# Patient Record
Sex: MALE | Race: BLACK OR AFRICAN AMERICAN | NOT HISPANIC OR LATINO | Employment: OTHER | URBAN - METROPOLITAN AREA
[De-identification: names, ages, dates, MRNs, and addresses within clinical notes are randomized per-mention and may not be internally consistent; named-entity substitution may affect disease eponyms.]

---

## 2017-09-27 ENCOUNTER — LAB VISIT (OUTPATIENT)
Dept: LAB | Facility: HOSPITAL | Age: 77
End: 2017-09-27
Payer: MEDICARE

## 2017-09-27 ENCOUNTER — OFFICE VISIT (OUTPATIENT)
Dept: FAMILY MEDICINE | Facility: CLINIC | Age: 77
End: 2017-09-27
Payer: MEDICARE

## 2017-09-27 VITALS
WEIGHT: 315 LBS | HEIGHT: 74 IN | BODY MASS INDEX: 40.43 KG/M2 | SYSTOLIC BLOOD PRESSURE: 136 MMHG | DIASTOLIC BLOOD PRESSURE: 70 MMHG | RESPIRATION RATE: 18 BRPM | TEMPERATURE: 97 F | OXYGEN SATURATION: 97 % | HEART RATE: 90 BPM

## 2017-09-27 DIAGNOSIS — J30.9 ALLERGIC RHINITIS, UNSPECIFIED CHRONICITY, UNSPECIFIED SEASONALITY, UNSPECIFIED TRIGGER: ICD-10-CM

## 2017-09-27 DIAGNOSIS — Z79.4 TYPE 2 DIABETES MELLITUS WITH HYPERGLYCEMIA, WITH LONG-TERM CURRENT USE OF INSULIN: ICD-10-CM

## 2017-09-27 DIAGNOSIS — R42 DIZZINESS: ICD-10-CM

## 2017-09-27 DIAGNOSIS — J44.9 CHRONIC OBSTRUCTIVE PULMONARY DISEASE, UNSPECIFIED COPD TYPE: ICD-10-CM

## 2017-09-27 DIAGNOSIS — I10 ESSENTIAL HYPERTENSION: ICD-10-CM

## 2017-09-27 DIAGNOSIS — I25.10 CORONARY ARTERY DISEASE, ANGINA PRESENCE UNSPECIFIED, UNSPECIFIED VESSEL OR LESION TYPE, UNSPECIFIED WHETHER NATIVE OR TRANSPLANTED HEART: ICD-10-CM

## 2017-09-27 DIAGNOSIS — M17.0 OSTEOARTHRITIS OF BOTH KNEES, UNSPECIFIED OSTEOARTHRITIS TYPE: ICD-10-CM

## 2017-09-27 DIAGNOSIS — E78.00 HYPERCHOLESTEROLEMIA: ICD-10-CM

## 2017-09-27 DIAGNOSIS — E11.65 TYPE 2 DIABETES MELLITUS WITH HYPERGLYCEMIA, WITH LONG-TERM CURRENT USE OF INSULIN: ICD-10-CM

## 2017-09-27 DIAGNOSIS — G47.30 SLEEP APNEA, UNSPECIFIED TYPE: ICD-10-CM

## 2017-09-27 DIAGNOSIS — M54.50 CHRONIC LOW BACK PAIN WITHOUT SCIATICA, UNSPECIFIED BACK PAIN LATERALITY: ICD-10-CM

## 2017-09-27 DIAGNOSIS — G89.29 CHRONIC LOW BACK PAIN WITHOUT SCIATICA, UNSPECIFIED BACK PAIN LATERALITY: ICD-10-CM

## 2017-09-27 DIAGNOSIS — N40.0 BENIGN PROSTATIC HYPERPLASIA, PRESENCE OF LOWER URINARY TRACT SYMPTOMS UNSPECIFIED: ICD-10-CM

## 2017-09-27 PROBLEM — M54.9 CHRONIC BACK PAIN: Status: ACTIVE | Noted: 2017-09-27

## 2017-09-27 LAB
ALBUMIN SERPL BCP-MCNC: 3.5 G/DL
ALP SERPL-CCNC: 107 U/L
ALT SERPL W/O P-5'-P-CCNC: 19 U/L
ANION GAP SERPL CALC-SCNC: 12 MMOL/L
AST SERPL-CCNC: 21 U/L
BASOPHILS # BLD AUTO: 0.01 K/UL
BASOPHILS NFR BLD: 0.1 %
BILIRUB SERPL-MCNC: 0.4 MG/DL
BUN SERPL-MCNC: 21 MG/DL
CALCIUM SERPL-MCNC: 9.1 MG/DL
CHLORIDE SERPL-SCNC: 101 MMOL/L
CHOLEST SERPL-MCNC: 130 MG/DL
CHOLEST/HDLC SERPL: 2.5 {RATIO}
CO2 SERPL-SCNC: 26 MMOL/L
CREAT SERPL-MCNC: 1.2 MG/DL
DIFFERENTIAL METHOD: ABNORMAL
EOSINOPHIL # BLD AUTO: 0.2 K/UL
EOSINOPHIL NFR BLD: 1.8 %
ERYTHROCYTE [DISTWIDTH] IN BLOOD BY AUTOMATED COUNT: 14.6 %
EST. GFR  (AFRICAN AMERICAN): >60 ML/MIN/1.73 M^2
EST. GFR  (NON AFRICAN AMERICAN): 58 ML/MIN/1.73 M^2
ESTIMATED AVG GLUCOSE: 209 MG/DL
GLUCOSE SERPL-MCNC: 264 MG/DL
HBA1C MFR BLD HPLC: 8.9 %
HCT VFR BLD AUTO: 40.3 %
HDLC SERPL-MCNC: 51 MG/DL
HDLC SERPL: 39.2 %
HGB BLD-MCNC: 13.1 G/DL
LDLC SERPL CALC-MCNC: 65.8 MG/DL
LYMPHOCYTES # BLD AUTO: 2.5 K/UL
LYMPHOCYTES NFR BLD: 26.2 %
MCH RBC QN AUTO: 31 PG
MCHC RBC AUTO-ENTMCNC: 32.5 G/DL
MCV RBC AUTO: 95 FL
MONOCYTES # BLD AUTO: 0.9 K/UL
MONOCYTES NFR BLD: 9.3 %
NEUTROPHILS # BLD AUTO: 6 K/UL
NEUTROPHILS NFR BLD: 62.5 %
NONHDLC SERPL-MCNC: 79 MG/DL
PLATELET # BLD AUTO: 207 K/UL
PMV BLD AUTO: 10.9 FL
POTASSIUM SERPL-SCNC: 4.1 MMOL/L
PROT SERPL-MCNC: 7.1 G/DL
RBC # BLD AUTO: 4.23 M/UL
SODIUM SERPL-SCNC: 139 MMOL/L
TRIGL SERPL-MCNC: 66 MG/DL
TSH SERPL DL<=0.005 MIU/L-ACNC: 3.67 UIU/ML
WBC # BLD AUTO: 9.57 K/UL

## 2017-09-27 PROCEDURE — 80053 COMPREHEN METABOLIC PANEL: CPT

## 2017-09-27 PROCEDURE — 1125F AMNT PAIN NOTED PAIN PRSNT: CPT | Mod: S$GLB,,, | Performed by: INTERNAL MEDICINE

## 2017-09-27 PROCEDURE — 3008F BODY MASS INDEX DOCD: CPT | Mod: S$GLB,,, | Performed by: INTERNAL MEDICINE

## 2017-09-27 PROCEDURE — 80061 LIPID PANEL: CPT

## 2017-09-27 PROCEDURE — G0008 ADMIN INFLUENZA VIRUS VAC: HCPCS | Mod: S$GLB,,, | Performed by: INTERNAL MEDICINE

## 2017-09-27 PROCEDURE — 36415 COLL VENOUS BLD VENIPUNCTURE: CPT | Mod: PO

## 2017-09-27 PROCEDURE — 85025 COMPLETE CBC W/AUTO DIFF WBC: CPT

## 2017-09-27 PROCEDURE — 99999 PR PBB SHADOW E&M-NEW PATIENT-LVL V: CPT | Mod: PBBFAC,,, | Performed by: INTERNAL MEDICINE

## 2017-09-27 PROCEDURE — 84443 ASSAY THYROID STIM HORMONE: CPT

## 2017-09-27 PROCEDURE — 99205 OFFICE O/P NEW HI 60 MIN: CPT | Mod: S$GLB,,, | Performed by: INTERNAL MEDICINE

## 2017-09-27 PROCEDURE — 83036 HEMOGLOBIN GLYCOSYLATED A1C: CPT

## 2017-09-27 PROCEDURE — 1159F MED LIST DOCD IN RCRD: CPT | Mod: S$GLB,,, | Performed by: INTERNAL MEDICINE

## 2017-09-27 PROCEDURE — 90662 IIV NO PRSV INCREASED AG IM: CPT | Mod: S$GLB,,, | Performed by: INTERNAL MEDICINE

## 2017-09-27 RX ORDER — HYDROCODONE BITARTRATE AND ACETAMINOPHEN 10; 325 MG/1; MG/1
TABLET ORAL
Refills: 0 | COMMUNITY
Start: 2017-07-03

## 2017-09-27 RX ORDER — VALSARTAN 80 MG/1
80 TABLET ORAL DAILY
COMMUNITY
End: 2017-10-12

## 2017-09-27 RX ORDER — MECLIZINE HCL 12.5 MG 12.5 MG/1
12.5 TABLET ORAL 3 TIMES DAILY PRN
Qty: 60 TABLET | Refills: 1 | Status: SHIPPED | OUTPATIENT
Start: 2017-09-27 | End: 2017-10-12

## 2017-09-27 RX ORDER — BUMETANIDE 2 MG/1
TABLET ORAL
Refills: 5 | COMMUNITY
Start: 2017-07-11 | End: 2018-10-26 | Stop reason: SDUPTHER

## 2017-09-27 RX ORDER — FLUTICASONE PROPIONATE 50 MCG
2 SPRAY, SUSPENSION (ML) NASAL DAILY
Qty: 16 BOTTLE | Refills: 6 | Status: SHIPPED | OUTPATIENT
Start: 2017-09-27 | End: 2017-11-03

## 2017-09-27 RX ORDER — POTASSIUM CHLORIDE 750 MG/1
TABLET, EXTENDED RELEASE ORAL
Refills: 3 | COMMUNITY
Start: 2017-07-03 | End: 2018-01-29 | Stop reason: SDUPTHER

## 2017-09-27 RX ORDER — ISOSORBIDE DINITRATE 30 MG/1
TABLET ORAL
Refills: 5 | COMMUNITY
Start: 2017-07-25 | End: 2017-10-12

## 2017-09-27 RX ORDER — TAMSULOSIN HYDROCHLORIDE 0.4 MG/1
CAPSULE ORAL
Refills: 11 | COMMUNITY
Start: 2017-07-24

## 2017-09-27 RX ORDER — ALBUTEROL 2 MG/1
2 TABLET ORAL 4 TIMES DAILY
COMMUNITY
End: 2021-07-23

## 2017-09-27 RX ORDER — ATORVASTATIN CALCIUM 40 MG/1
40 TABLET, FILM COATED ORAL DAILY
COMMUNITY
End: 2018-01-29 | Stop reason: SDUPTHER

## 2017-09-27 NOTE — PROGRESS NOTES
Subjective:       Patient ID: Jose Luis Martinez is a 77 y.o. male.    Chief Complaint: Establish Care; Sinus Problem; Medication Refill; Hypertension; Hyperlipidemia; Diabetes; COPD; Coronary Artery Disease; and Benign Prostatic Hypertrophy  -est care--------  Sinus Problem   This is a recurrent problem. Associated symptoms include congestion and shortness of breath. Pertinent negatives include no chills, coughing, diaphoresis, ear pain, headaches, neck pain, sinus pressure, sneezing or sore throat.   Medication Refill   Associated symptoms include congestion. Pertinent negatives include no abdominal pain, arthralgias, chest pain, chills, coughing, diaphoresis, fatigue, fever, headaches, joint swelling, myalgias, nausea, neck pain, numbness, rash, sore throat, vomiting or weakness.   Hypertension   This is a chronic problem. The problem is controlled. Associated symptoms include shortness of breath. Pertinent negatives include no chest pain, headaches, neck pain or palpitations.   Hyperlipidemia   This is a chronic problem. Associated symptoms include shortness of breath. Pertinent negatives include no chest pain or myalgias.   Diabetes   He presents for his follow-up diabetic visit. He has type 2 diabetes mellitus. Pertinent negatives for hypoglycemia include no confusion, dizziness, headaches, nervousness/anxiousness, pallor, seizures, speech difficulty or tremors. Pertinent negatives for diabetes include no chest pain, no fatigue, no polydipsia, no polyphagia, no polyuria and no weakness.   COPD   This is a chronic problem. Associated symptoms include congestion. Pertinent negatives include no abdominal pain, arthralgias, chest pain, chills, coughing, diaphoresis, fatigue, fever, headaches, joint swelling, myalgias, nausea, neck pain, numbness, rash, sore throat, vomiting or weakness.   Coronary Artery Disease   Presents for follow-up visit. Symptoms include shortness of breath. Pertinent negatives include no chest  pain, chest tightness, dizziness, leg swelling or palpitations. Risk factors include hyperlipidemia. The symptoms have been stable.   Benign Prostatic Hypertrophy   This is a chronic problem. Irritative symptoms do not include frequency or urgency. Pertinent negatives include no chills, dysuria, hematuria, nausea or vomiting.     Review of Systems   Constitutional: Negative for activity change, appetite change, chills, diaphoresis, fatigue, fever and unexpected weight change.   HENT: Positive for congestion. Negative for drooling, ear discharge, ear pain, facial swelling, hearing loss, mouth sores, nosebleeds, postnasal drip, rhinorrhea, sinus pressure, sneezing, sore throat, tinnitus, trouble swallowing and voice change.    Eyes: Negative for photophobia.   Respiratory: Positive for shortness of breath. Negative for apnea, cough, choking, chest tightness and wheezing.    Cardiovascular: Negative for chest pain, palpitations and leg swelling.   Gastrointestinal: Negative for abdominal distention, abdominal pain, anal bleeding, blood in stool, constipation, diarrhea, nausea and vomiting.   Endocrine: Negative for cold intolerance, heat intolerance, polydipsia, polyphagia and polyuria.   Genitourinary: Negative for difficulty urinating, dysuria, enuresis, flank pain, frequency, genital sores, hematuria and urgency.   Musculoskeletal: Positive for gait problem. Negative for arthralgias, back pain, joint swelling, myalgias, neck pain and neck stiffness.   Skin: Negative for color change, pallor, rash and wound.   Allergic/Immunologic: Negative for food allergies and immunocompromised state.   Neurological: Negative for dizziness, tremors, seizures, syncope, facial asymmetry, speech difficulty, weakness, light-headedness, numbness and headaches.   Hematological: Negative for adenopathy. Does not bruise/bleed easily.   Psychiatric/Behavioral: Negative for agitation, behavioral problems, confusion, decreased concentration,  dysphoric mood, hallucinations, self-injury, sleep disturbance and suicidal ideas. The patient is not nervous/anxious and is not hyperactive.        Objective:      Physical Exam   Constitutional: He is oriented to person, place, and time. He appears well-developed and well-nourished. No distress.   HENT:   Head: Normocephalic and atraumatic.   Eyes: Pupils are equal, round, and reactive to light.   Neck: Normal range of motion. Neck supple. Carotid bruit is not present.   Cardiovascular: Normal rate, regular rhythm, normal heart sounds and intact distal pulses.    Pulmonary/Chest: Effort normal and breath sounds normal. No respiratory distress. He has no wheezes. He has no rales. He exhibits no tenderness.   Abdominal: Soft. Bowel sounds are normal. He exhibits no distension. There is no tenderness. There is no rebound and no guarding.   Musculoskeletal: Normal range of motion. He exhibits no edema or tenderness.   Neurological: He is alert and oriented to person, place, and time.   Skin: Skin is warm and dry. No rash noted. He is not diaphoretic. No erythema. No pallor.   Psychiatric: He has a normal mood and affect. His behavior is normal. Judgment and thought content normal.   Nursing note and vitals reviewed.      Assessment:       1. Sleep apnea, unspecified type    2. Chronic obstructive pulmonary disease, unspecified COPD type    3. Essential hypertension    4. Hypercholesterolemia    5. Type 2 diabetes mellitus with hyperglycemia, with long-term current use of insulin    6. Benign prostatic hyperplasia, presence of lower urinary tract symptoms unspecified    7. Coronary artery disease, angina presence unspecified, unspecified vessel or lesion type, unspecified whether native or transplanted heart    8. Chronic low back pain without sciatica, unspecified back pain laterality    9. Osteoarthritis of both knees, unspecified osteoarthritis type    10. Allergic rhinitis, unspecified chronicity, unspecified  seasonality, unspecified trigger    11. Dizziness        Plan:         stable------------continue meds.              Notes/labs reviewed.             Sees cards dr mccormick, urology dr heredia.                 Check cmp,cbc,hga1c,lipids,tsh.                ----pulm consult for copd---------wants to wait on this-               Colon at [facility].                  F/u prn or 3 months.

## 2017-09-28 ENCOUNTER — TELEPHONE (OUTPATIENT)
Dept: FAMILY MEDICINE | Facility: CLINIC | Age: 77
End: 2017-09-28

## 2017-10-12 ENCOUNTER — OFFICE VISIT (OUTPATIENT)
Dept: FAMILY MEDICINE | Facility: CLINIC | Age: 77
End: 2017-10-12
Payer: MEDICARE

## 2017-10-12 VITALS
TEMPERATURE: 97 F | HEART RATE: 78 BPM | SYSTOLIC BLOOD PRESSURE: 130 MMHG | HEIGHT: 74 IN | WEIGHT: 315 LBS | RESPIRATION RATE: 18 BRPM | DIASTOLIC BLOOD PRESSURE: 76 MMHG | BODY MASS INDEX: 40.43 KG/M2 | OXYGEN SATURATION: 99 %

## 2017-10-12 DIAGNOSIS — I73.9 PAD (PERIPHERAL ARTERY DISEASE): ICD-10-CM

## 2017-10-12 DIAGNOSIS — R26.81 UNSTEADY GAIT: ICD-10-CM

## 2017-10-12 DIAGNOSIS — J30.9 ALLERGIC RHINITIS, UNSPECIFIED CHRONICITY, UNSPECIFIED SEASONALITY, UNSPECIFIED TRIGGER: Primary | ICD-10-CM

## 2017-10-12 DIAGNOSIS — Z79.4 TYPE 2 DIABETES MELLITUS WITH HYPERGLYCEMIA, WITH LONG-TERM CURRENT USE OF INSULIN: ICD-10-CM

## 2017-10-12 DIAGNOSIS — E11.65 TYPE 2 DIABETES MELLITUS WITH HYPERGLYCEMIA, WITH LONG-TERM CURRENT USE OF INSULIN: ICD-10-CM

## 2017-10-12 DIAGNOSIS — J44.9 CHRONIC OBSTRUCTIVE PULMONARY DISEASE, UNSPECIFIED COPD TYPE: ICD-10-CM

## 2017-10-12 PROCEDURE — 99999 PR PBB SHADOW E&M-EST. PATIENT-LVL V: CPT | Mod: PBBFAC,,, | Performed by: INTERNAL MEDICINE

## 2017-10-12 PROCEDURE — 99213 OFFICE O/P EST LOW 20 MIN: CPT | Mod: S$GLB,,, | Performed by: INTERNAL MEDICINE

## 2017-10-12 RX ORDER — ISOSORBIDE MONONITRATE 30 MG/1
TABLET, EXTENDED RELEASE ORAL
COMMUNITY
Start: 2017-09-28

## 2017-10-12 RX ORDER — BLOOD SUGAR DIAGNOSTIC
STRIP MISCELLANEOUS
COMMUNITY
Start: 2017-09-28 | End: 2017-12-28 | Stop reason: SDUPTHER

## 2017-10-12 RX ORDER — PEN NEEDLE, DIABETIC 31 GX5/16"
NEEDLE, DISPOSABLE MISCELLANEOUS
COMMUNITY
Start: 2017-08-18 | End: 2023-05-19 | Stop reason: SDUPTHER

## 2017-10-12 RX ORDER — VALSARTAN 160 MG/1
TABLET ORAL
COMMUNITY
Start: 2017-09-21 | End: 2018-09-26 | Stop reason: SDUPTHER

## 2017-10-12 RX ORDER — SYRING-NEEDL,DISP,INSUL,0.3 ML 30 GX5/16"
SYRINGE, EMPTY DISPOSABLE MISCELLANEOUS
COMMUNITY
Start: 2017-09-19 | End: 2019-06-07 | Stop reason: SDUPTHER

## 2017-10-12 RX ORDER — ALBUTEROL SULFATE 90 UG/1
AEROSOL, METERED RESPIRATORY (INHALATION)
COMMUNITY
Start: 2017-10-02 | End: 2018-06-13 | Stop reason: SDUPTHER

## 2017-10-12 RX ORDER — MECLIZINE HYDROCHLORIDE 25 MG/1
TABLET ORAL
COMMUNITY
Start: 2017-09-27 | End: 2018-08-16

## 2017-10-12 NOTE — PROGRESS NOTES
Subjective:       Patient ID: Jose Luis Martinez is a 77 y.o. male.    Chief Complaint: Follow-up and Shortness of Breath  -[facility] er f/u from past Saturday--------------for sob--------------dx'd---copd exacerbation-treated with nebs/steroids---------breathing good today-----has sinus congestion--------HPI  Review of Systems   Constitutional: Negative for chills and fever.   HENT: Positive for congestion, postnasal drip, rhinorrhea and sinus pressure. Negative for sore throat.    Respiratory: Negative for apnea, cough, choking, chest tightness, shortness of breath, wheezing and stridor.    Cardiovascular: Negative for chest pain and palpitations.   Gastrointestinal: Negative for abdominal pain, nausea and vomiting.   Genitourinary: Negative.    Neurological: Negative for tremors, seizures, syncope and speech difficulty.   Psychiatric/Behavioral: Negative for agitation, behavioral problems and confusion.       Objective:      Physical Exam   Constitutional: He is oriented to person, place, and time. He appears well-developed and well-nourished. No distress.   HENT:   Head: Normocephalic and atraumatic.   Eyes: Pupils are equal, round, and reactive to light.   Neck: Normal range of motion. Neck supple. Carotid bruit is not present.   Cardiovascular: Normal rate, regular rhythm, normal heart sounds and intact distal pulses.    Pulmonary/Chest: Effort normal and breath sounds normal. No respiratory distress. He has no wheezes. He has no rales. He exhibits no tenderness.   Abdominal: Soft. Bowel sounds are normal.   Musculoskeletal: Normal range of motion. He exhibits no edema or tenderness.   Neurological: He is alert and oriented to person, place, and time.   Skin: Skin is warm and dry. No rash noted. He is not diaphoretic. No erythema. No pallor.   Psychiatric: He has a normal mood and affect. His behavior is normal. Judgment and thought content normal.   Nursing note and vitals reviewed.      Assessment:       1. Allergic  rhinitis, unspecified chronicity, unspecified seasonality, unspecified trigger    2. PAD (peripheral artery disease)    3. Unsteady gait    4. Chronic obstructive pulmonary disease, unspecified COPD type    5. Type 2 diabetes mellitus with hyperglycemia, with long-term current use of insulin        Plan:        stable--------continue meds, watch diet.              Has f/u with pulm dr. Lai.         Notes/labs reviewed.               F/u as scheduled.

## 2017-10-20 ENCOUNTER — TELEPHONE (OUTPATIENT)
Dept: FAMILY MEDICINE | Facility: CLINIC | Age: 77
End: 2017-10-20

## 2017-10-20 ENCOUNTER — NURSE TRIAGE (OUTPATIENT)
Dept: ADMINISTRATIVE | Facility: CLINIC | Age: 77
End: 2017-10-20

## 2017-10-20 NOTE — TELEPHONE ENCOUNTER
----- Message from Anna Jones sent at 10/20/2017  3:04 PM CDT -----  Contact: mrs dunham -spouse  Would like to consult with nurse regarding patient's breathing. Please call back at [phone].        Thanks,  Anna Jones

## 2017-10-20 NOTE — TELEPHONE ENCOUNTER
Reason for Disposition   MODERATE difficulty breathing (e.g., speaks in phrases, SOB even at rest, pulse 100-120) of new onset or worse than normal   BP > 160 / 100 and any cardiac or neurologic symptoms (e.g., chest pain, difficulty breathing, unsteady gait, blurred vision)    Protocols used: ST BREATHING DIFFICULTY-A-OH, ST HIGH BLOOD PRESSURE-A-OH    Mr. Martinez states he is experiencing difficulty breathing and his chest feels tight. Patient states his blood pressure is currently 150/114. His blood sugar is 202 two hours after taking 30 units of insulin. Patient states he has not eaten.

## 2017-10-30 ENCOUNTER — OFFICE VISIT (OUTPATIENT)
Dept: FAMILY MEDICINE | Facility: CLINIC | Age: 77
End: 2017-10-30
Payer: MEDICARE

## 2017-10-30 ENCOUNTER — LAB VISIT (OUTPATIENT)
Dept: LAB | Facility: HOSPITAL | Age: 77
End: 2017-10-30
Payer: MEDICARE

## 2017-10-30 VITALS
HEART RATE: 89 BPM | BODY MASS INDEX: 40.43 KG/M2 | HEIGHT: 74 IN | WEIGHT: 315 LBS | RESPIRATION RATE: 16 BRPM | DIASTOLIC BLOOD PRESSURE: 82 MMHG | TEMPERATURE: 96 F | OXYGEN SATURATION: 96 % | SYSTOLIC BLOOD PRESSURE: 122 MMHG

## 2017-10-30 DIAGNOSIS — M17.0 OSTEOARTHRITIS OF BOTH KNEES, UNSPECIFIED OSTEOARTHRITIS TYPE: ICD-10-CM

## 2017-10-30 DIAGNOSIS — G47.30 SLEEP APNEA, UNSPECIFIED TYPE: ICD-10-CM

## 2017-10-30 DIAGNOSIS — I73.9 PAD (PERIPHERAL ARTERY DISEASE): ICD-10-CM

## 2017-10-30 DIAGNOSIS — N40.0 BENIGN PROSTATIC HYPERPLASIA, UNSPECIFIED WHETHER LOWER URINARY TRACT SYMPTOMS PRESENT: ICD-10-CM

## 2017-10-30 DIAGNOSIS — I10 ESSENTIAL HYPERTENSION: ICD-10-CM

## 2017-10-30 DIAGNOSIS — Z79.4 TYPE 2 DIABETES MELLITUS WITH HYPERGLYCEMIA, WITH LONG-TERM CURRENT USE OF INSULIN: ICD-10-CM

## 2017-10-30 DIAGNOSIS — E11.65 TYPE 2 DIABETES MELLITUS WITH HYPERGLYCEMIA, WITH LONG-TERM CURRENT USE OF INSULIN: ICD-10-CM

## 2017-10-30 DIAGNOSIS — M54.50 CHRONIC LOW BACK PAIN WITHOUT SCIATICA, UNSPECIFIED BACK PAIN LATERALITY: ICD-10-CM

## 2017-10-30 DIAGNOSIS — J44.9 CHRONIC OBSTRUCTIVE PULMONARY DISEASE, UNSPECIFIED COPD TYPE: ICD-10-CM

## 2017-10-30 DIAGNOSIS — R42 DIZZINESS: ICD-10-CM

## 2017-10-30 DIAGNOSIS — J30.9 ALLERGIC RHINITIS, UNSPECIFIED CHRONICITY, UNSPECIFIED SEASONALITY, UNSPECIFIED TRIGGER: Primary | ICD-10-CM

## 2017-10-30 DIAGNOSIS — E78.00 HYPERCHOLESTEROLEMIA: ICD-10-CM

## 2017-10-30 DIAGNOSIS — I25.10 CORONARY ARTERY DISEASE, ANGINA PRESENCE UNSPECIFIED, UNSPECIFIED VESSEL OR LESION TYPE, UNSPECIFIED WHETHER NATIVE OR TRANSPLANTED HEART: ICD-10-CM

## 2017-10-30 DIAGNOSIS — G89.29 CHRONIC LOW BACK PAIN WITHOUT SCIATICA, UNSPECIFIED BACK PAIN LATERALITY: ICD-10-CM

## 2017-10-30 LAB
BASOPHILS # BLD AUTO: 0.03 K/UL
BASOPHILS NFR BLD: 0.3 %
DIFFERENTIAL METHOD: ABNORMAL
EOSINOPHIL # BLD AUTO: 0.1 K/UL
EOSINOPHIL NFR BLD: 1 %
ERYTHROCYTE [DISTWIDTH] IN BLOOD BY AUTOMATED COUNT: 14.2 %
HCT VFR BLD AUTO: 41.2 %
HGB BLD-MCNC: 12.8 G/DL
IMM GRANULOCYTES # BLD AUTO: 0.02 K/UL
IMM GRANULOCYTES NFR BLD AUTO: 0.2 %
LYMPHOCYTES # BLD AUTO: 2.4 K/UL
LYMPHOCYTES NFR BLD: 26 %
MCH RBC QN AUTO: 29.6 PG
MCHC RBC AUTO-ENTMCNC: 31.1 G/DL
MCV RBC AUTO: 95 FL
MONOCYTES # BLD AUTO: 0.8 K/UL
MONOCYTES NFR BLD: 8.1 %
NEUTROPHILS # BLD AUTO: 6 K/UL
NEUTROPHILS NFR BLD: 64.4 %
NRBC BLD-RTO: 0 /100 WBC
PLATELET # BLD AUTO: 215 K/UL
PMV BLD AUTO: 10.9 FL
RBC # BLD AUTO: 4.32 M/UL
WBC # BLD AUTO: 9.37 K/UL

## 2017-10-30 PROCEDURE — 99999 PR PBB SHADOW E&M-EST. PATIENT-LVL IV: CPT | Mod: PBBFAC,,, | Performed by: INTERNAL MEDICINE

## 2017-10-30 PROCEDURE — 99215 OFFICE O/P EST HI 40 MIN: CPT | Mod: S$GLB,,, | Performed by: INTERNAL MEDICINE

## 2017-10-30 PROCEDURE — 36415 COLL VENOUS BLD VENIPUNCTURE: CPT | Mod: PO

## 2017-10-30 PROCEDURE — 85025 COMPLETE CBC W/AUTO DIFF WBC: CPT

## 2017-10-30 RX ORDER — FLUTICASONE PROPIONATE AND SALMETEROL 100; 50 UG/1; UG/1
1 POWDER RESPIRATORY (INHALATION) 2 TIMES DAILY
Qty: 60 EACH | Refills: 1 | Status: SHIPPED | OUTPATIENT
Start: 2017-10-30 | End: 2018-10-15

## 2017-10-30 NOTE — PROGRESS NOTES
Subjective:       Patient ID: Jose Luis Martinez is a 77 y.o. male.    Chief Complaint: Follow-up; Shortness of Breath; COPD; Hypertension; Hyperlipidemia; and Diabetes  --[facility] er f/u for SOB/copd--------missed his pulm f/u from previous er visit---------  Shortness of Breath   This is a recurrent problem. Pertinent negatives include no abdominal pain, chest pain, ear pain, fever, headaches, leg swelling, neck pain, rash, rhinorrhea, sore throat, vomiting or wheezing. His past medical history is significant for COPD.   COPD   This is a chronic problem. Associated symptoms include congestion and coughing. Pertinent negatives include no abdominal pain, arthralgias, chest pain, chills, diaphoresis, fatigue, fever, headaches, joint swelling, myalgias, nausea, neck pain, numbness, rash, sore throat, vomiting or weakness.   Hypertension   The problem is controlled. Associated symptoms include shortness of breath. Pertinent negatives include no chest pain, headaches, neck pain or palpitations.   Hyperlipidemia   Associated symptoms include shortness of breath. Pertinent negatives include no chest pain or myalgias. Current antihyperlipidemic treatment includes statins.   Diabetes   He presents for his follow-up diabetic visit. He has type 2 diabetes mellitus. Pertinent negatives for hypoglycemia include no confusion, dizziness, headaches, nervousness/anxiousness, pallor, seizures, speech difficulty or tremors. Pertinent negatives for diabetes include no chest pain, no fatigue, no polydipsia, no polyphagia, no polyuria and no weakness.     Review of Systems   Constitutional: Negative for activity change, appetite change, chills, diaphoresis, fatigue, fever and unexpected weight change.   HENT: Positive for congestion. Negative for drooling, ear discharge, ear pain, facial swelling, hearing loss, mouth sores, nosebleeds, postnasal drip, rhinorrhea, sinus pressure, sneezing, sore throat, tinnitus, trouble swallowing and voice  change.    Eyes: Negative for photophobia, redness and visual disturbance.   Respiratory: Positive for cough and shortness of breath. Negative for apnea, choking, chest tightness and wheezing.    Cardiovascular: Negative for chest pain, palpitations and leg swelling.   Gastrointestinal: Negative for abdominal distention, abdominal pain, anal bleeding, blood in stool, constipation, diarrhea, nausea and vomiting.   Endocrine: Negative for cold intolerance, heat intolerance, polydipsia, polyphagia and polyuria.   Genitourinary: Negative for difficulty urinating, dysuria, enuresis, flank pain, frequency, genital sores, hematuria and urgency.   Musculoskeletal: Negative for arthralgias, back pain, gait problem, joint swelling, myalgias, neck pain and neck stiffness.   Skin: Negative for color change, pallor, rash and wound.   Allergic/Immunologic: Negative for food allergies and immunocompromised state.   Neurological: Negative for dizziness, tremors, seizures, syncope, facial asymmetry, speech difficulty, weakness, light-headedness, numbness and headaches.   Hematological: Negative for adenopathy. Does not bruise/bleed easily.   Psychiatric/Behavioral: Negative for agitation, behavioral problems, confusion, decreased concentration, dysphoric mood, hallucinations, self-injury, sleep disturbance and suicidal ideas. The patient is not nervous/anxious and is not hyperactive.        Objective:      Physical Exam   Constitutional: He is oriented to person, place, and time. He appears well-developed and well-nourished. No distress.   HENT:   Head: Normocephalic and atraumatic.   Eyes: Pupils are equal, round, and reactive to light.   Neck: Normal range of motion. Neck supple. Carotid bruit is not present.   Cardiovascular: Normal rate, regular rhythm, normal heart sounds and intact distal pulses.    Pulmonary/Chest: Effort normal. No respiratory distress. He has wheezes. He has no rales. He exhibits no tenderness.   Abdominal:  Soft. Bowel sounds are normal. He exhibits no distension. There is no tenderness. There is no rebound and no guarding.   Musculoskeletal: Normal range of motion. He exhibits no edema or tenderness.   Neurological: He is alert and oriented to person, place, and time. Coordination normal.   Skin: Skin is warm and dry. No rash noted. He is not diaphoretic. No erythema. No pallor.   Psychiatric: He has a normal mood and affect. His behavior is normal. Judgment and thought content normal.   Nursing note and vitals reviewed.      Assessment:       1. Allergic rhinitis, unspecified chronicity, unspecified seasonality, unspecified trigger    2. Benign prostatic hyperplasia, unspecified whether lower urinary tract symptoms present    3. Coronary artery disease, angina presence unspecified, unspecified vessel or lesion type, unspecified whether native or transplanted heart    4. Chronic low back pain without sciatica, unspecified back pain laterality    5. Chronic obstructive pulmonary disease, unspecified COPD type    6. Essential hypertension    7. Hypercholesterolemia    8. Osteoarthritis of both knees, unspecified osteoarthritis type    9. PAD (peripheral artery disease)    10. Sleep apnea, unspecified type    11. Type 2 diabetes mellitus with hyperglycemia, with long-term current use of insulin    12. Dizziness        Plan:        continue meds.           Notes/labs reviewed.           Check cbc,            Sees cards dr mccormick, urology dr heredia.         F/u with pulm dr galeano.           Restart advair 100/50 bid and albuterol inh prn.             -----ent consult for sinus congestion-----             Colon at [facility].       F/u 6 weeks.

## 2017-11-02 ENCOUNTER — HOSPITAL ENCOUNTER (OUTPATIENT)
Dept: RADIOLOGY | Facility: HOSPITAL | Age: 77
Discharge: HOME | End: 2017-11-02
Attending: PHYSICIAN ASSISTANT
Payer: MEDICARE

## 2017-11-02 ENCOUNTER — OFFICE VISIT (OUTPATIENT)
Dept: OTOLARYNGOLOGY | Facility: CLINIC | Age: 77
End: 2017-11-02
Payer: MEDICARE

## 2017-11-02 ENCOUNTER — TELEPHONE (OUTPATIENT)
Dept: OTOLARYNGOLOGY | Facility: CLINIC | Age: 77
End: 2017-11-02

## 2017-11-02 VITALS — DIASTOLIC BLOOD PRESSURE: 67 MMHG | SYSTOLIC BLOOD PRESSURE: 131 MMHG | TEMPERATURE: 98 F | HEART RATE: 80 BPM

## 2017-11-02 DIAGNOSIS — R09.81 CHRONIC NASAL CONGESTION: Primary | ICD-10-CM

## 2017-11-02 DIAGNOSIS — J34.3 HYPERTROPHY OF BOTH INFERIOR NASAL TURBINATES: ICD-10-CM

## 2017-11-02 DIAGNOSIS — R09.81 CHRONIC NASAL CONGESTION: ICD-10-CM

## 2017-11-02 PROCEDURE — 99204 OFFICE O/P NEW MOD 45 MIN: CPT | Mod: S$GLB,,, | Performed by: PHYSICIAN ASSISTANT

## 2017-11-02 PROCEDURE — 70220 X-RAY EXAM OF SINUSES: CPT | Mod: 26,,, | Performed by: RADIOLOGY

## 2017-11-02 PROCEDURE — 99999 PR PBB SHADOW E&M-EST. PATIENT-LVL IV: CPT | Mod: PBBFAC,,, | Performed by: PHYSICIAN ASSISTANT

## 2017-11-02 PROCEDURE — 70220 X-RAY EXAM OF SINUSES: CPT | Mod: TC,PO

## 2017-11-02 NOTE — LETTER
November 2, 2017      Matti Mejia MD  [address] [place] [address]           [facility] - ENT  [address] [place] [address]  Phone: [phone]  Fax: [phone]          Patient: Jose Luis Martinez   MR Number: [id number]   YOB: 1940   Date of Visit: 11/2/2017       Dear Dr. Matti Mejia:    Thank you for referring Jose Luis Martinez to me for evaluation. Attached you will find relevant portions of my assessment and plan of care.    If you have questions, please do not hesitate to call me. I look forward to following Jose Luis Martinez along with you.    Sincerely,    MOSES Díazosure  CC:  No Recipients    If you would like to receive this communication electronically, please contact [email or web address] or [phone] to request more information on [employer] access.    For providers and/or their staff who would like to refer a patient to [facility], please contact us through our one-stop-shop provider referral line, [facility], at [phone].    If you feel you have received this communication in error or would no longer like to receive these types of communications, please e-mail [email or web address]

## 2017-11-02 NOTE — PROGRESS NOTES
Referring Provider:    Matti Mejia Md  [address] [place] [address]  Subjective:   Patient: Jose Luis Martinez [id number], :1940   Visit date:2017 11:08 AM    Chief Complaint:  Other (runny nose, allergies)    HPI:  Jose Luis is a 77 y.o. male who I was asked to see in consultation for evaluation of the following issue(s): chronic nasal congestion for years; alternating sides; worse over past 6 months and worse when lying down.  He has pain and pressure in his face and forehead.  He's been on Flonase for about 6 weeks with some improvement.  He's unable to tolerate his CPAP mask and wakes during the night and must take it off because of his nasal congestion.  He has occasional clear nasal drainage; denies sneezing.  He always has watery eyes.  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  Denies previous sinus surgery.  He quit smoking about 11 months ago; smoked 1 pack per day for 29 years.  He's unsure if he's on antihistamine currently.  He's had several recent ED visits for COPD exacerbation but says he's feeling better currently.        Review of Systems:  Negative unless checked off.  Gen:  []fever   []fatigue  HENT:  []nosebleeds  []dental problem   Eyes:  []photophobia  []visual disturbance  Resp:  []chest tightness [x]wheezing  Card:  []chest pain  []leg swelling  GI:  []abdominal pain []blood in stool  :  []dysuria  []hematuria  Musc:  []joint swelling  [x]gait problem  Skin:  []color change  []pallor  Neuro:  []seizures  []numbness  Hem:  []bruise/bleed easily  Psych:  []hallucinations  []behavioral problems  Allergy/Imm: has No Known Allergies.    His meds, allergies, medical, surgical, social & family histories were reviewed & updated:  -     He has a current medication list which includes the following prescription(s): accu-chek anjana plus test strp, albuterol, atorvastatin, bd ultra-fine erik pen needles, bumetanide, fluticasone, fluticasone-salmeterol 100-50 mcg/dose,  hydrocodone-acetaminophen 10-325mg, insulin nph-insulin regular (70/30), isosorbide mononitrate, meclizine, potassium chloride, ranitidine, tamsulosin, trueplus insulin, valsartan, and ventolin hfa.  -     He  has no past medical history on file.   -     He  does not have any pertinent problems on file.   -     He  has no past surgical history on file.  -     He  reports that he has quit smoking. He has a 29.00 pack-year smoking history. He has quit using smokeless tobacco. He reports that he does not drink alcohol or use drugs.  -     His family history is not on file.  -     He has No Known Allergies.    Objective:     Physical Exam:  Vitals:  /67   Pulse 80   Temp 97.6 °F (36.4 °C) (Tympanic)   General appearance:  Well developed, well nourished.  In wheelchair    Eyes:  Extraocular motions intact, PERRL    Communication:  no hoarseness, no dysphonia    Ears:  Otoscopy of external auditory canals and tympanic membranes was normal, clinical speech reception thresholds grossly intact, no mass/lesion of auricle.  Nose:  No masses/lesions of external nose, nasal mucosa, septum with bony spur on left.  Moderate hypertrophy of inferior turbinates bilaterally; subjective improvement of airflow thru his nose after intranasal application of topical Mukesh-Synephrine with reduction in turbinate size (L>R).  Significant sinus tenderness frontal and maxillary bilaterally  Mouth:  No mass/lesion of lips, teeth, gums, hard/soft palate, tongue, tonsils, or oropharynx.    Cardiovascular:   Radial Pulses +2     Neck & Lymphatics:  No cervical lymphadenopathy, no neck mass/crepitus/ asymmetry, trachea is midline, no thyroid enlargement/tenderness/mass.    Psych: Oriented x3,  Alert with normal mood and affect.     Respiration/Chest:  Symmetric expansion during respiration, normal respiratory effort.    Skin:  Warm and intact. No ulcerations of face, scalp, neck.    Assessment & Plan:   Jose Luis was seen today for  other.    Diagnoses and all orders for this visit:    Chronic nasal congestion  -     X-Ray Sinuses Min 3 Views; Future    Hypertrophy of both inferior nasal turbinates      His sinus tenderness seems out of proportion to his symptoms.  Will get sinus xrays and call him with results.  Recommend he continue Flonase BID and he can add Zyrtec or Claritin if not already on an antihistamine.  He's to check his meds at home first.  Recommend he RTC with Dr. Jordan for nasal endoscopy and to discuss if he's a candidate for SMRT.        We discussed his medical conditions, treatments and plan.  Jose Luis should return to clinic if any issues arise (symptoms worsen or persist), otherwise we will see him back in the clinic as above.    Thank you for allowing me to participate in the care of Jose Luis.

## 2017-11-03 ENCOUNTER — TELEPHONE (OUTPATIENT)
Dept: FAMILY MEDICINE | Facility: CLINIC | Age: 77
End: 2017-11-03

## 2017-11-03 RX ORDER — TRIAMCINOLONE ACETONIDE 55 UG/1
2 SPRAY, METERED NASAL DAILY
Qty: 16.9 G | Refills: 1 | Status: SHIPPED | OUTPATIENT
Start: 2017-11-03 | End: 2018-10-15

## 2017-11-03 NOTE — TELEPHONE ENCOUNTER
----- Message from Anabelle Barros sent at 11/3/2017  1:05 PM CDT -----  Contact: Pt  Pt called and stated he was returning a call to the nurse. He can be reached at [phone].    Thanks,  TF

## 2017-11-03 NOTE — TELEPHONE ENCOUNTER
----- Message from Anna Jones sent at 11/3/2017 10:23 AM CDT -----  Contact: self   Patient would like to consult with nurse regarding. Please call back at [phone].        Thanks,  Anna Jones

## 2017-11-03 NOTE — TELEPHONE ENCOUNTER
Sinus/nasal congestion/ has not tried anything OTC  Has tried Flonase/no fever/no cough/ times a couple of days, Flonase not helping.  Pt has C Pap. Would like possible nasal spray either OTC or RX

## 2017-11-20 ENCOUNTER — TELEPHONE (OUTPATIENT)
Dept: FAMILY MEDICINE | Facility: CLINIC | Age: 77
End: 2017-11-20

## 2017-11-20 DIAGNOSIS — E11.65 TYPE 2 DIABETES MELLITUS WITH HYPERGLYCEMIA, WITH LONG-TERM CURRENT USE OF INSULIN: Primary | ICD-10-CM

## 2017-11-20 DIAGNOSIS — Z79.4 TYPE 2 DIABETES MELLITUS WITH HYPERGLYCEMIA, WITH LONG-TERM CURRENT USE OF INSULIN: Primary | ICD-10-CM

## 2017-11-20 NOTE — TELEPHONE ENCOUNTER
----- Message from Pankaj Tapia sent at 11/20/2017 11:56 AM CST -----  Contact: pt  Pt is calling nurse staff regarding pt took insulin and pt sugar is still going higher instead of lower.  Pt don't feel that pt shaked the insulin very well. Pt call back [phone] thanks

## 2017-11-20 NOTE — TELEPHONE ENCOUNTER
----- Message from Hillary Jones sent at 11/20/2017  2:13 PM CST -----  Contact: self [phone]  Would like to consult with nurse regarding insulin medication,readings have elevated since taking last dosage.  Would like call back to advise.  Please call back at [phone].  Md Hernan

## 2017-11-20 NOTE — TELEPHONE ENCOUNTER
205 - 2:40  178 - 3:00   Pt hadn't ate anything and felt dizzy. Advised him that he needed to eat something and recheck sugar levels at that time.   205 - 3:50    Pt finances won't allow diabetic teaching right now. Scheduled for the beginning of the yr.

## 2017-12-28 ENCOUNTER — LAB VISIT (OUTPATIENT)
Dept: LAB | Facility: HOSPITAL | Age: 77
End: 2017-12-28
Payer: MEDICARE

## 2017-12-28 ENCOUNTER — OFFICE VISIT (OUTPATIENT)
Dept: FAMILY MEDICINE | Facility: CLINIC | Age: 77
End: 2017-12-28
Payer: MEDICARE

## 2017-12-28 ENCOUNTER — TELEPHONE (OUTPATIENT)
Dept: FAMILY MEDICINE | Facility: CLINIC | Age: 77
End: 2017-12-28

## 2017-12-28 VITALS
TEMPERATURE: 97 F | DIASTOLIC BLOOD PRESSURE: 60 MMHG | WEIGHT: 315 LBS | HEART RATE: 60 BPM | RESPIRATION RATE: 16 BRPM | HEIGHT: 74 IN | BODY MASS INDEX: 40.43 KG/M2 | OXYGEN SATURATION: 98 % | SYSTOLIC BLOOD PRESSURE: 130 MMHG

## 2017-12-28 DIAGNOSIS — N40.0 BENIGN PROSTATIC HYPERPLASIA, UNSPECIFIED WHETHER LOWER URINARY TRACT SYMPTOMS PRESENT: ICD-10-CM

## 2017-12-28 DIAGNOSIS — E11.65 TYPE 2 DIABETES MELLITUS WITH HYPERGLYCEMIA, WITH LONG-TERM CURRENT USE OF INSULIN: ICD-10-CM

## 2017-12-28 DIAGNOSIS — E78.00 HYPERCHOLESTEROLEMIA: ICD-10-CM

## 2017-12-28 DIAGNOSIS — Z79.4 TYPE 2 DIABETES MELLITUS WITH HYPERGLYCEMIA, WITH LONG-TERM CURRENT USE OF INSULIN: ICD-10-CM

## 2017-12-28 DIAGNOSIS — I25.10 CORONARY ARTERY DISEASE, ANGINA PRESENCE UNSPECIFIED, UNSPECIFIED VESSEL OR LESION TYPE, UNSPECIFIED WHETHER NATIVE OR TRANSPLANTED HEART: Primary | ICD-10-CM

## 2017-12-28 DIAGNOSIS — J44.9 CHRONIC OBSTRUCTIVE PULMONARY DISEASE, UNSPECIFIED COPD TYPE: ICD-10-CM

## 2017-12-28 DIAGNOSIS — I10 ESSENTIAL HYPERTENSION: ICD-10-CM

## 2017-12-28 DIAGNOSIS — G47.30 SLEEP APNEA, UNSPECIFIED TYPE: ICD-10-CM

## 2017-12-28 DIAGNOSIS — I73.9 PAD (PERIPHERAL ARTERY DISEASE): ICD-10-CM

## 2017-12-28 LAB
BASOPHILS # BLD AUTO: 0.03 K/UL
BASOPHILS NFR BLD: 0.3 %
DIFFERENTIAL METHOD: ABNORMAL
EOSINOPHIL # BLD AUTO: 0.1 K/UL
EOSINOPHIL NFR BLD: 1.4 %
ERYTHROCYTE [DISTWIDTH] IN BLOOD BY AUTOMATED COUNT: 14.1 %
ESTIMATED AVG GLUCOSE: 220 MG/DL
HBA1C MFR BLD HPLC: 9.3 %
HCT VFR BLD AUTO: 43.2 %
HGB BLD-MCNC: 13.2 G/DL
IMM GRANULOCYTES # BLD AUTO: 0.02 K/UL
IMM GRANULOCYTES NFR BLD AUTO: 0.2 %
LYMPHOCYTES # BLD AUTO: 3 K/UL
LYMPHOCYTES NFR BLD: 31.5 %
MCH RBC QN AUTO: 30.1 PG
MCHC RBC AUTO-ENTMCNC: 30.6 G/DL
MCV RBC AUTO: 99 FL
MONOCYTES # BLD AUTO: 0.9 K/UL
MONOCYTES NFR BLD: 9.1 %
NEUTROPHILS # BLD AUTO: 5.5 K/UL
NEUTROPHILS NFR BLD: 57.5 %
NRBC BLD-RTO: 0 /100 WBC
PLATELET # BLD AUTO: 225 K/UL
PMV BLD AUTO: 10.8 FL
RBC # BLD AUTO: 4.38 M/UL
WBC # BLD AUTO: 9.6 K/UL

## 2017-12-28 PROCEDURE — 36415 COLL VENOUS BLD VENIPUNCTURE: CPT | Mod: PO

## 2017-12-28 PROCEDURE — 99215 OFFICE O/P EST HI 40 MIN: CPT | Mod: S$GLB,,, | Performed by: INTERNAL MEDICINE

## 2017-12-28 PROCEDURE — 83036 HEMOGLOBIN GLYCOSYLATED A1C: CPT

## 2017-12-28 PROCEDURE — 85025 COMPLETE CBC W/AUTO DIFF WBC: CPT

## 2017-12-28 PROCEDURE — 99999 PR PBB SHADOW E&M-EST. PATIENT-LVL V: CPT | Mod: PBBFAC,,, | Performed by: INTERNAL MEDICINE

## 2017-12-28 RX ORDER — BLOOD SUGAR DIAGNOSTIC
1 STRIP MISCELLANEOUS 2 TIMES DAILY PRN
Qty: 200 EACH | Refills: 12 | Status: SHIPPED | OUTPATIENT
Start: 2017-12-28 | End: 2018-01-08 | Stop reason: SDUPTHER

## 2017-12-28 NOTE — PROGRESS NOTES
Subjective:       Patient ID: Jose Luis Martinez is a 77 y.o. male.    Chief Complaint: Follow-up; Hypertension; Hyperlipidemia; Diabetes; COPD; Peripheral Vascular Disease; Sleep Apnea; Benign Prostatic Hypertrophy; and Coronary Artery Disease    Hypertension   This is a chronic problem. The problem is controlled. Pertinent negatives include no chest pain, headaches, neck pain, palpitations or shortness of breath.   Hyperlipidemia   Pertinent negatives include no chest pain, myalgias or shortness of breath. Current antihyperlipidemic treatment includes statins. The current treatment provides significant improvement of lipids.   Diabetes   He presents for his follow-up diabetic visit. He has type 2 diabetes mellitus. His disease course has been stable. Pertinent negatives for hypoglycemia include no confusion, dizziness, headaches, nervousness/anxiousness, pallor, seizures, speech difficulty or tremors. Pertinent negatives for diabetes include no chest pain, no fatigue, no polydipsia, no polyphagia, no polyuria and no weakness.   COPD   Associated symptoms include congestion. Pertinent negatives include no abdominal pain, arthralgias, chest pain, chills, coughing, diaphoresis, fatigue, fever, headaches, joint swelling, myalgias, nausea, neck pain, numbness, rash, sore throat, vomiting or weakness.   Benign Prostatic Hypertrophy   Irritative symptoms do not include frequency or urgency. Pertinent negatives include no chills, dysuria, hematuria, nausea or vomiting.   Coronary Artery Disease   Presents for follow-up visit. Pertinent negatives include no chest pain, chest tightness, dizziness, leg swelling, palpitations or shortness of breath. Risk factors include hyperlipidemia. The symptoms have been stable.     Review of Systems   Constitutional: Negative for activity change, appetite change, chills, diaphoresis, fatigue, fever and unexpected weight change.   HENT: Positive for congestion. Negative for drooling, ear  discharge, ear pain, facial swelling, hearing loss, mouth sores, nosebleeds, postnasal drip, rhinorrhea, sinus pressure, sneezing, sore throat, tinnitus, trouble swallowing and voice change.    Eyes: Negative for photophobia, redness and visual disturbance.   Respiratory: Negative for apnea, cough, choking, chest tightness, shortness of breath and wheezing.    Cardiovascular: Negative for chest pain, palpitations and leg swelling.   Gastrointestinal: Negative for abdominal distention, abdominal pain, anal bleeding, blood in stool, constipation, diarrhea, nausea and vomiting.   Endocrine: Negative for cold intolerance, heat intolerance, polydipsia, polyphagia and polyuria.   Genitourinary: Negative for difficulty urinating, dysuria, enuresis, flank pain, frequency, genital sores, hematuria and urgency.   Musculoskeletal: Negative for arthralgias, back pain, gait problem, joint swelling, myalgias, neck pain and neck stiffness.   Skin: Negative for color change, pallor, rash and wound.   Allergic/Immunologic: Negative for food allergies and immunocompromised state.   Neurological: Negative for dizziness, tremors, seizures, syncope, facial asymmetry, speech difficulty, weakness, light-headedness, numbness and headaches.   Hematological: Negative for adenopathy. Does not bruise/bleed easily.   Psychiatric/Behavioral: Negative for agitation, behavioral problems, confusion, decreased concentration, dysphoric mood, hallucinations, self-injury, sleep disturbance and suicidal ideas. The patient is not nervous/anxious and is not hyperactive.        Objective:      Physical Exam   Constitutional: He is oriented to person, place, and time. He appears well-developed and well-nourished. No distress.   HENT:   Head: Normocephalic and atraumatic.   Eyes: Pupils are equal, round, and reactive to light.   Neck: Normal range of motion. Neck supple. No JVD present. Carotid bruit is not present. No tracheal deviation present. No  thyromegaly present.   Cardiovascular: Normal rate, regular rhythm, normal heart sounds and intact distal pulses.    Pulmonary/Chest: Effort normal and breath sounds normal. No respiratory distress. He has no wheezes. He has no rales. He exhibits no tenderness.   Abdominal: Soft. Bowel sounds are normal. He exhibits no distension. There is no tenderness. There is no rebound and no guarding.   Musculoskeletal: Normal range of motion. He exhibits no edema or tenderness.   Lymphadenopathy:     He has no cervical adenopathy.   Neurological: He is alert and oriented to person, place, and time.   Skin: Skin is warm and dry. No rash noted. He is not diaphoretic. No erythema. No pallor.   Psychiatric: He has a normal mood and affect. His behavior is normal. Judgment and thought content normal.   Nursing note and vitals reviewed.      Assessment:       1. Coronary artery disease, angina presence unspecified, unspecified vessel or lesion type, unspecified whether native or transplanted heart    2. Chronic obstructive pulmonary disease, unspecified COPD type    3. Essential hypertension    4. Hypercholesterolemia    5. Type 2 diabetes mellitus with hyperglycemia, with long-term current use of insulin    6. PAD (peripheral artery disease)    7. Sleep apnea, unspecified type    8. Benign prostatic hyperplasia, unspecified whether lower urinary tract symptoms present        Plan:        stable-continue meds.          Notes/labs reviewed.                    Check cbc,hga1c.                     Sees cards dr mccormick, urology dr heredia,pulm doc. [facility].                 F/u 4 months.

## 2017-12-28 NOTE — TELEPHONE ENCOUNTER
----- Message from Ernie Gardiner sent at 12/28/2017  2:25 PM CST -----  Contact: [phone]  Would like to consult with nurse about test strips order; waiting on strips to be sent to pharmacy; please call bk at [phone] thx nate [facility] - [facility] - [address] [facility].  [address]  [address]  [place] [address]  Phone: [phone] Fax: [phone]

## 2017-12-29 ENCOUNTER — TELEPHONE (OUTPATIENT)
Dept: FAMILY MEDICINE | Facility: CLINIC | Age: 77
End: 2017-12-29

## 2018-01-03 ENCOUNTER — TELEPHONE (OUTPATIENT)
Dept: DIABETES | Facility: CLINIC | Age: 78
End: 2018-01-03

## 2018-01-03 NOTE — TELEPHONE ENCOUNTER
Left message for pt to return call to reschedule appt that was missed today with Laurence Blackwood PA-C.

## 2018-01-08 ENCOUNTER — TELEPHONE (OUTPATIENT)
Dept: FAMILY MEDICINE | Facility: CLINIC | Age: 78
End: 2018-01-08

## 2018-01-08 RX ORDER — BLOOD SUGAR DIAGNOSTIC
1 STRIP MISCELLANEOUS 2 TIMES DAILY PRN
Qty: 200 EACH | Refills: 12 | Status: SHIPPED | OUTPATIENT
Start: 2018-01-08 | End: 2018-02-26 | Stop reason: SDUPTHER

## 2018-01-08 RX ORDER — INSULIN PUMP SYRINGE, 3 ML
EACH MISCELLANEOUS
Qty: 1 EACH | Refills: 12 | Status: SHIPPED | OUTPATIENT
Start: 2018-01-08 | End: 2018-09-10 | Stop reason: SDUPTHER

## 2018-01-08 NOTE — TELEPHONE ENCOUNTER
----- Message from Constantino Chun sent at 1/8/2018 10:29 AM CST -----  Contact: Carine YUEN  pt's wife  The pt is request a call back regarding his accucheck machine.  The pt can be reached [phone]

## 2018-01-08 NOTE — TELEPHONE ENCOUNTER
----- Message from Meg Gallagher sent at 1/8/2018 11:34 AM CST -----  Patient returning the nurse call. Please adv/call [phone].//thanks. cw

## 2018-01-29 RX ORDER — POTASSIUM CHLORIDE 750 MG/1
TABLET, EXTENDED RELEASE ORAL
Refills: 3 | Status: CANCELLED | OUTPATIENT
Start: 2018-01-29

## 2018-01-29 RX ORDER — POTASSIUM CHLORIDE 750 MG/1
10 TABLET, EXTENDED RELEASE ORAL DAILY
Qty: 90 TABLET | Refills: 3 | Status: SHIPPED | OUTPATIENT
Start: 2018-01-29 | End: 2019-01-25 | Stop reason: SDUPTHER

## 2018-01-29 RX ORDER — ATORVASTATIN CALCIUM 40 MG/1
40 TABLET, FILM COATED ORAL DAILY
Status: CANCELLED | OUTPATIENT
Start: 2018-01-29

## 2018-01-29 RX ORDER — ATORVASTATIN CALCIUM 40 MG/1
40 TABLET, FILM COATED ORAL DAILY
Qty: 90 TABLET | Refills: 3 | Status: SHIPPED | OUTPATIENT
Start: 2018-01-29 | End: 2019-02-01 | Stop reason: SDUPTHER

## 2018-01-29 NOTE — TELEPHONE ENCOUNTER
----- Message from Jessica Roblesony sent at 1/29/2018 11:14 AM CST -----  Contact: pt  1. What is the name of the medication you are requesting? Potassium   2. What is the dose? unknown  3. How do you take the medication? Orally, topically, etc? orally  4. How often do you take this medication? Once a day   5. Do you need a 30 day or 90 day supply? 90  6. How many refills are you requesting? 1  7. What is your preferred pharmacy and location of the pharmacy? [place] [phone]  8. Who can we contact with further questions? [phone]    1. What is the name of the medication you are requesting? Atoreastatin  2. What is the dose? 40 mg  3. How do you take the medication? Orally, topically, etc? orally  4. How often do you take this medication? Once a day  5. Do you need a 30 day or 90 day supply? 90  6. How many refills are you requesting? 1  7. What is your preferred pharmacy and location of the pharmacy? [place]   8. Who can we contact with further questions? [phone]

## 2018-02-26 RX ORDER — BLOOD SUGAR DIAGNOSTIC
1 STRIP MISCELLANEOUS 2 TIMES DAILY PRN
Qty: 200 EACH | Refills: 12 | Status: SHIPPED | OUTPATIENT
Start: 2018-02-26 | End: 2018-03-20 | Stop reason: SDUPTHER

## 2018-02-26 NOTE — TELEPHONE ENCOUNTER
LOV: 12/28/2017    Pt states he checks his blood sugar 3 time per day, so he doesn't have enough strips to cover.

## 2018-02-26 NOTE — TELEPHONE ENCOUNTER
----- Message from Radha Reid sent at 2/26/2018 11:37 AM CST -----  Pt at [phone]//states he needs to get a med refilled//med is Anitibine????? HCL 150mg//(generic for Zantac)//would also like to have refills//uses//[facility] in [place], [place]//please call when sent in//elisha/jan

## 2018-03-12 ENCOUNTER — TELEPHONE (OUTPATIENT)
Dept: FAMILY MEDICINE | Facility: CLINIC | Age: 78
End: 2018-03-12

## 2018-03-12 DIAGNOSIS — E11.65 TYPE 2 DIABETES MELLITUS WITH HYPERGLYCEMIA, WITH LONG-TERM CURRENT USE OF INSULIN: Primary | ICD-10-CM

## 2018-03-12 DIAGNOSIS — Z79.4 TYPE 2 DIABETES MELLITUS WITH HYPERGLYCEMIA, WITH LONG-TERM CURRENT USE OF INSULIN: Primary | ICD-10-CM

## 2018-03-12 DIAGNOSIS — J44.9 CHRONIC OBSTRUCTIVE PULMONARY DISEASE, UNSPECIFIED COPD TYPE: ICD-10-CM

## 2018-03-12 DIAGNOSIS — R26.81 UNSTEADY GAIT: ICD-10-CM

## 2018-03-12 DIAGNOSIS — I25.10 CORONARY ARTERY DISEASE, ANGINA PRESENCE UNSPECIFIED, UNSPECIFIED VESSEL OR LESION TYPE, UNSPECIFIED WHETHER NATIVE OR TRANSPLANTED HEART: ICD-10-CM

## 2018-03-12 NOTE — TELEPHONE ENCOUNTER
----- Message from Anabelle Barros sent at 3/12/2018  1:28 PM CDT -----  Contact: Carine Hawk called and stated she needed to speak to the nurse. She stated that the pt needs a order for home health to help the pt keep up with his blood sugar and breathing treatments.  She also stated that the pt needs PT in the home as well. She can be reached at [phone].    Thanks,  TF

## 2018-03-19 RX ORDER — FLUTICASONE PROPIONATE 50 MCG
SPRAY, SUSPENSION (ML) NASAL
Qty: 16 G | Refills: 6 | Status: SHIPPED | OUTPATIENT
Start: 2018-03-19 | End: 2018-11-01 | Stop reason: SDUPTHER

## 2018-03-20 RX ORDER — BLOOD SUGAR DIAGNOSTIC
1 STRIP MISCELLANEOUS 2 TIMES DAILY PRN
Qty: 200 EACH | Refills: 12 | Status: SHIPPED | OUTPATIENT
Start: 2018-03-20 | End: 2018-04-19 | Stop reason: SDUPTHER

## 2018-03-20 NOTE — TELEPHONE ENCOUNTER
----- Message from Ira Shafer sent at 3/20/2018  1:16 PM CDT -----  [facility]  [facility] strips needed..[phone]      [facility] [place] - [address] [facility].  [address] [place], [place] [address].  [address]  [place] [address]  Phone: [phone] Fax: [phone]

## 2018-04-16 ENCOUNTER — PATIENT OUTREACH (OUTPATIENT)
Dept: ADMINISTRATIVE | Facility: HOSPITAL | Age: 78
End: 2018-04-16

## 2018-04-16 NOTE — LETTER
April 16, 2018    Jose Luis Martinez  [address]  [facility] [address]             [facility] [address]  [facility] [address]  Phone: [phone] April 16, 2018        Dear Jose Luis Martinez    You have an upcoming appointment with Matti Mejia MD .    Your chart is indicating you may be overdue for the following:   Health Maintenance Due   Topic    Eye Exam     Zoster Vaccine      Were any of these test, procedures, and/or vaccines performed outside of [facility]?  If so, please let me know when and where so I may request those records and update your chart.    If you would like me to coordinate scheduling any of the recommended test or procedures around the time of your appointment, please feel free to let me know.     Thank you for choosing [facility] for your healthcare needs,    Charleen DE SANTIAGO LPN  Care Coordination Department  [facility]  [phone]

## 2018-04-19 ENCOUNTER — OFFICE VISIT (OUTPATIENT)
Dept: FAMILY MEDICINE | Facility: CLINIC | Age: 78
End: 2018-04-19
Payer: MEDICARE

## 2018-04-19 ENCOUNTER — LAB VISIT (OUTPATIENT)
Dept: LAB | Facility: HOSPITAL | Age: 78
End: 2018-04-19
Attending: INTERNAL MEDICINE
Payer: MEDICARE

## 2018-04-19 VITALS
TEMPERATURE: 96 F | DIASTOLIC BLOOD PRESSURE: 70 MMHG | BODY MASS INDEX: 40.43 KG/M2 | HEART RATE: 92 BPM | RESPIRATION RATE: 18 BRPM | HEIGHT: 74 IN | SYSTOLIC BLOOD PRESSURE: 114 MMHG | WEIGHT: 315 LBS | OXYGEN SATURATION: 97 %

## 2018-04-19 DIAGNOSIS — I10 ESSENTIAL HYPERTENSION: ICD-10-CM

## 2018-04-19 DIAGNOSIS — J44.9 CHRONIC OBSTRUCTIVE PULMONARY DISEASE, UNSPECIFIED COPD TYPE: ICD-10-CM

## 2018-04-19 DIAGNOSIS — I25.10 CORONARY ARTERY DISEASE, ANGINA PRESENCE UNSPECIFIED, UNSPECIFIED VESSEL OR LESION TYPE, UNSPECIFIED WHETHER NATIVE OR TRANSPLANTED HEART: Primary | ICD-10-CM

## 2018-04-19 DIAGNOSIS — G47.30 SLEEP APNEA, UNSPECIFIED TYPE: ICD-10-CM

## 2018-04-19 DIAGNOSIS — E11.65 TYPE 2 DIABETES MELLITUS WITH HYPERGLYCEMIA, WITH LONG-TERM CURRENT USE OF INSULIN: ICD-10-CM

## 2018-04-19 DIAGNOSIS — N40.0 BENIGN PROSTATIC HYPERPLASIA, UNSPECIFIED WHETHER LOWER URINARY TRACT SYMPTOMS PRESENT: ICD-10-CM

## 2018-04-19 DIAGNOSIS — Z79.4 TYPE 2 DIABETES MELLITUS WITH HYPERGLYCEMIA, WITH LONG-TERM CURRENT USE OF INSULIN: ICD-10-CM

## 2018-04-19 DIAGNOSIS — R26.81 UNSTEADY GAIT: ICD-10-CM

## 2018-04-19 DIAGNOSIS — E78.00 HYPERCHOLESTEROLEMIA: ICD-10-CM

## 2018-04-19 DIAGNOSIS — I73.9 PAD (PERIPHERAL ARTERY DISEASE): ICD-10-CM

## 2018-04-19 LAB
ALBUMIN SERPL BCP-MCNC: 3.3 G/DL
ALP SERPL-CCNC: 90 U/L
ALT SERPL W/O P-5'-P-CCNC: 21 U/L
ANION GAP SERPL CALC-SCNC: 10 MMOL/L
AST SERPL-CCNC: 18 U/L
BILIRUB SERPL-MCNC: 0.5 MG/DL
BUN SERPL-MCNC: 19 MG/DL
CALCIUM SERPL-MCNC: 9.2 MG/DL
CHLORIDE SERPL-SCNC: 100 MMOL/L
CO2 SERPL-SCNC: 29 MMOL/L
CREAT SERPL-MCNC: 1.1 MG/DL
EST. GFR  (AFRICAN AMERICAN): >60 ML/MIN/1.73 M^2
EST. GFR  (NON AFRICAN AMERICAN): >60 ML/MIN/1.73 M^2
ESTIMATED AVG GLUCOSE: 235 MG/DL
GLUCOSE SERPL-MCNC: 264 MG/DL
HBA1C MFR BLD HPLC: 9.8 %
POTASSIUM SERPL-SCNC: 4.1 MMOL/L
PROT SERPL-MCNC: 6.3 G/DL
SODIUM SERPL-SCNC: 139 MMOL/L

## 2018-04-19 PROCEDURE — 36415 COLL VENOUS BLD VENIPUNCTURE: CPT | Mod: PO

## 2018-04-19 PROCEDURE — 3074F SYST BP LT 130 MM HG: CPT | Mod: CPTII,S$GLB,, | Performed by: INTERNAL MEDICINE

## 2018-04-19 PROCEDURE — 3078F DIAST BP <80 MM HG: CPT | Mod: CPTII,S$GLB,, | Performed by: INTERNAL MEDICINE

## 2018-04-19 PROCEDURE — 80053 COMPREHEN METABOLIC PANEL: CPT

## 2018-04-19 PROCEDURE — 99999 PR PBB SHADOW E&M-EST. PATIENT-LVL V: CPT | Mod: PBBFAC,,, | Performed by: INTERNAL MEDICINE

## 2018-04-19 PROCEDURE — 83036 HEMOGLOBIN GLYCOSYLATED A1C: CPT

## 2018-04-19 PROCEDURE — 99215 OFFICE O/P EST HI 40 MIN: CPT | Mod: S$GLB,,, | Performed by: INTERNAL MEDICINE

## 2018-04-19 RX ORDER — BLOOD SUGAR DIAGNOSTIC
1 STRIP MISCELLANEOUS 3 TIMES DAILY PRN
Qty: 100 EACH | Refills: 12 | Status: SHIPPED | OUTPATIENT
Start: 2018-04-19 | End: 2019-04-22 | Stop reason: SDUPTHER

## 2018-04-19 RX ORDER — DOCUSATE SODIUM 100 MG/1
100 CAPSULE, LIQUID FILLED ORAL
COMMUNITY
Start: 2018-04-10 | End: 2018-05-10

## 2018-04-19 RX ORDER — FLUTICASONE FUROATE AND VILANTEROL 100; 25 UG/1; UG/1
POWDER RESPIRATORY (INHALATION)
COMMUNITY
Start: 2018-04-10 | End: 2018-10-15

## 2018-04-19 NOTE — PROGRESS NOTES
Subjective:       Patient ID: Jose Luis Martinez is a 77 y.o. male.    Chief Complaint: Hospital Follow Up; Hypertension; Hyperlipidemia; Diabetes; Coronary Artery Disease; COPD; and Benign Prostatic Hypertrophy    Hypertension   This is a chronic problem. The problem is controlled. Pertinent negatives include no chest pain, headaches, neck pain, palpitations or shortness of breath.   Hyperlipidemia   Associated symptoms include myalgias. Pertinent negatives include no chest pain or shortness of breath. Current antihyperlipidemic treatment includes statins. The current treatment provides significant improvement of lipids.   Diabetes   He presents for his follow-up diabetic visit. He has type 2 diabetes mellitus. His disease course has been stable. Pertinent negatives for hypoglycemia include no confusion, dizziness, headaches, nervousness/anxiousness, pallor, seizures, speech difficulty or tremors. Pertinent negatives for diabetes include no chest pain, no fatigue, no polydipsia, no polyphagia, no polyuria and no weakness.   Coronary Artery Disease   Presents for follow-up visit. Pertinent negatives include no chest pain, chest tightness, dizziness, leg swelling, palpitations or shortness of breath. Risk factors include hyperlipidemia. The symptoms have been stable.   COPD   This is a chronic problem. Associated symptoms include arthralgias and myalgias. Pertinent negatives include no abdominal pain, chest pain, chills, coughing, diaphoresis, fatigue, fever, headaches, joint swelling, nausea, neck pain, numbness, rash, sore throat, vomiting or weakness.   Benign Prostatic Hypertrophy   Irritative symptoms do not include frequency or urgency. Pertinent negatives include no chills, dysuria, hematuria, nausea or vomiting.     History reviewed. No pertinent past medical history.  History reviewed. No pertinent surgical history.  History reviewed. No pertinent family history.  Social History     Social History    Marital  status:      Spouse name: N/A    Number of children: N/A    Years of education: N/A     Occupational History    Not on file.     Social History Main Topics    Smoking status: Former Smoker     Packs/day: 1.00     Years: 29.00    Smokeless tobacco: Former User    Alcohol use No    Drug use: No    Sexual activity: Not on file     Other Topics Concern    Not on file     Social History Narrative    No narrative on file     Review of Systems   Constitutional: Negative for activity change, appetite change, chills, diaphoresis, fatigue, fever and unexpected weight change.   HENT: Negative for drooling, ear discharge, ear pain, facial swelling, hearing loss, mouth sores, nosebleeds, postnasal drip, rhinorrhea, sinus pressure, sneezing, sore throat, tinnitus, trouble swallowing and voice change.    Eyes: Negative for photophobia, redness and visual disturbance.   Respiratory: Negative for apnea, cough, choking, chest tightness, shortness of breath and wheezing.    Cardiovascular: Negative for chest pain, palpitations and leg swelling.   Gastrointestinal: Negative for abdominal distention, abdominal pain, anal bleeding, blood in stool, constipation, diarrhea, nausea and vomiting.   Endocrine: Negative for cold intolerance, heat intolerance, polydipsia, polyphagia and polyuria.   Genitourinary: Negative for difficulty urinating, dysuria, enuresis, flank pain, frequency, genital sores, hematuria and urgency.   Musculoskeletal: Positive for arthralgias, gait problem and myalgias. Negative for back pain, joint swelling, neck pain and neck stiffness.   Skin: Negative for color change, pallor, rash and wound.   Allergic/Immunologic: Negative for food allergies and immunocompromised state.   Neurological: Negative for dizziness, tremors, seizures, syncope, facial asymmetry, speech difficulty, weakness, light-headedness, numbness and headaches.   Hematological: Negative for adenopathy. Does not bruise/bleed easily.    Psychiatric/Behavioral: Negative for agitation, behavioral problems, confusion, decreased concentration, dysphoric mood, hallucinations, self-injury, sleep disturbance and suicidal ideas. The patient is not nervous/anxious and is not hyperactive.        Objective:      Physical Exam   Constitutional: He is oriented to person, place, and time. He appears well-developed and well-nourished. No distress.   HENT:   Head: Normocephalic and atraumatic.   Eyes: Pupils are equal, round, and reactive to light.   Neck: Normal range of motion. Neck supple. No JVD present. Carotid bruit is not present. No tracheal deviation present. No thyromegaly present.   Cardiovascular: Normal rate, regular rhythm, normal heart sounds and intact distal pulses.    Pulmonary/Chest: Effort normal and breath sounds normal. No respiratory distress. He has no wheezes. He has no rales. He exhibits no tenderness.   Abdominal: Soft. Bowel sounds are normal. He exhibits no distension. There is no tenderness. There is no rebound and no guarding.   Musculoskeletal: Normal range of motion. He exhibits no edema or tenderness.   Lymphadenopathy:     He has no cervical adenopathy.   Neurological: He is alert and oriented to person, place, and time.   Skin: Skin is warm and dry. No rash noted. He is not diaphoretic. No erythema. No pallor.   Psychiatric: He has a normal mood and affect. His behavior is normal. Judgment and thought content normal.   Nursing note and vitals reviewed.      CMP  Sodium   Date Value Ref Range Status   09/27/2017 139 136 - 145 mmol/L Final     Potassium   Date Value Ref Range Status   09/27/2017 4.1 3.5 - 5.1 mmol/L Final     Chloride   Date Value Ref Range Status   09/27/2017 101 95 - 110 mmol/L Final     CO2   Date Value Ref Range Status   09/27/2017 26 23 - 29 mmol/L Final     Glucose   Date Value Ref Range Status   09/27/2017 264 (H) 70 - 110 mg/dL Final     BUN, Bld   Date Value Ref Range Status   09/27/2017 21 8 - 23 mg/dL  Final     Creatinine   Date Value Ref Range Status   09/27/2017 1.2 0.5 - 1.4 mg/dL Final     Calcium   Date Value Ref Range Status   09/27/2017 9.1 8.7 - 10.5 mg/dL Final     Total Protein   Date Value Ref Range Status   09/27/2017 7.1 6.0 - 8.4 g/dL Final     Albumin   Date Value Ref Range Status   09/27/2017 3.5 3.5 - 5.2 g/dL Final     Total Bilirubin   Date Value Ref Range Status   09/27/2017 0.4 0.1 - 1.0 mg/dL Final     Comment:     For infants and newborns, interpretation of results should be based  on gestational age, weight and in agreement with clinical  observations.  Premature Infant recommended reference ranges:  Up to 24 hours.............<8.0 mg/dL  Up to 48 hours............<12.0 mg/dL  3-5 days..................<15.0 mg/dL  6-29 days.................<15.0 mg/dL       Alkaline Phosphatase   Date Value Ref Range Status   09/27/2017 107 55 - 135 U/L Final     AST   Date Value Ref Range Status   09/27/2017 21 10 - 40 U/L Final     ALT   Date Value Ref Range Status   09/27/2017 19 10 - 44 U/L Final     Anion Gap   Date Value Ref Range Status   09/27/2017 12 8 - 16 mmol/L Final     eGFR if    Date Value Ref Range Status   09/27/2017 >60.0 >60 mL/min/1.73 m^2 Final     eGFR if non    Date Value Ref Range Status   09/27/2017 58.0 (A) >60 mL/min/1.73 m^2 Final     Comment:     Calculation used to obtain the estimated glomerular filtration  rate (eGFR) is the CKD-EPI equation. Since race is unknown   in our information system, the eGFR values for   -American and Non--American patients are given   for each creatinine result.       Lab Results   Component Value Date    WBC 9.60 12/28/2017    HGB 13.2 (L) 12/28/2017    HCT 43.2 12/28/2017    MCV 99 (H) 12/28/2017     12/28/2017     Lab Results   Component Value Date    CHOL 130 09/27/2017     Lab Results   Component Value Date    HDL 51 09/27/2017     Lab Results   Component Value Date    LDLCALC 65.8  09/27/2017     Lab Results   Component Value Date    TRIG 66 09/27/2017     Lab Results   Component Value Date    CHOLHDL 39.2 09/27/2017     Lab Results   Component Value Date    TSH 3.667 09/27/2017     Lab Results   Component Value Date    HGBA1C 9.3 (H) 12/28/2017     Assessment:       1. Coronary artery disease, angina presence unspecified, unspecified vessel or lesion type, unspecified whether native or transplanted heart    2. Chronic obstructive pulmonary disease, unspecified COPD type    3. Essential hypertension    4. Hypercholesterolemia    5. PAD (peripheral artery disease)    6. Sleep apnea, unspecified type    7. Type 2 diabetes mellitus with hyperglycemia, with long-term current use of insulin    8. Benign prostatic hyperplasia, unspecified whether lower urinary tract symptoms present    9. Unsteady gait        Plan:   Coronary artery disease, angina presence unspecified, unspecified vessel or lesion type, unspecified whether native or transplanted heart------------sees cards dr mccormick.    Chronic obstructive pulmonary disease, unspecified COPD type-------------sees pulm doc.    Essential hypertension-------------controlled.    Hypercholesterolemia--------------controlled. Statin.    PAD (peripheral artery disease)    Sleep apnea, unspecified type    Type 2 diabetes mellitus with hyperglycemia, with long-term current use of insulin-------------watch diet, exercise.,  -     Comprehensive metabolic panel; Future; Expected date: 04/19/2018  -     Hemoglobin A1c; Future; Expected date: 04/19/2018    Benign prostatic hyperplasia, unspecified whether lower urinary tract symptoms present---------sees urology dr heredia.    Unsteady gait    Other orders  -     ACCU-CHEK DAVID PLUS TEST STRP Strp; Apply 1 strip topically 3 (three) times daily as needed.  Dispense: 100 each; Refill: 12           Colon at [facility].                F/u 3 months.

## 2018-04-20 ENCOUNTER — TELEPHONE (OUTPATIENT)
Dept: FAMILY MEDICINE | Facility: CLINIC | Age: 78
End: 2018-04-20

## 2018-04-20 DIAGNOSIS — Z79.4 TYPE 2 DIABETES MELLITUS WITH HYPERGLYCEMIA, WITH LONG-TERM CURRENT USE OF INSULIN: Primary | ICD-10-CM

## 2018-04-20 DIAGNOSIS — E11.65 TYPE 2 DIABETES MELLITUS WITH HYPERGLYCEMIA, WITH LONG-TERM CURRENT USE OF INSULIN: Primary | ICD-10-CM

## 2018-04-20 NOTE — TELEPHONE ENCOUNTER
Pt states he has no way of getting to appts wants to know if we can send someone to home to do diabetic teaching? Can you put in home health, so they can do the teaching?

## 2018-05-10 ENCOUNTER — TELEPHONE (OUTPATIENT)
Dept: FAMILY MEDICINE | Facility: CLINIC | Age: 78
End: 2018-05-10

## 2018-05-10 NOTE — TELEPHONE ENCOUNTER
----- Message from Deven Sofia sent at 5/10/2018  1:38 PM CDT -----  Contact: pt/Wife  Please give pt wife a call at [phone] regarding pain in the pt legs.

## 2018-06-13 ENCOUNTER — TELEPHONE (OUTPATIENT)
Dept: FAMILY MEDICINE | Facility: CLINIC | Age: 78
End: 2018-06-13

## 2018-06-13 ENCOUNTER — OFFICE VISIT (OUTPATIENT)
Dept: FAMILY MEDICINE | Facility: CLINIC | Age: 78
End: 2018-06-13
Payer: MEDICARE

## 2018-06-13 VITALS
BODY MASS INDEX: 40.43 KG/M2 | HEART RATE: 88 BPM | RESPIRATION RATE: 18 BRPM | OXYGEN SATURATION: 95 % | HEIGHT: 74 IN | WEIGHT: 315 LBS | SYSTOLIC BLOOD PRESSURE: 138 MMHG | DIASTOLIC BLOOD PRESSURE: 70 MMHG | TEMPERATURE: 98 F

## 2018-06-13 DIAGNOSIS — I10 ESSENTIAL HYPERTENSION: ICD-10-CM

## 2018-06-13 DIAGNOSIS — E78.00 HYPERCHOLESTEROLEMIA: Primary | ICD-10-CM

## 2018-06-13 DIAGNOSIS — I73.9 PAD (PERIPHERAL ARTERY DISEASE): ICD-10-CM

## 2018-06-13 DIAGNOSIS — L97.929 ULCERS OF BOTH LOWER LEGS: ICD-10-CM

## 2018-06-13 DIAGNOSIS — L97.919 ULCERS OF BOTH LOWER LEGS: ICD-10-CM

## 2018-06-13 DIAGNOSIS — Z79.4 TYPE 2 DIABETES MELLITUS WITH HYPERGLYCEMIA, WITH LONG-TERM CURRENT USE OF INSULIN: ICD-10-CM

## 2018-06-13 DIAGNOSIS — R60.9 EDEMA, UNSPECIFIED TYPE: ICD-10-CM

## 2018-06-13 DIAGNOSIS — E11.65 TYPE 2 DIABETES MELLITUS WITH HYPERGLYCEMIA, WITH LONG-TERM CURRENT USE OF INSULIN: ICD-10-CM

## 2018-06-13 PROCEDURE — 99214 OFFICE O/P EST MOD 30 MIN: CPT | Mod: S$GLB,,, | Performed by: INTERNAL MEDICINE

## 2018-06-13 PROCEDURE — 3075F SYST BP GE 130 - 139MM HG: CPT | Mod: CPTII,S$GLB,, | Performed by: INTERNAL MEDICINE

## 2018-06-13 PROCEDURE — 3078F DIAST BP <80 MM HG: CPT | Mod: CPTII,S$GLB,, | Performed by: INTERNAL MEDICINE

## 2018-06-13 PROCEDURE — 99999 PR PBB SHADOW E&M-EST. PATIENT-LVL V: CPT | Mod: PBBFAC,,, | Performed by: INTERNAL MEDICINE

## 2018-06-13 RX ORDER — SULFAMETHOXAZOLE AND TRIMETHOPRIM 800; 160 MG/1; MG/1
1 TABLET ORAL 2 TIMES DAILY
Qty: 20 TABLET | Refills: 0 | Status: SHIPPED | OUTPATIENT
Start: 2018-06-13 | End: 2018-06-23

## 2018-06-13 RX ORDER — SILVER SULFADIAZINE 10 G/1000G
CREAM TOPICAL DAILY
Qty: 400 G | Refills: 3 | Status: SHIPPED | OUTPATIENT
Start: 2018-06-13

## 2018-06-13 RX ORDER — ALBUTEROL SULFATE 90 UG/1
2 AEROSOL, METERED RESPIRATORY (INHALATION) EVERY 6 HOURS PRN
Qty: 18 G | Refills: 12 | Status: SHIPPED | OUTPATIENT
Start: 2018-06-13 | End: 2019-06-24 | Stop reason: SDUPTHER

## 2018-06-13 NOTE — TELEPHONE ENCOUNTER
----- Message from Amrik Luu sent at 6/13/2018 12:25 PM CDT -----  Contact: Bee/Daughter  Call Bee at [phone] regarding pharmacy not having pt prescription insulin NPH-insulin regular, 70/30, (NOVOLIN 70/30) 100 unit/mL (70-30) injection      [facility] - [place] [address].  [address] [place] [address] Ave.  [address]  [place] [address]  Phone: [phone] Fax: [phone]

## 2018-06-13 NOTE — PROGRESS NOTES
Subjective:       Patient ID: Jose Luis Martinez is a 77 y.o. male.    Chief Complaint: Medication discussion; sores on legs.; Hypertension; Hyperlipidemia; and Diabetes    Hypertension   This is a chronic problem. The problem is controlled. Pertinent negatives include no chest pain, headaches, neck pain, palpitations or shortness of breath.   Hyperlipidemia   Associated symptoms include myalgias. Pertinent negatives include no chest pain or shortness of breath. Current antihyperlipidemic treatment includes statins. The current treatment provides significant improvement of lipids.   Diabetes   He presents for his follow-up diabetic visit. He has type 2 diabetes mellitus. His disease course has been stable. Pertinent negatives for hypoglycemia include no confusion, dizziness, headaches, nervousness/anxiousness, pallor, seizures, speech difficulty or tremors. Pertinent negatives for diabetes include no chest pain, no fatigue, no polydipsia, no polyphagia, no polyuria and no weakness.     No past medical history on file.  No past surgical history on file.  No family history on file.  Social History     Social History    Marital status:      Spouse name: N/A    Number of children: N/A    Years of education: N/A     Occupational History    Not on file.     Social History Main Topics    Smoking status: Former Smoker     Packs/day: 1.00     Years: 29.00    Smokeless tobacco: Former User    Alcohol use No    Drug use: No    Sexual activity: Not on file     Other Topics Concern    Not on file     Social History Narrative    No narrative on file     Review of Systems   Constitutional: Negative for activity change, appetite change, chills, diaphoresis, fatigue, fever and unexpected weight change.   HENT: Negative for drooling, ear discharge, ear pain, facial swelling, hearing loss, mouth sores, nosebleeds, postnasal drip, rhinorrhea, sinus pressure, sneezing, sore throat, tinnitus, trouble swallowing and voice  change.    Eyes: Negative for photophobia and redness.   Respiratory: Negative for apnea, cough, choking, chest tightness, shortness of breath and wheezing.    Cardiovascular: Positive for leg swelling. Negative for chest pain and palpitations.   Gastrointestinal: Negative for abdominal distention, abdominal pain, anal bleeding, blood in stool, constipation, diarrhea, nausea and vomiting.   Endocrine: Negative for cold intolerance, heat intolerance, polydipsia, polyphagia and polyuria.   Genitourinary: Negative for difficulty urinating, dysuria, enuresis, flank pain, frequency, genital sores, hematuria and urgency.   Musculoskeletal: Positive for arthralgias, gait problem and myalgias. Negative for back pain, joint swelling, neck pain and neck stiffness.   Skin: Negative for color change, pallor, rash and wound.        Ulcers left lower leg   Allergic/Immunologic: Negative for food allergies and immunocompromised state.   Neurological: Negative for dizziness, tremors, seizures, syncope, facial asymmetry, speech difficulty, weakness, light-headedness, numbness and headaches.   Hematological: Negative for adenopathy. Does not bruise/bleed easily.   Psychiatric/Behavioral: Negative for agitation, behavioral problems, confusion, decreased concentration, dysphoric mood, hallucinations, self-injury, sleep disturbance and suicidal ideas. The patient is not nervous/anxious and is not hyperactive.        Objective:      Physical Exam   Constitutional: He is oriented to person, place, and time. He appears well-developed and well-nourished. No distress.   HENT:   Head: Normocephalic and atraumatic.   Eyes: Pupils are equal, round, and reactive to light.   Neck: Normal range of motion. Neck supple. No JVD present. Carotid bruit is not present. No tracheal deviation present. No thyromegaly present.   Cardiovascular: Normal rate, regular rhythm, normal heart sounds and intact distal pulses.    Pulmonary/Chest: Effort normal and  breath sounds normal. No respiratory distress. He has no wheezes. He has no rales. He exhibits no tenderness.   Abdominal: Soft. Bowel sounds are normal. He exhibits no distension. There is no tenderness. There is no rebound and no guarding.   Musculoskeletal: Normal range of motion. He exhibits edema. He exhibits no tenderness.   Lymphadenopathy:     He has no cervical adenopathy.   Neurological: He is alert and oriented to person, place, and time. No cranial nerve deficit. Coordination normal.   Skin: Skin is warm and dry. No rash noted. He is not diaphoretic. No erythema. No pallor.   Edema bilat lower extre with venous stasis changes and several superficial skin ulcers.,   Psychiatric: He has a normal mood and affect. His behavior is normal. Judgment and thought content normal.   Nursing note and vitals reviewed.      CMP  Sodium   Date Value Ref Range Status   04/19/2018 139 136 - 145 mmol/L Final     Potassium   Date Value Ref Range Status   04/19/2018 4.1 3.5 - 5.1 mmol/L Final     Chloride   Date Value Ref Range Status   04/19/2018 100 95 - 110 mmol/L Final     CO2   Date Value Ref Range Status   04/19/2018 29 23 - 29 mmol/L Final     Glucose   Date Value Ref Range Status   04/19/2018 264 (H) 70 - 110 mg/dL Final     BUN, Bld   Date Value Ref Range Status   04/19/2018 19 8 - 23 mg/dL Final     Creatinine   Date Value Ref Range Status   04/19/2018 1.1 0.5 - 1.4 mg/dL Final     Calcium   Date Value Ref Range Status   04/19/2018 9.2 8.7 - 10.5 mg/dL Final     Total Protein   Date Value Ref Range Status   04/19/2018 6.3 6.0 - 8.4 g/dL Final     Albumin   Date Value Ref Range Status   04/19/2018 3.3 (L) 3.5 - 5.2 g/dL Final     Total Bilirubin   Date Value Ref Range Status   04/19/2018 0.5 0.1 - 1.0 mg/dL Final     Comment:     For infants and newborns, interpretation of results should be based  on gestational age, weight and in agreement with clinical  observations.  Premature Infant recommended reference  ranges:  Up to 24 hours.............<8.0 mg/dL  Up to 48 hours............<12.0 mg/dL  3-5 days..................<15.0 mg/dL  6-29 days.................<15.0 mg/dL       Alkaline Phosphatase   Date Value Ref Range Status   04/19/2018 90 55 - 135 U/L Final     AST   Date Value Ref Range Status   04/19/2018 18 10 - 40 U/L Final     ALT   Date Value Ref Range Status   04/19/2018 21 10 - 44 U/L Final     Anion Gap   Date Value Ref Range Status   04/19/2018 10 8 - 16 mmol/L Final     eGFR if    Date Value Ref Range Status   04/19/2018 >60.0 >60 mL/min/1.73 m^2 Final     eGFR if non    Date Value Ref Range Status   04/19/2018 >60.0 >60 mL/min/1.73 m^2 Final     Comment:     Calculation used to obtain the estimated glomerular filtration  rate (eGFR) is the CKD-EPI equation.        Lab Results   Component Value Date    WBC 9.60 12/28/2017    HGB 13.2 (L) 12/28/2017    HCT 43.2 12/28/2017    MCV 99 (H) 12/28/2017     12/28/2017     Lab Results   Component Value Date    CHOL 130 09/27/2017     Lab Results   Component Value Date    HDL 51 09/27/2017     Lab Results   Component Value Date    LDLCALC 65.8 09/27/2017     Lab Results   Component Value Date    TRIG 66 09/27/2017     Lab Results   Component Value Date    CHOLHDL 39.2 09/27/2017     Lab Results   Component Value Date    TSH 3.667 09/27/2017     Lab Results   Component Value Date    HGBA1C 9.8 (H) 04/19/2018     Assessment:       1. Hypercholesterolemia    2. Type 2 diabetes mellitus with hyperglycemia, with long-term current use of insulin    3. PAD (peripheral artery disease)    4. Essential hypertension    5. Edema, unspecified type    6. Ulcers of both lower legs        Plan:   Hypercholesterolemia    Type 2 diabetes mellitus with hyperglycemia, with long-term current use of insulin  -     sulfamethoxazole-trimethoprim 800-160mg (BACTRIM DS) 800-160 mg Tab; Take 1 tablet by mouth 2 (two) times daily.  Dispense: 20 tablet;  Refill: 0  -     Cardiology Lab GERARDO Resting, Lower Extremities; Future  -     Ambulatory referral to Wound Clinic    PAD (peripheral artery disease)    Essential hypertension    Edema, unspecified type  -     sulfamethoxazole-trimethoprim 800-160mg (BACTRIM DS) 800-160 mg Tab; Take 1 tablet by mouth 2 (two) times daily.  Dispense: 20 tablet; Refill: 0  -     Cardiology Lab G[facility]O Resting, Lower Extremities; Future  -     Ambulatory referral to Wound Clinic    Ulcers of both lower legs  -     sulfamethoxazole-trimethoprim 800-160mg (BACTRIM DS) 800-160 mg Tab; Take 1 tablet by mouth 2 (two) times daily.  Dispense: 20 tablet; Refill: 0  -     Cardiology Lab GERARDO Resting, Lower Extremities; Future  -     Ambulatory referral to Wound Clinic    Other orders  -     VENTOLIN HFA 90 mcg/actuation inhaler; Inhale 2 puffs into the lungs every 6 (six) hours as needed for Wheezing.  Dispense: 18 g; Refill: 12  -     insulin NPH-insulin regular, 70/30, (NOVOLIN 70/30) 100 unit/mL (70-30) injection; Inject 30 Units into the skin Daily.  Dispense: 10 mL; Refill: 12                F/u 1 month.

## 2018-06-20 ENCOUNTER — TELEPHONE (OUTPATIENT)
Dept: CARDIOLOGY | Facility: CLINIC | Age: 78
End: 2018-06-20

## 2018-06-22 ENCOUNTER — TELEPHONE (OUTPATIENT)
Dept: FAMILY MEDICINE | Facility: CLINIC | Age: 78
End: 2018-06-22

## 2018-06-22 NOTE — TELEPHONE ENCOUNTER
----- Message from Zaid Chapa sent at 6/22/2018  8:22 AM CDT -----  Contact: Bee, pt's daughter  She's calling in regard to the pt having some constipation, pls advise OTC medication to use or send a new RX [facility] in [place], pls advise, [phone] (cell)

## 2018-06-27 ENCOUNTER — TELEPHONE (OUTPATIENT)
Dept: FAMILY MEDICINE | Facility: CLINIC | Age: 78
End: 2018-06-27

## 2018-06-27 NOTE — TELEPHONE ENCOUNTER
----- Message from Missy Hardy sent at 6/27/2018 12:18 PM CDT -----  Contact: pt wife  Calling in regards to upcoming appointment and health concerns as well  And please advise [phone]

## 2018-06-27 NOTE — TELEPHONE ENCOUNTER
Spoke with pt's wife she is requesting financial assistance for pt to be able to go to [facility] wound care clinic advised to contact JONN directly.

## 2018-07-18 PROBLEM — E66.01 MORBID OBESITY: Status: ACTIVE | Noted: 2018-07-18

## 2018-07-19 ENCOUNTER — PATIENT OUTREACH (OUTPATIENT)
Dept: ADMINISTRATIVE | Facility: HOSPITAL | Age: 78
End: 2018-07-19

## 2018-07-19 NOTE — PROGRESS NOTES
Spoke with patient in regards to Eye exam and he states he is scheuled for cateract surgery on 08/06/2018     Charleen DE SANTIAGO LPN Care Coordinator  Care Coordination Department  [facility]  [phone]

## 2018-08-06 ENCOUNTER — TELEPHONE (OUTPATIENT)
Dept: ADMINISTRATIVE | Facility: CLINIC | Age: 78
End: 2018-08-06

## 2018-08-16 ENCOUNTER — LAB VISIT (OUTPATIENT)
Dept: LAB | Facility: HOSPITAL | Age: 78
End: 2018-08-16
Attending: INTERNAL MEDICINE
Payer: MEDICARE

## 2018-08-16 ENCOUNTER — OFFICE VISIT (OUTPATIENT)
Dept: FAMILY MEDICINE | Facility: CLINIC | Age: 78
End: 2018-08-16
Payer: MEDICARE

## 2018-08-16 VITALS
HEART RATE: 75 BPM | SYSTOLIC BLOOD PRESSURE: 130 MMHG | OXYGEN SATURATION: 97 % | TEMPERATURE: 98 F | DIASTOLIC BLOOD PRESSURE: 70 MMHG | HEIGHT: 74 IN | BODY MASS INDEX: 38.45 KG/M2 | WEIGHT: 299.63 LBS

## 2018-08-16 DIAGNOSIS — E78.00 HYPERCHOLESTEROLEMIA: ICD-10-CM

## 2018-08-16 DIAGNOSIS — L97.929 ULCERS OF BOTH LOWER LEGS: Primary | ICD-10-CM

## 2018-08-16 DIAGNOSIS — Z79.4 TYPE 2 DIABETES MELLITUS WITH HYPERGLYCEMIA, WITH LONG-TERM CURRENT USE OF INSULIN: ICD-10-CM

## 2018-08-16 DIAGNOSIS — E66.01 MORBID OBESITY: ICD-10-CM

## 2018-08-16 DIAGNOSIS — E11.65 TYPE 2 DIABETES MELLITUS WITH HYPERGLYCEMIA, WITH LONG-TERM CURRENT USE OF INSULIN: ICD-10-CM

## 2018-08-16 DIAGNOSIS — I10 ESSENTIAL HYPERTENSION: ICD-10-CM

## 2018-08-16 DIAGNOSIS — M54.50 CHRONIC LOW BACK PAIN WITHOUT SCIATICA, UNSPECIFIED BACK PAIN LATERALITY: ICD-10-CM

## 2018-08-16 DIAGNOSIS — I73.9 PAD (PERIPHERAL ARTERY DISEASE): ICD-10-CM

## 2018-08-16 DIAGNOSIS — M17.0 OSTEOARTHRITIS OF BOTH KNEES, UNSPECIFIED OSTEOARTHRITIS TYPE: ICD-10-CM

## 2018-08-16 DIAGNOSIS — J44.9 CHRONIC OBSTRUCTIVE PULMONARY DISEASE, UNSPECIFIED COPD TYPE: ICD-10-CM

## 2018-08-16 DIAGNOSIS — L97.919 ULCERS OF BOTH LOWER LEGS: Primary | ICD-10-CM

## 2018-08-16 DIAGNOSIS — I25.10 CORONARY ARTERY DISEASE, ANGINA PRESENCE UNSPECIFIED, UNSPECIFIED VESSEL OR LESION TYPE, UNSPECIFIED WHETHER NATIVE OR TRANSPLANTED HEART: ICD-10-CM

## 2018-08-16 DIAGNOSIS — G89.29 CHRONIC LOW BACK PAIN WITHOUT SCIATICA, UNSPECIFIED BACK PAIN LATERALITY: ICD-10-CM

## 2018-08-16 DIAGNOSIS — G47.30 SLEEP APNEA, UNSPECIFIED TYPE: ICD-10-CM

## 2018-08-16 LAB
BASOPHILS # BLD AUTO: 0.03 K/UL
BASOPHILS NFR BLD: 0.3 %
CHOLEST SERPL-MCNC: 112 MG/DL
CHOLEST/HDLC SERPL: 2.4 {RATIO}
DIFFERENTIAL METHOD: ABNORMAL
EOSINOPHIL # BLD AUTO: 0.1 K/UL
EOSINOPHIL NFR BLD: 1.2 %
ERYTHROCYTE [DISTWIDTH] IN BLOOD BY AUTOMATED COUNT: 13.7 %
HCT VFR BLD AUTO: 39.4 %
HDLC SERPL-MCNC: 46 MG/DL
HDLC SERPL: 41.1 %
HGB BLD-MCNC: 12 G/DL
IMM GRANULOCYTES # BLD AUTO: 0.03 K/UL
IMM GRANULOCYTES NFR BLD AUTO: 0.3 %
LDLC SERPL CALC-MCNC: 53 MG/DL
LYMPHOCYTES # BLD AUTO: 2.9 K/UL
LYMPHOCYTES NFR BLD: 29.7 %
MCH RBC QN AUTO: 30.7 PG
MCHC RBC AUTO-ENTMCNC: 30.5 G/DL
MCV RBC AUTO: 101 FL
MONOCYTES # BLD AUTO: 0.9 K/UL
MONOCYTES NFR BLD: 8.9 %
NEUTROPHILS # BLD AUTO: 5.8 K/UL
NEUTROPHILS NFR BLD: 59.6 %
NONHDLC SERPL-MCNC: 66 MG/DL
NRBC BLD-RTO: 0 /100 WBC
PLATELET # BLD AUTO: 232 K/UL
PMV BLD AUTO: 10.6 FL
RBC # BLD AUTO: 3.91 M/UL
TRIGL SERPL-MCNC: 65 MG/DL
WBC # BLD AUTO: 9.71 K/UL

## 2018-08-16 PROCEDURE — 83036 HEMOGLOBIN GLYCOSYLATED A1C: CPT

## 2018-08-16 PROCEDURE — 36415 COLL VENOUS BLD VENIPUNCTURE: CPT | Mod: PO

## 2018-08-16 PROCEDURE — 3078F DIAST BP <80 MM HG: CPT | Mod: CPTII,S$GLB,, | Performed by: INTERNAL MEDICINE

## 2018-08-16 PROCEDURE — 85025 COMPLETE CBC W/AUTO DIFF WBC: CPT

## 2018-08-16 PROCEDURE — 80061 LIPID PANEL: CPT

## 2018-08-16 PROCEDURE — 3075F SYST BP GE 130 - 139MM HG: CPT | Mod: CPTII,S$GLB,, | Performed by: INTERNAL MEDICINE

## 2018-08-16 PROCEDURE — 99215 OFFICE O/P EST HI 40 MIN: CPT | Mod: S$GLB,,, | Performed by: INTERNAL MEDICINE

## 2018-08-16 PROCEDURE — 99999 PR PBB SHADOW E&M-EST. PATIENT-LVL IV: CPT | Mod: PBBFAC,,, | Performed by: INTERNAL MEDICINE

## 2018-08-16 NOTE — PROGRESS NOTES
Subjective:       Patient ID: Joes Luis Martinez is a 77 y.o. male.    Chief Complaint: Follow-up; Hypertension; Hyperlipidemia; Diabetes; Coronary Artery Disease; COPD; and Sleep Apnea    Hypertension   This is a chronic problem. The problem is controlled. Pertinent negatives include no chest pain, headaches, neck pain, palpitations or shortness of breath.   Hyperlipidemia   Associated symptoms include myalgias. Pertinent negatives include no chest pain or shortness of breath.   Diabetes   He presents for his follow-up diabetic visit. He has type 2 diabetes mellitus. Pertinent negatives for hypoglycemia include no confusion, dizziness, headaches, nervousness/anxiousness, pallor, seizures, speech difficulty or tremors. Pertinent negatives for diabetes include no chest pain, no fatigue, no polydipsia, no polyphagia, no polyuria and no weakness.   Coronary Artery Disease   Presents for follow-up visit. Symptoms include leg swelling. Pertinent negatives include no chest pain, chest tightness, dizziness, palpitations or shortness of breath. Risk factors include hyperlipidemia. The symptoms have been stable.   COPD   This is a chronic problem. Associated symptoms include arthralgias and myalgias. Pertinent negatives include no abdominal pain, chest pain, chills, coughing, diaphoresis, fatigue, fever, headaches, joint swelling, nausea, neck pain, numbness, rash, sore throat, vomiting or weakness.     No past medical history on file.  No past surgical history on file.  No family history on file.  Social History     Socioeconomic History    Marital status:      Spouse name: Not on file    Number of children: Not on file    Years of education: Not on file    Highest education level: Not on file   Social Needs    Financial resource strain: Not on file    Food insecurity - worry: Not on file    Food insecurity - inability: Not on file    Transportation needs - medical: Not on file    Transportation needs -  non-medical: Not on file   Occupational History    Not on file   Tobacco Use    Smoking status: Former Smoker     Packs/day: 1.00     Years: 29.00     Pack years: 29.00    Smokeless tobacco: Former User   Substance and Sexual Activity    Alcohol use: No    Drug use: No    Sexual activity: Not on file   Other Topics Concern    Not on file   Social History Narrative    Not on file     Review of Systems   Constitutional: Positive for activity change. Negative for appetite change, chills, diaphoresis, fatigue, fever and unexpected weight change.   HENT: Negative for drooling, ear discharge, ear pain, facial swelling, hearing loss, mouth sores, nosebleeds, postnasal drip, rhinorrhea, sinus pressure, sneezing, sore throat, tinnitus, trouble swallowing and voice change.    Eyes: Negative for photophobia, redness and visual disturbance.   Respiratory: Negative for apnea, cough, choking, chest tightness, shortness of breath and wheezing.    Cardiovascular: Positive for leg swelling. Negative for chest pain and palpitations.   Gastrointestinal: Negative for abdominal distention, abdominal pain, anal bleeding, blood in stool, constipation, diarrhea, nausea and vomiting.   Endocrine: Negative for cold intolerance, heat intolerance, polydipsia, polyphagia and polyuria.   Genitourinary: Negative for difficulty urinating, dysuria, enuresis, flank pain, frequency, genital sores, hematuria and urgency.   Musculoskeletal: Positive for arthralgias, gait problem and myalgias. Negative for back pain, joint swelling, neck pain and neck stiffness.   Skin: Negative for color change, pallor, rash and wound.   Allergic/Immunologic: Negative for food allergies and immunocompromised state.   Neurological: Negative for dizziness, tremors, seizures, syncope, facial asymmetry, speech difficulty, weakness, light-headedness, numbness and headaches.   Hematological: Negative for adenopathy. Does not bruise/bleed easily.    Psychiatric/Behavioral: Negative for agitation, behavioral problems, confusion, decreased concentration, dysphoric mood, hallucinations, self-injury, sleep disturbance and suicidal ideas. The patient is not nervous/anxious and is not hyperactive.        Objective:      Physical Exam   Constitutional: He is oriented to person, place, and time. He appears well-developed and well-nourished. No distress.   HENT:   Head: Normocephalic and atraumatic.   Eyes: Pupils are equal, round, and reactive to light.   Neck: Normal range of motion. Neck supple. No JVD present. Carotid bruit is not present. No tracheal deviation present. No thyromegaly present.   Cardiovascular: Normal rate, regular rhythm, normal heart sounds and intact distal pulses.   Pulmonary/Chest: Effort normal and breath sounds normal. No respiratory distress. He has no wheezes. He has no rales. He exhibits no tenderness.   Abdominal: Soft. Bowel sounds are normal. He exhibits no distension. There is no tenderness. There is no rebound and no guarding.   Musculoskeletal: Normal range of motion. He exhibits edema. He exhibits no tenderness.   Lymphadenopathy:     He has no cervical adenopathy.   Neurological: He is alert and oriented to person, place, and time.   Skin: Skin is warm and dry. No rash noted. He is not diaphoretic. No erythema. No pallor.   Psychiatric: He has a normal mood and affect. His behavior is normal. Judgment and thought content normal.   Nursing note and vitals reviewed.      CMP  Sodium   Date Value Ref Range Status   04/19/2018 139 136 - 145 mmol/L Final     Potassium   Date Value Ref Range Status   04/19/2018 4.1 3.5 - 5.1 mmol/L Final     Chloride   Date Value Ref Range Status   04/19/2018 100 95 - 110 mmol/L Final     CO2   Date Value Ref Range Status   04/19/2018 29 23 - 29 mmol/L Final     Glucose   Date Value Ref Range Status   04/19/2018 264 (H) 70 - 110 mg/dL Final     BUN, Bld   Date Value Ref Range Status   04/19/2018 19 8 -  23 mg/dL Final     Creatinine   Date Value Ref Range Status   04/19/2018 1.1 0.5 - 1.4 mg/dL Final     Calcium   Date Value Ref Range Status   04/19/2018 9.2 8.7 - 10.5 mg/dL Final     Total Protein   Date Value Ref Range Status   04/19/2018 6.3 6.0 - 8.4 g/dL Final     Albumin   Date Value Ref Range Status   04/19/2018 3.3 (L) 3.5 - 5.2 g/dL Final     Total Bilirubin   Date Value Ref Range Status   04/19/2018 0.5 0.1 - 1.0 mg/dL Final     Comment:     For infants and newborns, interpretation of results should be based  on gestational age, weight and in agreement with clinical  observations.  Premature Infant recommended reference ranges:  Up to 24 hours.............<8.0 mg/dL  Up to 48 hours............<12.0 mg/dL  3-5 days..................<15.0 mg/dL  6-29 days.................<15.0 mg/dL       Alkaline Phosphatase   Date Value Ref Range Status   04/19/2018 90 55 - 135 U/L Final     AST   Date Value Ref Range Status   04/19/2018 18 10 - 40 U/L Final     ALT   Date Value Ref Range Status   04/19/2018 21 10 - 44 U/L Final     Anion Gap   Date Value Ref Range Status   04/19/2018 10 8 - 16 mmol/L Final     eGFR if    Date Value Ref Range Status   04/19/2018 >60.0 >60 mL/min/1.73 m^2 Final     eGFR if non    Date Value Ref Range Status   04/19/2018 >60.0 >60 mL/min/1.73 m^2 Final     Comment:     Calculation used to obtain the estimated glomerular filtration  rate (eGFR) is the CKD-EPI equation.        Lab Results   Component Value Date    WBC 9.60 12/28/2017    HGB 13.2 (L) 12/28/2017    HCT 43.2 12/28/2017    MCV 99 (H) 12/28/2017     12/28/2017     Lab Results   Component Value Date    CHOL 130 09/27/2017     Lab Results   Component Value Date    HDL 51 09/27/2017     Lab Results   Component Value Date    LDLCALC 65.8 09/27/2017     Lab Results   Component Value Date    TRIG 66 09/27/2017     Lab Results   Component Value Date    CHOLHDL 39.2 09/27/2017     Lab Results    Component Value Date    TSH 3.667 09/27/2017     Lab Results   Component Value Date    HGBA1C 9.8 (H) 04/19/2018     Assessment:       1. Ulcers of both lower legs    2. Type 2 diabetes mellitus with hyperglycemia, with long-term current use of insulin    3. Sleep apnea, unspecified type    4. PAD (peripheral artery disease)    5. Osteoarthritis of both knees, unspecified osteoarthritis type    6. Hypercholesterolemia    7. Essential hypertension    8. Chronic obstructive pulmonary disease, unspecified COPD type    9. Chronic low back pain without sciatica, unspecified back pain laterality    10. Coronary artery disease, angina presence unspecified, unspecified vessel or lesion type, unspecified whether native or transplanted heart    11. Morbid obesity        Plan:   Ulcers of both lower legs----------------------improved-------------sees wound care clinic.    Type 2 diabetes mellitus with hyperglycemia, with long-term current use of insulin---watch diet.  -     CBC auto differential; Future; Expected date: 08/16/2018  -     Hemoglobin A1c; Future; Expected date: 08/16/2018    Sleep apnea, unspecified type    PAD (peripheral artery disease)    Osteoarthritis of both knees, unspecified osteoarthritis type    Hypercholesterolemia---stable.  -     Lipid panel; Future; Expected date: 08/16/2018    Essential hypertension---------------stable.    Chronic obstructive pulmonary disease, unspecified COPD type-------------------sees pulm doc.    Chronic low back pain without sciatica, unspecified back pain laterality    Coronary artery disease, angina presence unspecified, unspecified vessel or lesion type, unspecified whether native or transplanted heart--------stable.    Morbid obesity                  F/u 4 months.

## 2018-08-17 ENCOUNTER — TELEPHONE (OUTPATIENT)
Dept: FAMILY MEDICINE | Facility: CLINIC | Age: 78
End: 2018-08-17

## 2018-08-17 DIAGNOSIS — D64.9 ANEMIA, UNSPECIFIED TYPE: Primary | ICD-10-CM

## 2018-08-17 LAB
ESTIMATED AVG GLUCOSE: 235 MG/DL
HBA1C MFR BLD HPLC: 9.8 %

## 2018-08-17 NOTE — TELEPHONE ENCOUNTER
Spoke with pt voiced understanding of results.   appt scheduled with Dr De Souza on 9/26/18 at 10am

## 2018-08-17 NOTE — TELEPHONE ENCOUNTER
----- Message from Norma Leach sent at 8/17/2018  8:12 AM CDT -----  Contact: pt   States he's rtn nurses call and can be reached at [phone]//thanks/dbw

## 2018-08-17 NOTE — TELEPHONE ENCOUNTER
----- Message from Minerva Powell sent at 8/17/2018 11:44 AM CDT -----  Contact: pt  States he's returning a call. Please call pt at [phone]. Thank you

## 2018-09-10 RX ORDER — INSULIN PUMP SYRINGE, 3 ML
EACH MISCELLANEOUS
Qty: 1 EACH | Refills: 0 | Status: SHIPPED | OUTPATIENT
Start: 2018-09-10 | End: 2023-01-10

## 2018-09-10 NOTE — TELEPHONE ENCOUNTER
----- Message from Christelle Chen sent at 9/10/2018  6:59 AM CDT -----  Contact: wife/Carine  Please call pt wife @ [phone] regarding pt glucose machine, states pt need a new machine and supplies.

## 2018-09-12 ENCOUNTER — TELEPHONE (OUTPATIENT)
Dept: ADMINISTRATIVE | Facility: CLINIC | Age: 78
End: 2018-09-12

## 2018-09-12 NOTE — TELEPHONE ENCOUNTER
[facility] with [facility]-Dr. Matti Mejia. Pt received  services.

## 2018-09-19 ENCOUNTER — TELEPHONE (OUTPATIENT)
Dept: FAMILY MEDICINE | Facility: CLINIC | Age: 78
End: 2018-09-19

## 2018-09-19 NOTE — TELEPHONE ENCOUNTER
----- Message from Crystal Gallagher sent at 9/19/2018  9:08 AM CDT -----  Type: Needs Medical Advice    Who Called:  Wife-Carine Symptoms (please be specific):  NA   How long has patient had these symptoms:  [facility]   Pharmacy name and phone #:  [facility]   Best Call Back Number: [phone]  Additional Information: Patient's wife called asking for an order for a chair, to elevate the patient's leg to prevent swelling.

## 2018-09-19 NOTE — TELEPHONE ENCOUNTER
Spoke with pts wife states pt has problems with his legs and wants order for oversized chair (lift chair)  that can elevate his legs?

## 2018-09-25 ENCOUNTER — TELEPHONE (OUTPATIENT)
Dept: FAMILY MEDICINE | Facility: CLINIC | Age: 78
End: 2018-09-25

## 2018-09-25 DIAGNOSIS — R26.81 UNSTEADY GAIT: Primary | ICD-10-CM

## 2018-09-25 NOTE — TELEPHONE ENCOUNTER
----- Message from Arlene Schneider sent at 9/25/2018  4:33 PM CDT -----  Pt is calling in regards to pt's wheel chair and requesting rx refill. Pt does not know name of blood pressure medication. Please call back at [phone].    1. What is the name of the medication you are requesting? Blood pressure medication  2. What is the dose? n/a  3. How do you take the medication? Orally, topically, etc? n/a  4. How often do you take this medication? n/a  5. Do you need a 30 day or 90 day supply? n/a  6. How many refills are you requesting? n/a  7. What is your preferred pharmacy and location of the pharmacy?     Pt uses:    [facility] - [place] [facility] - [address] [place], [place] [address].  [address] [place], [place] [address].  [address]  [place] [address]  Phone: [phone] Fax: [phone]    8. Who can we contact with further questions? N/a    Thanks,   Arlene Schneider

## 2018-09-26 ENCOUNTER — INITIAL CONSULT (OUTPATIENT)
Dept: HEMATOLOGY/ONCOLOGY | Facility: CLINIC | Age: 78
End: 2018-09-26
Payer: MEDICARE

## 2018-09-26 ENCOUNTER — LAB VISIT (OUTPATIENT)
Dept: LAB | Facility: HOSPITAL | Age: 78
End: 2018-09-26
Attending: INTERNAL MEDICINE
Payer: MEDICARE

## 2018-09-26 VITALS
BODY MASS INDEX: 40.43 KG/M2 | WEIGHT: 315 LBS | OXYGEN SATURATION: 95 % | SYSTOLIC BLOOD PRESSURE: 120 MMHG | RESPIRATION RATE: 18 BRPM | TEMPERATURE: 99 F | DIASTOLIC BLOOD PRESSURE: 76 MMHG | HEIGHT: 74 IN | HEART RATE: 74 BPM

## 2018-09-26 DIAGNOSIS — D53.9 MACROCYTIC ANEMIA: ICD-10-CM

## 2018-09-26 DIAGNOSIS — D53.9 MACROCYTIC ANEMIA: Primary | ICD-10-CM

## 2018-09-26 DIAGNOSIS — R94.5 ABNORMAL RESULTS OF LIVER FUNCTION STUDIES: ICD-10-CM

## 2018-09-26 LAB
ALBUMIN SERPL BCP-MCNC: 3.3 G/DL
ALP SERPL-CCNC: 93 U/L
ALT SERPL W/O P-5'-P-CCNC: 18 U/L
ANION GAP SERPL CALC-SCNC: 7 MMOL/L
AST SERPL-CCNC: 17 U/L
B2 MICROGLOB SERPL-MCNC: 2.2 UG/ML
BASOPHILS # BLD AUTO: 0.01 K/UL
BASOPHILS NFR BLD: 0.1 %
BILIRUB SERPL-MCNC: 0.6 MG/DL
BUN SERPL-MCNC: 26 MG/DL
CALCIUM SERPL-MCNC: 8.9 MG/DL
CHLORIDE SERPL-SCNC: 101 MMOL/L
CO2 SERPL-SCNC: 32 MMOL/L
CREAT SERPL-MCNC: 1.1 MG/DL
DIFFERENTIAL METHOD: ABNORMAL
EOSINOPHIL # BLD AUTO: 0.1 K/UL
EOSINOPHIL NFR BLD: 1.4 %
ERYTHROCYTE [DISTWIDTH] IN BLOOD BY AUTOMATED COUNT: 13.8 %
EST. GFR  (AFRICAN AMERICAN): >60 ML/MIN/1.73 M^2
EST. GFR  (NON AFRICAN AMERICAN): >60 ML/MIN/1.73 M^2
FERRITIN SERPL-MCNC: 153 NG/ML
GLUCOSE SERPL-MCNC: 244 MG/DL
HAPTOGLOB SERPL-MCNC: 246 MG/DL
HCT VFR BLD AUTO: 39.1 %
HGB BLD-MCNC: 12.4 G/DL
IRON SERPL-MCNC: 58 UG/DL
LDH SERPL L TO P-CCNC: 159 U/L
LYMPHOCYTES # BLD AUTO: 2.5 K/UL
LYMPHOCYTES NFR BLD: 27.1 %
MCH RBC QN AUTO: 30.3 PG
MCHC RBC AUTO-ENTMCNC: 31.7 G/DL
MCV RBC AUTO: 96 FL
MONOCYTES # BLD AUTO: 0.7 K/UL
MONOCYTES NFR BLD: 7 %
NEUTROPHILS # BLD AUTO: 6 K/UL
NEUTROPHILS NFR BLD: 64.4 %
PLATELET # BLD AUTO: 185 K/UL
PMV BLD AUTO: 9.9 FL
POTASSIUM SERPL-SCNC: 3.6 MMOL/L
PROT SERPL-MCNC: 6.2 G/DL
RBC # BLD AUTO: 4.09 M/UL
RETICS/RBC NFR AUTO: 1.5 %
SATURATED IRON: 21 %
SODIUM SERPL-SCNC: 140 MMOL/L
TOTAL IRON BINDING CAPACITY: 277 UG/DL
TRANSFERRIN SERPL-MCNC: 187 MG/DL
TSH SERPL DL<=0.005 MIU/L-ACNC: 2.86 UIU/ML
VIT B12 SERPL-MCNC: 500 PG/ML
WBC # BLD AUTO: 9.25 K/UL

## 2018-09-26 PROCEDURE — 84165 PROTEIN E-PHORESIS SERUM: CPT

## 2018-09-26 PROCEDURE — 3078F DIAST BP <80 MM HG: CPT | Mod: CPTII,,, | Performed by: INTERNAL MEDICINE

## 2018-09-26 PROCEDURE — 85025 COMPLETE CBC W/AUTO DIFF WBC: CPT | Mod: PO

## 2018-09-26 PROCEDURE — 99213 OFFICE O/P EST LOW 20 MIN: CPT | Mod: PBBFAC,PO | Performed by: INTERNAL MEDICINE

## 2018-09-26 PROCEDURE — 83010 ASSAY OF HAPTOGLOBIN QUANT: CPT

## 2018-09-26 PROCEDURE — 80074 ACUTE HEPATITIS PANEL: CPT

## 2018-09-26 PROCEDURE — 83615 LACTATE (LD) (LDH) ENZYME: CPT | Mod: PO

## 2018-09-26 PROCEDURE — 83540 ASSAY OF IRON: CPT

## 2018-09-26 PROCEDURE — 99205 OFFICE O/P NEW HI 60 MIN: CPT | Mod: S$PBB,,, | Performed by: INTERNAL MEDICINE

## 2018-09-26 PROCEDURE — 1101F PT FALLS ASSESS-DOCD LE1/YR: CPT | Mod: CPTII,,, | Performed by: INTERNAL MEDICINE

## 2018-09-26 PROCEDURE — 82607 VITAMIN B-12: CPT

## 2018-09-26 PROCEDURE — 36415 COLL VENOUS BLD VENIPUNCTURE: CPT | Mod: PO

## 2018-09-26 PROCEDURE — 3074F SYST BP LT 130 MM HG: CPT | Mod: CPTII,,, | Performed by: INTERNAL MEDICINE

## 2018-09-26 PROCEDURE — 85045 AUTOMATED RETICULOCYTE COUNT: CPT

## 2018-09-26 PROCEDURE — 84443 ASSAY THYROID STIM HORMONE: CPT

## 2018-09-26 PROCEDURE — 83520 IMMUNOASSAY QUANT NOS NONAB: CPT

## 2018-09-26 PROCEDURE — 82728 ASSAY OF FERRITIN: CPT

## 2018-09-26 PROCEDURE — 84165 PROTEIN E-PHORESIS SERUM: CPT | Mod: 26,,, | Performed by: PATHOLOGY

## 2018-09-26 PROCEDURE — 80053 COMPREHEN METABOLIC PANEL: CPT | Mod: PO

## 2018-09-26 PROCEDURE — 99999 PR PBB SHADOW E&M-EST. PATIENT-LVL III: CPT | Mod: PBBFAC,,, | Performed by: INTERNAL MEDICINE

## 2018-09-26 PROCEDURE — 82232 ASSAY OF BETA-2 PROTEIN: CPT

## 2018-09-26 RX ORDER — VALSARTAN 160 MG/1
160 TABLET ORAL DAILY
Qty: 90 TABLET | Refills: 3 | Status: SHIPPED | OUTPATIENT
Start: 2018-09-26 | End: 2019-10-22 | Stop reason: SDUPTHER

## 2018-09-26 NOTE — PROGRESS NOTES
Subjective:       Patient ID: Jose Luis Martinez is a 78 y.o. male.    Chief Complaint: Consult; Results; and Anemia    HPI 78-year-old male referred by primary physician for evaluation of progressive macrocytic anemia was SC the patient for further evaluation    Past Medical History:   Diagnosis Date    Hypertension      History reviewed. No pertinent family history.  Social History     Socioeconomic History    Marital status:      Spouse name: Not on file    Number of children: Not on file    Years of education: Not on file    Highest education level: Not on file   Social Needs    Financial resource strain: Not on file    Food insecurity - worry: Not on file    Food insecurity - inability: Not on file    Transportation needs - medical: Not on file    Transportation needs - non-medical: Not on file   Occupational History    Not on file   Tobacco Use    Smoking status: Former Smoker     Packs/day: 1.00     Years: 29.00     Pack years: 29.00    Smokeless tobacco: Former User   Substance and Sexual Activity    Alcohol use: No    Drug use: No    Sexual activity: Not on file   Other Topics Concern    Not on file   Social History Narrative    Not on file     History reviewed. No pertinent surgical history.    Labs:  Lab Results   Component Value Date    WBC 9.71 08/16/2018    HGB 12.0 (L) 08/16/2018    HCT 39.4 (L) 08/16/2018     (H) 08/16/2018     08/16/2018     BMP  Lab Results   Component Value Date     04/19/2018    K 4.1 04/19/2018     04/19/2018    CO2 29 04/19/2018    BUN 19 04/19/2018    CREATININE 1.1 04/19/2018    CALCIUM 9.2 04/19/2018    ANIONGAP 10 04/19/2018    ESTGFRAFRICA >60.0 04/19/2018    EGFRNONAA >60.0 04/19/2018     Lab Results   Component Value Date    ALT 21 04/19/2018    AST 18 04/19/2018    ALKPHOS 90 04/19/2018    BILITOT 0.5 04/19/2018       No results found for: IRON, TIBC, FERRITIN, SATURATEDIRO  No results found for: KZVTBLMC25  No results  found for: FOLATE  Lab Results   Component Value Date    TSH 3.667 09/27/2017         Review of Systems   Constitutional: Positive for activity change, appetite change and fatigue. Negative for chills, diaphoresis, fever and unexpected weight change.   HENT: Negative for congestion, dental problem, drooling, ear discharge, ear pain, facial swelling, hearing loss, mouth sores, nosebleeds, postnasal drip, rhinorrhea, sinus pressure, sneezing, sore throat, tinnitus, trouble swallowing and voice change.    Eyes: Negative for photophobia, pain, discharge, redness, itching and visual disturbance.   Respiratory: Negative for apnea, cough, choking, chest tightness, shortness of breath, wheezing and stridor.    Cardiovascular: Negative for chest pain, palpitations and leg swelling.   Gastrointestinal: Negative for abdominal distention, abdominal pain, anal bleeding, blood in stool, constipation, diarrhea, nausea, rectal pain and vomiting.   Endocrine: Negative for cold intolerance, heat intolerance, polydipsia, polyphagia and polyuria.   Genitourinary: Negative for decreased urine volume, difficulty urinating, discharge, dysuria, enuresis, flank pain, frequency, genital sores, hematuria, penile pain, penile swelling, scrotal swelling, testicular pain and urgency.   Musculoskeletal: Positive for arthralgias and gait problem. Negative for back pain, joint swelling, myalgias, neck pain and neck stiffness.   Skin: Negative for color change, pallor, rash and wound.   Allergic/Immunologic: Negative for environmental allergies, food allergies and immunocompromised state.   Neurological: Positive for weakness. Negative for dizziness, tremors, seizures, syncope, facial asymmetry, speech difficulty, light-headedness, numbness and headaches.   Hematological: Negative for adenopathy. Does not bruise/bleed easily.   Psychiatric/Behavioral: Positive for dysphoric mood. Negative for agitation, behavioral problems, confusion, decreased  concentration, hallucinations, self-injury, sleep disturbance and suicidal ideas. The patient is nervous/anxious. The patient is not hyperactive.        Objective:      Physical Exam   Constitutional: He is oriented to person, place, and time. He appears well-developed and well-nourished. He appears distressed.   HENT:   Head: Normocephalic.   Right Ear: Tympanic membrane and external ear normal.   Left Ear: Tympanic membrane and external ear normal.   Nose: Nose normal. Right sinus exhibits no maxillary sinus tenderness and no frontal sinus tenderness. Left sinus exhibits no maxillary sinus tenderness and no frontal sinus tenderness.   Mouth/Throat: Oropharynx is clear and moist. No oropharyngeal exudate.   Eyes: EOM and lids are normal. Pupils are equal, round, and reactive to light. Right eye exhibits no discharge. Left eye exhibits no discharge. Right conjunctiva is not injected. Right conjunctiva has no hemorrhage. Left conjunctiva is not injected. Left conjunctiva has no hemorrhage. No scleral icterus. Right eye exhibits normal extraocular motion. Left eye exhibits normal extraocular motion.   Neck: Normal range of motion. Neck supple. No JVD present. No tracheal deviation present. No thyromegaly present.   Cardiovascular: Normal rate, regular rhythm and normal heart sounds.   Pulmonary/Chest: Effort normal and breath sounds normal. No stridor. No respiratory distress.   Abdominal: Soft. Bowel sounds are normal. He exhibits no mass. There is no hepatosplenomegaly, splenomegaly or hepatomegaly. There is no tenderness.   Musculoskeletal: Normal range of motion. He exhibits no edema or tenderness.   Lymphadenopathy:        Head (right side): No posterior auricular and no occipital adenopathy present.        Head (left side): No posterior auricular and no occipital adenopathy present.     He has no cervical adenopathy.        Right cervical: No superficial cervical, no deep cervical and no posterior cervical  adenopathy present.       Left cervical: No superficial cervical, no deep cervical and no posterior cervical adenopathy present.     He has no axillary adenopathy.        Right: No supraclavicular adenopathy present.        Left: No supraclavicular adenopathy present.   Neurological: He is alert and oriented to person, place, and time. He has normal strength. No cranial nerve deficit. Coordination abnormal.   Skin: Skin is dry. No rash noted. He is not diaphoretic. No cyanosis or erythema. Nails show no clubbing.   Psychiatric: He has a normal mood and affect. His behavior is normal. Judgment and thought content normal. Cognition and memory are normal.   Vitals reviewed.          Assessment:      1. Macrocytic anemia    2. Abnormal results of liver function studies            Plan:   Review of laboratory studies demonstrates progressive macrocytic anemia at this point will proceed with laboratory evaluation and see back in 2 weeks of stab wish patient on portal article on macrocytic anemia sent to patient unable to be sooner because of financial concerns for pain and co-pay          Aleks De Souza Jr, MD FACP

## 2018-09-26 NOTE — LETTER
September 26, 2018      Matti Mejia MD  [address] [place] [address]           [facility]  [address] [facility] [address]  [place] [address]  Phone: [phone]  Fax: [phone]          Patient: Jose Luis Martinez   MR Number: [id number]   YOB: 1940   Date of Visit: 9/26/2018       Dear Dr. Matti Mejia:    Thank you for referring Jose Luis Martinez to me for evaluation. Attached you will find relevant portions of my assessment and plan of care.    If you have questions, please do not hesitate to call me. I look forward to following Jose Luis Martinez along with you.    Sincerely,    Aleks De Souza MD    Enclosure  CC:  No Recipients    If you would like to receive this communication electronically, please contact [email or web address] or [phone] to request more information on [facility] access.    For providers and/or their staff who would like to refer a patient to [facility], please contact us through our one-stop-shop provider referral line, [facility], at [phone].    If you feel you have received this communication in error or would no longer like to receive these types of communications, please e-mail [email or web address]

## 2018-09-27 LAB
ALBUMIN SERPL ELPH-MCNC: 3.2 G/DL
ALPHA1 GLOB SERPL ELPH-MCNC: 0.27 G/DL
ALPHA2 GLOB SERPL ELPH-MCNC: 0.8 G/DL
B-GLOBULIN SERPL ELPH-MCNC: 0.68 G/DL
GAMMA GLOB SERPL ELPH-MCNC: 0.95 G/DL
HAV IGM SERPL QL IA: NEGATIVE
HBV CORE IGM SERPL QL IA: NEGATIVE
HBV SURFACE AG SERPL QL IA: NEGATIVE
HCV AB SERPL QL IA: NEGATIVE
KAPPA LC SER QL IA: 2.7 MG/DL
KAPPA LC/LAMBDA SER IA: 1.8
LAMBDA LC SER QL IA: 1.5 MG/DL
PATHOLOGIST INTERPRETATION SPE: NORMAL
PROT SERPL-MCNC: 5.9 G/DL

## 2018-10-15 ENCOUNTER — OFFICE VISIT (OUTPATIENT)
Dept: HEMATOLOGY/ONCOLOGY | Facility: CLINIC | Age: 78
End: 2018-10-15
Payer: MEDICARE

## 2018-10-15 VITALS
TEMPERATURE: 99 F | HEIGHT: 74 IN | SYSTOLIC BLOOD PRESSURE: 119 MMHG | WEIGHT: 315 LBS | HEART RATE: 85 BPM | BODY MASS INDEX: 40.43 KG/M2 | DIASTOLIC BLOOD PRESSURE: 69 MMHG | RESPIRATION RATE: 18 BRPM | OXYGEN SATURATION: 98 %

## 2018-10-15 DIAGNOSIS — D53.9 MACROCYTIC ANEMIA: Primary | ICD-10-CM

## 2018-10-15 DIAGNOSIS — D47.2 MGUS (MONOCLONAL GAMMOPATHY OF UNKNOWN SIGNIFICANCE): ICD-10-CM

## 2018-10-15 PROCEDURE — 1101F PT FALLS ASSESS-DOCD LE1/YR: CPT | Mod: CPTII,,, | Performed by: INTERNAL MEDICINE

## 2018-10-15 PROCEDURE — 3078F DIAST BP <80 MM HG: CPT | Mod: CPTII,,, | Performed by: INTERNAL MEDICINE

## 2018-10-15 PROCEDURE — 99213 OFFICE O/P EST LOW 20 MIN: CPT | Mod: PBBFAC,PO | Performed by: INTERNAL MEDICINE

## 2018-10-15 PROCEDURE — 99999 PR PBB SHADOW E&M-EST. PATIENT-LVL III: CPT | Mod: PBBFAC,,, | Performed by: INTERNAL MEDICINE

## 2018-10-15 PROCEDURE — 3074F SYST BP LT 130 MM HG: CPT | Mod: CPTII,,, | Performed by: INTERNAL MEDICINE

## 2018-10-15 PROCEDURE — 99214 OFFICE O/P EST MOD 30 MIN: CPT | Mod: S$PBB,,, | Performed by: INTERNAL MEDICINE

## 2018-10-15 NOTE — PROGRESS NOTES
Subjective:       Patient ID: Jose Luis Martinez is a 78 y.o. male.    Chief Complaint: Results    HPI 78-year-old male history of low-grade anemia returns for review    Past Medical History:   Diagnosis Date    Hypertension      History reviewed. No pertinent family history.  Social History     Socioeconomic History    Marital status:      Spouse name: Not on file    Number of children: Not on file    Years of education: Not on file    Highest education level: Not on file   Social Needs    Financial resource strain: Not on file    Food insecurity - worry: Not on file    Food insecurity - inability: Not on file    Transportation needs - medical: Not on file    Transportation needs - non-medical: Not on file   Occupational History    Not on file   Tobacco Use    Smoking status: Former Smoker     Packs/day: 1.00     Years: 29.00     Pack years: 29.00    Smokeless tobacco: Former User   Substance and Sexual Activity    Alcohol use: No    Drug use: No    Sexual activity: Not on file   Other Topics Concern    Not on file   Social History Narrative    Not on file     History reviewed. No pertinent surgical history.    Labs:  Lab Results   Component Value Date    WBC 9.25 09/26/2018    HGB 12.4 (L) 09/26/2018    HCT 39.1 (L) 09/26/2018    MCV 96 09/26/2018     09/26/2018     BMP  Lab Results   Component Value Date     09/26/2018    K 3.6 09/26/2018     09/26/2018    CO2 32 (H) 09/26/2018    BUN 26 (H) 09/26/2018    CREATININE 1.1 09/26/2018    CALCIUM 8.9 09/26/2018    ANIONGAP 7 (L) 09/26/2018    ESTGFRAFRICA >60 09/26/2018    EGFRNONAA >60 09/26/2018     Lab Results   Component Value Date    ALT 18 09/26/2018    AST 17 09/26/2018    ALKPHOS 93 09/26/2018    BILITOT 0.6 09/26/2018       Lab Results   Component Value Date    IRON 58 09/26/2018    TIBC 277 09/26/2018    FERRITIN 153 09/26/2018     Lab Results   Component Value Date    IZZFOWMO30 500 09/26/2018     No results found  for: FOLATE  Lab Results   Component Value Date    TSH 2.858 09/26/2018         Review of Systems   Constitutional: Negative for activity change, appetite change, chills, diaphoresis, fatigue, fever and unexpected weight change.   HENT: Negative for congestion, dental problem, drooling, ear discharge, ear pain, facial swelling, hearing loss, mouth sores, nosebleeds, postnasal drip, rhinorrhea, sinus pressure, sneezing, sore throat, tinnitus, trouble swallowing and voice change.    Eyes: Negative for photophobia, pain, discharge, redness, itching and visual disturbance.   Respiratory: Negative for apnea, cough, choking, chest tightness, shortness of breath, wheezing and stridor.    Cardiovascular: Negative for chest pain, palpitations and leg swelling.   Gastrointestinal: Negative for abdominal distention, abdominal pain, anal bleeding, blood in stool, constipation, diarrhea, nausea, rectal pain and vomiting.   Endocrine: Negative for cold intolerance, heat intolerance, polydipsia, polyphagia and polyuria.   Genitourinary: Negative for decreased urine volume, difficulty urinating, discharge, dysuria, enuresis, flank pain, frequency, genital sores, hematuria, penile pain, penile swelling, scrotal swelling, testicular pain and urgency.   Musculoskeletal: Positive for gait problem. Negative for arthralgias, back pain, joint swelling, myalgias, neck pain and neck stiffness.   Skin: Negative for color change, pallor, rash and wound.   Allergic/Immunologic: Negative for environmental allergies, food allergies and immunocompromised state.   Neurological: Negative for dizziness, tremors, seizures, syncope, facial asymmetry, speech difficulty, weakness, light-headedness, numbness and headaches.   Hematological: Negative for adenopathy. Does not bruise/bleed easily.   Psychiatric/Behavioral: Negative for agitation, behavioral problems, confusion, decreased concentration, dysphoric mood, hallucinations, self-injury, sleep  disturbance and suicidal ideas. The patient is not nervous/anxious and is not hyperactive.        Objective:      Physical Exam   Constitutional: He is oriented to person, place, and time. He appears well-developed and well-nourished. No distress.   HENT:   Head: Normocephalic.   Right Ear: External ear normal.   Left Ear: External ear normal.   Nose: Nose normal. Right sinus exhibits no maxillary sinus tenderness and no frontal sinus tenderness. Left sinus exhibits no maxillary sinus tenderness and no frontal sinus tenderness.   Mouth/Throat: Oropharynx is clear and moist. No oropharyngeal exudate.   Eyes: EOM and lids are normal. Pupils are equal, round, and reactive to light. Right eye exhibits no discharge. Left eye exhibits no discharge. Right conjunctiva is not injected. Right conjunctiva has no hemorrhage. Left conjunctiva is not injected. Left conjunctiva has no hemorrhage. No scleral icterus. Right eye exhibits normal extraocular motion. Left eye exhibits normal extraocular motion.   Neck: Normal range of motion. Neck supple. No JVD present. No tracheal deviation present. No thyromegaly present.   Cardiovascular: Normal rate and regular rhythm.   Pulmonary/Chest: Effort normal. No stridor. No respiratory distress.   Abdominal: Soft. He exhibits no mass. There is no hepatosplenomegaly, splenomegaly or hepatomegaly. There is no tenderness.   Musculoskeletal: Normal range of motion. He exhibits no edema or tenderness.   Lymphadenopathy:        Head (right side): No posterior auricular and no occipital adenopathy present.        Head (left side): No posterior auricular and no occipital adenopathy present.     He has no cervical adenopathy.        Right cervical: No superficial cervical, no deep cervical and no posterior cervical adenopathy present.       Left cervical: No superficial cervical, no deep cervical and no posterior cervical adenopathy present.     He has no axillary adenopathy.        Right: No  supraclavicular adenopathy present.        Left: No supraclavicular adenopathy present.   Neurological: He is alert and oriented to person, place, and time. He has normal strength. No cranial nerve deficit. Coordination abnormal.   Skin: Skin is dry. No rash noted. He is not diaphoretic. No cyanosis or erythema. Nails show no clubbing.   Psychiatric: He has a normal mood and affect. His behavior is normal. Judgment and thought content normal. Cognition and memory are normal.   Vitals reviewed.          Assessment:      1. Macrocytic anemia    2. MGUS (monoclonal gammopathy of unknown significance)           Plan:   Macrocytic anemia.  At this point stable findings will return 4-6 months low-grade MGUS information given no specific findings other than low-grade MGUS.  Discussed implications          Aleks De Souza Jr, MD FACP

## 2018-10-26 RX ORDER — BUMETANIDE 2 MG/1
2 TABLET ORAL 2 TIMES DAILY
Qty: 60 TABLET | Refills: 12 | Status: SHIPPED | OUTPATIENT
Start: 2018-10-26 | End: 2019-10-31 | Stop reason: SDUPTHER

## 2018-10-26 NOTE — TELEPHONE ENCOUNTER
----- Message from Rachna Tejada sent at 10/26/2018  9:42 AM CDT -----  [facility]ak ( [facility] ) is requesting a call from nurse to f/u on refill request         Please call Deepak ( [facility] ) back at [phone]

## 2018-10-30 ENCOUNTER — TELEPHONE (OUTPATIENT)
Dept: FAMILY MEDICINE | Facility: CLINIC | Age: 78
End: 2018-10-30

## 2018-10-30 NOTE — TELEPHONE ENCOUNTER
----- Message from Missy Hardy sent at 10/30/2018  4:28 PM CDT -----  Contact: pt wife  Calling in regards to a oversized reclining chair and insurance company does not have the information needed and to call [employer] at [phone] and ref # [id number] and please advise  [phone]

## 2018-10-30 NOTE — TELEPHONE ENCOUNTER
Pt wife instructed to call insurance company to see which chair would be cover under her insurance.

## 2018-11-01 RX ORDER — FLUTICASONE PROPIONATE 50 MCG
SPRAY, SUSPENSION (ML) NASAL
Qty: 16 G | Refills: 6 | Status: SHIPPED | OUTPATIENT
Start: 2018-11-01 | End: 2019-05-28 | Stop reason: SDUPTHER

## 2018-12-17 ENCOUNTER — OFFICE VISIT (OUTPATIENT)
Dept: FAMILY MEDICINE | Facility: CLINIC | Age: 78
End: 2018-12-17
Payer: MEDICARE

## 2018-12-17 ENCOUNTER — LAB VISIT (OUTPATIENT)
Dept: LAB | Facility: HOSPITAL | Age: 78
End: 2018-12-17
Payer: MEDICARE

## 2018-12-17 VITALS
SYSTOLIC BLOOD PRESSURE: 126 MMHG | TEMPERATURE: 98 F | DIASTOLIC BLOOD PRESSURE: 64 MMHG | BODY MASS INDEX: 40.43 KG/M2 | HEIGHT: 74 IN | WEIGHT: 315 LBS | RESPIRATION RATE: 16 BRPM | HEART RATE: 73 BPM | OXYGEN SATURATION: 96 %

## 2018-12-17 DIAGNOSIS — Z79.4 TYPE 2 DIABETES MELLITUS WITH HYPERGLYCEMIA, WITH LONG-TERM CURRENT USE OF INSULIN: ICD-10-CM

## 2018-12-17 DIAGNOSIS — D53.9 MACROCYTIC ANEMIA: ICD-10-CM

## 2018-12-17 DIAGNOSIS — E11.65 TYPE 2 DIABETES MELLITUS WITH HYPERGLYCEMIA, WITH LONG-TERM CURRENT USE OF INSULIN: ICD-10-CM

## 2018-12-17 DIAGNOSIS — E78.00 HYPERCHOLESTEROLEMIA: ICD-10-CM

## 2018-12-17 DIAGNOSIS — I10 ESSENTIAL HYPERTENSION: Primary | ICD-10-CM

## 2018-12-17 LAB
ESTIMATED AVG GLUCOSE: 246 MG/DL
HBA1C MFR BLD HPLC: 10.2 %

## 2018-12-17 PROCEDURE — 99999 PR PBB SHADOW E&M-EST. PATIENT-LVL V: CPT | Mod: PBBFAC,HCNC,, | Performed by: INTERNAL MEDICINE

## 2018-12-17 PROCEDURE — 83036 HEMOGLOBIN GLYCOSYLATED A1C: CPT | Mod: HCNC

## 2018-12-17 PROCEDURE — 3078F DIAST BP <80 MM HG: CPT | Mod: CPTII,HCNC,S$GLB, | Performed by: INTERNAL MEDICINE

## 2018-12-17 PROCEDURE — G0009 ADMIN PNEUMOCOCCAL VACCINE: HCPCS | Mod: HCNC,S$GLB,, | Performed by: INTERNAL MEDICINE

## 2018-12-17 PROCEDURE — 90670 PCV13 VACCINE IM: CPT | Mod: HCNC,S$GLB,, | Performed by: INTERNAL MEDICINE

## 2018-12-17 PROCEDURE — 36415 COLL VENOUS BLD VENIPUNCTURE: CPT | Mod: HCNC,PO

## 2018-12-17 PROCEDURE — 90662 IIV NO PRSV INCREASED AG IM: CPT | Mod: HCNC,S$GLB,, | Performed by: INTERNAL MEDICINE

## 2018-12-17 PROCEDURE — 1100F PTFALLS ASSESS-DOCD GE2>/YR: CPT | Mod: CPTII,HCNC,S$GLB, | Performed by: INTERNAL MEDICINE

## 2018-12-17 PROCEDURE — 3288F FALL RISK ASSESSMENT DOCD: CPT | Mod: CPTII,HCNC,S$GLB, | Performed by: INTERNAL MEDICINE

## 2018-12-17 PROCEDURE — 99215 OFFICE O/P EST HI 40 MIN: CPT | Mod: 25,HCNC,S$GLB, | Performed by: INTERNAL MEDICINE

## 2018-12-17 PROCEDURE — G0008 ADMIN INFLUENZA VIRUS VAC: HCPCS | Mod: HCNC,S$GLB,, | Performed by: INTERNAL MEDICINE

## 2018-12-17 PROCEDURE — 3074F SYST BP LT 130 MM HG: CPT | Mod: CPTII,HCNC,S$GLB, | Performed by: INTERNAL MEDICINE

## 2018-12-17 NOTE — PROGRESS NOTES
Subjective:       Patient ID: Jose Luis Martinez is a 78 y.o. male.    Chief Complaint: Follow-up (4 month); Hyperlipidemia; Hypertension; and Diabetes    Hyperlipidemia   Associated symptoms include myalgias. Pertinent negatives include no chest pain or shortness of breath. Current antihyperlipidemic treatment includes statins. The current treatment provides significant improvement of lipids.   Hypertension   This is a chronic problem. The problem is controlled. Pertinent negatives include no chest pain, headaches, neck pain, palpitations or shortness of breath.   Diabetes   He presents for his follow-up diabetic visit. He has type 2 diabetes mellitus. His disease course has been stable. Pertinent negatives for hypoglycemia include no confusion, dizziness, headaches, nervousness/anxiousness, pallor, seizures, speech difficulty or tremors. Pertinent negatives for diabetes include no chest pain, no fatigue, no polydipsia, no polyphagia, no polyuria and no weakness.     Past Medical History:   Diagnosis Date    Hypertension      No past surgical history on file.  No family history on file.  Social History     Socioeconomic History    Marital status:      Spouse name: Not on file    Number of children: Not on file    Years of education: Not on file    Highest education level: Not on file   Social Needs    Financial resource strain: Not on file    Food insecurity - worry: Not on file    Food insecurity - inability: Not on file    Transportation needs - medical: Not on file    Transportation needs - non-medical: Not on file   Occupational History    Not on file   Tobacco Use    Smoking status: Former Smoker     Packs/day: 1.00     Years: 29.00     Pack years: 29.00    Smokeless tobacco: Former User   Substance and Sexual Activity    Alcohol use: No    Drug use: No    Sexual activity: Not on file   Other Topics Concern    Not on file   Social History Narrative    Not on file     Review of Systems    Constitutional: Negative for activity change, appetite change, chills, diaphoresis, fatigue, fever and unexpected weight change.   HENT: Negative for drooling, ear discharge, ear pain, facial swelling, hearing loss, mouth sores, nosebleeds, postnasal drip, rhinorrhea, sinus pressure, sneezing, sore throat, tinnitus, trouble swallowing and voice change.    Eyes: Negative for photophobia, redness and visual disturbance.   Respiratory: Negative for apnea, cough, choking, chest tightness, shortness of breath and wheezing.    Cardiovascular: Positive for leg swelling. Negative for chest pain and palpitations.   Gastrointestinal: Negative for abdominal distention, abdominal pain, anal bleeding, blood in stool, constipation, diarrhea, nausea and vomiting.   Endocrine: Negative for cold intolerance, heat intolerance, polydipsia, polyphagia and polyuria.   Genitourinary: Negative for difficulty urinating, dysuria, enuresis, flank pain, frequency, genital sores, hematuria and urgency.   Musculoskeletal: Positive for arthralgias, gait problem and myalgias. Negative for back pain, joint swelling, neck pain and neck stiffness.   Skin: Negative for color change, pallor, rash and wound.   Allergic/Immunologic: Negative for food allergies and immunocompromised state.   Neurological: Negative for dizziness, tremors, seizures, syncope, facial asymmetry, speech difficulty, weakness, light-headedness, numbness and headaches.   Hematological: Negative for adenopathy. Does not bruise/bleed easily.   Psychiatric/Behavioral: Negative for agitation, behavioral problems, confusion, decreased concentration, dysphoric mood, hallucinations, self-injury, sleep disturbance and suicidal ideas. The patient is not nervous/anxious and is not hyperactive.        Objective:      Physical Exam   Constitutional: He is oriented to person, place, and time. He appears well-developed and well-nourished. No distress.   HENT:   Head: Normocephalic and  atraumatic.   Eyes: Pupils are equal, round, and reactive to light.   Neck: Normal range of motion. Neck supple. No JVD present. Carotid bruit is not present. No tracheal deviation present. No thyromegaly present.   Cardiovascular: Normal rate, regular rhythm, normal heart sounds and intact distal pulses.   Pulmonary/Chest: Effort normal and breath sounds normal. No respiratory distress. He has no wheezes. He has no rales. He exhibits no tenderness.   Abdominal: Soft. Bowel sounds are normal. He exhibits no distension. There is no tenderness. There is no rebound and no guarding.   Musculoskeletal: Normal range of motion. He exhibits edema. He exhibits no tenderness.   Lymphadenopathy:     He has no cervical adenopathy.   Neurological: He is alert and oriented to person, place, and time.   Skin: Skin is warm and dry. No rash noted. He is not diaphoretic. No erythema. No pallor.   Psychiatric: He has a normal mood and affect. His behavior is normal. Judgment and thought content normal.   Nursing note and vitals reviewed.      CMP  Sodium   Date Value Ref Range Status   09/26/2018 140 136 - 145 mmol/L Final     Potassium   Date Value Ref Range Status   09/26/2018 3.6 3.5 - 5.1 mmol/L Final     Chloride   Date Value Ref Range Status   09/26/2018 101 95 - 110 mmol/L Final     CO2   Date Value Ref Range Status   09/26/2018 32 (H) 23 - 29 mmol/L Final     Glucose   Date Value Ref Range Status   09/26/2018 244 (H) 70 - 110 mg/dL Final     BUN, Bld   Date Value Ref Range Status   09/26/2018 26 (H) 8 - 23 mg/dL Final     Creatinine   Date Value Ref Range Status   09/26/2018 1.1 0.5 - 1.4 mg/dL Final     Calcium   Date Value Ref Range Status   09/26/2018 8.9 8.7 - 10.5 mg/dL Final     Total Protein   Date Value Ref Range Status   09/26/2018 6.2 6.0 - 8.4 g/dL Final     Albumin   Date Value Ref Range Status   09/26/2018 3.3 (L) 3.5 - 5.2 g/dL Final     Total Bilirubin   Date Value Ref Range Status   09/26/2018 0.6 0.1 - 1.0  mg/dL Final     Comment:     For infants and newborns, interpretation of results should be based  on gestational age, weight and in agreement with clinical  observations.  Premature Infant recommended reference ranges:  Up to 24 hours.............<8.0 mg/dL  Up to 48 hours............<12.0 mg/dL  3-5 days..................<15.0 mg/dL  6-29 days.................<15.0 mg/dL       Alkaline Phosphatase   Date Value Ref Range Status   09/26/2018 93 55 - 135 U/L Final     AST   Date Value Ref Range Status   09/26/2018 17 10 - 40 U/L Final     ALT   Date Value Ref Range Status   09/26/2018 18 10 - 44 U/L Final     Anion Gap   Date Value Ref Range Status   09/26/2018 7 (L) 8 - 16 mmol/L Final     eGFR if    Date Value Ref Range Status   09/26/2018 >60 >60 mL/min/1.73 m^2 Final     eGFR if non    Date Value Ref Range Status   09/26/2018 >60 >60 mL/min/1.73 m^2 Final     Comment:     Calculation used to obtain the estimated glomerular filtration  rate (eGFR) is the CKD-EPI equation.        Lab Results   Component Value Date    WBC 9.25 09/26/2018    HGB 12.4 (L) 09/26/2018    HCT 39.1 (L) 09/26/2018    MCV 96 09/26/2018     09/26/2018     Lab Results   Component Value Date    CHOL 112 (L) 08/16/2018     Lab Results   Component Value Date    HDL 46 08/16/2018     Lab Results   Component Value Date    LDLCALC 53.0 (L) 08/16/2018     Lab Results   Component Value Date    TRIG 65 08/16/2018     Lab Results   Component Value Date    CHOLHDL 41.1 08/16/2018     Lab Results   Component Value Date    TSH 2.858 09/26/2018     Lab Results   Component Value Date    HGBA1C 9.8 (H) 08/16/2018     Assessment:       1. Essential hypertension    2. Hypercholesterolemia    3. Macrocytic anemia    4. Type 2 diabetes mellitus with hyperglycemia, with long-term current use of insulin        Plan:   Essential hypertension--stable.    Hypercholesterolemia-stable.    Macrocytic anemia-stable.    Type 2  diabetes mellitus with hyperglycemia, with long-term current use of insulin----------watch diet.  -     Hemoglobin A1c; Future; Expected date: 12/17/2018    Other orders  -     (In Office Administered) Pneumococcal Conjugate Vaccine (13 Valent) (IM)    F/u 4 months/

## 2018-12-18 ENCOUNTER — TELEPHONE (OUTPATIENT)
Dept: FAMILY MEDICINE | Facility: CLINIC | Age: 78
End: 2018-12-18

## 2019-01-25 RX ORDER — POTASSIUM CHLORIDE 750 MG/1
TABLET, EXTENDED RELEASE ORAL
Qty: 90 TABLET | Refills: 3 | Status: SHIPPED | OUTPATIENT
Start: 2019-01-25 | End: 2019-11-21 | Stop reason: SDUPTHER

## 2019-01-29 ENCOUNTER — TELEPHONE (OUTPATIENT)
Dept: FAMILY MEDICINE | Facility: CLINIC | Age: 79
End: 2019-01-29

## 2019-01-29 DIAGNOSIS — R26.81 UNSTEADY GAIT: ICD-10-CM
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
## 2019-01-29 NOTE — TELEPHONE ENCOUNTER
----- Message from Anand Mcdaniel sent at 1/29/2019 10:23 AM CST -----  Contact: pt wife  Caller is requesting a call back from the nurse in regards to the pt needing orders for a hospital bed because he is falling out of the bed and chair   [phone]

## 2019-01-29 NOTE — TELEPHONE ENCOUNTER
----- Message from Ira Shafer sent at 1/29/2019  3:29 PM CST -----  returned call regarding pt issue...[phone] (home)

## 2019-02-01 ENCOUNTER — TELEPHONE (OUTPATIENT)
Dept: FAMILY MEDICINE | Facility: CLINIC | Age: 79
End: 2019-02-01

## 2019-02-01 NOTE — TELEPHONE ENCOUNTER
----- Message from Katalina Gallagher sent at 2/1/2019  1:03 PM CST -----  Contact: Wife  Request a call concerning a hospital bed for this pt, no additional info given, can be reached at [phone]///[id number]

## 2019-02-02 RX ORDER — ATORVASTATIN CALCIUM 40 MG/1
TABLET, FILM COATED ORAL
Qty: 90 TABLET | Refills: 3 | Status: SHIPPED | OUTPATIENT
Start: 2019-02-02 | End: 2019-11-21 | Stop reason: SDUPTHER

## 2019-02-07 DIAGNOSIS — D53.9 MACROCYTIC ANEMIA: ICD-10-CM

## 2019-02-07 DIAGNOSIS — D47.2 MGUS (MONOCLONAL GAMMOPATHY OF UNKNOWN SIGNIFICANCE): Primary | ICD-10-CM

## 2019-02-13 ENCOUNTER — TELEPHONE (OUTPATIENT)
Dept: HEMATOLOGY/ONCOLOGY | Facility: CLINIC | Age: 79
End: 2019-02-13

## 2019-02-13 NOTE — TELEPHONE ENCOUNTER
Spoke with patient who states he is having problems getting his car to run and he will call back in a month or so to r/s. I acknowledged.  Call ended well.

## 2019-02-17 ENCOUNTER — TELEPHONE (OUTPATIENT)
Dept: FAMILY MEDICINE | Facility: CLINIC | Age: 79
End: 2019-02-17

## 2019-02-17 DIAGNOSIS — R53.1 WEAKNESS: Primary | ICD-10-CM

## 2019-02-17 NOTE — TELEPHONE ENCOUNTER
----- Message from Shelley Silva LPN sent at 2/15/2019  3:06 PM CST -----  Contact: Carine- spouse  Please advise.  ----- Message -----  From: Arlene Schneider  Sent: 2/15/2019  10:02 AM  To: Jackie HERNANDEZ Staff    Pt received hospital bed, but bed is too small for pt. Carine is calling to speak with nurse in regards to ordering larger bed for pt. A new rx is needed for larger bed. Please call Carine back at [phone].        Thanks,   Arlene Schneider

## 2019-03-11 ENCOUNTER — PATIENT OUTREACH (OUTPATIENT)
Dept: ADMINISTRATIVE | Facility: HOSPITAL | Age: 79
End: 2019-03-11

## 2019-04-22 RX ORDER — BLOOD SUGAR DIAGNOSTIC
STRIP MISCELLANEOUS
Qty: 100 STRIP | Refills: 12 | Status: SHIPPED | OUTPATIENT
Start: 2019-04-22 | End: 2020-05-11

## 2019-05-24 ENCOUNTER — OFFICE VISIT (OUTPATIENT)
Dept: FAMILY MEDICINE | Facility: CLINIC | Age: 79
End: 2019-05-24
Payer: MEDICARE

## 2019-05-24 ENCOUNTER — LAB VISIT (OUTPATIENT)
Dept: LAB | Facility: HOSPITAL | Age: 79
End: 2019-05-24
Payer: MEDICARE

## 2019-05-24 VITALS
SYSTOLIC BLOOD PRESSURE: 116 MMHG | HEIGHT: 74 IN | BODY MASS INDEX: 42.11 KG/M2 | OXYGEN SATURATION: 96 % | DIASTOLIC BLOOD PRESSURE: 52 MMHG | TEMPERATURE: 98 F | HEART RATE: 66 BPM

## 2019-05-24 DIAGNOSIS — I25.10 CORONARY ARTERY DISEASE, ANGINA PRESENCE UNSPECIFIED, UNSPECIFIED VESSEL OR LESION TYPE, UNSPECIFIED WHETHER NATIVE OR TRANSPLANTED HEART: Primary | ICD-10-CM

## 2019-05-24 DIAGNOSIS — J44.9 CHRONIC OBSTRUCTIVE PULMONARY DISEASE, UNSPECIFIED COPD TYPE: ICD-10-CM

## 2019-05-24 DIAGNOSIS — E11.65 TYPE 2 DIABETES MELLITUS WITH HYPERGLYCEMIA, WITH LONG-TERM CURRENT USE OF INSULIN: ICD-10-CM

## 2019-05-24 DIAGNOSIS — Z79.4 TYPE 2 DIABETES MELLITUS WITH HYPERGLYCEMIA, WITH LONG-TERM CURRENT USE OF INSULIN: ICD-10-CM

## 2019-05-24 DIAGNOSIS — I10 ESSENTIAL HYPERTENSION: ICD-10-CM

## 2019-05-24 DIAGNOSIS — E78.00 HYPERCHOLESTEROLEMIA: ICD-10-CM

## 2019-05-24 DIAGNOSIS — M17.0 OSTEOARTHRITIS OF BOTH KNEES, UNSPECIFIED OSTEOARTHRITIS TYPE: ICD-10-CM

## 2019-05-24 LAB
ALBUMIN SERPL BCP-MCNC: 3.3 G/DL (ref 3.5–5.2)
ALP SERPL-CCNC: 103 U/L (ref 55–135)
ALT SERPL W/O P-5'-P-CCNC: 15 U/L (ref 10–44)
ANION GAP SERPL CALC-SCNC: 9 MMOL/L (ref 8–16)
AST SERPL-CCNC: 18 U/L (ref 10–40)
BASOPHILS # BLD AUTO: 0.03 K/UL (ref 0–0.2)
BASOPHILS NFR BLD: 0.3 % (ref 0–1.9)
BILIRUB SERPL-MCNC: 0.4 MG/DL (ref 0.1–1)
BUN SERPL-MCNC: 26 MG/DL (ref 8–23)
CALCIUM SERPL-MCNC: 9.3 MG/DL (ref 8.7–10.5)
CHLORIDE SERPL-SCNC: 102 MMOL/L (ref 95–110)
CO2 SERPL-SCNC: 28 MMOL/L (ref 23–29)
CREAT SERPL-MCNC: 1.2 MG/DL (ref 0.5–1.4)
DIFFERENTIAL METHOD: ABNORMAL
EOSINOPHIL # BLD AUTO: 0.1 K/UL (ref 0–0.5)
EOSINOPHIL NFR BLD: 1.4 % (ref 0–8)
ERYTHROCYTE [DISTWIDTH] IN BLOOD BY AUTOMATED COUNT: 14 % (ref 11.5–14.5)
EST. GFR  (AFRICAN AMERICAN): >60 ML/MIN/1.73 M^2
EST. GFR  (NON AFRICAN AMERICAN): 57.6 ML/MIN/1.73 M^2
ESTIMATED AVG GLUCOSE: 252 MG/DL (ref 68–131)
GLUCOSE SERPL-MCNC: 238 MG/DL (ref 70–110)
HBA1C MFR BLD HPLC: 10.4 % (ref 4–5.6)
HCT VFR BLD AUTO: 38.7 % (ref 40–54)
HGB BLD-MCNC: 12.1 G/DL (ref 14–18)
IMM GRANULOCYTES # BLD AUTO: 0.02 K/UL (ref 0–0.04)
IMM GRANULOCYTES NFR BLD AUTO: 0.2 % (ref 0–0.5)
LYMPHOCYTES # BLD AUTO: 2.3 K/UL (ref 1–4.8)
LYMPHOCYTES NFR BLD: 24.4 % (ref 18–48)
MCH RBC QN AUTO: 30.5 PG (ref 27–31)
MCHC RBC AUTO-ENTMCNC: 31.3 G/DL (ref 32–36)
MCV RBC AUTO: 98 FL (ref 82–98)
MONOCYTES # BLD AUTO: 0.8 K/UL (ref 0.3–1)
MONOCYTES NFR BLD: 9 % (ref 4–15)
NEUTROPHILS # BLD AUTO: 6 K/UL (ref 1.8–7.7)
NEUTROPHILS NFR BLD: 64.7 % (ref 38–73)
NRBC BLD-RTO: 0 /100 WBC
PLATELET # BLD AUTO: 223 K/UL (ref 150–350)
PMV BLD AUTO: 11.1 FL (ref 9.2–12.9)
POTASSIUM SERPL-SCNC: 4.2 MMOL/L (ref 3.5–5.1)
PROT SERPL-MCNC: 6.4 G/DL (ref 6–8.4)
RBC # BLD AUTO: 3.97 M/UL (ref 4.6–6.2)
SODIUM SERPL-SCNC: 139 MMOL/L (ref 136–145)
WBC # BLD AUTO: 9.21 K/UL (ref 3.9–12.7)

## 2019-05-24 PROCEDURE — 3074F SYST BP LT 130 MM HG: CPT | Mod: HCNC,CPTII,S$GLB, | Performed by: INTERNAL MEDICINE

## 2019-05-24 PROCEDURE — 83036 HEMOGLOBIN GLYCOSYLATED A1C: CPT | Mod: HCNC

## 2019-05-24 PROCEDURE — 3078F DIAST BP <80 MM HG: CPT | Mod: HCNC,CPTII,S$GLB, | Performed by: INTERNAL MEDICINE

## 2019-05-24 PROCEDURE — 3078F PR MOST RECENT DIASTOLIC BLOOD PRESSURE < 80 MM HG: ICD-10-PCS | Mod: HCNC,CPTII,S$GLB, | Performed by: INTERNAL MEDICINE

## 2019-05-24 PROCEDURE — 3074F PR MOST RECENT SYSTOLIC BLOOD PRESSURE < 130 MM HG: ICD-10-PCS | Mod: HCNC,CPTII,S$GLB, | Performed by: INTERNAL MEDICINE

## 2019-05-24 PROCEDURE — 99999 PR PBB SHADOW E&M-EST. PATIENT-LVL IV: ICD-10-PCS | Mod: PBBFAC,HCNC,, | Performed by: INTERNAL MEDICINE

## 2019-05-24 PROCEDURE — 99215 PR OFFICE/OUTPT VISIT, EST, LEVL V, 40-54 MIN: ICD-10-PCS | Mod: HCNC,S$GLB,, | Performed by: INTERNAL MEDICINE

## 2019-05-24 PROCEDURE — 36415 COLL VENOUS BLD VENIPUNCTURE: CPT | Mod: HCNC,PO

## 2019-05-24 PROCEDURE — 1101F PR PT FALLS ASSESS DOC 0-1 FALLS W/OUT INJ PAST YR: ICD-10-PCS | Mod: HCNC,CPTII,S$GLB, | Performed by: INTERNAL MEDICINE

## 2019-05-24 PROCEDURE — 99999 PR PBB SHADOW E&M-EST. PATIENT-LVL IV: CPT | Mod: PBBFAC,HCNC,, | Performed by: INTERNAL MEDICINE

## 2019-05-24 PROCEDURE — 80053 COMPREHEN METABOLIC PANEL: CPT | Mod: HCNC

## 2019-05-24 PROCEDURE — 99215 OFFICE O/P EST HI 40 MIN: CPT | Mod: HCNC,S$GLB,, | Performed by: INTERNAL MEDICINE

## 2019-05-24 PROCEDURE — 1101F PT FALLS ASSESS-DOCD LE1/YR: CPT | Mod: HCNC,CPTII,S$GLB, | Performed by: INTERNAL MEDICINE

## 2019-05-24 PROCEDURE — 85025 COMPLETE CBC W/AUTO DIFF WBC: CPT | Mod: HCNC

## 2019-05-26 ENCOUNTER — TELEPHONE (OUTPATIENT)
Dept: FAMILY MEDICINE | Facility: CLINIC | Age: 79
End: 2019-05-26

## 2019-05-27 NOTE — TELEPHONE ENCOUNTER
----- Message from Siena Cotton sent at 5/27/2019  3:18 PM CDT -----  Contact: PT   Type:  Patient Returning Call    Who Called:pt   Who Left Message for Patient: Raul   Does the patient know what this is regarding?:no  Would the patient rather a call back or a response via [facility]? Call back   Best Call Back Number: [phone] (home)   Additional Information:

## 2019-05-28 RX ORDER — FLUTICASONE PROPIONATE 50 MCG
SPRAY, SUSPENSION (ML) NASAL
Qty: 16 G | Refills: 6 | Status: SHIPPED | OUTPATIENT
Start: 2019-05-28 | End: 2020-02-21

## 2019-06-06 RX ORDER — PEN NEEDLE, DIABETIC 31 GX5/16"
NEEDLE, DISPOSABLE MISCELLANEOUS
OUTPATIENT
Start: 2019-06-06

## 2019-06-06 NOTE — TELEPHONE ENCOUNTER
----- Message from Court Baker sent at 6/6/2019 11:06 AM CDT -----  Contact: pt  Can the clinic reply in [facility]: No    Please refill the medication(s) listed below. The patient can be reached at this phone number ([phone]_) once it is called into the pharmacy.    Medication #1insulin needles    Preferred Pharmacy: [facility] [facility] [address]

## 2019-06-07 NOTE — TELEPHONE ENCOUNTER
----- Message from Sheela Paulson sent at 6/7/2019  2:11 PM CDT -----  Contact: wendy/daughter [phone]  Type:  RX Refill Request    Who Called: Wendy/daughter  Refill or New Rx:refill  RX Name and Strength: insulin needles  How is the patient currently taking it? (ex. 1XDay): 2-3x day  Is this a 30 day or 90 day RX:90 day  Preferred Pharmacy with phone number:    [facility] [place] [address]  [address]  [facility] [address]  Phone: [phone] Fax: [phone]    Local or Mail Order:local  Ordering Provider:Jackie  Would the patient rather a call back or a response via [facility]? Call back   Best Call Back Number:[phone]  Additional Information:

## 2019-06-09 RX ORDER — SYRING-NEEDL,DISP,INSUL,0.3 ML 30 GX5/16"
0.5 SYRINGE, EMPTY DISPOSABLE MISCELLANEOUS 2 TIMES DAILY
Qty: 100 EACH | Refills: 6 | Status: SHIPPED | OUTPATIENT
Start: 2019-06-09 | End: 2020-09-08

## 2019-06-24 RX ORDER — ALBUTEROL SULFATE 90 UG/1
2 AEROSOL, METERED RESPIRATORY (INHALATION) EVERY 6 HOURS PRN
Qty: 18 G | Refills: 12 | Status: SHIPPED | OUTPATIENT
Start: 2019-06-24 | End: 2020-06-30

## 2019-07-01 RX ORDER — HUMAN INSULIN 100 [USP'U]/ML
INJECTION, SUSPENSION SUBCUTANEOUS
Qty: 10 ML | Refills: 12 | Status: SHIPPED | OUTPATIENT
Start: 2019-07-01 | End: 2019-12-21 | Stop reason: SDUPTHER

## 2019-10-22 RX ORDER — VALSARTAN 160 MG/1
TABLET ORAL
Qty: 90 TABLET | Refills: 3 | Status: SHIPPED | OUTPATIENT
Start: 2019-10-22 | End: 2020-12-17

## 2019-10-31 RX ORDER — BUMETANIDE 2 MG/1
TABLET ORAL
Qty: 60 TABLET | Refills: 12 | Status: SHIPPED | OUTPATIENT
Start: 2019-10-31 | End: 2020-11-03

## 2019-11-21 RX ORDER — ATORVASTATIN CALCIUM 40 MG/1
TABLET, FILM COATED ORAL
Qty: 90 TABLET | Refills: 3 | Status: SHIPPED | OUTPATIENT
Start: 2019-11-21 | End: 2020-12-17

## 2019-11-21 RX ORDER — POTASSIUM CHLORIDE 750 MG/1
TABLET, EXTENDED RELEASE ORAL
Qty: 90 TABLET | Refills: 3 | Status: SHIPPED | OUTPATIENT
Start: 2019-11-21 | End: 2021-01-06

## 2019-11-22 ENCOUNTER — TELEPHONE (OUTPATIENT)
Dept: FAMILY MEDICINE | Facility: CLINIC | Age: 79
End: 2019-11-22

## 2019-11-22 DIAGNOSIS — S81.802S WOUND OF LEFT LOWER EXTREMITY, SEQUELA: Primary | ICD-10-CM

## 2019-11-22 NOTE — TELEPHONE ENCOUNTER
----- Message from Christelle Chen sent at 11/22/2019  8:18 AM CST -----  Contact: Teola/wife  Type:  Same Day Appointment Request    Caller is requesting a same day appointment.  Caller declined first available appointment listed below.    Name of Caller:Patient  When is the first available appointment?11/25  Symptoms:Fluid build up/leg swollen/skin coming over,blisters  Best Call Back Number:[phone] or [phone]  Additional Information: wife call this morning, still waiting on someone to call to get him early this morning, pt only want to see Dr Mejia

## 2019-11-22 NOTE — TELEPHONE ENCOUNTER
----- Message from Norma Leach sent at 11/22/2019 10:08 AM CST -----  Contact: cindy from [place] after [facility]   Is calling  pt having the wound care for today and has had this before and has had wound care before and is wanting to have orders placed for wound care for tomorrow and can be reached at [phone]//thanks/dbw     Cindy states to pls call the home health co to set up wound care orders

## 2019-11-22 NOTE — TELEPHONE ENCOUNTER
----- Message from Anna Jones sent at 11/22/2019  6:55 AM CST -----  Contact: Spouse   Type:  Same Day Appointment Request    Caller is requesting a same day appointment.  Caller declined first available appointment listed below.    Name of Caller:Jose Luissarah Martinez   When is the first available appointment?12/6/19  Symptoms fluis in L leg -skin around leg peeling -leakage-swelling  Best Call Back Number: [phone]  Additional Information: n/a

## 2019-11-22 NOTE — TELEPHONE ENCOUNTER
Called madeleine   States pt was put on keflex and wound care was completed today  She will contact [facility] wound [facility] for Sunday       Order faxed to [facility]  Wound care [phone]

## 2019-11-25 ENCOUNTER — TELEPHONE (OUTPATIENT)
Dept: FAMILY MEDICINE | Facility: CLINIC | Age: 79
End: 2019-11-25

## 2019-11-25 DIAGNOSIS — S81.802S WOUND OF LEFT LOWER EXTREMITY, SEQUELA: ICD-10-CM

## 2019-11-25 DIAGNOSIS — R53.81 DEBILITY: Primary | ICD-10-CM

## 2019-11-25 NOTE — TELEPHONE ENCOUNTER
----- Message from Janee Nobles sent at 11/25/2019 10:10 AM CST -----  Contact: Carine wife  Calling concerning if patient HH is in motion. Wife is waiting to hear from provider for answer. Please call wife ASAP TODAY URGENT!! @ [phone]. Thanks, carlos

## 2019-11-25 NOTE — TELEPHONE ENCOUNTER
Called pt   Wife states pt had wound care 11/24/19  Orders sent to [facility] wound care 11/22  Wife given phone number to set up appt with [facility] wound clinic   Will be waiting for  to set up eval

## 2019-11-25 NOTE — TELEPHONE ENCOUNTER
----- Message from Katalina Gallagher sent at 11/25/2019 12:05 PM CST -----  Contact: wife  Request a concerning home health orders for this pt, no additional info given and can be reached at [phone]

## 2019-11-25 NOTE — TELEPHONE ENCOUNTER
Called pt   No answer    HH was sent referral Friday 11/22/19  Also sent referral to wound care at [facility]

## 2019-11-25 NOTE — TELEPHONE ENCOUNTER
----- Message from Christelle Chen sent at 11/25/2019  6:41 AM CST -----  Contact: deandre/Carine  Please call pt wife @ [phone] regarding status for pt Home Health.

## 2019-11-25 NOTE — TELEPHONE ENCOUNTER
Called pt wife  States she called HH and no referral was in    Internal referral placed 11/25  Monik confirmed receipt of refferal     Called pt and lvm

## 2019-11-26 PROCEDURE — G0180 PR HOME HEALTH MD CERTIFICATION: ICD-10-PCS | Mod: ,,, | Performed by: INTERNAL MEDICINE

## 2019-11-26 PROCEDURE — G0180 MD CERTIFICATION HHA PATIENT: HCPCS | Mod: ,,, | Performed by: INTERNAL MEDICINE

## 2019-12-06 ENCOUNTER — EXTERNAL HOME HEALTH (OUTPATIENT)
Dept: HOME HEALTH SERVICES | Facility: HOSPITAL | Age: 79
End: 2019-12-06
Payer: MEDICARE

## 2019-12-09 ENCOUNTER — OFFICE VISIT (OUTPATIENT)
Dept: FAMILY MEDICINE | Facility: CLINIC | Age: 79
End: 2019-12-09
Payer: MEDICARE

## 2019-12-09 ENCOUNTER — LAB VISIT (OUTPATIENT)
Dept: LAB | Facility: HOSPITAL | Age: 79
End: 2019-12-09
Attending: INTERNAL MEDICINE
Payer: MEDICARE

## 2019-12-09 VITALS
DIASTOLIC BLOOD PRESSURE: 63 MMHG | WEIGHT: 300.69 LBS | BODY MASS INDEX: 38.59 KG/M2 | OXYGEN SATURATION: 95 % | HEIGHT: 74 IN | TEMPERATURE: 99 F | SYSTOLIC BLOOD PRESSURE: 131 MMHG | HEART RATE: 95 BPM

## 2019-12-09 DIAGNOSIS — E11.65 TYPE 2 DIABETES MELLITUS WITH HYPERGLYCEMIA, WITH LONG-TERM CURRENT USE OF INSULIN: ICD-10-CM

## 2019-12-09 DIAGNOSIS — I10 ESSENTIAL HYPERTENSION: ICD-10-CM

## 2019-12-09 DIAGNOSIS — L97.929 ULCERS OF BOTH LOWER LEGS: ICD-10-CM

## 2019-12-09 DIAGNOSIS — I25.10 CORONARY ARTERY DISEASE, ANGINA PRESENCE UNSPECIFIED, UNSPECIFIED VESSEL OR LESION TYPE, UNSPECIFIED WHETHER NATIVE OR TRANSPLANTED HEART: Primary | ICD-10-CM

## 2019-12-09 DIAGNOSIS — H93.232 HEARING ABNORMALLY ACUTE, LEFT: ICD-10-CM

## 2019-12-09 DIAGNOSIS — Z79.4 TYPE 2 DIABETES MELLITUS WITH HYPERGLYCEMIA, WITH LONG-TERM CURRENT USE OF INSULIN: ICD-10-CM

## 2019-12-09 DIAGNOSIS — D53.9 MACROCYTIC ANEMIA: ICD-10-CM

## 2019-12-09 DIAGNOSIS — I73.9 PAD (PERIPHERAL ARTERY DISEASE): ICD-10-CM

## 2019-12-09 DIAGNOSIS — J44.9 CHRONIC OBSTRUCTIVE PULMONARY DISEASE, UNSPECIFIED COPD TYPE: ICD-10-CM

## 2019-12-09 DIAGNOSIS — E78.00 HYPERCHOLESTEROLEMIA: ICD-10-CM

## 2019-12-09 DIAGNOSIS — L97.919 ULCERS OF BOTH LOWER LEGS: ICD-10-CM

## 2019-12-09 PROCEDURE — 99499 UNLISTED E&M SERVICE: CPT | Mod: S$GLB,,, | Performed by: INTERNAL MEDICINE

## 2019-12-09 PROCEDURE — 99999 PR PBB SHADOW E&M-EST. PATIENT-LVL V: CPT | Mod: PBBFAC,HCNC,, | Performed by: INTERNAL MEDICINE

## 2019-12-09 PROCEDURE — 1159F PR MEDICATION LIST DOCUMENTED IN MEDICAL RECORD: ICD-10-PCS | Mod: HCNC,S$GLB,, | Performed by: INTERNAL MEDICINE

## 2019-12-09 PROCEDURE — 1159F MED LIST DOCD IN RCRD: CPT | Mod: HCNC,S$GLB,, | Performed by: INTERNAL MEDICINE

## 2019-12-09 PROCEDURE — 83036 HEMOGLOBIN GLYCOSYLATED A1C: CPT | Mod: HCNC

## 2019-12-09 PROCEDURE — 36415 COLL VENOUS BLD VENIPUNCTURE: CPT | Mod: HCNC,PO

## 2019-12-09 PROCEDURE — 99999 PR PBB SHADOW E&M-EST. PATIENT-LVL V: ICD-10-PCS | Mod: PBBFAC,HCNC,, | Performed by: INTERNAL MEDICINE

## 2019-12-09 PROCEDURE — G0008 FLU VACCINE - HIGH DOSE (65+) PRESERVATIVE FREE IM: ICD-10-PCS | Mod: HCNC,S$GLB,, | Performed by: INTERNAL MEDICINE

## 2019-12-09 PROCEDURE — 80061 LIPID PANEL: CPT | Mod: HCNC

## 2019-12-09 PROCEDURE — 3078F PR MOST RECENT DIASTOLIC BLOOD PRESSURE < 80 MM HG: ICD-10-PCS | Mod: HCNC,CPTII,S$GLB, | Performed by: INTERNAL MEDICINE

## 2019-12-09 PROCEDURE — 90662 FLU VACCINE - HIGH DOSE (65+) PRESERVATIVE FREE IM: ICD-10-PCS | Mod: HCNC,S$GLB,, | Performed by: INTERNAL MEDICINE

## 2019-12-09 PROCEDURE — 3075F PR MOST RECENT SYSTOLIC BLOOD PRESS GE 130-139MM HG: ICD-10-PCS | Mod: HCNC,CPTII,S$GLB, | Performed by: INTERNAL MEDICINE

## 2019-12-09 PROCEDURE — 3078F DIAST BP <80 MM HG: CPT | Mod: HCNC,CPTII,S$GLB, | Performed by: INTERNAL MEDICINE

## 2019-12-09 PROCEDURE — 1126F AMNT PAIN NOTED NONE PRSNT: CPT | Mod: HCNC,S$GLB,, | Performed by: INTERNAL MEDICINE

## 2019-12-09 PROCEDURE — 99215 PR OFFICE/OUTPT VISIT, EST, LEVL V, 40-54 MIN: ICD-10-PCS | Mod: 25,HCNC,S$GLB, | Performed by: INTERNAL MEDICINE

## 2019-12-09 PROCEDURE — 3075F SYST BP GE 130 - 139MM HG: CPT | Mod: HCNC,CPTII,S$GLB, | Performed by: INTERNAL MEDICINE

## 2019-12-09 PROCEDURE — 90662 IIV NO PRSV INCREASED AG IM: CPT | Mod: HCNC,S$GLB,, | Performed by: INTERNAL MEDICINE

## 2019-12-09 PROCEDURE — 99499 RISK ADDL DX/OHS AUDIT: ICD-10-PCS | Mod: S$GLB,,, | Performed by: INTERNAL MEDICINE

## 2019-12-09 PROCEDURE — 80053 COMPREHEN METABOLIC PANEL: CPT | Mod: HCNC

## 2019-12-09 PROCEDURE — 1101F PR PT FALLS ASSESS DOC 0-1 FALLS W/OUT INJ PAST YR: ICD-10-PCS | Mod: HCNC,CPTII,S$GLB, | Performed by: INTERNAL MEDICINE

## 2019-12-09 PROCEDURE — G0008 ADMIN INFLUENZA VIRUS VAC: HCPCS | Mod: HCNC,S$GLB,, | Performed by: INTERNAL MEDICINE

## 2019-12-09 PROCEDURE — 1126F PR PAIN SEVERITY QUANTIFIED, NO PAIN PRESENT: ICD-10-PCS | Mod: HCNC,S$GLB,, | Performed by: INTERNAL MEDICINE

## 2019-12-09 PROCEDURE — 1101F PT FALLS ASSESS-DOCD LE1/YR: CPT | Mod: HCNC,CPTII,S$GLB, | Performed by: INTERNAL MEDICINE

## 2019-12-09 PROCEDURE — 99215 OFFICE O/P EST HI 40 MIN: CPT | Mod: 25,HCNC,S$GLB, | Performed by: INTERNAL MEDICINE

## 2019-12-09 NOTE — PROGRESS NOTES
Subjective:       Patient ID: Jose Luis Martinez is a 79 y.o. male.    Chief Complaint: Follow-up; Hypertension; Hyperlipidemia; Diabetes; Anemia; and Coronary Artery Disease    Follow-up   Associated symptoms include arthralgias, fatigue and myalgias. Pertinent negatives include no abdominal pain, chest pain, chills, coughing, diaphoresis, fever, headaches, joint swelling, nausea, neck pain, numbness, rash, sore throat, vomiting or weakness.   Hypertension   Pertinent negatives include no chest pain, headaches, neck pain, palpitations or shortness of breath.   Hyperlipidemia   Associated symptoms include myalgias. Pertinent negatives include no chest pain or shortness of breath.   Diabetes   Pertinent negatives for hypoglycemia include no confusion, dizziness, headaches, nervousness/anxiousness, pallor, seizures, speech difficulty or tremors. Associated symptoms include fatigue. Pertinent negatives for diabetes include no chest pain, no polydipsia, no polyphagia, no polyuria and no weakness.   Anemia   There has been no abdominal pain, bruising/bleeding easily, confusion, fever, light-headedness, pallor or palpitations.   Coronary Artery Disease   Pertinent negatives include no chest pain, chest tightness, dizziness, palpitations or shortness of breath. Risk factors include hyperlipidemia.     Past Medical History:   Diagnosis Date    Hypertension      No past surgical history on file.  No family history on file.  Social History     Socioeconomic History    Marital status:      Spouse name: Not on file    Number of children: Not on file    Years of education: Not on file    Highest education level: Not on file   Occupational History    Not on file   Social Needs    Financial resource strain: Not on file    Food insecurity:     Worry: Not on file     Inability: Not on file    Transportation needs:     Medical: Not on file     Non-medical: Not on file   Tobacco Use    Smoking status: Former Smoker      Packs/day: 1.00     Years: 29.00     Pack years: 29.00    Smokeless tobacco: Former User   Substance and Sexual Activity    Alcohol use: No    Drug use: No    Sexual activity: Not on file   Lifestyle    Physical activity:     Days per week: Not on file     Minutes per session: Not on file    Stress: Not on file   Relationships    Social connections:     Talks on phone: Not on file     Gets together: Not on file     Attends Confucianist service: Not on file     Active member of club or organization: Not on file     Attends meetings of clubs or organizations: Not on file     Relationship status: Not on file   Other Topics Concern    Not on file   Social History Narrative    Not on file     Review of Systems   Constitutional: Positive for fatigue. Negative for activity change, appetite change, chills, diaphoresis, fever and unexpected weight change.   HENT: Positive for hearing loss. Negative for drooling, ear discharge, ear pain, facial swelling, mouth sores, nosebleeds, postnasal drip, rhinorrhea, sinus pressure, sneezing, sore throat, tinnitus, trouble swallowing and voice change.    Eyes: Negative for photophobia, redness and visual disturbance.   Respiratory: Negative for apnea, cough, choking, chest tightness, shortness of breath and wheezing.    Cardiovascular: Negative for chest pain and palpitations.   Gastrointestinal: Negative for abdominal distention, abdominal pain, anal bleeding, blood in stool, constipation, diarrhea, nausea and vomiting.   Endocrine: Negative for cold intolerance, heat intolerance, polydipsia, polyphagia and polyuria.   Genitourinary: Negative for difficulty urinating, dysuria, enuresis, flank pain, frequency, genital sores, hematuria and urgency.   Musculoskeletal: Positive for arthralgias, gait problem and myalgias. Negative for back pain, joint swelling, neck pain and neck stiffness.   Skin: Negative for color change, pallor, rash and wound.   Allergic/Immunologic: Negative for  food allergies and immunocompromised state.   Neurological: Negative for dizziness, tremors, seizures, syncope, facial asymmetry, speech difficulty, weakness, light-headedness, numbness and headaches.   Hematological: Negative for adenopathy. Does not bruise/bleed easily.   Psychiatric/Behavioral: Negative for agitation, behavioral problems, confusion, decreased concentration, dysphoric mood, hallucinations, self-injury, sleep disturbance and suicidal ideas. The patient is not nervous/anxious and is not hyperactive.        Objective:      Physical Exam   Constitutional: He is oriented to person, place, and time. He appears well-developed and well-nourished. No distress.   HENT:   Head: Normocephalic and atraumatic.   Right Ear: External ear normal.   Left Ear: External ear normal.   Mouth/Throat: No oropharyngeal exudate.   Eyes: Pupils are equal, round, and reactive to light. No scleral icterus.   Neck: Normal range of motion. Neck supple. No JVD present. Carotid bruit is not present. No tracheal deviation present. No thyromegaly present.   Cardiovascular: Normal rate, regular rhythm, normal heart sounds and intact distal pulses.   Pulmonary/Chest: Effort normal and breath sounds normal. No respiratory distress. He has no wheezes. He has no rales. He exhibits no tenderness.   Abdominal: Soft. Bowel sounds are normal. He exhibits no distension. There is no tenderness. There is no rebound and no guarding.   Musculoskeletal: Normal range of motion. He exhibits no edema or tenderness.   Lymphadenopathy:     He has no cervical adenopathy.   Neurological: He is alert and oriented to person, place, and time.   Skin: Skin is warm and dry. No rash noted. He is not diaphoretic. No erythema. No pallor.   Psychiatric: He has a normal mood and affect. His behavior is normal. Judgment and thought content normal.   Nursing note and vitals reviewed.      CMP  Sodium   Date Value Ref Range Status   05/24/2019 139 136 - 145 mmol/L  Final     Potassium   Date Value Ref Range Status   05/24/2019 4.2 3.5 - 5.1 mmol/L Final     Chloride   Date Value Ref Range Status   05/24/2019 102 95 - 110 mmol/L Final     CO2   Date Value Ref Range Status   05/24/2019 28 23 - 29 mmol/L Final     Glucose   Date Value Ref Range Status   05/24/2019 238 (H) 70 - 110 mg/dL Final     BUN, Bld   Date Value Ref Range Status   05/24/2019 26 (H) 8 - 23 mg/dL Final     Creatinine   Date Value Ref Range Status   05/24/2019 1.2 0.5 - 1.4 mg/dL Final     Calcium   Date Value Ref Range Status   05/24/2019 9.3 8.7 - 10.5 mg/dL Final     Total Protein   Date Value Ref Range Status   05/24/2019 6.4 6.0 - 8.4 g/dL Final     Albumin   Date Value Ref Range Status   05/24/2019 3.3 (L) 3.5 - 5.2 g/dL Final     Total Bilirubin   Date Value Ref Range Status   05/24/2019 0.4 0.1 - 1.0 mg/dL Final     Comment:     For infants and newborns, interpretation of results should be based  on gestational age, weight and in agreement with clinical  observations.  Premature Infant recommended reference ranges:  Up to 24 hours.............<8.0 mg/dL  Up to 48 hours............<12.0 mg/dL  3-5 days..................<15.0 mg/dL  6-29 days.................<15.0 mg/dL       Alkaline Phosphatase   Date Value Ref Range Status   05/24/2019 103 55 - 135 U/L Final     AST   Date Value Ref Range Status   05/24/2019 18 10 - 40 U/L Final     ALT   Date Value Ref Range Status   05/24/2019 15 10 - 44 U/L Final     Anion Gap   Date Value Ref Range Status   05/24/2019 9 8 - 16 mmol/L Final     eGFR if    Date Value Ref Range Status   05/24/2019 >60.0 >60 mL/min/1.73 m^2 Final     eGFR if non    Date Value Ref Range Status   05/24/2019 57.6 (A) >60 mL/min/1.73 m^2 Final     Comment:     Calculation used to obtain the estimated glomerular filtration  rate (eGFR) is the CKD-EPI equation.        Lab Results   Component Value Date    WBC 9.21 05/24/2019    HGB 12.1 (L) 05/24/2019    HCT  38.7 (L) 05/24/2019    MCV 98 05/24/2019     05/24/2019     Lab Results   Component Value Date    CHOL 112 (L) 08/16/2018     Lab Results   Component Value Date    HDL 46 08/16/2018     Lab Results   Component Value Date    LDLCALC 53.0 (L) 08/16/2018     Lab Results   Component Value Date    TRIG 65 08/16/2018     Lab Results   Component Value Date    CHOLHDL 41.1 08/16/2018     Lab Results   Component Value Date    TSH 2.858 09/26/2018     Lab Results   Component Value Date    HGBA1C 10.4 (H) 05/24/2019     Assessment:       1. Coronary artery disease, angina presence unspecified, unspecified vessel or lesion type, unspecified whether native or transplanted heart    2. PAD (peripheral artery disease)    3. Ulcers of both lower legs    4. Chronic obstructive pulmonary disease, unspecified COPD type    5. Essential hypertension    6. Hypercholesterolemia    7. Macrocytic anemia    8. Type 2 diabetes mellitus with hyperglycemia, with long-term current use of insulin    9. Hearing abnormally acute, left        Plan:   Coronary artery disease, angina presence unspecified, unspecified vessel or lesion type, unspecified whether native or transplanted heart--stable-    PAD (peripheral artery disease)    Ulcers of both lower legs---------sees wound care clinic------------    Chronic obstructive pulmonary disease, unspecified COPD type    Essential hypertension-------stable----------  -     Comprehensive metabolic panel; Future; Expected date: 12/09/2019    Hypercholesterolemia  -     Lipid panel; Future; Expected date: 12/09/2019    Macrocytic anemia    Type 2 diabetes mellitus with hyperglycemia, with long-term current use of insulin-----------continue meds, watch diet---------  -     Hemoglobin A1c; Future; Expected date: 12/09/2019    Hearing abnormally acute, left  -     Ambulatory referral to ENT    F/u 4 months-----------

## 2019-12-10 LAB
ALBUMIN SERPL BCP-MCNC: 3.2 G/DL (ref 3.5–5.2)
ALP SERPL-CCNC: 89 U/L (ref 55–135)
ALT SERPL W/O P-5'-P-CCNC: 27 U/L (ref 10–44)
ANION GAP SERPL CALC-SCNC: 10 MMOL/L (ref 8–16)
AST SERPL-CCNC: 25 U/L (ref 10–40)
BILIRUB SERPL-MCNC: 0.4 MG/DL (ref 0.1–1)
BUN SERPL-MCNC: 26 MG/DL (ref 8–23)
CALCIUM SERPL-MCNC: 8.6 MG/DL (ref 8.7–10.5)
CHLORIDE SERPL-SCNC: 103 MMOL/L (ref 95–110)
CHOLEST SERPL-MCNC: 99 MG/DL (ref 120–199)
CHOLEST/HDLC SERPL: 2.2 {RATIO} (ref 2–5)
CO2 SERPL-SCNC: 29 MMOL/L (ref 23–29)
CREAT SERPL-MCNC: 1.2 MG/DL (ref 0.5–1.4)
EST. GFR  (AFRICAN AMERICAN): >60 ML/MIN/1.73 M^2
EST. GFR  (NON AFRICAN AMERICAN): 57.2 ML/MIN/1.73 M^2
ESTIMATED AVG GLUCOSE: 237 MG/DL (ref 68–131)
GLUCOSE SERPL-MCNC: 215 MG/DL (ref 70–110)
HBA1C MFR BLD HPLC: 9.9 % (ref 4–5.6)
HDLC SERPL-MCNC: 46 MG/DL (ref 40–75)
HDLC SERPL: 46.5 % (ref 20–50)
LDLC SERPL CALC-MCNC: 42.8 MG/DL (ref 63–159)
NONHDLC SERPL-MCNC: 53 MG/DL
POTASSIUM SERPL-SCNC: 4 MMOL/L (ref 3.5–5.1)
PROT SERPL-MCNC: 6.5 G/DL (ref 6–8.4)
SODIUM SERPL-SCNC: 142 MMOL/L (ref 136–145)
TRIGL SERPL-MCNC: 51 MG/DL (ref 30–150)

## 2019-12-21 ENCOUNTER — NURSE TRIAGE (OUTPATIENT)
Dept: ADMINISTRATIVE | Facility: CLINIC | Age: 79
End: 2019-12-21

## 2019-12-21 NOTE — TELEPHONE ENCOUNTER
Reason for Disposition   Caller has already spoken with another triager and has no further questions.    Additional Information   Negative: Caller is angry or rude (e.g., hangs up, verbally abusive, yelling)   Negative: Caller hangs up   Negative: Caller has already spoken with the PCP and has no further questions.    Protocols used: NO CONTACT OR DUPLICATE CONTACT CALL-A-AH
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
## 2020-01-24 ENCOUNTER — EXTERNAL HOME HEALTH (OUTPATIENT)
Dept: HOME HEALTH SERVICES | Facility: HOSPITAL | Age: 80
End: 2020-01-24
Payer: MEDICARE

## 2020-01-25 PROCEDURE — G0179 PR HOME HEALTH MD RECERTIFICATION: ICD-10-PCS | Mod: ,,, | Performed by: INTERNAL MEDICINE

## 2020-01-25 PROCEDURE — G0179 MD RECERTIFICATION HHA PT: HCPCS | Mod: ,,, | Performed by: INTERNAL MEDICINE

## 2020-02-21 RX ORDER — FLUTICASONE PROPIONATE 50 MCG
SPRAY, SUSPENSION (ML) NASAL
Qty: 16 G | Refills: 6 | Status: SHIPPED | OUTPATIENT
Start: 2020-02-21 | End: 2020-09-16

## 2020-03-25 PROCEDURE — G0179 PR HOME HEALTH MD RECERTIFICATION: ICD-10-PCS | Mod: ,,, | Performed by: INTERNAL MEDICINE

## 2020-03-25 PROCEDURE — G0179 MD RECERTIFICATION HHA PT: HCPCS | Mod: ,,, | Performed by: INTERNAL MEDICINE

## 2020-03-30 ENCOUNTER — EXTERNAL HOME HEALTH (OUTPATIENT)
Dept: HOME HEALTH SERVICES | Facility: HOSPITAL | Age: 80
End: 2020-03-30
Payer: MEDICARE

## 2020-04-01 ENCOUNTER — TELEPHONE (OUTPATIENT)
Dept: FAMILY MEDICINE | Facility: CLINIC | Age: 80
End: 2020-04-01

## 2020-04-01 DIAGNOSIS — H93.19 TINNITUS, UNSPECIFIED LATERALITY: Primary | ICD-10-CM

## 2020-04-01 NOTE — TELEPHONE ENCOUNTER
----- Message from Martha Ortega sent at 4/1/2020  1:05 PM CDT -----  Contact: TRACEY MARROQUIN [[id number]]  Name of Who is Calling:   TRACEY MARROQUIN [[id number]]    What is the request in detail:   Patient called requesting a referral to see an ENT doctor.  Please give the above patient a call back at your earliest convenience and further advise.     Thanks!    Reply by [facility]:no      Call Back: TRACEY MARROQUIN // ph# [phone]

## 2020-04-01 NOTE — TELEPHONE ENCOUNTER
Pt states he is hearing ringing in his ear  No pain   Would like something called in   And     Would like to see ENT

## 2020-04-02 NOTE — TELEPHONE ENCOUNTER
----- Message from Katy Christopher sent at 4/2/2020 10:35 AM CDT -----  Contact: Patient  Patient is returning a call, please call them back at [phone]. Thank you

## 2020-04-02 NOTE — TELEPHONE ENCOUNTER
----- Message from Clariebl Rodriguez sent at 4/1/2020  4:15 PM CDT -----  Contact: Yaw  Type:  Patient Returning Call    Who Called:Carine-wife  Who Left Message for Patient:  Does the patient know what this is regarding?:  Would the patient rather a call back or a response via [facility]? call  Best Call Back Number:[phone]    Additional Information:

## 2020-04-15 ENCOUNTER — DOCUMENT SCAN (OUTPATIENT)
Dept: HOME HEALTH SERVICES | Facility: HOSPITAL | Age: 80
End: 2020-04-15
Payer: MEDICARE

## 2020-05-11 ENCOUNTER — CLINICAL SUPPORT (OUTPATIENT)
Dept: AUDIOLOGY | Facility: CLINIC | Age: 80
End: 2020-05-11
Payer: MEDICARE

## 2020-05-11 DIAGNOSIS — H93.19 TINNITUS, UNSPECIFIED LATERALITY: ICD-10-CM

## 2020-05-11 RX ORDER — BLOOD SUGAR DIAGNOSTIC
STRIP MISCELLANEOUS
Qty: 100 STRIP | Refills: 12 | Status: SHIPPED | OUTPATIENT
Start: 2020-05-11 | End: 2021-07-17

## 2020-05-13 ENCOUNTER — PATIENT OUTREACH (OUTPATIENT)
Dept: ADMINISTRATIVE | Facility: OTHER | Age: 80
End: 2020-05-13

## 2020-05-15 ENCOUNTER — CLINICAL SUPPORT (OUTPATIENT)
Dept: AUDIOLOGY | Facility: CLINIC | Age: 80
End: 2020-05-15
Payer: MEDICARE

## 2020-05-15 DIAGNOSIS — H90.3 SENSORINEURAL HEARING LOSS, BILATERAL: Primary | ICD-10-CM

## 2020-05-15 DIAGNOSIS — H93.12 TINNITUS, LEFT EAR: ICD-10-CM

## 2020-05-15 PROCEDURE — 92557 COMPREHENSIVE HEARING TEST: CPT | Mod: HCNC,S$GLB,, | Performed by: AUDIOLOGIST-HEARING AID FITTER

## 2020-05-15 PROCEDURE — 92567 TYMPANOMETRY: CPT | Mod: HCNC,S$GLB,, | Performed by: AUDIOLOGIST-HEARING AID FITTER

## 2020-05-15 PROCEDURE — 92567 PR TYMPA2METRY: ICD-10-PCS | Mod: HCNC,S$GLB,, | Performed by: AUDIOLOGIST-HEARING AID FITTER

## 2020-05-15 PROCEDURE — 92557 PR COMPREHENSIVE HEARING TEST: ICD-10-PCS | Mod: HCNC,S$GLB,, | Performed by: AUDIOLOGIST-HEARING AID FITTER

## 2020-05-15 NOTE — PROGRESS NOTES
Referring Provider:Dr. Jackie Martinez was seen 05/15/2020 for an audiological evaluation.  Patient complains of tinnitus of the left ear that has been present for the last few months. He reports that it is constant and high pitched. He denies hearing loss, otalgia, aural fullness, and vertigo today. 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 He has DM Type 2.    Results reveal a mild-to-moderate sensorineural hearing loss 250-8000 Hz for the right ear, and a moderate sensorineural hearing loss 250-8000 Hz for the left ear.   Speech Reception Thresholds were  40 dBHL for the right ear and 50 dBHL for the left ear.   Word recognition scores were excellent for the right ear and good for the left ear.   Tympanograms were Type A, normal for the right ear and Type A, normal for the left ear.    We discussed that tinnitus is most often caused by a hearing loss, and that as the hair cells are damaged, either genetic or as a result of loud noise exposure, they then cause tinnitus. Some patients find that restricting the salt or caffeine in their diet helps, and there is also an OTC supplement, lipflavinoids, that some people find to be effective though their benefit is not fully proven. Tinnitus tends to be louder in times of stress and fatigue, and may decrease with time. Sound machines may also be an effective masking technique if needed at night.     Patient was counseled on the above findings.    Recommendations:  1. Binaural hearing aids with tinnitus masking technology. Patient's wife has hearing aids through insurance so he will contact [facility] regarding benefits. Provided shaun  Copy of his audiogram.  2. Annual Audiograms.  3. Hearing protection in noise.

## 2020-05-24 PROCEDURE — G0179 PR HOME HEALTH MD RECERTIFICATION: ICD-10-PCS | Mod: ,,, | Performed by: INTERNAL MEDICINE

## 2020-05-24 PROCEDURE — G0179 MD RECERTIFICATION HHA PT: HCPCS | Mod: ,,, | Performed by: INTERNAL MEDICINE

## 2020-06-04 ENCOUNTER — EXTERNAL HOME HEALTH (OUTPATIENT)
Dept: HOME HEALTH SERVICES | Facility: HOSPITAL | Age: 80
End: 2020-06-04
Payer: MEDICARE

## 2020-06-09 ENCOUNTER — LAB VISIT (OUTPATIENT)
Dept: LAB | Facility: HOSPITAL | Age: 80
End: 2020-06-09
Payer: MEDICARE

## 2020-06-09 ENCOUNTER — OFFICE VISIT (OUTPATIENT)
Dept: FAMILY MEDICINE | Facility: CLINIC | Age: 80
End: 2020-06-09
Payer: MEDICARE

## 2020-06-09 VITALS
RESPIRATION RATE: 16 BRPM | HEART RATE: 71 BPM | DIASTOLIC BLOOD PRESSURE: 52 MMHG | SYSTOLIC BLOOD PRESSURE: 112 MMHG | OXYGEN SATURATION: 97 % | TEMPERATURE: 99 F

## 2020-06-09 DIAGNOSIS — Z79.4 TYPE 2 DIABETES MELLITUS WITH HYPERGLYCEMIA, WITH LONG-TERM CURRENT USE OF INSULIN: ICD-10-CM

## 2020-06-09 DIAGNOSIS — E11.65 TYPE 2 DIABETES MELLITUS WITH HYPERGLYCEMIA, WITH LONG-TERM CURRENT USE OF INSULIN: ICD-10-CM

## 2020-06-09 DIAGNOSIS — I10 ESSENTIAL HYPERTENSION: ICD-10-CM

## 2020-06-09 DIAGNOSIS — I25.10 CORONARY ARTERY DISEASE, ANGINA PRESENCE UNSPECIFIED, UNSPECIFIED VESSEL OR LESION TYPE, UNSPECIFIED WHETHER NATIVE OR TRANSPLANTED HEART: ICD-10-CM

## 2020-06-09 DIAGNOSIS — E78.00 HYPERCHOLESTEROLEMIA: ICD-10-CM

## 2020-06-09 DIAGNOSIS — I10 ESSENTIAL HYPERTENSION: Primary | ICD-10-CM

## 2020-06-09 LAB
ALBUMIN SERPL BCP-MCNC: 3.5 G/DL (ref 3.5–5.2)
ALP SERPL-CCNC: 113 U/L (ref 55–135)
ALT SERPL W/O P-5'-P-CCNC: 31 U/L (ref 10–44)
ANION GAP SERPL CALC-SCNC: 10 MMOL/L (ref 8–16)
AST SERPL-CCNC: 30 U/L (ref 10–40)
BASOPHILS # BLD AUTO: 0.03 K/UL (ref 0–0.2)
BASOPHILS NFR BLD: 0.3 % (ref 0–1.9)
BILIRUB SERPL-MCNC: 0.4 MG/DL (ref 0.1–1)
BUN SERPL-MCNC: 33 MG/DL (ref 8–23)
CALCIUM SERPL-MCNC: 9.2 MG/DL (ref 8.7–10.5)
CHLORIDE SERPL-SCNC: 102 MMOL/L (ref 95–110)
CO2 SERPL-SCNC: 28 MMOL/L (ref 23–29)
CREAT SERPL-MCNC: 1.3 MG/DL (ref 0.5–1.4)
DIFFERENTIAL METHOD: ABNORMAL
EOSINOPHIL # BLD AUTO: 0.1 K/UL (ref 0–0.5)
EOSINOPHIL NFR BLD: 1.4 % (ref 0–8)
ERYTHROCYTE [DISTWIDTH] IN BLOOD BY AUTOMATED COUNT: 14.7 % (ref 11.5–14.5)
EST. GFR  (AFRICAN AMERICAN): 60 ML/MIN/1.73 M^2
EST. GFR  (NON AFRICAN AMERICAN): 51.9 ML/MIN/1.73 M^2
GLUCOSE SERPL-MCNC: 259 MG/DL (ref 70–110)
HCT VFR BLD AUTO: 42.8 % (ref 40–54)
HGB BLD-MCNC: 12.5 G/DL (ref 14–18)
IMM GRANULOCYTES # BLD AUTO: 0.02 K/UL (ref 0–0.04)
IMM GRANULOCYTES NFR BLD AUTO: 0.2 % (ref 0–0.5)
LYMPHOCYTES # BLD AUTO: 3.2 K/UL (ref 1–4.8)
LYMPHOCYTES NFR BLD: 32.6 % (ref 18–48)
MCH RBC QN AUTO: 29.4 PG (ref 27–31)
MCHC RBC AUTO-ENTMCNC: 29.2 G/DL (ref 32–36)
MCV RBC AUTO: 101 FL (ref 82–98)
MONOCYTES # BLD AUTO: 0.8 K/UL (ref 0.3–1)
MONOCYTES NFR BLD: 8.3 % (ref 4–15)
NEUTROPHILS # BLD AUTO: 5.6 K/UL (ref 1.8–7.7)
NEUTROPHILS NFR BLD: 57.2 % (ref 38–73)
NRBC BLD-RTO: 0 /100 WBC
PLATELET # BLD AUTO: 226 K/UL (ref 150–350)
PMV BLD AUTO: 10.7 FL (ref 9.2–12.9)
POTASSIUM SERPL-SCNC: 4.4 MMOL/L (ref 3.5–5.1)
PROT SERPL-MCNC: 6.7 G/DL (ref 6–8.4)
RBC # BLD AUTO: 4.25 M/UL (ref 4.6–6.2)
SODIUM SERPL-SCNC: 140 MMOL/L (ref 136–145)
WBC # BLD AUTO: 9.87 K/UL (ref 3.9–12.7)

## 2020-06-09 PROCEDURE — 1159F MED LIST DOCD IN RCRD: CPT | Mod: HCNC,S$GLB,, | Performed by: INTERNAL MEDICINE

## 2020-06-09 PROCEDURE — 1101F PT FALLS ASSESS-DOCD LE1/YR: CPT | Mod: HCNC,CPTII,S$GLB, | Performed by: INTERNAL MEDICINE

## 2020-06-09 PROCEDURE — 1125F AMNT PAIN NOTED PAIN PRSNT: CPT | Mod: HCNC,S$GLB,, | Performed by: INTERNAL MEDICINE

## 2020-06-09 PROCEDURE — 80053 COMPREHEN METABOLIC PANEL: CPT | Mod: HCNC

## 2020-06-09 PROCEDURE — 1159F PR MEDICATION LIST DOCUMENTED IN MEDICAL RECORD: ICD-10-PCS | Mod: HCNC,S$GLB,, | Performed by: INTERNAL MEDICINE

## 2020-06-09 PROCEDURE — 1125F PR PAIN SEVERITY QUANTIFIED, PAIN PRESENT: ICD-10-PCS | Mod: HCNC,S$GLB,, | Performed by: INTERNAL MEDICINE

## 2020-06-09 PROCEDURE — 36415 COLL VENOUS BLD VENIPUNCTURE: CPT | Mod: HCNC,PO

## 2020-06-09 PROCEDURE — 3074F PR MOST RECENT SYSTOLIC BLOOD PRESSURE < 130 MM HG: ICD-10-PCS | Mod: HCNC,CPTII,S$GLB, | Performed by: INTERNAL MEDICINE

## 2020-06-09 PROCEDURE — 99999 PR PBB SHADOW E&M-EST. PATIENT-LVL IV: ICD-10-PCS | Mod: PBBFAC,HCNC,, | Performed by: INTERNAL MEDICINE

## 2020-06-09 PROCEDURE — 83036 HEMOGLOBIN GLYCOSYLATED A1C: CPT | Mod: HCNC

## 2020-06-09 PROCEDURE — 99499 RISK ADDL DX/OHS AUDIT: ICD-10-PCS | Mod: S$GLB,,, | Performed by: INTERNAL MEDICINE

## 2020-06-09 PROCEDURE — 99214 PR OFFICE/OUTPT VISIT, EST, LEVL IV, 30-39 MIN: ICD-10-PCS | Mod: HCNC,S$GLB,, | Performed by: INTERNAL MEDICINE

## 2020-06-09 PROCEDURE — 3078F PR MOST RECENT DIASTOLIC BLOOD PRESSURE < 80 MM HG: ICD-10-PCS | Mod: HCNC,CPTII,S$GLB, | Performed by: INTERNAL MEDICINE

## 2020-06-09 PROCEDURE — 3078F DIAST BP <80 MM HG: CPT | Mod: HCNC,CPTII,S$GLB, | Performed by: INTERNAL MEDICINE

## 2020-06-09 PROCEDURE — 99214 OFFICE O/P EST MOD 30 MIN: CPT | Mod: HCNC,S$GLB,, | Performed by: INTERNAL MEDICINE

## 2020-06-09 PROCEDURE — 1101F PR PT FALLS ASSESS DOC 0-1 FALLS W/OUT INJ PAST YR: ICD-10-PCS | Mod: HCNC,CPTII,S$GLB, | Performed by: INTERNAL MEDICINE

## 2020-06-09 PROCEDURE — 99499 UNLISTED E&M SERVICE: CPT | Mod: S$GLB,,, | Performed by: INTERNAL MEDICINE

## 2020-06-09 PROCEDURE — 3074F SYST BP LT 130 MM HG: CPT | Mod: HCNC,CPTII,S$GLB, | Performed by: INTERNAL MEDICINE

## 2020-06-09 PROCEDURE — 85025 COMPLETE CBC W/AUTO DIFF WBC: CPT | Mod: HCNC

## 2020-06-09 PROCEDURE — 99999 PR PBB SHADOW E&M-EST. PATIENT-LVL IV: CPT | Mod: PBBFAC,HCNC,, | Performed by: INTERNAL MEDICINE

## 2020-06-09 NOTE — PROGRESS NOTES
Subjective:       Patient ID: Jose Luis Martinez is a 79 y.o. male.    Chief Complaint: Follow-up; Hypertension; Hyperlipidemia; Diabetes; and Coronary Artery Disease    Follow-up   Associated symptoms include arthralgias, fatigue and myalgias. Pertinent negatives include no abdominal pain, chest pain, chills, coughing, diaphoresis, fever, headaches, joint swelling, nausea, neck pain, numbness, rash, sore throat, vomiting or weakness.   Hypertension   Pertinent negatives include no chest pain, headaches, neck pain, palpitations or shortness of breath.   Hyperlipidemia   Associated symptoms include myalgias. Pertinent negatives include no chest pain or shortness of breath.   Diabetes   Pertinent negatives for hypoglycemia include no confusion, dizziness, headaches, nervousness/anxiousness, pallor, seizures, speech difficulty or tremors. Associated symptoms include fatigue. Pertinent negatives for diabetes include no chest pain, no polydipsia, no polyphagia, no polyuria and no weakness.   Coronary Artery Disease   Pertinent negatives include no chest pain, chest tightness, dizziness, palpitations or shortness of breath. Risk factors include hyperlipidemia.     Past Medical History:   Diagnosis Date    Hypertension      No past surgical history on file.  No family history on file.  Social History     Socioeconomic History    Marital status:      Spouse name: Not on file    Number of children: Not on file    Years of education: Not on file    Highest education level: Not on file   Occupational History    Not on file   Social Needs    Financial resource strain: Not on file    Food insecurity:     Worry: Not on file     Inability: Not on file    Transportation needs:     Medical: Not on file     Non-medical: Not on file   Tobacco Use    Smoking status: Former Smoker     Packs/day: 1.00     Years: 29.00     Pack years: 29.00    Smokeless tobacco: Former User   Substance and Sexual Activity    Alcohol use:  No    Drug use: No    Sexual activity: Not on file   Lifestyle    Physical activity:     Days per week: Not on file     Minutes per session: Not on file    Stress: Not on file   Relationships    Social connections:     Talks on phone: Not on file     Gets together: Not on file     Attends Taoism service: Not on file     Active member of club or organization: Not on file     Attends meetings of clubs or organizations: Not on file     Relationship status: Not on file   Other Topics Concern    Not on file   Social History Narrative    Not on file     Review of Systems   Constitutional: Positive for fatigue. Negative for activity change, appetite change, chills, diaphoresis, fever and unexpected weight change.   HENT: Negative for drooling, ear discharge, ear pain, facial swelling, hearing loss, mouth sores, nosebleeds, postnasal drip, rhinorrhea, sinus pressure, sneezing, sore throat, tinnitus, trouble swallowing and voice change.    Eyes: Negative for photophobia, redness and visual disturbance.   Respiratory: Negative for apnea, cough, choking, chest tightness, shortness of breath and wheezing.    Cardiovascular: Negative for chest pain and palpitations.   Gastrointestinal: Negative for abdominal distention, abdominal pain, anal bleeding, blood in stool, constipation, diarrhea, nausea and vomiting.   Endocrine: Negative for cold intolerance, heat intolerance, polydipsia, polyphagia and polyuria.   Genitourinary: Negative for difficulty urinating, dysuria, enuresis, flank pain, frequency, genital sores, hematuria and urgency.   Musculoskeletal: Positive for arthralgias, gait problem and myalgias. Negative for back pain, joint swelling, neck pain and neck stiffness.   Skin: Negative for color change, pallor, rash and wound.   Allergic/Immunologic: Negative for food allergies and immunocompromised state.   Neurological: Negative for dizziness, tremors, seizures, syncope, facial asymmetry, speech difficulty,  weakness, light-headedness, numbness and headaches.   Hematological: Negative for adenopathy. Does not bruise/bleed easily.   Psychiatric/Behavioral: Negative for agitation, behavioral problems, confusion, decreased concentration, dysphoric mood, hallucinations, self-injury, sleep disturbance and suicidal ideas. The patient is not nervous/anxious and is not hyperactive.        Objective:      Physical Exam   Constitutional: He is oriented to person, place, and time. He appears well-developed and well-nourished. No distress.   HENT:   Head: Normocephalic and atraumatic.   Eyes: Pupils are equal, round, and reactive to light.   Neck: Normal range of motion. Neck supple. No JVD present. Carotid bruit is not present. No tracheal deviation present. No thyromegaly present.   Cardiovascular: Normal rate, regular rhythm, normal heart sounds and intact distal pulses.   Pulmonary/Chest: Effort normal and breath sounds normal. No respiratory distress. He has no wheezes. He has no rales. He exhibits no tenderness.   Abdominal: Soft. Bowel sounds are normal. He exhibits no distension. There is no tenderness. There is no rebound and no guarding.   Musculoskeletal: Normal range of motion. He exhibits no edema or tenderness.   Lymphadenopathy:     He has no cervical adenopathy.   Neurological: He is alert and oriented to person, place, and time.   Skin: Skin is warm and dry. No rash noted. He is not diaphoretic. No erythema. No pallor.   Psychiatric: He has a normal mood and affect. His behavior is normal. Judgment and thought content normal.   Nursing note and vitals reviewed.      CMP  Sodium   Date Value Ref Range Status   12/09/2019 142 136 - 145 mmol/L Final     Potassium   Date Value Ref Range Status   12/09/2019 4.0 3.5 - 5.1 mmol/L Final     Chloride   Date Value Ref Range Status   12/09/2019 103 95 - 110 mmol/L Final     CO2   Date Value Ref Range Status   12/09/2019 29 23 - 29 mmol/L Final     Glucose   Date Value Ref  Range Status   12/09/2019 215 (H) 70 - 110 mg/dL Final     BUN, Bld   Date Value Ref Range Status   12/09/2019 26 (H) 8 - 23 mg/dL Final     Creatinine   Date Value Ref Range Status   12/09/2019 1.2 0.5 - 1.4 mg/dL Final     Calcium   Date Value Ref Range Status   12/09/2019 8.6 (L) 8.7 - 10.5 mg/dL Final     Total Protein   Date Value Ref Range Status   12/09/2019 6.5 6.0 - 8.4 g/dL Final     Albumin   Date Value Ref Range Status   12/09/2019 3.2 (L) 3.5 - 5.2 g/dL Final     Total Bilirubin   Date Value Ref Range Status   12/09/2019 0.4 0.1 - 1.0 mg/dL Final     Comment:     For infants and newborns, interpretation of results should be based  on gestational age, weight and in agreement with clinical  observations.  Premature Infant recommended reference ranges:  Up to 24 hours.............<8.0 mg/dL  Up to 48 hours............<12.0 mg/dL  3-5 days..................<15.0 mg/dL  6-29 days.................<15.0 mg/dL       Alkaline Phosphatase   Date Value Ref Range Status   12/09/2019 89 55 - 135 U/L Final     AST   Date Value Ref Range Status   12/09/2019 25 10 - 40 U/L Final     ALT   Date Value Ref Range Status   12/09/2019 27 10 - 44 U/L Final     Anion Gap   Date Value Ref Range Status   12/09/2019 10 8 - 16 mmol/L Final     eGFR if    Date Value Ref Range Status   12/09/2019 >60.0 >60 mL/min/1.73 m^2 Final     eGFR if non    Date Value Ref Range Status   12/09/2019 57.2 (A) >60 mL/min/1.73 m^2 Final     Comment:     Calculation used to obtain the estimated glomerular filtration  rate (eGFR) is the CKD-EPI equation.        Lab Results   Component Value Date    WBC 9.21 05/24/2019    HGB 12.1 (L) 05/24/2019    HCT 38.7 (L) 05/24/2019    MCV 98 05/24/2019     05/24/2019     Lab Results   Component Value Date    CHOL 99 (L) 12/09/2019     Lab Results   Component Value Date    HDL 46 12/09/2019     Lab Results   Component Value Date    LDLCALC 42.8 (L) 12/09/2019     Lab  Results   Component Value Date    TRIG 51 12/09/2019     Lab Results   Component Value Date    CHOLHDL 46.5 12/09/2019     Lab Results   Component Value Date    TSH 2.858 09/26/2018     Lab Results   Component Value Date    HGBA1C 9.9 (H) 12/09/2019     Assessment:       1. Essential hypertension    2. Hypercholesterolemia    3. Type 2 diabetes mellitus with hyperglycemia, with long-term current use of insulin    4. Coronary artery disease, angina presence unspecified, unspecified vessel or lesion type, unspecified whether native or transplanted heart        Plan:   Essential hypertension  -     Comprehensive metabolic panel; Future; Expected date: 06/09/2020  -     CBC auto differential; Future; Expected date: 06/09/2020    Hypercholesterolemia    Type 2 diabetes mellitus with hyperglycemia, with long-term current use of insulin  -     Hemoglobin A1C; Future; Expected date: 06/09/2020    Coronary artery disease, angina presence unspecified, unspecified vessel or lesion type, unspecified whether native or transplanted heart    Stable--------------------continue current meds-------------sees wound care for legs ulcers.                F/u 4 months----------;

## 2020-06-10 ENCOUNTER — TELEPHONE (OUTPATIENT)
Dept: FAMILY MEDICINE | Facility: CLINIC | Age: 80
End: 2020-06-10

## 2020-06-10 DIAGNOSIS — E11.65 TYPE 2 DIABETES MELLITUS WITH HYPERGLYCEMIA, WITHOUT LONG-TERM CURRENT USE OF INSULIN: Primary | ICD-10-CM

## 2020-06-10 LAB
ESTIMATED AVG GLUCOSE: 249 MG/DL (ref 68–131)
HBA1C MFR BLD HPLC: 10.3 % (ref 4–5.6)

## 2020-06-18 ENCOUNTER — PATIENT OUTREACH (OUTPATIENT)
Dept: ADMINISTRATIVE | Facility: OTHER | Age: 80
End: 2020-06-18

## 2020-06-19 ENCOUNTER — CLINICAL SUPPORT (OUTPATIENT)
Dept: DIABETES | Facility: CLINIC | Age: 80
End: 2020-06-19
Payer: MEDICARE

## 2020-06-19 DIAGNOSIS — E11.65 TYPE 2 DIABETES MELLITUS WITH HYPERGLYCEMIA, WITHOUT LONG-TERM CURRENT USE OF INSULIN: Primary | ICD-10-CM

## 2020-06-19 PROCEDURE — G0108 DIAB MANAGE TRN  PER INDIV: HCPCS | Mod: HCNC,S$GLB,, | Performed by: DIETITIAN, REGISTERED

## 2020-06-19 PROCEDURE — G0108 PR DIAB MANAGE TRN  PER INDIV: ICD-10-PCS | Mod: HCNC,S$GLB,, | Performed by: DIETITIAN, REGISTERED

## 2020-06-19 NOTE — PROGRESS NOTES
Diabetes Education  Author: Crista Tovar RD, CDE  Date: 6/19/2020      Diabetes Care Specialist Telehealth Visit Note     It was in the patient's best interest to receive diabetes self-management education and support services in this format to prevent the exposure to COVID-19.        Established Patient - Audio Only Telehealth Visit  The patient location is: home   The chief complaint leading to consultation is: Diabetes    Visit type: Virtual visit with audio only (telephone)  Total time spent with patient: 60 min      The reason for the audio only service rather than synchronous audio and video virtual visit was related to technical difficulties or patient preference/necessity.     Each patient to whom I provide medical services by telemedicine is:  (1) informed of the relationship between the physician and patient and the respective role of any other health care provider with respect to management of the patient; and (2) notified that they may decline to receive medical services by telemedicine and may withdraw from such care at any time. Patient verbally consented to receive this service via voice-only telephone call.       Diabetes Care Management Summary  Diabetes Education Record Assessment/Progress: Initial  Current Diabetes Risk Level: High     Referring Provider: Matti Mejia P*  79 y.o. male on phone today for diabetes education. Patient has not taken diabetes education courses in the past.      Patient has never taken oral diabetes medication. He likes taking NPH insulin because he can adjust the dose based on his BG. Explained that his A1c is elevated and could benefit from adding an oral medication. He was adamant that he does not want to change his medication regimen. He states he has a bad memory and is not able to retain information. He thinks he will forget to take a new medication. He is also afraid of hypoglycemia and states he feels bad if his BG is under 100 mg/dl. Suggested  patient see Diabetes Provider, Elio Quinones for medication adjustment and he declined.   Daughter assists patient in caring for his health. He gave permission to call daughter, Bee Best at [phone] to schedule follow-up visit and discuss recommendation to see Diabetes Provider.  Called daughter and left recorded message with call back information to schedule follow-up visit.            There is no height or weight on file to calculate BMI.  Self Reported weight: 298 pounds      Last A1c:   Lab Results   Component Value Date    HGBA1C 10.3 (H) 06/09/2020     Last Visit with Diabetes Educator: Last Education Visit: Not Found  Last OPCM Referral: : Not Found   Enrolled in OPCM: No      Diabetes Type  Diabetes Type : Type II    Diabetes History  Diabetes Diagnosis: >10 years  Current Treatment: Insulin, Diet  Reviewed Problem List with Patient: Yes   Takes 7-10u NPH with lunch. If BG is over 250 mg/dl, patient will take 2u to correct.     Health Maintenance was reviewed today with patient. Discussed with patient importance of routine eye exams, foot exams/foot care, blood work (i.e.: A1c, microalbumin, and lipid), dental visits, yearly flu vaccine, and pneumonia vaccine as indicated by PCP. Patient verbalized understanding.     Health Maintenance Topics with due status: Not Due       Topic Last Completion Date    Lipid Panel 12/09/2019    Hemoglobin A1c 06/09/2020     Health Maintenance Due   Topic Date Due    TETANUS VACCINE  09/02/1958    Aspirin/Antiplatelet Therapy  09/02/1958    Shingles Vaccine (1 of 2) 09/02/1990    Foot Exam  01/03/2019    Eye Exam  07/12/2019       Nutrition  Meal Planning: water, 3 meals per day  What type of sweetener do you use?: none  What type of beverages do you drink?: water, juice  Meal Plan 24 Hour Recall - Breakfast: egg, grits, water  Meal Plan 24 Hour Recall - Lunch: diet cranberry juice, lima beans, stew meat, spoonful of rice  Meal Plan 24 Hour Recall - Dinner:  Turkey salami sandwich, crackers with jelly, water  Meal Plan 24 Hour Recall - Snack: none.    Monitoring   Self Monitoring : Checks BG three times per day  Blood Glucose Logs: No  Do you use a personal continuous glucose monitor?: No  In the last month, how often have you had a low blood sugar reaction?: once  What are your symptoms of low blood sugar?: dizzy, unstable, drunk feeling  How do you treat low blood sugar?: eat something  Can you tell when your blood sugar is too high?: no  How do you treat high blood sugar?: doesn't do anything, lets medicine work.    Exercise   Exercise Type: none    Current Diabetes Treatment   Current Treatment: Insulin, Diet    Social History  Preferred Learning Method: Face to Face  Primary Support: Self, Daughter  Occupation: Retired  Smoking Status: Ex Smoker  Alcohol Use: Never                                Barriers to Change  Barriers to Change: Financial(Only gets $566 per month in [facility])  Learning Challenges : Literacy(Memory Loss)    Readiness to Learn   Readiness to Learn : Acceptance    Cultural Influences  Cultural Influences: No    Diabetes Education Assessment/Progress  Diabetes Disease Process (diabetes disease process and treatment options): Discussion, Individual Session  Nutrition (Incorporating nutritional management into one's lifestyle): Discussion, Individual Session, Requires Assistance Family/SO  Physical Activity (incorporating physical activity into one's lifestyle): Demonstration, Individual Session  Medications (states correct name, dose, onset, peak, duration, side effects & timing of meds): Discussion, Individual Session  Monitoring (monitoring blood glucose/other parameters & using results): Discussion, Individual Session  Acute Complications (preventing, detecting, and treating acute complications): Discussion, Individual Session  Chronic Complications (preventing, detecting, and treating chronic complications): Discussion, Individual  Session  Clinical (diabetes, other pertinent medical history, and relevant comorbidities reviewed during visit): Discussion, Individual Session  Cognitive (knowledge of self-management skills, functional health literacy): Discussion, Individual Session  Psychosocial (emotional response to diabetes): Discussion, Individual Session  Diabetes Distress and Support Systems: Not Covered/Deferred  Behavioral (readiness for change, lifestyle practices, self-care behaviors): Discussion, Individual Session              Mailing plate method for meal planning and Hypoglycemia/Hyperglycemia Signs and Treatment.     Diabetes Care Plan/Intervention  Education Plan/Intervention: Individual Follow-Up DSMT(Follow-up with daughter)    Diabetes Meal Plan  Restrictions: Restricted Carbohydrate  Calories: 1800  Carbohydrate Per Meal: 30-45g  Carbohydrate Per Snack : 7-15g    Today's Self-Management Care Plan was developed with the patient's input and is based on barriers identified during today's assessment.    The long and short-term goals in the care plan were written with the patient/caregiver's input. The patient has agreed to work toward these goals to improve his overall diabetes control.      The patient received a copy of today's self-management plan and verbalized understanding of the care plan, goals, and all of today's instructions.      The patient was encouraged to communicate with his physician and care team regarding his condition(s) and treatment.  I provided the patient with my contact information today and encouraged him to contact me via phone or patient portal as needed.     Education Units of Time   Time Spent: 60 min

## 2020-07-20 ENCOUNTER — TELEPHONE (OUTPATIENT)
Dept: FAMILY MEDICINE | Facility: CLINIC | Age: 80
End: 2020-07-20

## 2020-07-20 NOTE — TELEPHONE ENCOUNTER
----- Message from Claribel Rodriguez sent at 7/20/2020  3:01 PM CDT -----  [facility] line is calling regarding detailed order for  pt. Please call back at [phone] ref [id number]

## 2020-11-12 ENCOUNTER — TELEPHONE (OUTPATIENT)
Dept: FAMILY MEDICINE | Facility: CLINIC | Age: 80
End: 2020-11-12

## 2020-11-12 ENCOUNTER — OFFICE VISIT (OUTPATIENT)
Dept: FAMILY MEDICINE | Facility: CLINIC | Age: 80
End: 2020-11-12
Payer: MEDICARE

## 2020-11-12 ENCOUNTER — LAB VISIT (OUTPATIENT)
Dept: LAB | Facility: HOSPITAL | Age: 80
End: 2020-11-12
Attending: INTERNAL MEDICINE
Payer: MEDICARE

## 2020-11-12 VITALS
DIASTOLIC BLOOD PRESSURE: 60 MMHG | SYSTOLIC BLOOD PRESSURE: 116 MMHG | HEART RATE: 74 BPM | TEMPERATURE: 99 F | OXYGEN SATURATION: 99 % | WEIGHT: 315 LBS | BODY MASS INDEX: 40.43 KG/M2 | HEIGHT: 74 IN

## 2020-11-12 DIAGNOSIS — Z79.4 TYPE 2 DIABETES MELLITUS WITH HYPERGLYCEMIA, WITH LONG-TERM CURRENT USE OF INSULIN: Primary | ICD-10-CM

## 2020-11-12 DIAGNOSIS — Z79.4 TYPE 2 DIABETES MELLITUS WITH HYPERGLYCEMIA, WITH LONG-TERM CURRENT USE OF INSULIN: ICD-10-CM

## 2020-11-12 DIAGNOSIS — E11.65 TYPE 2 DIABETES MELLITUS WITH HYPERGLYCEMIA, WITH LONG-TERM CURRENT USE OF INSULIN: Primary | ICD-10-CM

## 2020-11-12 DIAGNOSIS — E66.01 MORBID OBESITY: ICD-10-CM

## 2020-11-12 DIAGNOSIS — K21.9 GASTROESOPHAGEAL REFLUX DISEASE WITHOUT ESOPHAGITIS: ICD-10-CM

## 2020-11-12 DIAGNOSIS — H93.19 TINNITUS, UNSPECIFIED LATERALITY: ICD-10-CM

## 2020-11-12 DIAGNOSIS — I10 ESSENTIAL HYPERTENSION: ICD-10-CM

## 2020-11-12 DIAGNOSIS — E11.65 TYPE 2 DIABETES MELLITUS WITH HYPERGLYCEMIA, WITH LONG-TERM CURRENT USE OF INSULIN: ICD-10-CM

## 2020-11-12 DIAGNOSIS — I25.10 CORONARY ARTERY DISEASE, ANGINA PRESENCE UNSPECIFIED, UNSPECIFIED VESSEL OR LESION TYPE, UNSPECIFIED WHETHER NATIVE OR TRANSPLANTED HEART: ICD-10-CM

## 2020-11-12 LAB
ESTIMATED AVG GLUCOSE: 263 MG/DL (ref 68–131)
HBA1C MFR BLD HPLC: 10.8 % (ref 4–5.6)

## 2020-11-12 PROCEDURE — 99499 RISK ADDL DX/OHS AUDIT: ICD-10-PCS | Mod: S$GLB,,, | Performed by: INTERNAL MEDICINE

## 2020-11-12 PROCEDURE — 83036 HEMOGLOBIN GLYCOSYLATED A1C: CPT | Mod: HCNC

## 2020-11-12 PROCEDURE — 1125F AMNT PAIN NOTED PAIN PRSNT: CPT | Mod: HCNC,S$GLB,, | Performed by: INTERNAL MEDICINE

## 2020-11-12 PROCEDURE — 99213 OFFICE O/P EST LOW 20 MIN: CPT | Mod: HCNC,S$GLB,, | Performed by: INTERNAL MEDICINE

## 2020-11-12 PROCEDURE — 1125F PR PAIN SEVERITY QUANTIFIED, PAIN PRESENT: ICD-10-PCS | Mod: HCNC,S$GLB,, | Performed by: INTERNAL MEDICINE

## 2020-11-12 PROCEDURE — 3288F PR FALLS RISK ASSESSMENT DOCUMENTED: ICD-10-PCS | Mod: HCNC,CPTII,S$GLB, | Performed by: INTERNAL MEDICINE

## 2020-11-12 PROCEDURE — 3074F SYST BP LT 130 MM HG: CPT | Mod: HCNC,CPTII,S$GLB, | Performed by: INTERNAL MEDICINE

## 2020-11-12 PROCEDURE — 1159F MED LIST DOCD IN RCRD: CPT | Mod: HCNC,S$GLB,, | Performed by: INTERNAL MEDICINE

## 2020-11-12 PROCEDURE — 3078F PR MOST RECENT DIASTOLIC BLOOD PRESSURE < 80 MM HG: ICD-10-PCS | Mod: HCNC,CPTII,S$GLB, | Performed by: INTERNAL MEDICINE

## 2020-11-12 PROCEDURE — 3078F DIAST BP <80 MM HG: CPT | Mod: HCNC,CPTII,S$GLB, | Performed by: INTERNAL MEDICINE

## 2020-11-12 PROCEDURE — 3288F FALL RISK ASSESSMENT DOCD: CPT | Mod: HCNC,CPTII,S$GLB, | Performed by: INTERNAL MEDICINE

## 2020-11-12 PROCEDURE — 99999 PR PBB SHADOW E&M-EST. PATIENT-LVL V: CPT | Mod: PBBFAC,HCNC,, | Performed by: INTERNAL MEDICINE

## 2020-11-12 PROCEDURE — 36415 COLL VENOUS BLD VENIPUNCTURE: CPT | Mod: HCNC,PO

## 2020-11-12 PROCEDURE — 99999 PR PBB SHADOW E&M-EST. PATIENT-LVL V: ICD-10-PCS | Mod: PBBFAC,HCNC,, | Performed by: INTERNAL MEDICINE

## 2020-11-12 PROCEDURE — 3074F PR MOST RECENT SYSTOLIC BLOOD PRESSURE < 130 MM HG: ICD-10-PCS | Mod: HCNC,CPTII,S$GLB, | Performed by: INTERNAL MEDICINE

## 2020-11-12 PROCEDURE — 99213 PR OFFICE/OUTPT VISIT, EST, LEVL III, 20-29 MIN: ICD-10-PCS | Mod: HCNC,S$GLB,, | Performed by: INTERNAL MEDICINE

## 2020-11-12 PROCEDURE — 1100F PTFALLS ASSESS-DOCD GE2>/YR: CPT | Mod: HCNC,CPTII,S$GLB, | Performed by: INTERNAL MEDICINE

## 2020-11-12 PROCEDURE — 1100F PR PT FALLS ASSESS DOC 2+ FALLS/FALL W/INJURY/YR: ICD-10-PCS | Mod: HCNC,CPTII,S$GLB, | Performed by: INTERNAL MEDICINE

## 2020-11-12 PROCEDURE — 1159F PR MEDICATION LIST DOCUMENTED IN MEDICAL RECORD: ICD-10-PCS | Mod: HCNC,S$GLB,, | Performed by: INTERNAL MEDICINE

## 2020-11-12 PROCEDURE — 99499 UNLISTED E&M SERVICE: CPT | Mod: S$GLB,,, | Performed by: INTERNAL MEDICINE

## 2020-11-12 RX ORDER — DUTASTERIDE 0.5 MG/1
0.5 CAPSULE, LIQUID FILLED ORAL DAILY
COMMUNITY
Start: 2020-10-22

## 2020-11-12 RX ORDER — CETIRIZINE HYDROCHLORIDE 10 MG/1
10 TABLET ORAL
COMMUNITY
End: 2023-01-10

## 2020-11-12 RX ORDER — PANTOPRAZOLE SODIUM 40 MG/1
40 TABLET, DELAYED RELEASE ORAL DAILY
Qty: 90 TABLET | Refills: 3 | Status: SHIPPED | OUTPATIENT
Start: 2020-11-12 | End: 2021-10-21

## 2020-11-12 RX ORDER — ALFUZOSIN HYDROCHLORIDE 10 MG/1
TABLET, EXTENDED RELEASE ORAL
COMMUNITY
Start: 2020-11-03

## 2020-11-12 RX ORDER — INSULIN ASPART 100 [IU]/ML
INJECTION, SUSPENSION SUBCUTANEOUS
COMMUNITY
End: 2021-03-02

## 2020-11-12 NOTE — PROGRESS NOTES
Subjective:       Patient ID: Jose Luis Martinez is a 80 y.o. male.    Chief Complaint: Hypertension, Diabetes, Coronary Artery Disease, and Gastroesophageal Reflux    Hypertension  This is a chronic problem. Pertinent negatives include no chest pain, palpitations or shortness of breath.   Diabetes  He presents for his follow-up diabetic visit. He has type 2 diabetes mellitus. Associated symptoms include weakness. Pertinent negatives for diabetes include no chest pain.   Coronary Artery Disease  Pertinent negatives include no chest pain, chest tightness, palpitations or shortness of breath.   Gastroesophageal Reflux  He reports no abdominal pain, no chest pain, no choking, no coughing, no nausea or no wheezing.     Past Medical History:   Diagnosis Date    Hypertension      History reviewed. No pertinent surgical history.  History reviewed. No pertinent family history.  Social History     Socioeconomic History    Marital status:      Spouse name: Not on file    Number of children: Not on file    Years of education: Not on file    Highest education level: Not on file   Occupational History    Not on file   Social Needs    Financial resource strain: Not on file    Food insecurity     Worry: Not on file     Inability: Not on file    Transportation needs     Medical: Not on file     Non-medical: Not on file   Tobacco Use    Smoking status: Former Smoker     Packs/day: 1.00     Years: 29.00     Pack years: 29.00    Smokeless tobacco: Former User   Substance and Sexual Activity    Alcohol use: No    Drug use: No    Sexual activity: Not on file   Lifestyle    Physical activity     Days per week: Not on file     Minutes per session: Not on file    Stress: Not on file   Relationships    Social connections     Talks on phone: Not on file     Gets together: Not on file     Attends Oriental orthodox service: Not on file     Active member of club or organization: Not on file     Attends meetings of clubs or  organizations: Not on file     Relationship status: Not on file   Other Topics Concern    Not on file   Social History Narrative    Not on file     Review of Systems   Constitutional: Negative for chills and fever.   HENT: Negative.    Respiratory: Negative for apnea, cough, choking, chest tightness, shortness of breath, wheezing and stridor.    Cardiovascular: Negative for chest pain and palpitations.   Gastrointestinal: Negative for abdominal pain, nausea and vomiting.   Musculoskeletal: Positive for arthralgias, gait problem and myalgias.   Neurological: Positive for weakness.       Objective:      Physical Exam  Vitals signs and nursing note reviewed.   Constitutional:       Appearance: Normal appearance.   Cardiovascular:      Rate and Rhythm: Normal rate and regular rhythm.      Pulses: Normal pulses.      Heart sounds: Normal heart sounds.   Pulmonary:      Effort: Pulmonary effort is normal.      Breath sounds: Normal breath sounds.   Abdominal:      General: Abdomen is flat.      Palpations: Abdomen is soft.   Neurological:      Mental Status: He is alert and oriented to person, place, and time.         CMP  Sodium   Date Value Ref Range Status   06/09/2020 140 136 - 145 mmol/L Final     Potassium   Date Value Ref Range Status   06/09/2020 4.4 3.5 - 5.1 mmol/L Final     Chloride   Date Value Ref Range Status   06/09/2020 102 95 - 110 mmol/L Final     CO2   Date Value Ref Range Status   06/09/2020 28 23 - 29 mmol/L Final     Glucose   Date Value Ref Range Status   06/09/2020 259 (H) 70 - 110 mg/dL Final     BUN   Date Value Ref Range Status   06/09/2020 33 (H) 8 - 23 mg/dL Final     Creatinine   Date Value Ref Range Status   06/09/2020 1.3 0.5 - 1.4 mg/dL Final     Calcium   Date Value Ref Range Status   06/09/2020 9.2 8.7 - 10.5 mg/dL Final     Total Protein   Date Value Ref Range Status   06/09/2020 6.7 6.0 - 8.4 g/dL Final     Albumin   Date Value Ref Range Status   06/09/2020 3.5 3.5 - 5.2 g/dL Final      Total Bilirubin   Date Value Ref Range Status   06/09/2020 0.4 0.1 - 1.0 mg/dL Final     Comment:     For infants and newborns, interpretation of results should be based  on gestational age, weight and in agreement with clinical  observations.  Premature Infant recommended reference ranges:  Up to 24 hours.............<8.0 mg/dL  Up to 48 hours............<12.0 mg/dL  3-5 days..................<15.0 mg/dL  6-29 days.................<15.0 mg/dL       Alkaline Phosphatase   Date Value Ref Range Status   06/09/2020 113 55 - 135 U/L Final     AST   Date Value Ref Range Status   06/09/2020 30 10 - 40 U/L Final     ALT   Date Value Ref Range Status   06/09/2020 31 10 - 44 U/L Final     Anion Gap   Date Value Ref Range Status   06/09/2020 10 8 - 16 mmol/L Final     eGFR if    Date Value Ref Range Status   06/09/2020 60.0 >60 mL/min/1.73 m^2 Final     eGFR if non    Date Value Ref Range Status   06/09/2020 51.9 (A) >60 mL/min/1.73 m^2 Final     Comment:     Calculation used to obtain the estimated glomerular filtration  rate (eGFR) is the CKD-EPI equation.        Lab Results   Component Value Date    WBC 9.87 06/09/2020    HGB 12.5 (L) 06/09/2020    HCT 42.8 06/09/2020     (H) 06/09/2020     06/09/2020     Lab Results   Component Value Date    CHOL 99 (L) 12/09/2019     Lab Results   Component Value Date    HDL 46 12/09/2019     Lab Results   Component Value Date    LDLCALC 42.8 (L) 12/09/2019     Lab Results   Component Value Date    TRIG 51 12/09/2019     Lab Results   Component Value Date    CHOLHDL 46.5 12/09/2019     Lab Results   Component Value Date    TSH 2.858 09/26/2018     Lab Results   Component Value Date    HGBA1C 10.3 (H) 06/09/2020     Assessment:       1. Type 2 diabetes mellitus with hyperglycemia, with long-term current use of insulin    2. Morbid obesity    3. Essential hypertension    4. Coronary artery disease, angina presence unspecified, unspecified  vessel or lesion type, unspecified whether native or transplanted heart    5. Gastroesophageal reflux disease without esophagitis    6. Tinnitus, unspecified laterality        Plan:   Type 2 diabetes mellitus with hyperglycemia, with long-term current use of insulin  -     Hemoglobin A1C; Future; Expected date: 11/12/2020  -     Ambulatory referral/consult to Home Health; Future; Expected date: 11/19/2020----------to help with diabetes--------    Morbid obesity    Essential hypertension    Coronary artery disease, angina presence unspecified, unspecified vessel or lesion type, unspecified whether native or transplanted heart    Gastroesophageal reflux disease without esophagitis  -     pantoprazole (PROTONIX) 40 MG tablet; Take 1 tablet (40 mg total) by mouth once daily.  Dispense: 90 tablet; Refill: 3    Tinnitus, unspecified laterality  -     Ambulatory referral/consult to ENT; Future; Expected date: 11/19/2020    Continue meds.            F/u 3 months-

## 2020-11-12 NOTE — TELEPHONE ENCOUNTER
Pt daughter states he has been taking OTC gas X  It has not helped   He has no appetite and a stomach ache     Asking for something prescribed

## 2020-11-13 ENCOUNTER — TELEPHONE (OUTPATIENT)
Dept: FAMILY MEDICINE | Facility: CLINIC | Age: 80
End: 2020-11-13

## 2020-11-13 DIAGNOSIS — E11.65 TYPE 2 DIABETES MELLITUS WITH HYPERGLYCEMIA, WITH LONG-TERM CURRENT USE OF INSULIN: Primary | ICD-10-CM

## 2020-11-13 DIAGNOSIS — Z79.4 TYPE 2 DIABETES MELLITUS WITH HYPERGLYCEMIA, WITH LONG-TERM CURRENT USE OF INSULIN: Primary | ICD-10-CM

## 2020-11-19 ENCOUNTER — TELEPHONE (OUTPATIENT)
Dept: DIABETES | Facility: CLINIC | Age: 80
End: 2020-11-19

## 2020-11-20 ENCOUNTER — TELEPHONE (OUTPATIENT)
Dept: DIABETES | Facility: CLINIC | Age: 80
End: 2020-11-20

## 2020-11-20 NOTE — TELEPHONE ENCOUNTER
S/w pt's wife in regards to dm management referral. Wife stated they do not have transportation at this time and declined to schedule an appt.

## 2020-12-10 ENCOUNTER — PES CALL (OUTPATIENT)
Dept: ADMINISTRATIVE | Facility: CLINIC | Age: 80
End: 2020-12-10

## 2021-01-13 ENCOUNTER — IMMUNIZATION (OUTPATIENT)
Dept: INTERNAL MEDICINE | Facility: CLINIC | Age: 81
End: 2021-01-13
Payer: MEDICARE

## 2021-01-13 DIAGNOSIS — Z23 NEED FOR VACCINATION: ICD-10-CM

## 2021-01-13 PROCEDURE — 91300 COVID-19, MRNA, LNP-S, PF, 30 MCG/0.3 ML DOSE VACCINE: CPT | Mod: PBBFAC | Performed by: FAMILY MEDICINE

## 2021-02-01 ENCOUNTER — OFFICE VISIT (OUTPATIENT)
Dept: FAMILY MEDICINE | Facility: CLINIC | Age: 81
End: 2021-02-01
Payer: MEDICARE

## 2021-02-01 VITALS
SYSTOLIC BLOOD PRESSURE: 120 MMHG | TEMPERATURE: 98 F | WEIGHT: 302.69 LBS | BODY MASS INDEX: 38.85 KG/M2 | HEIGHT: 74 IN | HEART RATE: 69 BPM | DIASTOLIC BLOOD PRESSURE: 52 MMHG | OXYGEN SATURATION: 95 %

## 2021-02-01 DIAGNOSIS — S09.302A INJURY OF TYMPANIC MEMBRANE OF LEFT EAR, INITIAL ENCOUNTER: Primary | ICD-10-CM

## 2021-02-01 PROCEDURE — 3078F PR MOST RECENT DIASTOLIC BLOOD PRESSURE < 80 MM HG: ICD-10-PCS | Mod: CPTII,S$GLB,, | Performed by: FAMILY MEDICINE

## 2021-02-01 PROCEDURE — 3288F PR FALLS RISK ASSESSMENT DOCUMENTED: ICD-10-PCS | Mod: CPTII,S$GLB,, | Performed by: FAMILY MEDICINE

## 2021-02-01 PROCEDURE — 1159F PR MEDICATION LIST DOCUMENTED IN MEDICAL RECORD: ICD-10-PCS | Mod: S$GLB,,, | Performed by: FAMILY MEDICINE

## 2021-02-01 PROCEDURE — 3288F FALL RISK ASSESSMENT DOCD: CPT | Mod: CPTII,S$GLB,, | Performed by: FAMILY MEDICINE

## 2021-02-01 PROCEDURE — 99213 PR OFFICE/OUTPT VISIT, EST, LEVL III, 20-29 MIN: ICD-10-PCS | Mod: S$GLB,,, | Performed by: FAMILY MEDICINE

## 2021-02-01 PROCEDURE — 1100F PR PT FALLS ASSESS DOC 2+ FALLS/FALL W/INJURY/YR: ICD-10-PCS | Mod: CPTII,S$GLB,, | Performed by: FAMILY MEDICINE

## 2021-02-01 PROCEDURE — 1100F PTFALLS ASSESS-DOCD GE2>/YR: CPT | Mod: CPTII,S$GLB,, | Performed by: FAMILY MEDICINE

## 2021-02-01 PROCEDURE — 1159F MED LIST DOCD IN RCRD: CPT | Mod: S$GLB,,, | Performed by: FAMILY MEDICINE

## 2021-02-01 PROCEDURE — 3074F PR MOST RECENT SYSTOLIC BLOOD PRESSURE < 130 MM HG: ICD-10-PCS | Mod: CPTII,S$GLB,, | Performed by: FAMILY MEDICINE

## 2021-02-01 PROCEDURE — 1126F PR PAIN SEVERITY QUANTIFIED, NO PAIN PRESENT: ICD-10-PCS | Mod: S$GLB,,, | Performed by: FAMILY MEDICINE

## 2021-02-01 PROCEDURE — 99999 PR PBB SHADOW E&M-EST. PATIENT-LVL V: CPT | Mod: PBBFAC,,, | Performed by: FAMILY MEDICINE

## 2021-02-01 PROCEDURE — 1126F AMNT PAIN NOTED NONE PRSNT: CPT | Mod: S$GLB,,, | Performed by: FAMILY MEDICINE

## 2021-02-01 PROCEDURE — 3074F SYST BP LT 130 MM HG: CPT | Mod: CPTII,S$GLB,, | Performed by: FAMILY MEDICINE

## 2021-02-01 PROCEDURE — 99999 PR PBB SHADOW E&M-EST. PATIENT-LVL V: ICD-10-PCS | Mod: PBBFAC,,, | Performed by: FAMILY MEDICINE

## 2021-02-01 PROCEDURE — 3078F DIAST BP <80 MM HG: CPT | Mod: CPTII,S$GLB,, | Performed by: FAMILY MEDICINE

## 2021-02-01 PROCEDURE — 99213 OFFICE O/P EST LOW 20 MIN: CPT | Mod: S$GLB,,, | Performed by: FAMILY MEDICINE

## 2021-02-01 RX ORDER — OFLOXACIN 3 MG/ML
5 SOLUTION AURICULAR (OTIC) 2 TIMES DAILY
Qty: 5 ML | Refills: 0 | Status: SHIPPED | OUTPATIENT
Start: 2021-02-01

## 2021-02-03 ENCOUNTER — IMMUNIZATION (OUTPATIENT)
Dept: INTERNAL MEDICINE | Facility: CLINIC | Age: 81
End: 2021-02-03
Payer: MEDICARE

## 2021-02-03 DIAGNOSIS — Z23 NEED FOR VACCINATION: Primary | ICD-10-CM

## 2021-02-03 PROCEDURE — 91300 COVID-19, MRNA, LNP-S, PF, 30 MCG/0.3 ML DOSE VACCINE: CPT | Mod: PBBFAC | Performed by: FAMILY MEDICINE

## 2021-02-03 PROCEDURE — 0002A COVID-19, MRNA, LNP-S, PF, 30 MCG/0.3 ML DOSE VACCINE: CPT | Mod: PBBFAC | Performed by: FAMILY MEDICINE

## 2021-02-15 ENCOUNTER — TELEPHONE (OUTPATIENT)
Dept: INTERNAL MEDICINE | Facility: CLINIC | Age: 81
End: 2021-02-15

## 2021-02-26 RX ORDER — ALBUTEROL SULFATE 90 UG/1
AEROSOL, METERED RESPIRATORY (INHALATION)
Qty: 18 G | Refills: 12 | Status: SHIPPED | OUTPATIENT
Start: 2021-02-26 | End: 2021-11-03

## 2021-03-01 ENCOUNTER — TELEPHONE (OUTPATIENT)
Dept: FAMILY MEDICINE | Facility: CLINIC | Age: 81
End: 2021-03-01

## 2021-03-02 ENCOUNTER — OFFICE VISIT (OUTPATIENT)
Dept: FAMILY MEDICINE | Facility: CLINIC | Age: 81
End: 2021-03-02
Payer: MEDICARE

## 2021-03-02 ENCOUNTER — LAB VISIT (OUTPATIENT)
Dept: LAB | Facility: HOSPITAL | Age: 81
End: 2021-03-02
Payer: MEDICARE

## 2021-03-02 ENCOUNTER — TELEPHONE (OUTPATIENT)
Dept: FAMILY MEDICINE | Facility: CLINIC | Age: 81
End: 2021-03-02

## 2021-03-02 VITALS
TEMPERATURE: 98 F | OXYGEN SATURATION: 96 % | RESPIRATION RATE: 18 BRPM | DIASTOLIC BLOOD PRESSURE: 74 MMHG | SYSTOLIC BLOOD PRESSURE: 138 MMHG | HEIGHT: 74 IN | HEART RATE: 81 BPM | BODY MASS INDEX: 38.86 KG/M2

## 2021-03-02 DIAGNOSIS — E11.65 TYPE 2 DIABETES MELLITUS WITH HYPERGLYCEMIA, WITH LONG-TERM CURRENT USE OF INSULIN: ICD-10-CM

## 2021-03-02 DIAGNOSIS — E11.65 TYPE 2 DIABETES MELLITUS WITH HYPERGLYCEMIA, WITH LONG-TERM CURRENT USE OF INSULIN: Primary | ICD-10-CM

## 2021-03-02 DIAGNOSIS — Z79.4 TYPE 2 DIABETES MELLITUS WITH HYPERGLYCEMIA, WITH LONG-TERM CURRENT USE OF INSULIN: Primary | ICD-10-CM

## 2021-03-02 DIAGNOSIS — Z79.4 TYPE 2 DIABETES MELLITUS WITH HYPERGLYCEMIA, WITH LONG-TERM CURRENT USE OF INSULIN: ICD-10-CM

## 2021-03-02 LAB
ALBUMIN SERPL BCP-MCNC: 3.3 G/DL (ref 3.5–5.2)
ALP SERPL-CCNC: 90 U/L (ref 55–135)
ALT SERPL W/O P-5'-P-CCNC: 22 U/L (ref 10–44)
ANION GAP SERPL CALC-SCNC: 11 MMOL/L (ref 8–16)
AST SERPL-CCNC: 19 U/L (ref 10–40)
BILIRUB SERPL-MCNC: 0.5 MG/DL (ref 0.1–1)
BUN SERPL-MCNC: 25 MG/DL (ref 8–23)
CALCIUM SERPL-MCNC: 9.2 MG/DL (ref 8.7–10.5)
CHLORIDE SERPL-SCNC: 100 MMOL/L (ref 95–110)
CO2 SERPL-SCNC: 29 MMOL/L (ref 23–29)
CREAT SERPL-MCNC: 1.4 MG/DL (ref 0.5–1.4)
EST. GFR  (AFRICAN AMERICAN): 54.5 ML/MIN/1.73 M^2
EST. GFR  (NON AFRICAN AMERICAN): 47.1 ML/MIN/1.73 M^2
ESTIMATED AVG GLUCOSE: 283 MG/DL (ref 68–131)
GLUCOSE SERPL-MCNC: 304 MG/DL (ref 70–110)
HBA1C MFR BLD: 11.5 % (ref 4–5.6)
POTASSIUM SERPL-SCNC: 4.5 MMOL/L (ref 3.5–5.1)
PROT SERPL-MCNC: 6.6 G/DL (ref 6–8.4)
SODIUM SERPL-SCNC: 140 MMOL/L (ref 136–145)

## 2021-03-02 PROCEDURE — 1125F PR PAIN SEVERITY QUANTIFIED, PAIN PRESENT: ICD-10-PCS | Mod: S$GLB,,, | Performed by: REGISTERED NURSE

## 2021-03-02 PROCEDURE — 99214 OFFICE O/P EST MOD 30 MIN: CPT | Mod: S$GLB,,, | Performed by: REGISTERED NURSE

## 2021-03-02 PROCEDURE — 36415 COLL VENOUS BLD VENIPUNCTURE: CPT | Mod: PO

## 2021-03-02 PROCEDURE — 1125F AMNT PAIN NOTED PAIN PRSNT: CPT | Mod: S$GLB,,, | Performed by: REGISTERED NURSE

## 2021-03-02 PROCEDURE — 3288F FALL RISK ASSESSMENT DOCD: CPT | Mod: CPTII,S$GLB,, | Performed by: REGISTERED NURSE

## 2021-03-02 PROCEDURE — 99214 PR OFFICE/OUTPT VISIT, EST, LEVL IV, 30-39 MIN: ICD-10-PCS | Mod: S$GLB,,, | Performed by: REGISTERED NURSE

## 2021-03-02 PROCEDURE — 1100F PTFALLS ASSESS-DOCD GE2>/YR: CPT | Mod: CPTII,S$GLB,, | Performed by: REGISTERED NURSE

## 2021-03-02 PROCEDURE — 83036 HEMOGLOBIN GLYCOSYLATED A1C: CPT

## 2021-03-02 PROCEDURE — 3075F SYST BP GE 130 - 139MM HG: CPT | Mod: CPTII,S$GLB,, | Performed by: REGISTERED NURSE

## 2021-03-02 PROCEDURE — 3078F PR MOST RECENT DIASTOLIC BLOOD PRESSURE < 80 MM HG: ICD-10-PCS | Mod: CPTII,S$GLB,, | Performed by: REGISTERED NURSE

## 2021-03-02 PROCEDURE — 1100F PR PT FALLS ASSESS DOC 2+ FALLS/FALL W/INJURY/YR: ICD-10-PCS | Mod: CPTII,S$GLB,, | Performed by: REGISTERED NURSE

## 2021-03-02 PROCEDURE — 3078F DIAST BP <80 MM HG: CPT | Mod: CPTII,S$GLB,, | Performed by: REGISTERED NURSE

## 2021-03-02 PROCEDURE — 99499 UNLISTED E&M SERVICE: CPT | Mod: S$GLB,,, | Performed by: REGISTERED NURSE

## 2021-03-02 PROCEDURE — 99499 RISK ADDL DX/OHS AUDIT: ICD-10-PCS | Mod: S$GLB,,, | Performed by: REGISTERED NURSE

## 2021-03-02 PROCEDURE — 1159F PR MEDICATION LIST DOCUMENTED IN MEDICAL RECORD: ICD-10-PCS | Mod: S$GLB,,, | Performed by: REGISTERED NURSE

## 2021-03-02 PROCEDURE — 1159F MED LIST DOCD IN RCRD: CPT | Mod: S$GLB,,, | Performed by: REGISTERED NURSE

## 2021-03-02 PROCEDURE — 99999 PR PBB SHADOW E&M-EST. PATIENT-LVL V: ICD-10-PCS | Mod: PBBFAC,,, | Performed by: REGISTERED NURSE

## 2021-03-02 PROCEDURE — 3288F PR FALLS RISK ASSESSMENT DOCUMENTED: ICD-10-PCS | Mod: CPTII,S$GLB,, | Performed by: REGISTERED NURSE

## 2021-03-02 PROCEDURE — 80053 COMPREHEN METABOLIC PANEL: CPT

## 2021-03-02 PROCEDURE — 99999 PR PBB SHADOW E&M-EST. PATIENT-LVL V: CPT | Mod: PBBFAC,,, | Performed by: REGISTERED NURSE

## 2021-03-02 PROCEDURE — 3075F PR MOST RECENT SYSTOLIC BLOOD PRESS GE 130-139MM HG: ICD-10-PCS | Mod: CPTII,S$GLB,, | Performed by: REGISTERED NURSE

## 2021-03-02 RX ORDER — INSULIN DETEMIR 100 [IU]/ML
15 INJECTION, SOLUTION SUBCUTANEOUS NIGHTLY
Qty: 3 BOX | Refills: 1 | Status: SHIPPED | OUTPATIENT
Start: 2021-03-02 | End: 2021-06-07 | Stop reason: SDUPTHER

## 2021-03-02 RX ORDER — LINAGLIPTIN 5 MG/1
5 TABLET, FILM COATED ORAL DAILY
Qty: 90 TABLET | Refills: 1 | Status: SHIPPED | OUTPATIENT
Start: 2021-03-02 | End: 2021-04-02

## 2021-03-02 RX ORDER — PEN NEEDLE, DIABETIC 30 GX3/16"
1 NEEDLE, DISPOSABLE MISCELLANEOUS DAILY
Qty: 100 EACH | Refills: 6 | Status: SHIPPED | OUTPATIENT
Start: 2021-03-02

## 2021-03-22 ENCOUNTER — TELEPHONE (OUTPATIENT)
Dept: FAMILY MEDICINE | Facility: CLINIC | Age: 81
End: 2021-03-22

## 2021-03-22 DIAGNOSIS — R29.898 LEG WEAKNESS, BILATERAL: Primary | ICD-10-CM

## 2021-03-22 DIAGNOSIS — R26.81 UNSTEADY GAIT: ICD-10-CM

## 2021-03-23 ENCOUNTER — TELEPHONE (OUTPATIENT)
Dept: FAMILY MEDICINE | Facility: CLINIC | Age: 81
End: 2021-03-23

## 2021-03-24 ENCOUNTER — TELEPHONE (OUTPATIENT)
Dept: FAMILY MEDICINE | Facility: CLINIC | Age: 81
End: 2021-03-24

## 2021-03-24 DIAGNOSIS — R53.1 WEAKNESS: Primary | ICD-10-CM

## 2021-03-26 ENCOUNTER — PES CALL (OUTPATIENT)
Dept: ADMINISTRATIVE | Facility: CLINIC | Age: 81
End: 2021-03-26

## 2021-04-02 ENCOUNTER — NURSE TRIAGE (OUTPATIENT)
Dept: ADMINISTRATIVE | Facility: CLINIC | Age: 81
End: 2021-04-02

## 2021-04-02 DIAGNOSIS — E11.65 TYPE 2 DIABETES MELLITUS WITH HYPERGLYCEMIA, WITH LONG-TERM CURRENT USE OF INSULIN: ICD-10-CM

## 2021-04-02 DIAGNOSIS — Z79.4 TYPE 2 DIABETES MELLITUS WITH HYPERGLYCEMIA, WITH LONG-TERM CURRENT USE OF INSULIN: ICD-10-CM

## 2021-04-02 RX ORDER — LINAGLIPTIN 5 MG/1
5 TABLET, FILM COATED ORAL DAILY
Qty: 90 TABLET | Refills: 1 | Status: SHIPPED | OUTPATIENT
Start: 2021-04-02 | End: 2021-06-07 | Stop reason: SDUPTHER

## 2021-04-21 ENCOUNTER — TELEPHONE (OUTPATIENT)
Dept: FAMILY MEDICINE | Facility: CLINIC | Age: 81
End: 2021-04-21

## 2021-04-22 ENCOUNTER — TELEPHONE (OUTPATIENT)
Dept: FAMILY MEDICINE | Facility: CLINIC | Age: 81
End: 2021-04-22

## 2021-04-22 DIAGNOSIS — R26.81 UNSTEADY GAIT: Primary | ICD-10-CM

## 2021-05-03 ENCOUNTER — PES CALL (OUTPATIENT)
Dept: ADMINISTRATIVE | Facility: CLINIC | Age: 81
End: 2021-05-03

## 2021-06-07 ENCOUNTER — OFFICE VISIT (OUTPATIENT)
Dept: FAMILY MEDICINE | Facility: CLINIC | Age: 81
End: 2021-06-07
Payer: MEDICARE

## 2021-06-07 VITALS
RESPIRATION RATE: 18 BRPM | OXYGEN SATURATION: 97 % | BODY MASS INDEX: 39.63 KG/M2 | SYSTOLIC BLOOD PRESSURE: 124 MMHG | HEART RATE: 69 BPM | DIASTOLIC BLOOD PRESSURE: 60 MMHG | TEMPERATURE: 97 F | WEIGHT: 308.63 LBS

## 2021-06-07 DIAGNOSIS — M79.642 PAIN IN BOTH HANDS: Primary | ICD-10-CM

## 2021-06-07 DIAGNOSIS — M79.89 LEG SWELLING: ICD-10-CM

## 2021-06-07 DIAGNOSIS — Z79.4 TYPE 2 DIABETES MELLITUS WITH HYPERGLYCEMIA, WITH LONG-TERM CURRENT USE OF INSULIN: ICD-10-CM

## 2021-06-07 DIAGNOSIS — E11.65 TYPE 2 DIABETES MELLITUS WITH HYPERGLYCEMIA, WITH LONG-TERM CURRENT USE OF INSULIN: ICD-10-CM

## 2021-06-07 DIAGNOSIS — M79.641 PAIN IN BOTH HANDS: Primary | ICD-10-CM

## 2021-06-07 PROCEDURE — 99499 UNLISTED E&M SERVICE: CPT | Mod: HCNC,S$GLB,, | Performed by: INTERNAL MEDICINE

## 2021-06-07 PROCEDURE — 3288F FALL RISK ASSESSMENT DOCD: CPT | Mod: CPTII,S$GLB,, | Performed by: INTERNAL MEDICINE

## 2021-06-07 PROCEDURE — 99214 PR OFFICE/OUTPT VISIT, EST, LEVL IV, 30-39 MIN: ICD-10-PCS | Mod: S$GLB,,, | Performed by: INTERNAL MEDICINE

## 2021-06-07 PROCEDURE — 1159F PR MEDICATION LIST DOCUMENTED IN MEDICAL RECORD: ICD-10-PCS | Mod: S$GLB,,, | Performed by: INTERNAL MEDICINE

## 2021-06-07 PROCEDURE — 99499 RISK ADDL DX/OHS AUDIT: ICD-10-PCS | Mod: HCNC,S$GLB,, | Performed by: INTERNAL MEDICINE

## 2021-06-07 PROCEDURE — 99999 PR PBB SHADOW E&M-EST. PATIENT-LVL V: CPT | Mod: PBBFAC,,, | Performed by: INTERNAL MEDICINE

## 2021-06-07 PROCEDURE — 1125F PR PAIN SEVERITY QUANTIFIED, PAIN PRESENT: ICD-10-PCS | Mod: S$GLB,,, | Performed by: INTERNAL MEDICINE

## 2021-06-07 PROCEDURE — 1159F MED LIST DOCD IN RCRD: CPT | Mod: S$GLB,,, | Performed by: INTERNAL MEDICINE

## 2021-06-07 PROCEDURE — 1101F PT FALLS ASSESS-DOCD LE1/YR: CPT | Mod: CPTII,S$GLB,, | Performed by: INTERNAL MEDICINE

## 2021-06-07 PROCEDURE — 3288F PR FALLS RISK ASSESSMENT DOCUMENTED: ICD-10-PCS | Mod: CPTII,S$GLB,, | Performed by: INTERNAL MEDICINE

## 2021-06-07 PROCEDURE — 1125F AMNT PAIN NOTED PAIN PRSNT: CPT | Mod: S$GLB,,, | Performed by: INTERNAL MEDICINE

## 2021-06-07 PROCEDURE — 99999 PR PBB SHADOW E&M-EST. PATIENT-LVL V: ICD-10-PCS | Mod: PBBFAC,,, | Performed by: INTERNAL MEDICINE

## 2021-06-07 PROCEDURE — 1101F PR PT FALLS ASSESS DOC 0-1 FALLS W/OUT INJ PAST YR: ICD-10-PCS | Mod: CPTII,S$GLB,, | Performed by: INTERNAL MEDICINE

## 2021-06-07 PROCEDURE — 99214 OFFICE O/P EST MOD 30 MIN: CPT | Mod: S$GLB,,, | Performed by: INTERNAL MEDICINE

## 2021-06-07 RX ORDER — LINAGLIPTIN 5 MG/1
5 TABLET, FILM COATED ORAL DAILY
Qty: 90 TABLET | Refills: 1 | Status: SHIPPED | OUTPATIENT
Start: 2021-06-07 | End: 2021-09-13 | Stop reason: SDUPTHER

## 2021-06-07 RX ORDER — INFLUENZA A VIRUS A/MICHIGAN/45/2015 X-275 (H1N1) ANTIGEN (FORMALDEHYDE INACTIVATED), INFLUENZA A VIRUS A/SINGAPORE/INFIMH-16-0019/2016 IVR-186 (H3N2) ANTIGEN (FORMALDEHYDE INACTIVATED), INFLUENZA B VIRUS B/PHUKET/3073/2013 ANTIGEN (FORMALDEHYDE INACTIVATED), AND INFLUENZA B VIRUS B/MARYLAND/15/2016 BX-69A ANTIGEN (FORMALDEHYDE INACTIVATED) 60; 60; 60; 60 UG/.7ML; UG/.7ML; UG/.7ML; UG/.7ML
INJECTION, SUSPENSION INTRAMUSCULAR
COMMUNITY
Start: 2020-12-17 | End: 2023-01-10

## 2021-06-07 RX ORDER — INSULIN DETEMIR 100 [IU]/ML
15 INJECTION, SOLUTION SUBCUTANEOUS NIGHTLY
Qty: 3 BOX | Refills: 1 | Status: SHIPPED | OUTPATIENT
Start: 2021-06-07 | End: 2021-07-08

## 2021-06-09 ENCOUNTER — TELEPHONE (OUTPATIENT)
Dept: FAMILY MEDICINE | Facility: CLINIC | Age: 81
End: 2021-06-09

## 2021-06-09 DIAGNOSIS — M79.642 PAIN IN BOTH HANDS: Primary | ICD-10-CM

## 2021-06-09 DIAGNOSIS — M79.641 PAIN IN BOTH HANDS: Primary | ICD-10-CM

## 2021-06-09 PROCEDURE — G0180 PR HOME HEALTH MD CERTIFICATION: ICD-10-PCS | Mod: ,,, | Performed by: INTERNAL MEDICINE

## 2021-06-09 PROCEDURE — G0180 MD CERTIFICATION HHA PATIENT: HCPCS | Mod: ,,, | Performed by: INTERNAL MEDICINE

## 2021-06-22 ENCOUNTER — PATIENT OUTREACH (OUTPATIENT)
Dept: ADMINISTRATIVE | Facility: HOSPITAL | Age: 81
End: 2021-06-22

## 2021-06-22 DIAGNOSIS — E11.65 TYPE 2 DIABETES MELLITUS WITH HYPERGLYCEMIA, WITH LONG-TERM CURRENT USE OF INSULIN: Primary | ICD-10-CM

## 2021-06-22 DIAGNOSIS — Z79.4 TYPE 2 DIABETES MELLITUS WITH HYPERGLYCEMIA, WITH LONG-TERM CURRENT USE OF INSULIN: Primary | ICD-10-CM

## 2021-07-06 ENCOUNTER — EXTERNAL HOME HEALTH (OUTPATIENT)
Dept: HOME HEALTH SERVICES | Facility: HOSPITAL | Age: 81
End: 2021-07-06
Payer: MEDICARE

## 2021-07-07 ENCOUNTER — TELEPHONE (OUTPATIENT)
Dept: FAMILY MEDICINE | Facility: CLINIC | Age: 81
End: 2021-07-07

## 2021-07-12 ENCOUNTER — TELEPHONE (OUTPATIENT)
Dept: FAMILY MEDICINE | Facility: CLINIC | Age: 81
End: 2021-07-12

## 2021-07-12 DIAGNOSIS — G47.30 SLEEP APNEA, UNSPECIFIED TYPE: Primary | ICD-10-CM

## 2021-07-17 RX ORDER — BLOOD SUGAR DIAGNOSTIC
STRIP MISCELLANEOUS
Qty: 100 STRIP | Refills: 12 | Status: SHIPPED | OUTPATIENT
Start: 2021-07-17 | End: 2021-08-25 | Stop reason: SDUPTHER

## 2021-07-23 ENCOUNTER — OFFICE VISIT (OUTPATIENT)
Dept: PULMONOLOGY | Facility: CLINIC | Age: 81
End: 2021-07-23
Payer: MEDICARE

## 2021-07-23 VITALS
HEART RATE: 62 BPM | SYSTOLIC BLOOD PRESSURE: 109 MMHG | BODY MASS INDEX: 39.01 KG/M2 | OXYGEN SATURATION: 97 % | HEIGHT: 74 IN | DIASTOLIC BLOOD PRESSURE: 70 MMHG | RESPIRATION RATE: 20 BRPM | WEIGHT: 304 LBS

## 2021-07-23 DIAGNOSIS — Z87.891 FORMER SMOKER: ICD-10-CM

## 2021-07-23 DIAGNOSIS — G47.33 OSA AND COPD OVERLAP SYNDROME: Primary | ICD-10-CM

## 2021-07-23 DIAGNOSIS — G47.30 SLEEP APNEA, UNSPECIFIED TYPE: ICD-10-CM

## 2021-07-23 DIAGNOSIS — J44.9 OSA AND COPD OVERLAP SYNDROME: Primary | ICD-10-CM

## 2021-07-23 PROBLEM — E66.812 CLASS 2 SEVERE OBESITY DUE TO EXCESS CALORIES WITH SERIOUS COMORBIDITY AND BODY MASS INDEX (BMI) OF 39.0 TO 39.9 IN ADULT: Status: ACTIVE | Noted: 2018-07-18

## 2021-07-23 PROCEDURE — 99205 PR OFFICE/OUTPT VISIT, NEW, LEVL V, 60-74 MIN: ICD-10-PCS | Mod: S$GLB,,, | Performed by: INTERNAL MEDICINE

## 2021-07-23 PROCEDURE — 3074F PR MOST RECENT SYSTOLIC BLOOD PRESSURE < 130 MM HG: ICD-10-PCS | Mod: CPTII,S$GLB,, | Performed by: INTERNAL MEDICINE

## 2021-07-23 PROCEDURE — 1101F PT FALLS ASSESS-DOCD LE1/YR: CPT | Mod: CPTII,S$GLB,, | Performed by: INTERNAL MEDICINE

## 2021-07-23 PROCEDURE — 99999 PR PBB SHADOW E&M-EST. PATIENT-LVL IV: ICD-10-PCS | Mod: PBBFAC,,, | Performed by: INTERNAL MEDICINE

## 2021-07-23 PROCEDURE — 1101F PR PT FALLS ASSESS DOC 0-1 FALLS W/OUT INJ PAST YR: ICD-10-PCS | Mod: CPTII,S$GLB,, | Performed by: INTERNAL MEDICINE

## 2021-07-23 PROCEDURE — 3288F FALL RISK ASSESSMENT DOCD: CPT | Mod: CPTII,S$GLB,, | Performed by: INTERNAL MEDICINE

## 2021-07-23 PROCEDURE — 3288F PR FALLS RISK ASSESSMENT DOCUMENTED: ICD-10-PCS | Mod: CPTII,S$GLB,, | Performed by: INTERNAL MEDICINE

## 2021-07-23 PROCEDURE — 99205 OFFICE O/P NEW HI 60 MIN: CPT | Mod: S$GLB,,, | Performed by: INTERNAL MEDICINE

## 2021-07-23 PROCEDURE — 3078F PR MOST RECENT DIASTOLIC BLOOD PRESSURE < 80 MM HG: ICD-10-PCS | Mod: CPTII,S$GLB,, | Performed by: INTERNAL MEDICINE

## 2021-07-23 PROCEDURE — 99999 PR PBB SHADOW E&M-EST. PATIENT-LVL IV: CPT | Mod: PBBFAC,,, | Performed by: INTERNAL MEDICINE

## 2021-07-23 PROCEDURE — 1159F MED LIST DOCD IN RCRD: CPT | Mod: CPTII,S$GLB,, | Performed by: INTERNAL MEDICINE

## 2021-07-23 PROCEDURE — 3074F SYST BP LT 130 MM HG: CPT | Mod: CPTII,S$GLB,, | Performed by: INTERNAL MEDICINE

## 2021-07-23 PROCEDURE — 1159F PR MEDICATION LIST DOCUMENTED IN MEDICAL RECORD: ICD-10-PCS | Mod: CPTII,S$GLB,, | Performed by: INTERNAL MEDICINE

## 2021-07-23 PROCEDURE — 3078F DIAST BP <80 MM HG: CPT | Mod: CPTII,S$GLB,, | Performed by: INTERNAL MEDICINE

## 2021-07-23 RX ORDER — ALBUTEROL SULFATE 0.83 MG/ML
2.5 SOLUTION RESPIRATORY (INHALATION) EVERY 6 HOURS PRN
Qty: 1 BOX | Refills: 3 | Status: SHIPPED | OUTPATIENT
Start: 2021-07-23 | End: 2022-07-23

## 2021-07-26 ENCOUNTER — PES CALL (OUTPATIENT)
Dept: ADMINISTRATIVE | Facility: CLINIC | Age: 81
End: 2021-07-26

## 2021-07-28 ENCOUNTER — TELEPHONE (OUTPATIENT)
Dept: PULMONOLOGY | Facility: CLINIC | Age: 81
End: 2021-07-28

## 2021-08-04 ENCOUNTER — PATIENT MESSAGE (OUTPATIENT)
Dept: ADMINISTRATIVE | Facility: HOSPITAL | Age: 81
End: 2021-08-04

## 2021-08-08 PROCEDURE — G0179 MD RECERTIFICATION HHA PT: HCPCS | Mod: ,,, | Performed by: INTERNAL MEDICINE

## 2021-08-08 PROCEDURE — G0179 PR HOME HEALTH MD RECERTIFICATION: ICD-10-PCS | Mod: ,,, | Performed by: INTERNAL MEDICINE

## 2021-08-09 ENCOUNTER — TELEPHONE (OUTPATIENT)
Dept: FAMILY MEDICINE | Facility: CLINIC | Age: 81
End: 2021-08-09

## 2021-08-09 ENCOUNTER — TELEPHONE (OUTPATIENT)
Dept: PULMONOLOGY | Facility: CLINIC | Age: 81
End: 2021-08-09

## 2021-08-10 ENCOUNTER — OFFICE VISIT (OUTPATIENT)
Dept: FAMILY MEDICINE | Facility: CLINIC | Age: 81
End: 2021-08-10
Payer: MEDICARE

## 2021-08-10 VITALS
BODY MASS INDEX: 39.03 KG/M2 | HEART RATE: 70 BPM | OXYGEN SATURATION: 94 % | DIASTOLIC BLOOD PRESSURE: 48 MMHG | TEMPERATURE: 97 F | SYSTOLIC BLOOD PRESSURE: 105 MMHG | RESPIRATION RATE: 20 BRPM | HEIGHT: 74 IN

## 2021-08-10 DIAGNOSIS — I10 ESSENTIAL HYPERTENSION: ICD-10-CM

## 2021-08-10 DIAGNOSIS — M54.50 CHRONIC LOW BACK PAIN WITHOUT SCIATICA, UNSPECIFIED BACK PAIN LATERALITY: ICD-10-CM

## 2021-08-10 DIAGNOSIS — I25.10 CORONARY ARTERY DISEASE, ANGINA PRESENCE UNSPECIFIED, UNSPECIFIED VESSEL OR LESION TYPE, UNSPECIFIED WHETHER NATIVE OR TRANSPLANTED HEART: ICD-10-CM

## 2021-08-10 DIAGNOSIS — I73.9 PAD (PERIPHERAL ARTERY DISEASE): ICD-10-CM

## 2021-08-10 DIAGNOSIS — N40.0 BENIGN PROSTATIC HYPERPLASIA, UNSPECIFIED WHETHER LOWER URINARY TRACT SYMPTOMS PRESENT: ICD-10-CM

## 2021-08-10 DIAGNOSIS — Z79.4 TYPE 2 DIABETES MELLITUS WITH HYPERGLYCEMIA, WITH LONG-TERM CURRENT USE OF INSULIN: ICD-10-CM

## 2021-08-10 DIAGNOSIS — E11.65 TYPE 2 DIABETES MELLITUS WITH HYPERGLYCEMIA, WITH LONG-TERM CURRENT USE OF INSULIN: ICD-10-CM

## 2021-08-10 DIAGNOSIS — F11.20 UNCOMPLICATED OPIOID DEPENDENCE: ICD-10-CM

## 2021-08-10 DIAGNOSIS — Z00.00 ENCOUNTER FOR PREVENTIVE HEALTH EXAMINATION: Primary | ICD-10-CM

## 2021-08-10 DIAGNOSIS — L97.929 ULCERS OF BOTH LOWER LEGS: ICD-10-CM

## 2021-08-10 DIAGNOSIS — G89.29 CHRONIC LOW BACK PAIN WITHOUT SCIATICA, UNSPECIFIED BACK PAIN LATERALITY: ICD-10-CM

## 2021-08-10 DIAGNOSIS — L97.919 ULCERS OF BOTH LOWER LEGS: ICD-10-CM

## 2021-08-10 DIAGNOSIS — H53.8 BLURRED VISION, BILATERAL: ICD-10-CM

## 2021-08-10 DIAGNOSIS — R26.81 UNSTEADY GAIT: ICD-10-CM

## 2021-08-10 DIAGNOSIS — E66.01 CLASS 2 SEVERE OBESITY DUE TO EXCESS CALORIES WITH SERIOUS COMORBIDITY AND BODY MASS INDEX (BMI) OF 39.0 TO 39.9 IN ADULT: ICD-10-CM

## 2021-08-10 DIAGNOSIS — Z99.3 DEPENDENCE ON WHEELCHAIR: ICD-10-CM

## 2021-08-10 DIAGNOSIS — J44.9 CHRONIC OBSTRUCTIVE PULMONARY DISEASE, UNSPECIFIED COPD TYPE: ICD-10-CM

## 2021-08-10 DIAGNOSIS — G47.30 SLEEP APNEA, UNSPECIFIED TYPE: ICD-10-CM

## 2021-08-10 DIAGNOSIS — E78.00 HYPERCHOLESTEROLEMIA: ICD-10-CM

## 2021-08-10 PROCEDURE — 1101F PT FALLS ASSESS-DOCD LE1/YR: CPT | Mod: CPTII,S$GLB,, | Performed by: NURSE PRACTITIONER

## 2021-08-10 PROCEDURE — 99999 PR PBB SHADOW E&M-EST. PATIENT-LVL IV: ICD-10-PCS | Mod: PBBFAC,,, | Performed by: NURSE PRACTITIONER

## 2021-08-10 PROCEDURE — 99999 PR PBB SHADOW E&M-EST. PATIENT-LVL IV: CPT | Mod: PBBFAC,,, | Performed by: NURSE PRACTITIONER

## 2021-08-10 PROCEDURE — 1125F AMNT PAIN NOTED PAIN PRSNT: CPT | Mod: CPTII,S$GLB,, | Performed by: NURSE PRACTITIONER

## 2021-08-10 PROCEDURE — 1101F PR PT FALLS ASSESS DOC 0-1 FALLS W/OUT INJ PAST YR: ICD-10-PCS | Mod: CPTII,S$GLB,, | Performed by: NURSE PRACTITIONER

## 2021-08-10 PROCEDURE — G0439 PR MEDICARE ANNUAL WELLNESS SUBSEQUENT VISIT: ICD-10-PCS | Mod: S$GLB,,, | Performed by: NURSE PRACTITIONER

## 2021-08-10 PROCEDURE — 99499 UNLISTED E&M SERVICE: CPT | Mod: HCNC,S$GLB,, | Performed by: NURSE PRACTITIONER

## 2021-08-10 PROCEDURE — 3074F PR MOST RECENT SYSTOLIC BLOOD PRESSURE < 130 MM HG: ICD-10-PCS | Mod: CPTII,S$GLB,, | Performed by: NURSE PRACTITIONER

## 2021-08-10 PROCEDURE — 3074F SYST BP LT 130 MM HG: CPT | Mod: CPTII,S$GLB,, | Performed by: NURSE PRACTITIONER

## 2021-08-10 PROCEDURE — 1125F PR PAIN SEVERITY QUANTIFIED, PAIN PRESENT: ICD-10-PCS | Mod: CPTII,S$GLB,, | Performed by: NURSE PRACTITIONER

## 2021-08-10 PROCEDURE — 99499 RISK ADDL DX/OHS AUDIT: ICD-10-PCS | Mod: HCNC,S$GLB,, | Performed by: NURSE PRACTITIONER

## 2021-08-10 PROCEDURE — 3078F DIAST BP <80 MM HG: CPT | Mod: CPTII,S$GLB,, | Performed by: NURSE PRACTITIONER

## 2021-08-10 PROCEDURE — 3288F FALL RISK ASSESSMENT DOCD: CPT | Mod: CPTII,S$GLB,, | Performed by: NURSE PRACTITIONER

## 2021-08-10 PROCEDURE — G0439 PPPS, SUBSEQ VISIT: HCPCS | Mod: S$GLB,,, | Performed by: NURSE PRACTITIONER

## 2021-08-10 PROCEDURE — 3078F PR MOST RECENT DIASTOLIC BLOOD PRESSURE < 80 MM HG: ICD-10-PCS | Mod: CPTII,S$GLB,, | Performed by: NURSE PRACTITIONER

## 2021-08-10 PROCEDURE — 3288F PR FALLS RISK ASSESSMENT DOCUMENTED: ICD-10-PCS | Mod: CPTII,S$GLB,, | Performed by: NURSE PRACTITIONER

## 2021-08-20 ENCOUNTER — OFFICE VISIT (OUTPATIENT)
Dept: FAMILY MEDICINE | Facility: CLINIC | Age: 81
End: 2021-08-20
Payer: MEDICARE

## 2021-08-20 DIAGNOSIS — E78.00 HYPERCHOLESTEROLEMIA: ICD-10-CM

## 2021-08-20 DIAGNOSIS — R60.9 EDEMA, UNSPECIFIED TYPE: ICD-10-CM

## 2021-08-20 DIAGNOSIS — M79.642 PAIN IN BOTH HANDS: ICD-10-CM

## 2021-08-20 DIAGNOSIS — G47.30 SLEEP APNEA, UNSPECIFIED TYPE: ICD-10-CM

## 2021-08-20 DIAGNOSIS — R26.81 UNSTEADY GAIT: ICD-10-CM

## 2021-08-20 DIAGNOSIS — I10 ESSENTIAL HYPERTENSION: ICD-10-CM

## 2021-08-20 DIAGNOSIS — Z79.4 TYPE 2 DIABETES MELLITUS WITH HYPERGLYCEMIA, WITH LONG-TERM CURRENT USE OF INSULIN: ICD-10-CM

## 2021-08-20 DIAGNOSIS — E11.65 TYPE 2 DIABETES MELLITUS WITH HYPERGLYCEMIA, WITH LONG-TERM CURRENT USE OF INSULIN: ICD-10-CM

## 2021-08-20 DIAGNOSIS — I25.10 CORONARY ARTERY DISEASE, ANGINA PRESENCE UNSPECIFIED, UNSPECIFIED VESSEL OR LESION TYPE, UNSPECIFIED WHETHER NATIVE OR TRANSPLANTED HEART: Primary | ICD-10-CM

## 2021-08-20 DIAGNOSIS — M79.641 PAIN IN BOTH HANDS: ICD-10-CM

## 2021-08-20 DIAGNOSIS — J44.9 CHRONIC OBSTRUCTIVE PULMONARY DISEASE, UNSPECIFIED COPD TYPE: ICD-10-CM

## 2021-08-20 PROCEDURE — 99442 PR PHYSICIAN TELEPHONE EVALUATION 11-20 MIN: ICD-10-PCS | Mod: 95,,, | Performed by: INTERNAL MEDICINE

## 2021-08-20 PROCEDURE — 99442 PR PHYSICIAN TELEPHONE EVALUATION 11-20 MIN: CPT | Mod: 95,,, | Performed by: INTERNAL MEDICINE

## 2021-08-20 RX ORDER — GABAPENTIN 100 MG/1
100 CAPSULE ORAL NIGHTLY
Qty: 30 CAPSULE | Refills: 4 | Status: SHIPPED | OUTPATIENT
Start: 2021-08-20 | End: 2023-01-10

## 2021-08-25 ENCOUNTER — LAB VISIT (OUTPATIENT)
Dept: LAB | Facility: HOSPITAL | Age: 81
End: 2021-08-25
Attending: INTERNAL MEDICINE
Payer: MEDICARE

## 2021-08-25 DIAGNOSIS — I25.10 DISEASE OF CARDIOVASCULAR SYSTEM: Primary | ICD-10-CM

## 2021-08-25 LAB
ALBUMIN SERPL BCP-MCNC: 3.2 G/DL (ref 3.5–5.2)
ALP SERPL-CCNC: 95 U/L (ref 55–135)
ALT SERPL W/O P-5'-P-CCNC: 19 U/L (ref 10–44)
ANION GAP SERPL CALC-SCNC: 13 MMOL/L (ref 8–16)
AST SERPL-CCNC: 23 U/L (ref 10–40)
BASOPHILS # BLD AUTO: 0.02 K/UL (ref 0–0.2)
BASOPHILS NFR BLD: 0.2 % (ref 0–1.9)
BILIRUB SERPL-MCNC: 0.5 MG/DL (ref 0.1–1)
BUN SERPL-MCNC: 30 MG/DL (ref 8–23)
CALCIUM SERPL-MCNC: 9.4 MG/DL (ref 8.7–10.5)
CHLORIDE SERPL-SCNC: 103 MMOL/L (ref 95–110)
CHOLEST SERPL-MCNC: 110 MG/DL (ref 120–199)
CHOLEST/HDLC SERPL: 2.3 {RATIO} (ref 2–5)
CO2 SERPL-SCNC: 23 MMOL/L (ref 23–29)
CREAT SERPL-MCNC: 1.3 MG/DL (ref 0.5–1.4)
DIFFERENTIAL METHOD: ABNORMAL
EOSINOPHIL # BLD AUTO: 0.2 K/UL (ref 0–0.5)
EOSINOPHIL NFR BLD: 2 % (ref 0–8)
ERYTHROCYTE [DISTWIDTH] IN BLOOD BY AUTOMATED COUNT: 15.5 % (ref 11.5–14.5)
EST. GFR  (AFRICAN AMERICAN): 59.6 ML/MIN/1.73 M^2
EST. GFR  (NON AFRICAN AMERICAN): 51.5 ML/MIN/1.73 M^2
ESTIMATED AVG GLUCOSE: 232 MG/DL (ref 68–131)
GLUCOSE SERPL-MCNC: 174 MG/DL (ref 70–110)
HBA1C MFR BLD: 9.7 % (ref 4–5.6)
HCT VFR BLD AUTO: 36.4 % (ref 40–54)
HDLC SERPL-MCNC: 47 MG/DL (ref 40–75)
HDLC SERPL: 42.7 % (ref 20–50)
HGB BLD-MCNC: 11 G/DL (ref 14–18)
IMM GRANULOCYTES # BLD AUTO: 0.06 K/UL (ref 0–0.04)
IMM GRANULOCYTES NFR BLD AUTO: 0.7 % (ref 0–0.5)
LDLC SERPL CALC-MCNC: 53 MG/DL (ref 63–159)
LYMPHOCYTES # BLD AUTO: 1.8 K/UL (ref 1–4.8)
LYMPHOCYTES NFR BLD: 19.3 % (ref 18–48)
MCH RBC QN AUTO: 28.4 PG (ref 27–31)
MCHC RBC AUTO-ENTMCNC: 30.2 G/DL (ref 32–36)
MCV RBC AUTO: 94 FL (ref 82–98)
MONOCYTES # BLD AUTO: 0.8 K/UL (ref 0.3–1)
MONOCYTES NFR BLD: 8.6 % (ref 4–15)
NEUTROPHILS # BLD AUTO: 6.4 K/UL (ref 1.8–7.7)
NEUTROPHILS NFR BLD: 69.2 % (ref 38–73)
NONHDLC SERPL-MCNC: 63 MG/DL
NRBC BLD-RTO: 0 /100 WBC
PLATELET # BLD AUTO: 238 K/UL (ref 150–450)
PMV BLD AUTO: 10.7 FL (ref 9.2–12.9)
POTASSIUM SERPL-SCNC: 4.3 MMOL/L (ref 3.5–5.1)
PROT SERPL-MCNC: 7.1 G/DL (ref 6–8.4)
RBC # BLD AUTO: 3.87 M/UL (ref 4.6–6.2)
SODIUM SERPL-SCNC: 139 MMOL/L (ref 136–145)
TRIGL SERPL-MCNC: 50 MG/DL (ref 30–150)
TSH SERPL DL<=0.005 MIU/L-ACNC: 3.8 UIU/ML (ref 0.4–4)
WBC # BLD AUTO: 9.18 K/UL (ref 3.9–12.7)

## 2021-08-25 PROCEDURE — 84443 ASSAY THYROID STIM HORMONE: CPT | Mod: PO | Performed by: INTERNAL MEDICINE

## 2021-08-25 PROCEDURE — 80053 COMPREHEN METABOLIC PANEL: CPT | Mod: PO | Performed by: INTERNAL MEDICINE

## 2021-08-25 PROCEDURE — 85025 COMPLETE CBC W/AUTO DIFF WBC: CPT | Mod: PO | Performed by: INTERNAL MEDICINE

## 2021-08-25 PROCEDURE — 83036 HEMOGLOBIN GLYCOSYLATED A1C: CPT | Performed by: INTERNAL MEDICINE

## 2021-08-25 PROCEDURE — 80061 LIPID PANEL: CPT | Performed by: INTERNAL MEDICINE

## 2021-08-25 RX ORDER — BLOOD SUGAR DIAGNOSTIC
1 STRIP MISCELLANEOUS 2 TIMES DAILY PRN
Qty: 100 STRIP | Refills: 12 | Status: SHIPPED | OUTPATIENT
Start: 2021-08-25 | End: 2022-08-04

## 2021-08-26 ENCOUNTER — TELEPHONE (OUTPATIENT)
Dept: FAMILY MEDICINE | Facility: CLINIC | Age: 81
End: 2021-08-26

## 2021-08-26 DIAGNOSIS — D64.9 ANEMIA, UNSPECIFIED TYPE: Primary | ICD-10-CM

## 2021-08-27 ENCOUNTER — EXTERNAL HOME HEALTH (OUTPATIENT)
Dept: HOME HEALTH SERVICES | Facility: HOSPITAL | Age: 81
End: 2021-08-27
Payer: MEDICARE

## 2021-09-13 DIAGNOSIS — Z79.4 TYPE 2 DIABETES MELLITUS WITH HYPERGLYCEMIA, WITH LONG-TERM CURRENT USE OF INSULIN: ICD-10-CM

## 2021-09-13 DIAGNOSIS — E11.65 TYPE 2 DIABETES MELLITUS WITH HYPERGLYCEMIA, WITH LONG-TERM CURRENT USE OF INSULIN: ICD-10-CM

## 2021-09-13 RX ORDER — LINAGLIPTIN 5 MG/1
5 TABLET, FILM COATED ORAL DAILY
Qty: 90 TABLET | Refills: 1 | Status: SHIPPED | OUTPATIENT
Start: 2021-09-13 | End: 2021-11-12

## 2021-09-13 RX ORDER — INSULIN DETEMIR 100 [IU]/ML
15 INJECTION, SOLUTION SUBCUTANEOUS NIGHTLY
Qty: 45 ML | Refills: 1 | Status: SHIPPED | OUTPATIENT
Start: 2021-09-13 | End: 2021-09-15 | Stop reason: SDUPTHER

## 2021-09-15 ENCOUNTER — DOCUMENT SCAN (OUTPATIENT)
Dept: HOME HEALTH SERVICES | Facility: HOSPITAL | Age: 81
End: 2021-09-15
Payer: MEDICARE

## 2021-09-15 ENCOUNTER — TELEPHONE (OUTPATIENT)
Dept: FAMILY MEDICINE | Facility: CLINIC | Age: 81
End: 2021-09-15

## 2021-09-15 DIAGNOSIS — Z79.4 TYPE 2 DIABETES MELLITUS WITH HYPERGLYCEMIA, WITH LONG-TERM CURRENT USE OF INSULIN: ICD-10-CM

## 2021-09-15 DIAGNOSIS — E11.65 TYPE 2 DIABETES MELLITUS WITH HYPERGLYCEMIA, WITH LONG-TERM CURRENT USE OF INSULIN: ICD-10-CM

## 2021-09-15 RX ORDER — INSULIN DETEMIR 100 [IU]/ML
15 INJECTION, SOLUTION SUBCUTANEOUS NIGHTLY
Qty: 45 ML | Refills: 2 | Status: SHIPPED | OUTPATIENT
Start: 2021-09-15 | End: 2021-10-14

## 2021-09-15 RX ORDER — INSULIN DETEMIR 100 [IU]/ML
15 INJECTION, SOLUTION SUBCUTANEOUS NIGHTLY
Qty: 45 ML | Refills: 6 | Status: SHIPPED | OUTPATIENT
Start: 2021-09-15 | End: 2021-09-15 | Stop reason: SDUPTHER

## 2021-09-17 ENCOUNTER — DOCUMENT SCAN (OUTPATIENT)
Dept: HOME HEALTH SERVICES | Facility: HOSPITAL | Age: 81
End: 2021-09-17
Payer: MEDICARE

## 2021-09-17 ENCOUNTER — TELEPHONE (OUTPATIENT)
Dept: PULMONOLOGY | Facility: CLINIC | Age: 81
End: 2021-09-17

## 2021-09-20 ENCOUNTER — DOCUMENT SCAN (OUTPATIENT)
Dept: HOME HEALTH SERVICES | Facility: HOSPITAL | Age: 81
End: 2021-09-20
Payer: MEDICARE

## 2021-09-21 ENCOUNTER — LAB VISIT (OUTPATIENT)
Dept: LAB | Facility: HOSPITAL | Age: 81
End: 2021-09-21
Attending: INTERNAL MEDICINE
Payer: MEDICARE

## 2021-09-21 DIAGNOSIS — I25.10 CORONARY ATHEROSCLEROSIS OF NATIVE CORONARY ARTERY: Primary | ICD-10-CM

## 2021-09-21 LAB
BASOPHILS # BLD AUTO: 0.03 K/UL (ref 0–0.2)
BASOPHILS NFR BLD: 0.3 % (ref 0–1.9)
DIFFERENTIAL METHOD: ABNORMAL
EOSINOPHIL # BLD AUTO: 0.2 K/UL (ref 0–0.5)
EOSINOPHIL NFR BLD: 2 % (ref 0–8)
ERYTHROCYTE [DISTWIDTH] IN BLOOD BY AUTOMATED COUNT: 16.3 % (ref 11.5–14.5)
HCT VFR BLD AUTO: 36.2 % (ref 40–54)
HGB BLD-MCNC: 10.9 G/DL (ref 14–18)
IMM GRANULOCYTES # BLD AUTO: 0.04 K/UL (ref 0–0.04)
IMM GRANULOCYTES NFR BLD AUTO: 0.4 % (ref 0–0.5)
LYMPHOCYTES # BLD AUTO: 2.5 K/UL (ref 1–4.8)
LYMPHOCYTES NFR BLD: 27.5 % (ref 18–48)
MCH RBC QN AUTO: 29.2 PG (ref 27–31)
MCHC RBC AUTO-ENTMCNC: 30.1 G/DL (ref 32–36)
MCV RBC AUTO: 97 FL (ref 82–98)
MONOCYTES # BLD AUTO: 0.9 K/UL (ref 0.3–1)
MONOCYTES NFR BLD: 9.7 % (ref 4–15)
NEUTROPHILS # BLD AUTO: 5.4 K/UL (ref 1.8–7.7)
NEUTROPHILS NFR BLD: 60.1 % (ref 38–73)
NRBC BLD-RTO: 0 /100 WBC
PLATELET # BLD AUTO: 205 K/UL (ref 150–450)
PMV BLD AUTO: 10.8 FL (ref 9.2–12.9)
RBC # BLD AUTO: 3.73 M/UL (ref 4.6–6.2)
WBC # BLD AUTO: 9.04 K/UL (ref 3.9–12.7)

## 2021-09-21 PROCEDURE — 36415 COLL VENOUS BLD VENIPUNCTURE: CPT | Mod: PO | Performed by: INTERNAL MEDICINE

## 2021-09-21 PROCEDURE — 83540 ASSAY OF IRON: CPT | Performed by: INTERNAL MEDICINE

## 2021-09-21 PROCEDURE — 85025 COMPLETE CBC W/AUTO DIFF WBC: CPT | Mod: PO | Performed by: INTERNAL MEDICINE

## 2021-09-21 PROCEDURE — 82728 ASSAY OF FERRITIN: CPT | Performed by: INTERNAL MEDICINE

## 2021-09-22 ENCOUNTER — TELEPHONE (OUTPATIENT)
Dept: FAMILY MEDICINE | Facility: CLINIC | Age: 81
End: 2021-09-22

## 2021-09-22 DIAGNOSIS — D64.9 ANEMIA, UNSPECIFIED TYPE: Primary | ICD-10-CM

## 2021-09-22 LAB
FERRITIN SERPL-MCNC: 282 NG/ML (ref 20–300)
IRON SERPL-MCNC: 52 UG/DL (ref 45–160)

## 2021-09-28 ENCOUNTER — DOCUMENT SCAN (OUTPATIENT)
Dept: HOME HEALTH SERVICES | Facility: HOSPITAL | Age: 81
End: 2021-09-28
Payer: MEDICARE

## 2021-10-04 ENCOUNTER — TELEPHONE (OUTPATIENT)
Dept: FAMILY MEDICINE | Facility: CLINIC | Age: 81
End: 2021-10-04

## 2021-10-06 DIAGNOSIS — Z01.812 ENCOUNTER FOR PREPROCEDURE SCREENING LABORATORY TESTING FOR COVID-19: Primary | ICD-10-CM

## 2021-10-06 DIAGNOSIS — Z11.52 ENCOUNTER FOR PREPROCEDURE SCREENING LABORATORY TESTING FOR COVID-19: Primary | ICD-10-CM

## 2021-10-07 PROCEDURE — G0180 MD CERTIFICATION HHA PATIENT: HCPCS | Mod: ,,, | Performed by: INTERNAL MEDICINE

## 2021-10-07 PROCEDURE — G0180 PR HOME HEALTH MD CERTIFICATION: ICD-10-PCS | Mod: ,,, | Performed by: INTERNAL MEDICINE

## 2021-10-10 ENCOUNTER — LAB VISIT (OUTPATIENT)
Dept: PRIMARY CARE CLINIC | Facility: CLINIC | Age: 81
End: 2021-10-10
Payer: MEDICARE

## 2021-10-10 DIAGNOSIS — Z01.812 ENCOUNTER FOR PREPROCEDURE SCREENING LABORATORY TESTING FOR COVID-19: ICD-10-CM

## 2021-10-10 DIAGNOSIS — Z11.52 ENCOUNTER FOR PREPROCEDURE SCREENING LABORATORY TESTING FOR COVID-19: ICD-10-CM

## 2021-10-10 PROCEDURE — U0005 INFEC AGEN DETEC AMPLI PROBE: HCPCS | Performed by: INTERNAL MEDICINE

## 2021-10-10 PROCEDURE — U0003 INFECTIOUS AGENT DETECTION BY NUCLEIC ACID (DNA OR RNA); SEVERE ACUTE RESPIRATORY SYNDROME CORONAVIRUS 2 (SARS-COV-2) (CORONAVIRUS DISEASE [COVID-19]), AMPLIFIED PROBE TECHNIQUE, MAKING USE OF HIGH THROUGHPUT TECHNOLOGIES AS DESCRIBED BY CMS-2020-01-R: HCPCS | Mod: HCNC | Performed by: INTERNAL MEDICINE

## 2021-10-11 LAB
SARS-COV-2 RNA RESP QL NAA+PROBE: NOT DETECTED
SARS-COV-2- CYCLE NUMBER: NORMAL

## 2021-10-13 ENCOUNTER — CLINICAL SUPPORT (OUTPATIENT)
Dept: PULMONOLOGY | Facility: CLINIC | Age: 81
End: 2021-10-13
Payer: MEDICARE

## 2021-10-13 VITALS — BODY MASS INDEX: 39.51 KG/M2 | WEIGHT: 307.88 LBS | HEIGHT: 74 IN

## 2021-10-13 DIAGNOSIS — J44.9 OSA AND COPD OVERLAP SYNDROME: ICD-10-CM

## 2021-10-13 DIAGNOSIS — G47.33 OSA AND COPD OVERLAP SYNDROME: ICD-10-CM

## 2021-10-13 LAB
BRPFT: NORMAL
DLCO ADJ PRE: 16.43 ML/(MIN*MMHG)
DLCO SINGLE BREATH LLN: 21.47
DLCO SINGLE BREATH PRE REF: 50.1 %
DLCO SINGLE BREATH REF: 28.4
DLCOC SBVA LLN: 2.56
DLCOC SBVA PRE REF: 148.6 %
DLCOC SBVA REF: 3.58
DLCOC SINGLE BREATH LLN: 21.47
DLCOC SINGLE BREATH PRE REF: 57.9 %
DLCOC SINGLE BREATH REF: 28.4
DLCOVA LLN: 2.56
DLCOVA PRE REF: 128.6 %
DLCOVA PRE: 4.6 ML/(MIN*MMHG*L)
DLCOVA REF: 3.58
DLVAADJ PRE: 5.31 ML/(MIN*MMHG*L)
FEF 25 75 LLN: 0.56
FEF 25 75 PRE REF: 21.8 %
FEF 25 75 REF: 1.9
FEV1 FVC LLN: 60
FEV1 FVC PRE REF: 75.4 %
FEV1 FVC REF: 74
FEV1 LLN: 1.79
FEV1 PRE REF: 43 %
FEV1 REF: 2.75
FVC LLN: 2.59
FVC PRE REF: 56.4 %
FVC REF: 3.73
IVC PRE: 1.84 L
IVC SINGLE BREATH LLN: 2.59
IVC SINGLE BREATH PRE REF: 49.5 %
IVC SINGLE BREATH REF: 3.73
MVV LLN: 110
MVV PRE REF: 20 %
MVV REF: 130
PEF LLN: 5.61
PEF PRE REF: 36.1 %
PEF REF: 8.58
PRE DLCO: 14.22 ML/(MIN*MMHG)
PRE FEF 25 75: 0.41 L/S
PRE FET 100: 11.4 SEC
PRE FEV1 FVC: 56.13 %
PRE FEV1: 1.18 L
PRE FVC: 2.1 L
PRE MVV: 26 L/MIN
PRE PEF: 3.09 L/S
VA PRE: 3.1 L
VA SINGLE BREATH LLN: 7.79
VA SINGLE BREATH PRE REF: 39.8 %
VA SINGLE BREATH REF: 7.79

## 2021-10-13 PROCEDURE — 94618 PULMONARY STRESS TESTING: ICD-10-PCS | Mod: HCNC,S$GLB,, | Performed by: INTERNAL MEDICINE

## 2021-10-13 PROCEDURE — 94729 PR C02/MEMBANE DIFFUSE CAPACITY: ICD-10-PCS | Mod: HCNC,S$GLB,, | Performed by: INTERNAL MEDICINE

## 2021-10-13 PROCEDURE — 94618 PULMONARY STRESS TESTING: CPT | Mod: HCNC,S$GLB,, | Performed by: INTERNAL MEDICINE

## 2021-10-13 PROCEDURE — 94010 BREATHING CAPACITY TEST: CPT | Mod: 59,HCNC,S$GLB, | Performed by: INTERNAL MEDICINE

## 2021-10-13 PROCEDURE — 94010 BREATHING CAPACITY TEST: ICD-10-PCS | Mod: 59,HCNC,S$GLB, | Performed by: INTERNAL MEDICINE

## 2021-10-13 PROCEDURE — 94729 DIFFUSING CAPACITY: CPT | Mod: HCNC,S$GLB,, | Performed by: INTERNAL MEDICINE

## 2021-10-15 ENCOUNTER — TELEPHONE (OUTPATIENT)
Dept: HEMATOLOGY/ONCOLOGY | Facility: CLINIC | Age: 81
End: 2021-10-15

## 2021-10-18 ENCOUNTER — PATIENT MESSAGE (OUTPATIENT)
Dept: ADMINISTRATIVE | Facility: HOSPITAL | Age: 81
End: 2021-10-18
Payer: MEDICARE

## 2021-10-21 ENCOUNTER — EXTERNAL HOME HEALTH (OUTPATIENT)
Dept: HOME HEALTH SERVICES | Facility: HOSPITAL | Age: 81
End: 2021-10-21
Payer: MEDICARE

## 2021-10-25 ENCOUNTER — OFFICE VISIT (OUTPATIENT)
Dept: PULMONOLOGY | Facility: CLINIC | Age: 81
End: 2021-10-25
Payer: MEDICARE

## 2021-10-25 ENCOUNTER — TELEPHONE (OUTPATIENT)
Dept: PULMONOLOGY | Facility: CLINIC | Age: 81
End: 2021-10-25
Payer: MEDICARE

## 2021-10-25 VITALS
DIASTOLIC BLOOD PRESSURE: 72 MMHG | BODY MASS INDEX: 38.08 KG/M2 | OXYGEN SATURATION: 95 % | HEIGHT: 74 IN | WEIGHT: 296.75 LBS | RESPIRATION RATE: 16 BRPM | HEART RATE: 67 BPM | SYSTOLIC BLOOD PRESSURE: 120 MMHG

## 2021-10-25 DIAGNOSIS — J44.9 OSA AND COPD OVERLAP SYNDROME: ICD-10-CM

## 2021-10-25 DIAGNOSIS — G47.33 OSA AND COPD OVERLAP SYNDROME: ICD-10-CM

## 2021-10-25 DIAGNOSIS — J44.9 CHRONIC OBSTRUCTIVE PULMONARY DISEASE, UNSPECIFIED COPD TYPE: ICD-10-CM

## 2021-10-25 DIAGNOSIS — J44.9 COPD, SEVERE: Primary | ICD-10-CM

## 2021-10-25 PROCEDURE — 1160F RVW MEDS BY RX/DR IN RCRD: CPT | Mod: HCNC,CPTII,S$GLB, | Performed by: NURSE PRACTITIONER

## 2021-10-25 PROCEDURE — 99214 OFFICE O/P EST MOD 30 MIN: CPT | Mod: HCNC,S$GLB,, | Performed by: NURSE PRACTITIONER

## 2021-10-25 PROCEDURE — 3074F SYST BP LT 130 MM HG: CPT | Mod: HCNC,CPTII,S$GLB, | Performed by: NURSE PRACTITIONER

## 2021-10-25 PROCEDURE — 99999 PR PBB SHADOW E&M-EST. PATIENT-LVL III: CPT | Mod: PBBFAC,HCNC,, | Performed by: NURSE PRACTITIONER

## 2021-10-25 PROCEDURE — 1159F PR MEDICATION LIST DOCUMENTED IN MEDICAL RECORD: ICD-10-PCS | Mod: HCNC,CPTII,S$GLB, | Performed by: NURSE PRACTITIONER

## 2021-10-25 PROCEDURE — 99214 PR OFFICE/OUTPT VISIT, EST, LEVL IV, 30-39 MIN: ICD-10-PCS | Mod: HCNC,S$GLB,, | Performed by: NURSE PRACTITIONER

## 2021-10-25 PROCEDURE — 3078F PR MOST RECENT DIASTOLIC BLOOD PRESSURE < 80 MM HG: ICD-10-PCS | Mod: HCNC,CPTII,S$GLB, | Performed by: NURSE PRACTITIONER

## 2021-10-25 PROCEDURE — 1160F PR REVIEW ALL MEDS BY PRESCRIBER/CLIN PHARMACIST DOCUMENTED: ICD-10-PCS | Mod: HCNC,CPTII,S$GLB, | Performed by: NURSE PRACTITIONER

## 2021-10-25 PROCEDURE — 99999 PR PBB SHADOW E&M-EST. PATIENT-LVL III: ICD-10-PCS | Mod: PBBFAC,HCNC,, | Performed by: NURSE PRACTITIONER

## 2021-10-25 PROCEDURE — 1159F MED LIST DOCD IN RCRD: CPT | Mod: HCNC,CPTII,S$GLB, | Performed by: NURSE PRACTITIONER

## 2021-10-25 PROCEDURE — 3074F PR MOST RECENT SYSTOLIC BLOOD PRESSURE < 130 MM HG: ICD-10-PCS | Mod: HCNC,CPTII,S$GLB, | Performed by: NURSE PRACTITIONER

## 2021-10-25 PROCEDURE — 3078F DIAST BP <80 MM HG: CPT | Mod: HCNC,CPTII,S$GLB, | Performed by: NURSE PRACTITIONER

## 2021-11-15 ENCOUNTER — TELEPHONE (OUTPATIENT)
Dept: PULMONOLOGY | Facility: CLINIC | Age: 81
End: 2021-11-15
Payer: MEDICARE

## 2021-11-16 ENCOUNTER — CLINICAL SUPPORT (OUTPATIENT)
Dept: PULMONOLOGY | Facility: CLINIC | Age: 81
End: 2021-11-16
Payer: MEDICARE

## 2021-11-16 ENCOUNTER — OFFICE VISIT (OUTPATIENT)
Dept: HEMATOLOGY/ONCOLOGY | Facility: CLINIC | Age: 81
End: 2021-11-16
Payer: MEDICARE

## 2021-11-16 ENCOUNTER — LAB VISIT (OUTPATIENT)
Dept: LAB | Facility: HOSPITAL | Age: 81
End: 2021-11-16
Attending: INTERNAL MEDICINE
Payer: MEDICARE

## 2021-11-16 VITALS
DIASTOLIC BLOOD PRESSURE: 66 MMHG | TEMPERATURE: 98 F | WEIGHT: 288.69 LBS | OXYGEN SATURATION: 97 % | BODY MASS INDEX: 37.05 KG/M2 | HEART RATE: 65 BPM | HEIGHT: 74 IN | SYSTOLIC BLOOD PRESSURE: 131 MMHG

## 2021-11-16 VITALS — OXYGEN SATURATION: 99 % | BODY MASS INDEX: 37.05 KG/M2 | HEIGHT: 74 IN | HEART RATE: 69 BPM | WEIGHT: 288.69 LBS

## 2021-11-16 DIAGNOSIS — R76.8 ELEVATED SERUM IMMUNOGLOBULIN FREE LIGHT CHAIN LEVEL: Primary | ICD-10-CM

## 2021-11-16 DIAGNOSIS — D64.9 ANEMIA, UNSPECIFIED TYPE: ICD-10-CM

## 2021-11-16 DIAGNOSIS — G47.33 OSA AND COPD OVERLAP SYNDROME: ICD-10-CM

## 2021-11-16 DIAGNOSIS — R76.8 ELEVATED SERUM IMMUNOGLOBULIN FREE LIGHT CHAIN LEVEL: ICD-10-CM

## 2021-11-16 DIAGNOSIS — J44.9 OSA AND COPD OVERLAP SYNDROME: ICD-10-CM

## 2021-11-16 PROCEDURE — 1159F MED LIST DOCD IN RCRD: CPT | Mod: HCNC,CPTII,S$GLB, | Performed by: INTERNAL MEDICINE

## 2021-11-16 PROCEDURE — 3078F PR MOST RECENT DIASTOLIC BLOOD PRESSURE < 80 MM HG: ICD-10-PCS | Mod: HCNC,CPTII,S$GLB, | Performed by: INTERNAL MEDICINE

## 2021-11-16 PROCEDURE — 3288F FALL RISK ASSESSMENT DOCD: CPT | Mod: HCNC,CPTII,S$GLB, | Performed by: INTERNAL MEDICINE

## 2021-11-16 PROCEDURE — 99999 PR PBB SHADOW E&M-EST. PATIENT-LVL III: CPT | Mod: PBBFAC,HCNC,, | Performed by: INTERNAL MEDICINE

## 2021-11-16 PROCEDURE — 3288F PR FALLS RISK ASSESSMENT DOCUMENTED: ICD-10-PCS | Mod: HCNC,CPTII,S$GLB, | Performed by: INTERNAL MEDICINE

## 2021-11-16 PROCEDURE — 1126F PR PAIN SEVERITY QUANTIFIED, NO PAIN PRESENT: ICD-10-PCS | Mod: HCNC,CPTII,S$GLB, | Performed by: INTERNAL MEDICINE

## 2021-11-16 PROCEDURE — 99999 PR PBB SHADOW E&M-EST. PATIENT-LVL II: ICD-10-PCS | Mod: PBBFAC,HCNC,,

## 2021-11-16 PROCEDURE — 84165 PATHOLOGIST INTERPRETATION SPE: ICD-10-PCS | Mod: 26,HCNC,, | Performed by: PATHOLOGY

## 2021-11-16 PROCEDURE — 99214 OFFICE O/P EST MOD 30 MIN: CPT | Mod: HCNC,S$GLB,, | Performed by: INTERNAL MEDICINE

## 2021-11-16 PROCEDURE — 84165 PROTEIN E-PHORESIS SERUM: CPT | Mod: 26,HCNC,, | Performed by: PATHOLOGY

## 2021-11-16 PROCEDURE — 36415 COLL VENOUS BLD VENIPUNCTURE: CPT | Mod: HCNC | Performed by: INTERNAL MEDICINE

## 2021-11-16 PROCEDURE — 1101F PT FALLS ASSESS-DOCD LE1/YR: CPT | Mod: HCNC,CPTII,S$GLB, | Performed by: INTERNAL MEDICINE

## 2021-11-16 PROCEDURE — 1101F PR PT FALLS ASSESS DOC 0-1 FALLS W/OUT INJ PAST YR: ICD-10-PCS | Mod: HCNC,CPTII,S$GLB, | Performed by: INTERNAL MEDICINE

## 2021-11-16 PROCEDURE — 3075F PR MOST RECENT SYSTOLIC BLOOD PRESS GE 130-139MM HG: ICD-10-PCS | Mod: HCNC,CPTII,S$GLB, | Performed by: INTERNAL MEDICINE

## 2021-11-16 PROCEDURE — 1126F AMNT PAIN NOTED NONE PRSNT: CPT | Mod: HCNC,CPTII,S$GLB, | Performed by: INTERNAL MEDICINE

## 2021-11-16 PROCEDURE — 99999 PR PBB SHADOW E&M-EST. PATIENT-LVL II: CPT | Mod: PBBFAC,HCNC,,

## 2021-11-16 PROCEDURE — 83520 IMMUNOASSAY QUANT NOS NONAB: CPT | Mod: HCNC | Performed by: INTERNAL MEDICINE

## 2021-11-16 PROCEDURE — 3078F DIAST BP <80 MM HG: CPT | Mod: HCNC,CPTII,S$GLB, | Performed by: INTERNAL MEDICINE

## 2021-11-16 PROCEDURE — 1159F PR MEDICATION LIST DOCUMENTED IN MEDICAL RECORD: ICD-10-PCS | Mod: HCNC,CPTII,S$GLB, | Performed by: INTERNAL MEDICINE

## 2021-11-16 PROCEDURE — 99214 PR OFFICE/OUTPT VISIT, EST, LEVL IV, 30-39 MIN: ICD-10-PCS | Mod: HCNC,S$GLB,, | Performed by: INTERNAL MEDICINE

## 2021-11-16 PROCEDURE — 99999 PR PBB SHADOW E&M-EST. PATIENT-LVL III: ICD-10-PCS | Mod: PBBFAC,HCNC,, | Performed by: INTERNAL MEDICINE

## 2021-11-16 PROCEDURE — 84165 PROTEIN E-PHORESIS SERUM: CPT | Mod: HCNC | Performed by: INTERNAL MEDICINE

## 2021-11-16 PROCEDURE — 3075F SYST BP GE 130 - 139MM HG: CPT | Mod: HCNC,CPTII,S$GLB, | Performed by: INTERNAL MEDICINE

## 2021-11-17 LAB
ALBUMIN SERPL ELPH-MCNC: 3.16 G/DL (ref 3.35–5.55)
ALPHA1 GLOB SERPL ELPH-MCNC: 0.33 G/DL (ref 0.17–0.41)
ALPHA2 GLOB SERPL ELPH-MCNC: 0.85 G/DL (ref 0.43–0.99)
B-GLOBULIN SERPL ELPH-MCNC: 0.67 G/DL (ref 0.5–1.1)
GAMMA GLOB SERPL ELPH-MCNC: 1.29 G/DL (ref 0.67–1.58)
KAPPA LC SER QL IA: 7.52 MG/DL (ref 0.33–1.94)
KAPPA LC/LAMBDA SER IA: 3.27 (ref 0.26–1.65)
LAMBDA LC SER QL IA: 2.3 MG/DL (ref 0.57–2.63)
PATHOLOGIST INTERPRETATION SPE: NORMAL
PROT SERPL-MCNC: 6.3 G/DL (ref 6–8.4)

## 2021-11-22 ENCOUNTER — CLINICAL SUPPORT (OUTPATIENT)
Dept: PULMONOLOGY | Facility: CLINIC | Age: 81
End: 2021-11-22
Payer: MEDICARE

## 2021-11-22 VITALS — HEIGHT: 74 IN | BODY MASS INDEX: 36.96 KG/M2 | HEART RATE: 70 BPM | WEIGHT: 288 LBS | OXYGEN SATURATION: 98 %

## 2021-11-22 PROCEDURE — 99999 PR PBB SHADOW E&M-EST. PATIENT-LVL II: CPT | Mod: PBBFAC,HCNC,,

## 2021-11-22 PROCEDURE — 99999 PR PBB SHADOW E&M-EST. PATIENT-LVL II: ICD-10-PCS | Mod: PBBFAC,HCNC,,

## 2021-11-26 DIAGNOSIS — R76.8 ELEVATED SERUM IMMUNOGLOBULIN FREE LIGHT CHAIN LEVEL: Primary | ICD-10-CM

## 2021-12-21 ENCOUNTER — HOSPITAL ENCOUNTER (OUTPATIENT)
Dept: SLEEP MEDICINE | Facility: HOSPITAL | Age: 81
Discharge: HOME | End: 2021-12-21
Attending: INTERNAL MEDICINE
Payer: MEDICARE

## 2021-12-21 DIAGNOSIS — G47.33 OSA (OBSTRUCTIVE SLEEP APNEA): ICD-10-CM

## 2021-12-21 DIAGNOSIS — J44.9 OSA AND COPD OVERLAP SYNDROME: ICD-10-CM

## 2021-12-21 DIAGNOSIS — G47.33 OSA AND COPD OVERLAP SYNDROME: ICD-10-CM

## 2021-12-21 PROCEDURE — 95811 POLYSOM 6/>YRS CPAP 4/> PARM: CPT | Mod: HCNC

## 2021-12-21 PROCEDURE — 95811 POLYSOM 6/>YRS CPAP 4/> PARM: CPT | Mod: 26,HCNC,, | Performed by: INTERNAL MEDICINE

## 2021-12-21 PROCEDURE — 95811 PR POLYSOMNOGRAPHY W/CPAP: ICD-10-PCS | Mod: 26,HCNC,, | Performed by: INTERNAL MEDICINE

## 2021-12-22 PROBLEM — J44.9 OSA AND COPD OVERLAP SYNDROME: Status: ACTIVE | Noted: 2017-09-27

## 2021-12-22 PROBLEM — G47.33 OSA AND COPD OVERLAP SYNDROME: Status: ACTIVE | Noted: 2017-09-27

## 2021-12-22 RX ORDER — BUMETANIDE 2 MG/1
2 TABLET ORAL 2 TIMES DAILY
Qty: 60 TABLET | Refills: 12 | Status: SHIPPED | OUTPATIENT
Start: 2021-12-22 | End: 2022-12-28

## 2021-12-29 RX ORDER — BUMETANIDE 2 MG/1
TABLET ORAL
Qty: 60 TABLET | Refills: 12 | OUTPATIENT
Start: 2021-12-29

## 2022-01-05 ENCOUNTER — LAB VISIT (OUTPATIENT)
Dept: LAB | Facility: HOSPITAL | Age: 82
End: 2022-01-05
Attending: INTERNAL MEDICINE
Payer: MEDICARE

## 2022-01-05 ENCOUNTER — OFFICE VISIT (OUTPATIENT)
Dept: INTERNAL MEDICINE | Facility: CLINIC | Age: 82
End: 2022-01-05
Payer: MEDICARE

## 2022-01-05 VITALS
TEMPERATURE: 99 F | WEIGHT: 294.56 LBS | RESPIRATION RATE: 16 BRPM | OXYGEN SATURATION: 95 % | SYSTOLIC BLOOD PRESSURE: 120 MMHG | HEART RATE: 70 BPM | DIASTOLIC BLOOD PRESSURE: 82 MMHG | BODY MASS INDEX: 37.82 KG/M2

## 2022-01-05 DIAGNOSIS — G47.33 OSA (OBSTRUCTIVE SLEEP APNEA): ICD-10-CM

## 2022-01-05 DIAGNOSIS — M79.642 PAIN IN BOTH HANDS: ICD-10-CM

## 2022-01-05 DIAGNOSIS — J44.9 OSA AND COPD OVERLAP SYNDROME: Primary | ICD-10-CM

## 2022-01-05 DIAGNOSIS — Z79.4 TYPE 2 DIABETES MELLITUS WITH HYPERGLYCEMIA, WITH LONG-TERM CURRENT USE OF INSULIN: ICD-10-CM

## 2022-01-05 DIAGNOSIS — M79.641 PAIN IN BOTH HANDS: ICD-10-CM

## 2022-01-05 DIAGNOSIS — E78.00 HYPERCHOLESTEROLEMIA: ICD-10-CM

## 2022-01-05 DIAGNOSIS — I10 ESSENTIAL HYPERTENSION: ICD-10-CM

## 2022-01-05 DIAGNOSIS — I25.10 CORONARY ARTERY DISEASE, UNSPECIFIED VESSEL OR LESION TYPE, UNSPECIFIED WHETHER ANGINA PRESENT, UNSPECIFIED WHETHER NATIVE OR TRANSPLANTED HEART: Primary | ICD-10-CM

## 2022-01-05 DIAGNOSIS — G47.33 OSA AND COPD OVERLAP SYNDROME: Primary | ICD-10-CM

## 2022-01-05 DIAGNOSIS — E11.65 TYPE 2 DIABETES MELLITUS WITH HYPERGLYCEMIA, WITH LONG-TERM CURRENT USE OF INSULIN: ICD-10-CM

## 2022-01-05 LAB
ESTIMATED AVG GLUCOSE: 183 MG/DL (ref 68–131)
HBA1C MFR BLD: 8 % (ref 4–5.6)

## 2022-01-05 PROCEDURE — 3079F PR MOST RECENT DIASTOLIC BLOOD PRESSURE 80-89 MM HG: ICD-10-PCS | Mod: HCNC,CPTII,S$GLB, | Performed by: INTERNAL MEDICINE

## 2022-01-05 PROCEDURE — 99999 PR PBB SHADOW E&M-EST. PATIENT-LVL IV: ICD-10-PCS | Mod: PBBFAC,HCNC,, | Performed by: INTERNAL MEDICINE

## 2022-01-05 PROCEDURE — 3288F FALL RISK ASSESSMENT DOCD: CPT | Mod: HCNC,CPTII,S$GLB, | Performed by: INTERNAL MEDICINE

## 2022-01-05 PROCEDURE — 99499 RISK ADDL DX/OHS AUDIT: ICD-10-PCS | Mod: S$GLB,,, | Performed by: INTERNAL MEDICINE

## 2022-01-05 PROCEDURE — 99499 UNLISTED E&M SERVICE: CPT | Mod: S$GLB,,, | Performed by: INTERNAL MEDICINE

## 2022-01-05 PROCEDURE — 36415 COLL VENOUS BLD VENIPUNCTURE: CPT | Mod: HCNC | Performed by: INTERNAL MEDICINE

## 2022-01-05 PROCEDURE — 3079F DIAST BP 80-89 MM HG: CPT | Mod: HCNC,CPTII,S$GLB, | Performed by: INTERNAL MEDICINE

## 2022-01-05 PROCEDURE — 99214 PR OFFICE/OUTPT VISIT, EST, LEVL IV, 30-39 MIN: ICD-10-PCS | Mod: HCNC,S$GLB,, | Performed by: INTERNAL MEDICINE

## 2022-01-05 PROCEDURE — 1126F AMNT PAIN NOTED NONE PRSNT: CPT | Mod: HCNC,CPTII,S$GLB, | Performed by: INTERNAL MEDICINE

## 2022-01-05 PROCEDURE — 1159F PR MEDICATION LIST DOCUMENTED IN MEDICAL RECORD: ICD-10-PCS | Mod: HCNC,CPTII,S$GLB, | Performed by: INTERNAL MEDICINE

## 2022-01-05 PROCEDURE — 3288F PR FALLS RISK ASSESSMENT DOCUMENTED: ICD-10-PCS | Mod: HCNC,CPTII,S$GLB, | Performed by: INTERNAL MEDICINE

## 2022-01-05 PROCEDURE — 1159F MED LIST DOCD IN RCRD: CPT | Mod: HCNC,CPTII,S$GLB, | Performed by: INTERNAL MEDICINE

## 2022-01-05 PROCEDURE — 1101F PT FALLS ASSESS-DOCD LE1/YR: CPT | Mod: HCNC,CPTII,S$GLB, | Performed by: INTERNAL MEDICINE

## 2022-01-05 PROCEDURE — 99214 OFFICE O/P EST MOD 30 MIN: CPT | Mod: HCNC,S$GLB,, | Performed by: INTERNAL MEDICINE

## 2022-01-05 PROCEDURE — 3074F SYST BP LT 130 MM HG: CPT | Mod: HCNC,CPTII,S$GLB, | Performed by: INTERNAL MEDICINE

## 2022-01-05 PROCEDURE — 3074F PR MOST RECENT SYSTOLIC BLOOD PRESSURE < 130 MM HG: ICD-10-PCS | Mod: HCNC,CPTII,S$GLB, | Performed by: INTERNAL MEDICINE

## 2022-01-05 PROCEDURE — 1126F PR PAIN SEVERITY QUANTIFIED, NO PAIN PRESENT: ICD-10-PCS | Mod: HCNC,CPTII,S$GLB, | Performed by: INTERNAL MEDICINE

## 2022-01-05 PROCEDURE — 99999 PR PBB SHADOW E&M-EST. PATIENT-LVL IV: CPT | Mod: PBBFAC,HCNC,, | Performed by: INTERNAL MEDICINE

## 2022-01-05 PROCEDURE — 1101F PR PT FALLS ASSESS DOC 0-1 FALLS W/OUT INJ PAST YR: ICD-10-PCS | Mod: HCNC,CPTII,S$GLB, | Performed by: INTERNAL MEDICINE

## 2022-01-05 PROCEDURE — 83036 HEMOGLOBIN GLYCOSYLATED A1C: CPT | Mod: HCNC | Performed by: INTERNAL MEDICINE

## 2022-01-05 NOTE — PROGRESS NOTES
Subjective:       Patient ID: Jose Luis Martinez is a 81 y.o. male.    Chief Complaint: Follow-up (3m), Hypertension, Hyperlipidemia, and Diabetes    Follow-up  Associated symptoms include arthralgias. Pertinent negatives include no abdominal pain, chest pain, chills, coughing, diaphoresis, fatigue, fever, headaches, joint swelling, myalgias, nausea, neck pain, numbness, rash, sore throat, vomiting or weakness.   Hypertension  Pertinent negatives include no chest pain, headaches, neck pain, palpitations or shortness of breath.   Hyperlipidemia  Pertinent negatives include no chest pain, myalgias or shortness of breath.   Diabetes  Pertinent negatives for hypoglycemia include no confusion, dizziness, headaches, nervousness/anxiousness, pallor, seizures, speech difficulty or tremors. Pertinent negatives for diabetes include no chest pain, no fatigue, no polydipsia, no polyphagia, no polyuria and no weakness.     Past Medical History:   Diagnosis Date    Hypertension      Past Surgical History:   Procedure Laterality Date    CATARACT EXTRACTION, BILATERAL      LIPOMA RESECTION Bilateral     neck     PROSTATE SURGERY      REPAIR OF EYELID      TOTAL KNEE ARTHROPLASTY Left      No family history on file.  Social History     Socioeconomic History    Marital status:    Tobacco Use    Smoking status: Former Smoker     Packs/day: 1.00     Years: 29.00     Pack years: 29.00     Quit date: 10/25/2019     Years since quittin.2    Smokeless tobacco: Former User   Substance and Sexual Activity    Alcohol use: No    Drug use: No     Review of Systems   Constitutional: Negative for activity change, appetite change, chills, diaphoresis, fatigue, fever and unexpected weight change.   HENT: Negative for drooling, ear discharge, ear pain, facial swelling, hearing loss, mouth sores, nosebleeds, postnasal drip, rhinorrhea, sinus pressure, sneezing, sore throat, tinnitus, trouble swallowing and voice change.    Eyes:  Negative for photophobia, redness and visual disturbance.   Respiratory: Negative for apnea, cough, choking, chest tightness, shortness of breath and wheezing.    Cardiovascular: Negative for chest pain, palpitations and leg swelling.   Gastrointestinal: Negative for abdominal distention, abdominal pain, anal bleeding, blood in stool, constipation, diarrhea, nausea and vomiting.   Endocrine: Negative for cold intolerance, heat intolerance, polydipsia, polyphagia and polyuria.   Genitourinary: Negative for difficulty urinating, dysuria, enuresis, flank pain, frequency, genital sores, hematuria and urgency.   Musculoskeletal: Positive for arthralgias and gait problem. Negative for back pain, joint swelling, myalgias, neck pain and neck stiffness.   Skin: Negative for color change, pallor, rash and wound.   Allergic/Immunologic: Negative for food allergies and immunocompromised state.   Neurological: Negative for dizziness, tremors, seizures, syncope, facial asymmetry, speech difficulty, weakness, light-headedness, numbness and headaches.   Hematological: Negative for adenopathy. Does not bruise/bleed easily.   Psychiatric/Behavioral: Negative for agitation, behavioral problems, confusion, decreased concentration, dysphoric mood, hallucinations, self-injury, sleep disturbance and suicidal ideas. The patient is not nervous/anxious and is not hyperactive.        Objective:      Physical Exam  Vitals and nursing note reviewed.   Constitutional:       General: He is not in acute distress.     Appearance: Normal appearance. He is well-developed and well-nourished. He is not diaphoretic.   HENT:      Head: Normocephalic and atraumatic.      Mouth/Throat:      Pharynx: No oropharyngeal exudate.   Eyes:      General: No scleral icterus.     Pupils: Pupils are equal, round, and reactive to light.   Neck:      Thyroid: No thyromegaly.      Vascular: No carotid bruit or JVD.      Trachea: No tracheal deviation.   Cardiovascular:       Rate and Rhythm: Normal rate and regular rhythm.      Pulses: Intact distal pulses.      Heart sounds: Normal heart sounds.   Pulmonary:      Effort: Pulmonary effort is normal. No respiratory distress.      Breath sounds: Normal breath sounds. No wheezing or rales.   Chest:      Chest wall: No tenderness.   Abdominal:      General: Bowel sounds are normal. There is no distension.      Palpations: Abdomen is soft.      Tenderness: There is no abdominal tenderness. There is no guarding or rebound.   Musculoskeletal:         General: No tenderness or edema. Normal range of motion.      Cervical back: Normal range of motion and neck supple.   Lymphadenopathy:      Cervical: No cervical adenopathy.   Skin:     General: Skin is warm and dry.      Coloration: Skin is not pale.      Findings: No erythema or rash.   Neurological:      Mental Status: He is alert and oriented to person, place, and time.   Psychiatric:         Mood and Affect: Mood and affect normal.         Behavior: Behavior normal.         Thought Content: Thought content normal.         Judgment: Judgment normal.         CMP  Sodium   Date Value Ref Range Status   08/25/2021 139 136 - 145 mmol/L Final     Potassium   Date Value Ref Range Status   08/25/2021 4.3 3.5 - 5.1 mmol/L Final     Chloride   Date Value Ref Range Status   08/25/2021 103 95 - 110 mmol/L Final     CO2   Date Value Ref Range Status   08/25/2021 23 23 - 29 mmol/L Final     Glucose   Date Value Ref Range Status   08/25/2021 174 (H) 70 - 110 mg/dL Final     BUN   Date Value Ref Range Status   08/25/2021 30 (H) 8 - 23 mg/dL Final     Creatinine   Date Value Ref Range Status   08/25/2021 1.3 0.5 - 1.4 mg/dL Final     Calcium   Date Value Ref Range Status   08/25/2021 9.4 8.7 - 10.5 mg/dL Final     Total Protein   Date Value Ref Range Status   08/25/2021 7.1 6.0 - 8.4 g/dL Final     Albumin   Date Value Ref Range Status   08/25/2021 3.2 (L) 3.5 - 5.2 g/dL Final     Total Bilirubin   Date  Value Ref Range Status   08/25/2021 0.5 0.1 - 1.0 mg/dL Final     Comment:     For infants and newborns, interpretation of results should be based  on gestational age, weight and in agreement with clinical  observations.    Premature Infant recommended reference ranges:  Up to 24 hours.............<8.0 mg/dL  Up to 48 hours............<12.0 mg/dL  3-5 days..................<15.0 mg/dL  6-29 days.................<15.0 mg/dL       Alkaline Phosphatase   Date Value Ref Range Status   08/25/2021 95 55 - 135 U/L Final     AST   Date Value Ref Range Status   08/25/2021 23 10 - 40 U/L Final     ALT   Date Value Ref Range Status   08/25/2021 19 10 - 44 U/L Final     Anion Gap   Date Value Ref Range Status   08/25/2021 13 8 - 16 mmol/L Final     eGFR if    Date Value Ref Range Status   08/25/2021 59.6 (A) >60 mL/min/1.73 m^2 Final     eGFR if non    Date Value Ref Range Status   08/25/2021 51.5 (A) >60 mL/min/1.73 m^2 Final     Comment:     Calculation used to obtain the estimated glomerular filtration  rate (eGFR) is the CKD-EPI equation.        Lab Results   Component Value Date    WBC 9.04 09/21/2021    HGB 10.9 (L) 09/21/2021    HCT 36.2 (L) 09/21/2021    MCV 97 09/21/2021     09/21/2021     Lab Results   Component Value Date    CHOL 110 (L) 08/25/2021     Lab Results   Component Value Date    HDL 47 08/25/2021     Lab Results   Component Value Date    LDLCALC 53.0 (L) 08/25/2021     Lab Results   Component Value Date    TRIG 50 08/25/2021     Lab Results   Component Value Date    CHOLHDL 42.7 08/25/2021     Lab Results   Component Value Date    TSH 3.799 08/25/2021     Lab Results   Component Value Date    HGBA1C 9.7 (H) 08/25/2021     Assessment:       1. Coronary artery disease, unspecified vessel or lesion type, unspecified whether angina present, unspecified whether native or transplanted heart    2. Essential hypertension    3. Hypercholesterolemia    4. CHRISTOPHER (obstructive sleep  apnea)    5. Type 2 diabetes mellitus with hyperglycemia, with long-term current use of insulin    6. Pain in both hands        Plan:   Coronary artery disease, unspecified vessel or lesion type, unspecified whether angina present, unspecified whether native or transplanted heart    Essential hypertension    Hypercholesterolemia    CHRISTOPHER (obstructive sleep apnea)    Type 2 diabetes mellitus with hyperglycemia, with long-term current use of insulin  -     Hemoglobin A1C; Future; Expected date: 01/05/2022    Pain in both hands  -     Ambulatory referral/consult to Neurology; Future; Expected date: 01/12/2022  -     Ambulatory referral/consult to Orthopedics; Future; Expected date: 01/12/2022    Continue meds-------------as above----------------f/u 3 months-------------

## 2022-01-07 ENCOUNTER — TELEPHONE (OUTPATIENT)
Dept: INTERNAL MEDICINE | Facility: CLINIC | Age: 82
End: 2022-01-07
Payer: MEDICARE

## 2022-01-07 DIAGNOSIS — M79.641 PAIN IN BOTH HANDS: Primary | ICD-10-CM

## 2022-01-07 DIAGNOSIS — M79.642 PAIN IN BOTH HANDS: Primary | ICD-10-CM

## 2022-01-07 NOTE — TELEPHONE ENCOUNTER
----- Message from Afsaneh Orozco sent at 1/7/2022  2:04 PM CST -----  Contact: [phone]  Patient called, in regards needing to talk with Dr Mejia's office. In regard the referral for neurology and ortho. Please call and advise. Thank you

## 2022-01-10 ENCOUNTER — TELEPHONE (OUTPATIENT)
Dept: PULMONOLOGY | Facility: CLINIC | Age: 82
End: 2022-01-10
Payer: MEDICARE

## 2022-01-10 ENCOUNTER — TELEPHONE (OUTPATIENT)
Dept: INTERNAL MEDICINE | Facility: CLINIC | Age: 82
End: 2022-01-10
Payer: MEDICARE

## 2022-01-10 NOTE — TELEPHONE ENCOUNTER
The  Patient's wife called to find out when he will get CPAP. Spoke with Ibeth at [facility] and she stated that it would take 4-16 weeks to get CPAP machine due to shortage.

## 2022-01-10 NOTE — TELEPHONE ENCOUNTER
Spoke to wife told her we never tried to call them.  She just seen [facility] pop up on the phone and assumed it was us.

## 2022-01-10 NOTE — TELEPHONE ENCOUNTER
----- Message from Emani Watkins sent at 1/10/2022 11:46 AM CST -----  Contact: Wife [phone]  Type:  Patient Returning Call    Who Called: Wife(roly)  Who Left Message for Patient: nurse  Does the patient know what this is regarding?:no  Would the patient rather a call back or a response via [facility]? Call back   Best Call Back Number: [phone]  Additional Information:

## 2022-01-12 ENCOUNTER — TELEPHONE (OUTPATIENT)
Dept: PULMONOLOGY | Facility: CLINIC | Age: 82
End: 2022-01-12
Payer: MEDICARE

## 2022-01-20 DIAGNOSIS — M79.642 BILATERAL HAND PAIN: Primary | ICD-10-CM

## 2022-01-20 DIAGNOSIS — M79.641 BILATERAL HAND PAIN: Primary | ICD-10-CM

## 2022-01-26 NOTE — TELEPHONE ENCOUNTER
No new care gaps identified.  Powered by [employer]. Reference number: [id number].   1/26/2022 1:59:45 PM CST

## 2022-02-01 ENCOUNTER — PATIENT OUTREACH (OUTPATIENT)
Dept: ADMINISTRATIVE | Facility: OTHER | Age: 82
End: 2022-02-01
Payer: MEDICARE

## 2022-02-02 ENCOUNTER — HOSPITAL ENCOUNTER (OUTPATIENT)
Dept: RADIOLOGY | Facility: HOSPITAL | Age: 82
Discharge: HOME | End: 2022-02-02
Attending: ORTHOPAEDIC SURGERY
Payer: MEDICARE

## 2022-02-02 ENCOUNTER — OFFICE VISIT (OUTPATIENT)
Dept: ORTHOPEDICS | Facility: CLINIC | Age: 82
End: 2022-02-02
Payer: MEDICARE

## 2022-02-02 VITALS
SYSTOLIC BLOOD PRESSURE: 114 MMHG | DIASTOLIC BLOOD PRESSURE: 55 MMHG | WEIGHT: 294 LBS | BODY MASS INDEX: 37.73 KG/M2 | HEART RATE: 67 BPM | HEIGHT: 74 IN

## 2022-02-02 DIAGNOSIS — M79.642 PAIN IN BOTH HANDS: ICD-10-CM

## 2022-02-02 DIAGNOSIS — M79.642 BILATERAL HAND PAIN: ICD-10-CM

## 2022-02-02 DIAGNOSIS — M19.049 ARTHRITIS OF HAND: Primary | ICD-10-CM

## 2022-02-02 DIAGNOSIS — M79.641 BILATERAL HAND PAIN: ICD-10-CM

## 2022-02-02 DIAGNOSIS — M79.641 PAIN IN BOTH HANDS: ICD-10-CM

## 2022-02-02 PROCEDURE — 1159F MED LIST DOCD IN RCRD: CPT | Mod: HCNC,CPTII,S$GLB, | Performed by: ORTHOPAEDIC SURGERY

## 2022-02-02 PROCEDURE — 99999 PR PBB SHADOW E&M-EST. PATIENT-LVL III: ICD-10-PCS | Mod: PBBFAC,HCNC,, | Performed by: ORTHOPAEDIC SURGERY

## 2022-02-02 PROCEDURE — 1125F AMNT PAIN NOTED PAIN PRSNT: CPT | Mod: HCNC,CPTII,S$GLB, | Performed by: ORTHOPAEDIC SURGERY

## 2022-02-02 PROCEDURE — 1100F PTFALLS ASSESS-DOCD GE2>/YR: CPT | Mod: HCNC,CPTII,S$GLB, | Performed by: ORTHOPAEDIC SURGERY

## 2022-02-02 PROCEDURE — 73130 X-RAY EXAM OF HAND: CPT | Mod: 26,50,HCNC, | Performed by: RADIOLOGY

## 2022-02-02 PROCEDURE — 3078F DIAST BP <80 MM HG: CPT | Mod: HCNC,CPTII,S$GLB, | Performed by: ORTHOPAEDIC SURGERY

## 2022-02-02 PROCEDURE — 1160F PR REVIEW ALL MEDS BY PRESCRIBER/CLIN PHARMACIST DOCUMENTED: ICD-10-PCS | Mod: HCNC,CPTII,S$GLB, | Performed by: ORTHOPAEDIC SURGERY

## 2022-02-02 PROCEDURE — 3288F PR FALLS RISK ASSESSMENT DOCUMENTED: ICD-10-PCS | Mod: HCNC,CPTII,S$GLB, | Performed by: ORTHOPAEDIC SURGERY

## 2022-02-02 PROCEDURE — 99203 PR OFFICE/OUTPT VISIT, NEW, LEVL III, 30-44 MIN: ICD-10-PCS | Mod: 25,HCNC,S$GLB, | Performed by: ORTHOPAEDIC SURGERY

## 2022-02-02 PROCEDURE — 3074F PR MOST RECENT SYSTOLIC BLOOD PRESSURE < 130 MM HG: ICD-10-PCS | Mod: HCNC,CPTII,S$GLB, | Performed by: ORTHOPAEDIC SURGERY

## 2022-02-02 PROCEDURE — 3288F FALL RISK ASSESSMENT DOCD: CPT | Mod: HCNC,CPTII,S$GLB, | Performed by: ORTHOPAEDIC SURGERY

## 2022-02-02 PROCEDURE — 99999 PR PBB SHADOW E&M-EST. PATIENT-LVL III: CPT | Mod: PBBFAC,HCNC,, | Performed by: ORTHOPAEDIC SURGERY

## 2022-02-02 PROCEDURE — 20600 DRAIN/INJ JOINT/BURSA W/O US: CPT | Mod: 51,50,HCNC,S$GLB | Performed by: ORTHOPAEDIC SURGERY

## 2022-02-02 PROCEDURE — 3074F SYST BP LT 130 MM HG: CPT | Mod: HCNC,CPTII,S$GLB, | Performed by: ORTHOPAEDIC SURGERY

## 2022-02-02 PROCEDURE — 1125F PR PAIN SEVERITY QUANTIFIED, PAIN PRESENT: ICD-10-PCS | Mod: HCNC,CPTII,S$GLB, | Performed by: ORTHOPAEDIC SURGERY

## 2022-02-02 PROCEDURE — 20600 DRAIN/INJ JOINT/BURSA W/O US: CPT | Mod: 50,HCNC,S$GLB, | Performed by: ORTHOPAEDIC SURGERY

## 2022-02-02 PROCEDURE — 73130 X-RAY EXAM OF HAND: CPT | Mod: TC,50,HCNC

## 2022-02-02 PROCEDURE — 3078F PR MOST RECENT DIASTOLIC BLOOD PRESSURE < 80 MM HG: ICD-10-PCS | Mod: HCNC,CPTII,S$GLB, | Performed by: ORTHOPAEDIC SURGERY

## 2022-02-02 PROCEDURE — 1160F RVW MEDS BY RX/DR IN RCRD: CPT | Mod: HCNC,CPTII,S$GLB, | Performed by: ORTHOPAEDIC SURGERY

## 2022-02-02 PROCEDURE — 1100F PR PT FALLS ASSESS DOC 2+ FALLS/FALL W/INJURY/YR: ICD-10-PCS | Mod: HCNC,CPTII,S$GLB, | Performed by: ORTHOPAEDIC SURGERY

## 2022-02-02 PROCEDURE — 1159F PR MEDICATION LIST DOCUMENTED IN MEDICAL RECORD: ICD-10-PCS | Mod: HCNC,CPTII,S$GLB, | Performed by: ORTHOPAEDIC SURGERY

## 2022-02-02 PROCEDURE — 99203 OFFICE O/P NEW LOW 30 MIN: CPT | Mod: 25,HCNC,S$GLB, | Performed by: ORTHOPAEDIC SURGERY

## 2022-02-02 PROCEDURE — 73130 XR HAND COMPLETE 3 VIEWS BILATERAL: ICD-10-PCS | Mod: 26,50,HCNC, | Performed by: RADIOLOGY

## 2022-02-02 PROCEDURE — 20600 SMALL JOINT ASPIRATION/INJECTION: R LONG MCP, L LONG MCP: ICD-10-PCS | Mod: 51,50,HCNC,S$GLB | Performed by: ORTHOPAEDIC SURGERY

## 2022-02-02 RX ORDER — INSULIN GLARGINE 100 [IU]/ML
INJECTION, SOLUTION SUBCUTANEOUS
COMMUNITY
Start: 2022-01-05 | End: 2022-06-03

## 2022-02-02 RX ORDER — TRIAMCINOLONE ACETONIDE 40 MG/ML
40 INJECTION, SUSPENSION INTRA-ARTICULAR; INTRAMUSCULAR
Status: DISCONTINUED | OUTPATIENT
Start: 2022-02-02 | End: 2022-02-02 | Stop reason: HOSPADM

## 2022-02-02 RX ADMIN — TRIAMCINOLONE ACETONIDE 40 MG: 40 INJECTION, SUSPENSION INTRA-ARTICULAR; INTRAMUSCULAR at 10:02

## 2022-02-02 NOTE — PROCEDURES
Small Joint Aspiration/Injection: R index MCP, L index MCP    Date/Time: 2/2/2022 10:00 AM  Performed by: Carl Velazquez MD  Authorized by: Carl Velazquez MD     Consent Done?:  Yes (Verbal)  Indications:  Arthritis  Timeout: prior to procedure the correct patient, procedure, and site was verified    Prep: patient was prepped and draped in usual sterile fashion      Local anesthesia used?: Yes    Local anesthetic:  Lidocaine 2% without epinephrine  Anesthetic total (ml):  1    Location:  Index finger  Site:  R index MCP and L index MCP  Ultrasonic guidance for needle placement?: No    Needle size:  25 G  Medications:  40 mg triamcinolone acetonide 40 mg/mL; 40 mg triamcinolone acetonide 40 mg/mL

## 2022-02-02 NOTE — PROGRESS NOTES
"Subjective:     Patient ID: Jose Luis Martinez is a 81 y.o. male.    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  He has tried anti-inflammatories without improvement he wishes to try injection.    Past Medical History:   Diagnosis Date    Hypertension      Past Surgical History:   Procedure Laterality Date    CATARACT EXTRACTION, BILATERAL      LIPOMA RESECTION Bilateral     neck     PROSTATE SURGERY      REPAIR OF EYELID      TOTAL KNEE ARTHROPLASTY Left      No family history on file.  Social History     Socioeconomic History    Marital status:    Tobacco Use    Smoking status: Former Smoker     Packs/day: 1.00     Years: 29.00     Pack years: 29.00     Quit date: 10/25/2019     Years since quittin.2    Smokeless tobacco: Former User   Substance and Sexual Activity    Alcohol use: No    Drug use: No     Medication List with Changes/Refills   Current Medications    ACCU-CHEK DAVID PLUS TEST STRP STRP    Apply 1 each topically 2 (two) times daily as needed.    ACCU-CHEK SOFTCLIX LANCETS MISC    USE TO TEST TWICE DAILY    ALBUTEROL (PROVENTIL) 2.5 MG /3 ML (0.083 %) NEBULIZER SOLUTION    Take 3 mLs (2.5 mg total) by nebulization every 6 (six) hours as needed for Wheezing. Rescue    ALFUZOSIN (UROXATRAL) 10 MG TB24        ASPIRIN ORAL    1 tab    ATORVASTATIN (LIPITOR) 40 MG TABLET    TAKE ONE TABLET BY MOUTH ONCE A DAY    BD ULTRA-FINE VINNIE PEN NEEDLES 32 GAUGE X 5/32" NDLE        BLOOD-GLUCOSE METER KIT    Use as instructed    BUMETANIDE (BUMEX) 2 MG TABLET    Take 1 tablet (2 mg total) by mouth 2 (two) times daily.    CETIRIZINE (ZYRTEC) 10 MG TABLET    Take 10 mg by mouth.    DUTASTERIDE (AVODART) 0.5 MG CAPSULE        FLUTICASONE PROPIONATE (FLONASE) 50 MCG/ACTUATION NASAL SPRAY    USE 2 SPRAYS IN EACH NOSTRIL EVERY DAY    " "FLUTICASONE-UMECLIDIN-VILANTER (TRELEGY ELLIPTA) 100-62.5-25 MCG DSDV    Inhale 1 puff into the lungs once daily.    FLUZONE HIGHDOSE QUAD 20-21  MCG/0.7 ML SYRG        GABAPENTIN (NEURONTIN) 100 MG CAPSULE    Take 1 capsule (100 mg total) by mouth every evening.    HYDROCODONE-ACETAMINOPHEN 10-325MG (NORCO)  MG TAB        ISOSORBIDE MONONITRATE (IMDUR) 30 MG 24 HR TABLET        LANCETS 32 GAUGE MISC    1 lancet by Misc.(Non-Drug; Combo Route) route 2 (two) times daily.    LANTUS SOLOSTAR U-100 INSULIN GLARGINE 100 UNITS/ML (3ML) SUBQ PEN    INJECT 15 UNITS INTO THE SKIN EVERY EVENING.    LEVEMIR FLEXTOUCH U-100 INSULN 100 UNIT/ML (3 ML) INPN PEN    INJECT 15 UNITS INTO THE SKIN EVERY EVENING    OFLOXACIN (FLOXIN) 0.3 % OTIC SOLUTION    Place 5 drops into the left ear 2 (two) times daily.    PANTOPRAZOLE (PROTONIX) 40 MG TABLET    TAKE 1 TABLET BY MOUTH ONCE DAILY.    PEN NEEDLE, DIABETIC (BD ULTRA-FINE VINNIE PEN NEEDLE) 32 GAUGE X 5/32" NDLE    1 Units by Misc.(Non-Drug; Combo Route) route once daily.    POTASSIUM CHLORIDE (KLOR-CON) 10 MEQ TBSR    Take 1 tablet (10 mEq total) by mouth once daily.    SILVER SULFADIAZINE 1% (SILVADENE) 1 % CREAM    Apply topically once daily.    TAMSULOSIN (FLOMAX) 0.4 MG CP24        TRADJENTA 5 MG TAB TABLET    TAKE 1 TABLET BY MOUTH ONCE DAILY    TRUEPLUS INSULIN 0.5 ML 31 GAUGE X 5/16" SYRG    INJECT TWO TIMES A DAY AS DIRECTED    VALSARTAN (DIOVAN) 160 MG TABLET    TAKE ONE TABLET BY MOUTH ONCE A DAY    VENTOLIN HFA 90 MCG/ACTUATION INHALER    INHALE 2 PUFFS INTO THE LUNGS EVERY 6 HOURS AS NEEDED FOR WHEEZING.     Review of patient's allergies indicates:  No Known Allergies  Review of Systems   Constitutional: Negative for malaise/fatigue.   HENT: Negative for hearing loss.    Eyes: Negative for double vision and visual disturbance.   Cardiovascular: Negative for chest pain.   Respiratory: Positive for shortness of breath and sleep disturbances due to breathing.  "   Endocrine: Negative for cold intolerance.   Hematologic/Lymphatic: Does not bruise/bleed easily.   Skin: Positive for poor wound healing. Negative for suspicious lesions.   Musculoskeletal: Positive for arthritis, back pain, falls, joint pain and joint swelling. Negative for gout.   Gastrointestinal: Positive for heartburn. Negative for nausea and vomiting.   Genitourinary: Positive for frequency, hesitancy, incomplete emptying and nocturia. Negative for dysuria.   Neurological: Negative for numbness, paresthesias and sensory change.   Psychiatric/Behavioral: Negative for depression, memory loss and substance abuse. The patient is not nervous/anxious.    Allergic/Immunologic: Negative for persistent infections.       Objective:   Body mass index is 37.75 kg/m².  Vitals:    02/02/22 0853   BP: (!) 114/55   Pulse: 67                General    Constitutional: He is oriented to person, place, and time. He appears well-developed and well-nourished. No distress.   HENT:   Head: Normocephalic.   Eyes: EOM are normal.   Pulmonary/Chest: Effort normal.   Neurological: He is oriented to person, place, and time.   Psychiatric: He has a normal mood and affect.             Right Hand/Wrist Exam     Inspection   Scars: Wrist - absent Hand -  absent  Effusion: Wrist - absent Hand -  present    Pain   Hand - The patient exhibits pain of the middle MCP and index MCP.    Other     Neuorologic Exam    Median Distribution: normal  Ulnar Distribution: normal  Radial Distribution: normal    Comments:  The patient has swelling right index and long finger metacarpophalangeal joints.  There is pain at the extremes of motion.  There are no motor or sensory deficits.      Left Hand/Wrist Exam     Inspection   Scars: Wrist - absent Hand -  absent  Effusion: Wrist - absent Hand -  present    Pain   Hand - The patient exhibits pain of the middle MCP and index MCP.    Other     Sensory Exam  Median Distribution: normal  Ulnar Distribution:  normal  Radial Distribution: normal    Comments:  The patient has swelling left index and long finger metacarpophalangeal joints.  There is pain at the extremes of motion.  There are no motor or sensory deficits.          Vascular Exam       Capillary Refill  Right Hand: normal capillary refill  Left Hand: normal capillary refill      Relevant imaging results reviewed and interpreted by me, discussed with the patient and / or family today radiographs of both hands reviewed which showed significant osteoarthritic change index and long finger metacarpophalangeal joints bilaterally.  Assessment:     Encounter Diagnoses   Name Primary?    Pain in both hands     Arthritis of hand Yes        Plan:       The patient was counseled regarding the diagnosis.  He was injected with 0.5 cc of Kenalog and 0.5 cc of 2% plain lidocaine in the right index and long finger metacarpophalangeal joints and left index and long finger metacarpophalangeal joints under sterile technique.  He will return in 3 weeks if not improved.  He will wait at least 3 months between injections              Disclaimer: This note was prepared using a voice recognition system and is likely to have sound alike errors within the text.

## 2022-02-02 NOTE — PROCEDURES
Small Joint Aspiration/Injection: R long MCP, L long MCP    Date/Time: 2/2/2022 10:00 AM  Performed by: aCrl Velazquez MD  Authorized by: Carl Velazquez MD     Consent Done?:  Yes (Verbal)  Indications:  Arthritis  Timeout: prior to procedure the correct patient, procedure, and site was verified    Prep: patient was prepped and draped in usual sterile fashion      Local anesthesia used?: Yes    Local anesthetic:  Lidocaine 2% without epinephrine  Anesthetic total (ml):  1    Location:  Long finger  Site:  R long MCP and L long MCP  Ultrasonic guidance for needle placement?: No    Needle size:  25 G  Approach:  Dorsal  Medications:  40 mg triamcinolone acetonide 40 mg/mL; 40 mg triamcinolone acetonide 40 mg/mL

## 2022-02-02 NOTE — PROGRESS NOTES
Health Maintenance Due   Topic Date Due    Diabetes Urine Screening  Never done    TETANUS VACCINE  Never done    Aspirin/Antiplatelet Therapy  Never done    Shingles Vaccine (1 of 2) Never done    Foot Exam  01/03/2019    Eye Exam  07/12/2019     Updates were requested from care everywhere.  Chart was reviewed for overdue Proactive [facility] Encounters ([facility]) topics (CRS, Breast Cancer Screening, Eye exam)  Health Maintenance has been updated.  LINKS immunization registry triggered.  Immunizations were reconciled.

## 2022-02-09 RX ORDER — POTASSIUM CHLORIDE 750 MG/1
TABLET, EXTENDED RELEASE ORAL
Qty: 90 TABLET | Refills: 3 | OUTPATIENT
Start: 2022-02-09

## 2022-02-10 ENCOUNTER — TELEPHONE (OUTPATIENT)
Dept: FAMILY MEDICINE | Facility: CLINIC | Age: 82
End: 2022-02-10
Payer: MEDICARE

## 2022-02-10 NOTE — TELEPHONE ENCOUNTER
----- Message from Ijeoma Gallagher sent at 2/10/2022 11:39 AM CST -----  Contact: self [phone]  Pt requesting a call back in regards to c-pap machine.    Please call and advise

## 2022-02-11 ENCOUNTER — TELEPHONE (OUTPATIENT)
Dept: PULMONOLOGY | Facility: CLINIC | Age: 82
End: 2022-02-11
Payer: MEDICARE

## 2022-02-11 ENCOUNTER — TELEPHONE (OUTPATIENT)
Dept: FAMILY MEDICINE | Facility: CLINIC | Age: 82
End: 2022-02-11
Payer: MEDICARE

## 2022-02-11 NOTE — TELEPHONE ENCOUNTER
----- Message from Yahaira Russell sent at 2/11/2022 12:26 PM CST -----  Contact: Carine/ wife  Shirayanna would like a call back in regards to the patients C-pap machine, please call her at [phone]

## 2022-02-11 NOTE — TELEPHONE ENCOUNTER
----- Message from Zulma Cotton sent at 2/11/2022 12:34 PM CST -----  Contact: Carine Hawk would like for someone to call the patient at [phone], Regards to if an Cpap machine is going to be order.    Thanks  Td

## 2022-02-21 ENCOUNTER — TELEPHONE (OUTPATIENT)
Dept: PULMONOLOGY | Facility: CLINIC | Age: 82
End: 2022-02-21
Payer: MEDICARE

## 2022-02-21 NOTE — TELEPHONE ENCOUNTER
----- Message from Alexis Tejada sent at 2/21/2022  1:53 PM CST -----  Contact: wife(lisa)[phone]  wife is calling regarding patients mask. Please call back at [phone]. Thanks/dj

## 2022-02-22 ENCOUNTER — TELEPHONE (OUTPATIENT)
Dept: PULMONOLOGY | Facility: CLINIC | Age: 82
End: 2022-02-22
Payer: MEDICARE

## 2022-02-22 DIAGNOSIS — G47.33 OSA ON CPAP: Primary | ICD-10-CM

## 2022-02-22 NOTE — ASSESSMENT & PLAN NOTE
12/21/2021 PSG Split night severe obstructive sleep apnea.The apnea/hypopnea index 88.2 was events/hour. CPAP 10 cm optimal.   1/5/2022 orders CPAP 10 cm, per Dr. SUDHAKAR Diaz.   2/22/2022 patient request change to nasal mask. Updated orders

## 2022-02-22 NOTE — TELEPHONE ENCOUNTER
----- Message from Karyn Cevallos MA sent at 2/22/2022  4:22 PM CST -----  Please put in orders in for nasal mask . Per Jose pt wants to change.

## 2022-02-22 NOTE — TELEPHONE ENCOUNTER
Orders Placed This Encounter   Procedures    CPAP/BIPAP SUPPLIES     Benefits and compliant  90 day supply. 4 refills  HME: [facility]     Order Specific Question:   Length of need (1-99 months):     Answer:   99     Order Specific Question:   Choose ONE mask type and its corresponding cushions and/or pillows:     Answer:    Nasal Cushion Mask, 1 per 90 days:  Nasal Cushions, (6 per 90 days)     Order Specific Question:   Choose EITHER Heated or Non-Heated Tubjing     Answer:    Non-Heated Tubing, 1 per 90 days     Order Specific Question:   Number of Days Needed:     Answer:   99     Order Specific Question:   All other supplies as needed as listed below:     Answer:    Headgear, 1 per 180 days     Order Specific Question:   All other supplies as needed as listed below:     Answer:    Chin Strap, 1 per 180 days     Order Specific Question:   All other supplies as needed as listed below:     Answer:    Disposable Filter, 6 per 90 days     Order Specific Question:   All other supplies as needed as listed below:     Answer:    Humidifier Chamber, 1 per 180 days     Order Specific Question:   All other supplies as needed as listed below:     Answer:    Non-Disposable Filter, 1 per 180 days     1. CHRISTOPHER on CPAP  CPAP/BIPAP SUPPLIES

## 2022-02-25 ENCOUNTER — TELEPHONE (OUTPATIENT)
Dept: PULMONOLOGY | Facility: HOSPITAL | Age: 82
End: 2022-02-25
Payer: MEDICARE

## 2022-02-25 NOTE — TELEPHONE ENCOUNTER
Called pt regarding pulmonary rehab. Spoke to pt's wife since pt not available.  Information given. Wife will speak to pt and call us back.

## 2022-02-28 ENCOUNTER — TELEPHONE (OUTPATIENT)
Dept: PULMONOLOGY | Facility: CLINIC | Age: 82
End: 2022-02-28
Payer: MEDICARE

## 2022-02-28 ENCOUNTER — PATIENT OUTREACH (OUTPATIENT)
Dept: PULMONOLOGY | Facility: CLINIC | Age: 82
End: 2022-02-28
Payer: MEDICARE

## 2022-02-28 DIAGNOSIS — G47.33 OSA ON CPAP: Primary | ICD-10-CM

## 2022-02-28 NOTE — TELEPHONE ENCOUNTER
"  2/28/2022 pt request change to cpap 8 cm. not tolerating cpap 10 cm  Consideration for over night oximetry if cpap 8 is optimal at IDL, desat on split night titration seen on CPAP 8 cm.    Orders Placed This Encounter   Procedures    HME - OTHER     Patient request lower CPAP pressure by 2 cm. Please change remotely to CPAP 8 cm. Thank you     Order Specific Question:   Type of Equipment:     Answer:   CPAP     Order Specific Question:   Height:     Answer:   6'2"     Order Specific Question:   Weight:     Answer:   294 lbs     Order Specific Question:   Does patient have medical equipment at home?     Answer:   CPAP     1. CHRISTOPHER on CPAP  HME - OTHER       "

## 2022-02-28 NOTE — TELEPHONE ENCOUNTER
Chronic Disease Management  Called patient to complete Pulmonary Disease Management Questionnaire.    Patient has concerns regarding new CPAP device. Patient complains of breathlessness while using device. Patient is requesting to have pressure setting decreased. Encouraged patient to contact [facility] to assess device functionality. Patient reports completing a visit last week with no resolution. Patient is not interested in completing sleep study to reevaluate setting.     Information forwarded to pulmonary provider.  Advised patient to contact PDM with any further needs.     Time  spent with patient:  Minutes

## 2022-02-28 NOTE — TELEPHONE ENCOUNTER
----- Message from Pedro Nieto, RRT sent at 2/28/2022 10:15 AM CST -----  Regarding: CPAP setting  Good morning,   Just an FYI.. patient complaining of discomfort on new CPAP device at +10 setting. Reporting breathlessness and requesting pressure setting to be decreased by 2cmH2o. Refused idea of another sleep study.  Thank you.

## 2022-04-01 PROBLEM — R26.81 UNSTEADY GAIT: Status: RESOLVED | Noted: 2017-10-12 | Resolved: 2022-04-01

## 2022-04-01 PROBLEM — M79.641 PAIN IN BOTH HANDS: Status: RESOLVED | Noted: 2021-08-20 | Resolved: 2022-04-01

## 2022-04-01 PROBLEM — L97.919 ULCERS OF BOTH LOWER LEGS: Status: RESOLVED | Noted: 2018-06-13 | Resolved: 2022-04-01

## 2022-04-01 PROBLEM — M79.642 PAIN IN BOTH HANDS: Status: RESOLVED | Noted: 2021-08-20 | Resolved: 2022-04-01

## 2022-04-01 PROBLEM — L97.929 ULCERS OF BOTH LOWER LEGS: Status: RESOLVED | Noted: 2018-06-13 | Resolved: 2022-04-01

## 2022-04-01 PROBLEM — R42 DIZZINESS: Status: RESOLVED | Noted: 2017-09-27 | Resolved: 2022-04-01

## 2022-04-06 NOTE — TELEPHONE ENCOUNTER
----- Message from Christelle Chen sent at 6/22/2018  1:59 PM CDT -----  Contact: daughter/Bee  Returning nurse call, please call Bee @  [phone].   patient refused

## 2022-04-07 ENCOUNTER — PATIENT OUTREACH (OUTPATIENT)
Dept: PULMONOLOGY | Facility: CLINIC | Age: 82
End: 2022-04-07
Payer: MEDICARE

## 2022-04-07 PROCEDURE — 99999 PR PBB SHADOW E&M-EST. PATIENT-LVL III: CPT | Mod: PBBFAC,,,

## 2022-04-07 PROCEDURE — 99999 PR PBB SHADOW E&M-EST. PATIENT-LVL III: ICD-10-PCS | Mod: PBBFAC,,,

## 2022-04-14 ENCOUNTER — TELEPHONE (OUTPATIENT)
Dept: PULMONOLOGY | Facility: CLINIC | Age: 82
End: 2022-04-14
Payer: MEDICARE

## 2022-04-14 NOTE — TELEPHONE ENCOUNTER
Spoke to pt.  He forgot about his appt yesterday.  resched to 4/25/22.  Will call pulmo for change in his lung inhaler.

## 2022-04-14 NOTE — TELEPHONE ENCOUNTER
----- Message from Yahaira Russell sent at 4/14/2022 12:18 PM CDT -----  Pt would like a call back in regards to his bp keep elevating due to the medication, please call him at .[phone]

## 2022-04-14 NOTE — TELEPHONE ENCOUNTER
----- Message from Katalina Gallagher sent at 4/14/2022  2:21 PM CDT -----  Contact: pt  Type:  Patient Returning Call    Who Called: pt  Who Left Message for Patient: unknown   Does the patient know what this is regarding? no  Would the patient rather a call back or a response via [facility]? Call back  Best Call Back Number: [phone]  Additional Information: n/a

## 2022-04-14 NOTE — TELEPHONE ENCOUNTER
----- Message from Mai Brown sent at 4/14/2022  1:39 PM CDT -----  Contact: Pt spouse  .Type:  Patient Returning Call    Who Called: Carine      Does the patient know what this is regarding?: requesting nurse to callback   Would the patient rather a call back or a response via [facility]?  Callback   Best Call Back Number: .[phone] or [phone] ([place])     Additional Information:

## 2022-04-14 NOTE — TELEPHONE ENCOUNTER
Taking trelegy and its causing high blood pressure. (150/80), normally 115/60.  Pt is concerned that if he continues to take medication, blood pressure will continue to be high. Wants NP Porfirios advise on if he should continue to take medication.

## 2022-04-18 NOTE — TELEPHONE ENCOUNTER
Care Due:                  Date            Visit Type   Department     Provider  --------------------------------------------------------------------------------                                EP -                              PRIMARY      [facility]  Last Visit: 01-      CARE ([facility])   DEE DEE Mejia                              EP -                              PRIMARY      [facility]  Next Visit: 04-      CARE ([facility])   DEE DEE Mejia                                                            Last  Test          Frequency    Reason                     Performed    Due Date  --------------------------------------------------------------------------------    HBA1C.......  6 months...  ADELINA RASMUSSEN.......  01- 07-    Powered by [employer]. Reference number: [id number].   4/18/2022 2:43:05 PM CDT

## 2022-04-19 RX ORDER — VALSARTAN 160 MG/1
TABLET ORAL
Qty: 90 TABLET | Refills: 1 | Status: SHIPPED | OUTPATIENT
Start: 2022-04-19

## 2022-04-19 NOTE — TELEPHONE ENCOUNTER
Refill Authorization Note   Jose Luis Martinez  is requesting a refill authorization.  Brief Assessment and Rationale for Refill:  Approve     Medication Therapy Plan:       Medication Reconciliation Completed: No   Comments:     No Care Gaps recommended.     Note composed:4:47 PM 04/19/2022

## 2022-04-21 ENCOUNTER — TELEPHONE (OUTPATIENT)
Dept: FAMILY MEDICINE | Facility: CLINIC | Age: 82
End: 2022-04-21
Payer: MEDICARE

## 2022-04-21 NOTE — TELEPHONE ENCOUNTER
----- Message from Matti Mejia MD sent at 4/21/2022 11:02 AM CDT -----  Contact: [phone]  Should go to urgent care clinic----  ----- Message -----  From: Zulma Tavares MA  Sent: 4/21/2022  10:59 AM CDT  To: Matti Mejia MD    Spoke to pts wife. She states that Mr. Amaya has had a bad sinus/chest cold.  No fever, been have a wet productive cough congestion, headache.  ----- Message -----  From: Remedios Hardy  Sent: 4/21/2022  10:54 AM CDT  To: Jackie HERNANDEZ Staff    Patient would like to get medical advice.  Symptoms :  coughing brownish mucus , not feeling well  How long have you had these symptoms: 4 days  Would you like a call back, or a response through your [facility] portal?: call back  Pharmacy name and phone #   [facility] - [place] - [address] [place], [place] [address].   Phone:  [phone]  Fax:  [phone]        Comments:   patient  would like an antibiotic

## 2022-04-25 ENCOUNTER — LAB VISIT (OUTPATIENT)
Dept: LAB | Facility: HOSPITAL | Age: 82
End: 2022-04-25
Attending: INTERNAL MEDICINE
Payer: MEDICARE

## 2022-04-25 ENCOUNTER — OFFICE VISIT (OUTPATIENT)
Dept: FAMILY MEDICINE | Facility: CLINIC | Age: 82
End: 2022-04-25
Payer: MEDICARE

## 2022-04-25 VITALS
DIASTOLIC BLOOD PRESSURE: 60 MMHG | TEMPERATURE: 98 F | WEIGHT: 308 LBS | HEART RATE: 72 BPM | BODY MASS INDEX: 39.54 KG/M2 | SYSTOLIC BLOOD PRESSURE: 120 MMHG | RESPIRATION RATE: 18 BRPM

## 2022-04-25 DIAGNOSIS — E78.00 HYPERCHOLESTEROLEMIA: ICD-10-CM

## 2022-04-25 DIAGNOSIS — J44.9 COPD, SEVERE: ICD-10-CM

## 2022-04-25 DIAGNOSIS — E66.01 CLASS 2 SEVERE OBESITY DUE TO EXCESS CALORIES WITH SERIOUS COMORBIDITY AND BODY MASS INDEX (BMI) OF 39.0 TO 39.9 IN ADULT: ICD-10-CM

## 2022-04-25 DIAGNOSIS — Z79.4 TYPE 2 DIABETES MELLITUS WITH HYPERGLYCEMIA, WITH LONG-TERM CURRENT USE OF INSULIN: ICD-10-CM

## 2022-04-25 DIAGNOSIS — E11.65 TYPE 2 DIABETES MELLITUS WITH HYPERGLYCEMIA, WITH LONG-TERM CURRENT USE OF INSULIN: Primary | ICD-10-CM

## 2022-04-25 DIAGNOSIS — Z79.4 TYPE 2 DIABETES MELLITUS WITH HYPERGLYCEMIA, WITH LONG-TERM CURRENT USE OF INSULIN: Primary | ICD-10-CM

## 2022-04-25 DIAGNOSIS — H91.90 HEARING LOSS, UNSPECIFIED HEARING LOSS TYPE, UNSPECIFIED LATERALITY: ICD-10-CM

## 2022-04-25 DIAGNOSIS — E11.65 TYPE 2 DIABETES MELLITUS WITH HYPERGLYCEMIA, WITH LONG-TERM CURRENT USE OF INSULIN: ICD-10-CM

## 2022-04-25 DIAGNOSIS — I10 ESSENTIAL HYPERTENSION: ICD-10-CM

## 2022-04-25 DIAGNOSIS — G47.33 OSA ON CPAP: ICD-10-CM

## 2022-04-25 DIAGNOSIS — R21 RASH: ICD-10-CM

## 2022-04-25 LAB
ALBUMIN SERPL BCP-MCNC: 3.1 G/DL (ref 3.5–5.2)
ALP SERPL-CCNC: 81 U/L (ref 55–135)
ALT SERPL W/O P-5'-P-CCNC: 22 U/L (ref 10–44)
ANION GAP SERPL CALC-SCNC: 10 MMOL/L (ref 8–16)
AST SERPL-CCNC: 24 U/L (ref 10–40)
BILIRUB SERPL-MCNC: 0.3 MG/DL (ref 0.1–1)
BUN SERPL-MCNC: 37 MG/DL (ref 8–23)
CALCIUM SERPL-MCNC: 9.3 MG/DL (ref 8.7–10.5)
CHLORIDE SERPL-SCNC: 105 MMOL/L (ref 95–110)
CO2 SERPL-SCNC: 26 MMOL/L (ref 23–29)
CREAT SERPL-MCNC: 1.7 MG/DL (ref 0.5–1.4)
EST. GFR  (AFRICAN AMERICAN): 42.8 ML/MIN/1.73 M^2
EST. GFR  (NON AFRICAN AMERICAN): 37 ML/MIN/1.73 M^2
GLUCOSE SERPL-MCNC: 121 MG/DL (ref 70–110)
POTASSIUM SERPL-SCNC: 4.2 MMOL/L (ref 3.5–5.1)
PROT SERPL-MCNC: 6.8 G/DL (ref 6–8.4)
SODIUM SERPL-SCNC: 141 MMOL/L (ref 136–145)

## 2022-04-25 PROCEDURE — 1159F PR MEDICATION LIST DOCUMENTED IN MEDICAL RECORD: ICD-10-PCS | Mod: CPTII,S$GLB,, | Performed by: INTERNAL MEDICINE

## 2022-04-25 PROCEDURE — 99999 PR PBB SHADOW E&M-EST. PATIENT-LVL V: ICD-10-PCS | Mod: PBBFAC,,, | Performed by: INTERNAL MEDICINE

## 2022-04-25 PROCEDURE — 99214 PR OFFICE/OUTPT VISIT, EST, LEVL IV, 30-39 MIN: ICD-10-PCS | Mod: S$GLB,,, | Performed by: INTERNAL MEDICINE

## 2022-04-25 PROCEDURE — 99214 OFFICE O/P EST MOD 30 MIN: CPT | Mod: S$GLB,,, | Performed by: INTERNAL MEDICINE

## 2022-04-25 PROCEDURE — 3052F HG A1C>EQUAL 8.0%<EQUAL 9.0%: CPT | Mod: CPTII,S$GLB,, | Performed by: INTERNAL MEDICINE

## 2022-04-25 PROCEDURE — 3052F PR MOST RECENT HEMOGLOBIN A1C LEVEL 8.0 - < 9.0%: ICD-10-PCS | Mod: CPTII,S$GLB,, | Performed by: INTERNAL MEDICINE

## 2022-04-25 PROCEDURE — 36415 COLL VENOUS BLD VENIPUNCTURE: CPT | Mod: PO | Performed by: INTERNAL MEDICINE

## 2022-04-25 PROCEDURE — 3074F PR MOST RECENT SYSTOLIC BLOOD PRESSURE < 130 MM HG: ICD-10-PCS | Mod: CPTII,S$GLB,, | Performed by: INTERNAL MEDICINE

## 2022-04-25 PROCEDURE — 83036 HEMOGLOBIN GLYCOSYLATED A1C: CPT | Performed by: INTERNAL MEDICINE

## 2022-04-25 PROCEDURE — 3078F DIAST BP <80 MM HG: CPT | Mod: CPTII,S$GLB,, | Performed by: INTERNAL MEDICINE

## 2022-04-25 PROCEDURE — 85025 COMPLETE CBC W/AUTO DIFF WBC: CPT | Performed by: INTERNAL MEDICINE

## 2022-04-25 PROCEDURE — 99999 PR PBB SHADOW E&M-EST. PATIENT-LVL V: CPT | Mod: PBBFAC,,, | Performed by: INTERNAL MEDICINE

## 2022-04-25 PROCEDURE — 1101F PT FALLS ASSESS-DOCD LE1/YR: CPT | Mod: CPTII,S$GLB,, | Performed by: INTERNAL MEDICINE

## 2022-04-25 PROCEDURE — 3288F PR FALLS RISK ASSESSMENT DOCUMENTED: ICD-10-PCS | Mod: CPTII,S$GLB,, | Performed by: INTERNAL MEDICINE

## 2022-04-25 PROCEDURE — 1125F PR PAIN SEVERITY QUANTIFIED, PAIN PRESENT: ICD-10-PCS | Mod: CPTII,S$GLB,, | Performed by: INTERNAL MEDICINE

## 2022-04-25 PROCEDURE — 1159F MED LIST DOCD IN RCRD: CPT | Mod: CPTII,S$GLB,, | Performed by: INTERNAL MEDICINE

## 2022-04-25 PROCEDURE — 1101F PR PT FALLS ASSESS DOC 0-1 FALLS W/OUT INJ PAST YR: ICD-10-PCS | Mod: CPTII,S$GLB,, | Performed by: INTERNAL MEDICINE

## 2022-04-25 PROCEDURE — 80053 COMPREHEN METABOLIC PANEL: CPT | Performed by: INTERNAL MEDICINE

## 2022-04-25 PROCEDURE — 3074F SYST BP LT 130 MM HG: CPT | Mod: CPTII,S$GLB,, | Performed by: INTERNAL MEDICINE

## 2022-04-25 PROCEDURE — 3288F FALL RISK ASSESSMENT DOCD: CPT | Mod: CPTII,S$GLB,, | Performed by: INTERNAL MEDICINE

## 2022-04-25 PROCEDURE — 3078F PR MOST RECENT DIASTOLIC BLOOD PRESSURE < 80 MM HG: ICD-10-PCS | Mod: CPTII,S$GLB,, | Performed by: INTERNAL MEDICINE

## 2022-04-25 PROCEDURE — 1125F AMNT PAIN NOTED PAIN PRSNT: CPT | Mod: CPTII,S$GLB,, | Performed by: INTERNAL MEDICINE

## 2022-04-25 NOTE — PROGRESS NOTES
Subjective:       Patient ID: Jose Luis Martinez is a 81 y.o. male.    Chief Complaint: Follow-up, Hypertension, Hyperlipidemia, and Diabetes    Follow-up  Associated symptoms include arthralgias, myalgias, a rash and weakness. Pertinent negatives include no abdominal pain, chest pain, chills, coughing, diaphoresis, fatigue, fever, headaches, joint swelling, nausea, neck pain, numbness, sore throat or vomiting.   Hypertension  Pertinent negatives include no chest pain, headaches, neck pain, palpitations or shortness of breath.   Hyperlipidemia  Associated symptoms include myalgias. Pertinent negatives include no chest pain or shortness of breath.   Diabetes  Pertinent negatives for hypoglycemia include no confusion, dizziness, headaches, nervousness/anxiousness, pallor, seizures, speech difficulty or tremors. Associated symptoms include weakness. Pertinent negatives for diabetes include no chest pain, no fatigue, no polydipsia, no polyphagia and no polyuria.     Past Medical History:   Diagnosis Date    Hypertension     Ulcers of both lower legs 2018     Past Surgical History:   Procedure Laterality Date    CATARACT EXTRACTION, BILATERAL      LIPOMA RESECTION Bilateral     neck     PROSTATE SURGERY      REPAIR OF EYELID      TOTAL KNEE ARTHROPLASTY Left      No family history on file.  Social History     Socioeconomic History    Marital status:    Tobacco Use    Smoking status: Former Smoker     Packs/day: 1.00     Years: 29.00     Pack years: 29.00     Quit date: 10/25/2019     Years since quittin.5    Smokeless tobacco: Former User   Substance and Sexual Activity    Alcohol use: No    Drug use: No     Review of Systems   Constitutional: Negative for activity change, appetite change, chills, diaphoresis, fatigue, fever and unexpected weight change.   HENT: Positive for hearing loss. Negative for drooling, ear discharge, ear pain, facial swelling, mouth sores, nosebleeds, postnasal drip,  rhinorrhea, sinus pressure, sneezing, sore throat, tinnitus, trouble swallowing and voice change.    Eyes: Negative for photophobia, redness and visual disturbance.   Respiratory: Negative for apnea, cough, choking, chest tightness, shortness of breath and wheezing.    Cardiovascular: Negative for chest pain, palpitations and leg swelling.   Gastrointestinal: Negative for abdominal distention, abdominal pain, anal bleeding, blood in stool, constipation, diarrhea, nausea, rectal pain and vomiting.   Endocrine: Negative for cold intolerance, heat intolerance, polydipsia, polyphagia and polyuria.   Genitourinary: Negative for difficulty urinating, dysuria, enuresis, flank pain, frequency, genital sores, hematuria and urgency.   Musculoskeletal: Positive for arthralgias, gait problem and myalgias. Negative for back pain, joint swelling, neck pain and neck stiffness.   Skin: Positive for rash. Negative for color change, pallor and wound.   Allergic/Immunologic: Negative for food allergies and immunocompromised state.   Neurological: Positive for weakness. Negative for dizziness, tremors, seizures, syncope, facial asymmetry, speech difficulty, light-headedness, numbness and headaches.   Hematological: Negative for adenopathy. Does not bruise/bleed easily.   Psychiatric/Behavioral: Negative for agitation, behavioral problems, confusion, decreased concentration, dysphoric mood, hallucinations, self-injury, sleep disturbance and suicidal ideas. The patient is not nervous/anxious and is not hyperactive.        Objective:      Physical Exam  Vitals and nursing note reviewed.   Constitutional:       General: He is not in acute distress.     Appearance: Normal appearance. He is well-developed. He is not diaphoretic.   HENT:      Head: Normocephalic and atraumatic.      Left Ear: There is impacted cerumen.      Mouth/Throat:      Pharynx: No oropharyngeal exudate.   Eyes:      General: No scleral icterus.     Pupils: Pupils are  equal, round, and reactive to light.   Neck:      Thyroid: No thyromegaly.      Vascular: No carotid bruit or JVD.      Trachea: No tracheal deviation.   Cardiovascular:      Rate and Rhythm: Normal rate and regular rhythm.      Heart sounds: Normal heart sounds.   Pulmonary:      Effort: Pulmonary effort is normal. No respiratory distress.      Breath sounds: Normal breath sounds. No wheezing or rales.   Chest:      Chest wall: No tenderness.   Abdominal:      General: Bowel sounds are normal. There is no distension.      Palpations: Abdomen is soft.      Tenderness: There is no abdominal tenderness. There is no guarding or rebound.   Musculoskeletal:         General: No tenderness. Normal range of motion.      Cervical back: Normal range of motion and neck supple.   Lymphadenopathy:      Cervical: No cervical adenopathy.   Skin:     General: Skin is warm and dry.      Coloration: Skin is not pale.      Findings: No erythema or rash.   Neurological:      Mental Status: He is alert and oriented to person, place, and time.   Psychiatric:         Behavior: Behavior normal.         Thought Content: Thought content normal.         Judgment: Judgment normal.         CMP  Sodium   Date Value Ref Range Status   08/25/2021 139 136 - 145 mmol/L Final     Potassium   Date Value Ref Range Status   08/25/2021 4.3 3.5 - 5.1 mmol/L Final     Chloride   Date Value Ref Range Status   08/25/2021 103 95 - 110 mmol/L Final     CO2   Date Value Ref Range Status   08/25/2021 23 23 - 29 mmol/L Final     Glucose   Date Value Ref Range Status   08/25/2021 174 (H) 70 - 110 mg/dL Final     BUN   Date Value Ref Range Status   08/25/2021 30 (H) 8 - 23 mg/dL Final     Creatinine   Date Value Ref Range Status   08/25/2021 1.3 0.5 - 1.4 mg/dL Final     Calcium   Date Value Ref Range Status   08/25/2021 9.4 8.7 - 10.5 mg/dL Final     Total Protein   Date Value Ref Range Status   08/25/2021 7.1 6.0 - 8.4 g/dL Final     Albumin   Date Value Ref  Range Status   08/25/2021 3.2 (L) 3.5 - 5.2 g/dL Final     Total Bilirubin   Date Value Ref Range Status   08/25/2021 0.5 0.1 - 1.0 mg/dL Final     Comment:     For infants and newborns, interpretation of results should be based  on gestational age, weight and in agreement with clinical  observations.    Premature Infant recommended reference ranges:  Up to 24 hours.............<8.0 mg/dL  Up to 48 hours............<12.0 mg/dL  3-5 days..................<15.0 mg/dL  6-29 days.................<15.0 mg/dL       Alkaline Phosphatase   Date Value Ref Range Status   08/25/2021 95 55 - 135 U/L Final     AST   Date Value Ref Range Status   08/25/2021 23 10 - 40 U/L Final     ALT   Date Value Ref Range Status   08/25/2021 19 10 - 44 U/L Final     Anion Gap   Date Value Ref Range Status   08/25/2021 13 8 - 16 mmol/L Final     eGFR if    Date Value Ref Range Status   08/25/2021 59.6 (A) >60 mL/min/1.73 m^2 Final     eGFR if non    Date Value Ref Range Status   08/25/2021 51.5 (A) >60 mL/min/1.73 m^2 Final     Comment:     Calculation used to obtain the estimated glomerular filtration  rate (eGFR) is the CKD-EPI equation.        Lab Results   Component Value Date    WBC 9.04 09/21/2021    HGB 10.9 (L) 09/21/2021    HCT 36.2 (L) 09/21/2021    MCV 97 09/21/2021     09/21/2021     Lab Results   Component Value Date    CHOL 110 (L) 08/25/2021     Lab Results   Component Value Date    HDL 47 08/25/2021     Lab Results   Component Value Date    LDLCALC 53.0 (L) 08/25/2021     Lab Results   Component Value Date    TRIG 50 08/25/2021     Lab Results   Component Value Date    CHOLHDL 42.7 08/25/2021     Lab Results   Component Value Date    TSH 3.799 08/25/2021     Lab Results   Component Value Date    HGBA1C 8.0 (H) 01/05/2022     Assessment:       1. Type 2 diabetes mellitus with hyperglycemia, with long-term current use of insulin    2. CHRISTOPHER on CPAP    3. Essential hypertension    4.  Hypercholesterolemia    5. Class 2 severe obesity due to excess calories with serious comorbidity and body mass index (BMI) of 39.0 to 39.9 in adult    6. COPD, severe    7. Hearing loss, unspecified hearing loss type, unspecified laterality    8. Rash        Plan:   Type 2 diabetes mellitus with hyperglycemia, with long-term current use of insulin  -     Hemoglobin A1C; Future; Expected date: 04/25/2022    CHRISTOPHER on CPAP    Essential hypertension  -     Comprehensive Metabolic Panel; Future; Expected date: 04/25/2022  -     CBC Auto Differential; Future; Expected date: 04/25/2022    Hypercholesterolemia    Class 2 severe obesity due to excess calories with serious comorbidity and body mass index (BMI) of 39.0 to 39.9 in adult    COPD, severe    Hearing loss, unspecified hearing loss type, unspecified laterality  -     Ambulatory referral/consult to ENT; Future; Expected date: 05/02/2022      Rash  -     Ambulatory referral/consult to Dermatology; Future; Expected date: 05/02/2022    Stable------------continue meds----------------as above----------f/u 4 months=------

## 2022-04-26 ENCOUNTER — TELEPHONE (OUTPATIENT)
Dept: NEPHROLOGY | Facility: CLINIC | Age: 82
End: 2022-04-26
Payer: MEDICARE

## 2022-04-26 ENCOUNTER — TELEPHONE (OUTPATIENT)
Dept: FAMILY MEDICINE | Facility: CLINIC | Age: 82
End: 2022-04-26
Payer: MEDICARE

## 2022-04-26 DIAGNOSIS — N28.9 RENAL INSUFFICIENCY: ICD-10-CM

## 2022-04-26 DIAGNOSIS — D64.9 ANEMIA, UNSPECIFIED TYPE: Primary | ICD-10-CM

## 2022-04-26 LAB
BASOPHILS # BLD AUTO: 0.02 K/UL (ref 0–0.2)
BASOPHILS NFR BLD: 0.2 % (ref 0–1.9)
DIFFERENTIAL METHOD: ABNORMAL
EOSINOPHIL # BLD AUTO: 0.1 K/UL (ref 0–0.5)
EOSINOPHIL NFR BLD: 0.8 % (ref 0–8)
ERYTHROCYTE [DISTWIDTH] IN BLOOD BY AUTOMATED COUNT: 14.8 % (ref 11.5–14.5)
ESTIMATED AVG GLUCOSE: 166 MG/DL (ref 68–131)
HBA1C MFR BLD: 7.4 % (ref 4–5.6)
HCT VFR BLD AUTO: 36.2 % (ref 40–54)
HGB BLD-MCNC: 10.7 G/DL (ref 14–18)
IMM GRANULOCYTES # BLD AUTO: 0.06 K/UL (ref 0–0.04)
IMM GRANULOCYTES NFR BLD AUTO: 0.5 % (ref 0–0.5)
LYMPHOCYTES # BLD AUTO: 2.2 K/UL (ref 1–4.8)
LYMPHOCYTES NFR BLD: 19 % (ref 18–48)
MCH RBC QN AUTO: 30 PG (ref 27–31)
MCHC RBC AUTO-ENTMCNC: 29.6 G/DL (ref 32–36)
MCV RBC AUTO: 101 FL (ref 82–98)
MONOCYTES # BLD AUTO: 0.9 K/UL (ref 0.3–1)
MONOCYTES NFR BLD: 7.8 % (ref 4–15)
NEUTROPHILS # BLD AUTO: 8.1 K/UL (ref 1.8–7.7)
NEUTROPHILS NFR BLD: 71.7 % (ref 38–73)
NRBC BLD-RTO: 0 /100 WBC
PLATELET # BLD AUTO: 261 K/UL (ref 150–450)
PMV BLD AUTO: 10.2 FL (ref 9.2–12.9)
RBC # BLD AUTO: 3.57 M/UL (ref 4.6–6.2)
WBC # BLD AUTO: 11.31 K/UL (ref 3.9–12.7)

## 2022-04-26 NOTE — TELEPHONE ENCOUNTER
"Offered pts wife 2 appt [place] afternoon or early am appt. Will call back next month to get him appt"4/26/22/sf   "

## 2022-04-28 ENCOUNTER — OFFICE VISIT (OUTPATIENT)
Dept: PULMONOLOGY | Facility: CLINIC | Age: 82
End: 2022-04-28
Payer: MEDICARE

## 2022-04-28 VITALS
RESPIRATION RATE: 14 BRPM | HEIGHT: 74 IN | BODY MASS INDEX: 39.53 KG/M2 | SYSTOLIC BLOOD PRESSURE: 130 MMHG | OXYGEN SATURATION: 98 % | WEIGHT: 308 LBS | HEART RATE: 96 BPM | DIASTOLIC BLOOD PRESSURE: 70 MMHG

## 2022-04-28 DIAGNOSIS — J30.9 ALLERGIC RHINITIS, UNSPECIFIED SEASONALITY, UNSPECIFIED TRIGGER: ICD-10-CM

## 2022-04-28 DIAGNOSIS — G47.33 OSA ON CPAP: Primary | ICD-10-CM

## 2022-04-28 DIAGNOSIS — E66.01 CLASS 2 SEVERE OBESITY DUE TO EXCESS CALORIES WITH SERIOUS COMORBIDITY AND BODY MASS INDEX (BMI) OF 39.0 TO 39.9 IN ADULT: ICD-10-CM

## 2022-04-28 DIAGNOSIS — R06.02 SOB (SHORTNESS OF BREATH) ON EXERTION: ICD-10-CM

## 2022-04-28 DIAGNOSIS — G47.33 OSA AND COPD OVERLAP SYNDROME: ICD-10-CM

## 2022-04-28 DIAGNOSIS — J44.9 OSA AND COPD OVERLAP SYNDROME: ICD-10-CM

## 2022-04-28 DIAGNOSIS — J44.9 CHRONIC OBSTRUCTIVE PULMONARY DISEASE, UNSPECIFIED COPD TYPE: ICD-10-CM

## 2022-04-28 PROCEDURE — 1159F PR MEDICATION LIST DOCUMENTED IN MEDICAL RECORD: ICD-10-PCS | Mod: CPTII,S$GLB,, | Performed by: NURSE PRACTITIONER

## 2022-04-28 PROCEDURE — 3288F PR FALLS RISK ASSESSMENT DOCUMENTED: ICD-10-PCS | Mod: CPTII,S$GLB,, | Performed by: NURSE PRACTITIONER

## 2022-04-28 PROCEDURE — 3075F PR MOST RECENT SYSTOLIC BLOOD PRESS GE 130-139MM HG: ICD-10-PCS | Mod: CPTII,S$GLB,, | Performed by: NURSE PRACTITIONER

## 2022-04-28 PROCEDURE — 3288F FALL RISK ASSESSMENT DOCD: CPT | Mod: CPTII,S$GLB,, | Performed by: NURSE PRACTITIONER

## 2022-04-28 PROCEDURE — 99499 RISK ADDL DX/OHS AUDIT: ICD-10-PCS | Mod: S$GLB,,, | Performed by: NURSE PRACTITIONER

## 2022-04-28 PROCEDURE — 99999 PR PBB SHADOW E&M-EST. PATIENT-LVL V: ICD-10-PCS | Mod: PBBFAC,,, | Performed by: NURSE PRACTITIONER

## 2022-04-28 PROCEDURE — 3078F PR MOST RECENT DIASTOLIC BLOOD PRESSURE < 80 MM HG: ICD-10-PCS | Mod: CPTII,S$GLB,, | Performed by: NURSE PRACTITIONER

## 2022-04-28 PROCEDURE — 99499 UNLISTED E&M SERVICE: CPT | Mod: S$GLB,,, | Performed by: NURSE PRACTITIONER

## 2022-04-28 PROCEDURE — 1159F MED LIST DOCD IN RCRD: CPT | Mod: CPTII,S$GLB,, | Performed by: NURSE PRACTITIONER

## 2022-04-28 PROCEDURE — 1160F PR REVIEW ALL MEDS BY PRESCRIBER/CLIN PHARMACIST DOCUMENTED: ICD-10-PCS | Mod: CPTII,S$GLB,, | Performed by: NURSE PRACTITIONER

## 2022-04-28 PROCEDURE — 3075F SYST BP GE 130 - 139MM HG: CPT | Mod: CPTII,S$GLB,, | Performed by: NURSE PRACTITIONER

## 2022-04-28 PROCEDURE — 1160F RVW MEDS BY RX/DR IN RCRD: CPT | Mod: CPTII,S$GLB,, | Performed by: NURSE PRACTITIONER

## 2022-04-28 PROCEDURE — 1101F PR PT FALLS ASSESS DOC 0-1 FALLS W/OUT INJ PAST YR: ICD-10-PCS | Mod: CPTII,S$GLB,, | Performed by: NURSE PRACTITIONER

## 2022-04-28 PROCEDURE — 1101F PT FALLS ASSESS-DOCD LE1/YR: CPT | Mod: CPTII,S$GLB,, | Performed by: NURSE PRACTITIONER

## 2022-04-28 PROCEDURE — 99214 PR OFFICE/OUTPT VISIT, EST, LEVL IV, 30-39 MIN: ICD-10-PCS | Mod: S$GLB,,, | Performed by: NURSE PRACTITIONER

## 2022-04-28 PROCEDURE — 99999 PR PBB SHADOW E&M-EST. PATIENT-LVL V: CPT | Mod: PBBFAC,,, | Performed by: NURSE PRACTITIONER

## 2022-04-28 PROCEDURE — 99214 OFFICE O/P EST MOD 30 MIN: CPT | Mod: S$GLB,,, | Performed by: NURSE PRACTITIONER

## 2022-04-28 PROCEDURE — 3078F DIAST BP <80 MM HG: CPT | Mod: CPTII,S$GLB,, | Performed by: NURSE PRACTITIONER

## 2022-04-28 RX ORDER — PREDNISONE 20 MG/1
TABLET ORAL
COMMUNITY
Start: 2022-04-21 | End: 2022-10-28

## 2022-04-28 RX ORDER — LEVOFLOXACIN 750 MG/1
TABLET ORAL
COMMUNITY
Start: 2022-04-21 | End: 2022-10-28

## 2022-04-28 NOTE — ASSESSMENT & PLAN NOTE
Off Treleviktor found increased blood pressure  Feels controlled on twice daily use Albuterol inhaler   He only wants Albuterol inhaler, feels well controlled.   Return 6 months CPFT, 6mwd

## 2022-04-28 NOTE — ASSESSMENT & PLAN NOTE
On CPAP 8 cm with optimal control obstructive sleep apnea   COPD controlled on albuterol inhaler as needed.  Not using Trelegy 100 mcg felt increased blood pressure  Only willing to continue Albuterol inhaler since good results used about twice daily for shortness of breath.

## 2022-04-28 NOTE — PROGRESS NOTES
Outpatient Pulmonary Evaluation       SUBJECTIVE:     Chief Complaint   Patient presents with    Sleep Apnea    COPD       History of Present Illness:    Patient is a 81 y.o. male     HPI: Jose Luis Martinez is here for follow up for CHRISTOPHER and CPAP complaince assessment.   He is on CPAP of 8 cmH2O pressure which is  optimally controlling sleep apnea with apneic index (AHI) 0.8 events an hour.   He is compliant with CPAP use. Complaince download today reveals 87.3% of days with greater than 4 hours of device use.   Patient reports benefit from CPAP use and denies snoring and excessive daytime sleepiness.  Patient reports no complaints. Nasal pillows mask is used.           [place] Sleepiness Scale   EPWORTH SLEEPINESS SCALE 4/28/2022 11/22/2021 10/25/2021 7/23/2021   Sitting and reading 3 3 3 1   Watching TV 3 3 3 1   Sitting, inactive in a public place (e.g. a theatre or a meeting) 0 3 3 0   As a passenger in a car for an hour without a break 0 3 2 1   Lying down to rest in the afternoon when circumstances permit 1 3 1 3   Sitting and talking to someone 0 3 2 1   Sitting quietly after a lunch without alcohol 0 3 2 3   In a car, while stopped for a few minutes in traffic 0 3 0 0   Total score 7 24 16 10        COPD   Off Trelegy found increased blood pressure  Feels controlled on twice daily use Albuterol inhaler   He only wants Albuterol inhaler, feels well controlled.     Quit smoking 2018.  22 pack year smoker.  Prior work [employer] [facility] [employer] [employer] [facility] [employer].     Not on home O2.    Bed time is 1030 - 1100  Wake time is 1000 - 1100    Neck circumference is 19.5 inches  Mallampati score 4    Review of Systems   Constitutional: Positive for fatigue. Negative for fever and chills.   HENT: Negative for nosebleeds.    Eyes: Negative for redness.   Respiratory: Positive for dyspnea on extertion. Negative for apnea, snoring, choking and somnolence.    Cardiovascular: Negative for chest pain.  "  Genitourinary: Negative for hematuria.   Endocrine: Negative for cold intolerance.    Musculoskeletal: Positive for arthralgias, back pain and gait problem.   Skin: Negative for rash.   Gastrointestinal: Negative for vomiting.   Neurological: Negative for syncope.   Hematological: Negative for adenopathy.   Psychiatric/Behavioral: Negative for confusion and sleep disturbance.       Review of patient's allergies indicates:  No Known Allergies    Current Outpatient Medications   Medication Sig Dispense Refill    ACCU-CHEK DAVID PLUS TEST STRP Strp Apply 1 each topically 2 (two) times daily as needed. 100 strip 12    ACCU-CHEK SOFTCLIX LANCETS Misc USE TO TEST TWICE DAILY 100 each 12    albuterol (PROVENTIL) 2.5 mg /3 mL (0.083 %) nebulizer solution Take 3 mLs (2.5 mg total) by nebulization every 6 (six) hours as needed for Wheezing. Rescue 1 Box 3    alfuzosin (UROXATRAL) 10 mg Tb24       ASPIRIN ORAL 1 tab      atorvastatin (LIPITOR) 40 MG tablet TAKE ONE TABLET BY MOUTH ONCE A DAY 90 tablet 3    BD ULTRA-FINE VINNIE PEN NEEDLES 32 gauge x 5/32" Ndle       blood-glucose meter kit Use as instructed 1 each 0    bumetanide (BUMEX) 2 MG tablet Take 1 tablet (2 mg total) by mouth 2 (two) times daily. 60 tablet 12    cetirizine (ZYRTEC) 10 MG tablet Take 10 mg by mouth.      dutasteride (AVODART) 0.5 mg capsule       fluticasone propionate (FLONASE) 50 mcg/actuation nasal spray USE 2 SPRAYS IN EACH NOSTRIL EVERY DAY 16 g 6    fluticasone-umeclidin-vilanter (TRELEGY ELLIPTA) 100-62.5-25 mcg DsDv Inhale 1 puff into the lungs once daily. 60 each 11    FLUZONE HIGHDOSE QUAD 20-21  mcg/0.7 mL Syrg       gabapentin (NEURONTIN) 100 MG capsule Take 1 capsule (100 mg total) by mouth every evening. 30 capsule 4    hydrocodone-acetaminophen 10-325mg (NORCO)  mg Tab   0    isosorbide mononitrate (IMDUR) 30 MG 24 hr tablet       lancets 32 gauge Misc 1 lancet by Misc.(Non-Drug; Combo Route) route 2 (two) times daily. 200 " "each 12    LANTUS SOLOSTAR U-100 INSULIN glargine 100 units/mL (3mL) SubQ pen INJECT 15 UNITS INTO THE SKIN EVERY EVENING.      LEVEMIR FLEXTOUCH U-100 INSULN 100 unit/mL (3 mL) InPn pen INJECT 15 UNITS INTO THE SKIN EVERY EVENING 18 mL 1    levoFLOXacin (LEVAQUIN) 750 MG tablet levofloxacin Take 1 Tablet (oral) 1 time per day for 7 days 20220421 tablet 1 time per day oral 7 days active 750 mg      levoFLOXacin (LEVAQUIN) 750 MG tablet TAKE ONE TABLET BY MOUTH DAILY FOR SEVEN DAYS.      ofloxacin (FLOXIN) 0.3 % otic solution Place 5 drops into the left ear 2 (two) times daily. 5 mL 0    pantoprazole (PROTONIX) 40 MG tablet TAKE 1 TABLET BY MOUTH ONCE DAILY. 90 tablet 3    pen needle, diabetic (BD ULTRA-FINE VINNIE PEN NEEDLE) 32 gauge x 5/32" Ndle 1 Units by Misc.(Non-Drug; Combo Route) route once daily. 100 each 6    potassium chloride (KLOR-CON) 10 MEQ TbSR Take 1 tablet (10 mEq total) by mouth once daily. 90 tablet 3    predniSONE (DELTASONE) 20 MG tablet prednisone Take 2 Tablet (oral) 1 time per day for 5 days 20220421 tablet 1 time per day oral 5 days active 20 MG      predniSONE (DELTASONE) 20 MG tablet TAKE 2 TABLETS BY MOUTH EVERY DAY FOR FIVE DAYS.      silver sulfADIAZINE 1% (SILVADENE) 1 % cream Apply topically once daily. 400 g 3    tamsulosin (FLOMAX) 0.4 mg Cp24   11    TRADJENTA 5 mg Tab tablet TAKE 1 TABLET BY MOUTH ONCE DAILY 30 tablet 5    TRUEPLUS INSULIN 0.5 mL 31 gauge x 5/16" Syrg INJECT TWO TIMES A DAY AS DIRECTED 100 each 6    valsartan (DIOVAN) 160 MG tablet TAKE ONE TABLET BY MOUTH ONCE A DAY 90 tablet 1    VENTOLIN HFA 90 mcg/actuation inhaler INHALE 2 PUFFS INTO THE LUNGS EVERY 6 HOURS AS NEEDED FOR WHEEZING. 18 g 12     No current facility-administered medications for this visit.       Past Medical History:   Diagnosis Date    Hypertension     Ulcers of both lower legs 6/13/2018     Past Surgical History:   Procedure Laterality Date    CATARACT EXTRACTION, BILATERAL      LIPOMA RESECTION " "Bilateral     neck     PROSTATE SURGERY      REPAIR OF EYELID      TOTAL KNEE ARTHROPLASTY Left      History reviewed. No pertinent family history.  Social History     Tobacco Use    Smoking status: Former Smoker     Packs/day: 1.00     Years: 29.00     Pack years: 29.00     Quit date: 10/25/2019     Years since quittin.5    Smokeless tobacco: Former User   Substance Use Topics    Alcohol use: No    Drug use: No          OBJECTIVE:     Vital Signs (Most Recent)  Vital Signs  Pulse: 96  Resp: 14  SpO2: 98 %  BP: 130/70  Height and Weight  Height: 6' 2" (188 cm)  Weight: (!) 139.7 kg (307 lb 15.7 oz)  BSA (Calculated - sq m): 2.7 sq meters  BMI (Calculated): 39.5  Weight in (lb) to have BMI = 25: 194.3]  Wt Readings from Last 2 Encounters:   22 (!) 139.7 kg (307 lb 15.7 oz)   22 (!) 139.7 kg (308 lb)         Physical Exam:  Physical Exam   Constitutional: He is oriented to person, place, and time. He appears well-developed. No distress.   HENT:   Head: Normocephalic.   Cardiovascular: Normal rate and regular rhythm.   Pulmonary/Chest: Normal expansion and effort normal. No stridor. No respiratory distress. He has decreased breath sounds. He exhibits no tenderness.   Abdominal: He exhibits no distension.   Musculoskeletal:         General: No tenderness.      Cervical back: Neck supple.   Lymphadenopathy:     He has no cervical adenopathy.   Neurological: He is alert and oriented to person, place, and time. Gait abnormal.   On wheelchair ambulates with difficulty   Skin: Skin is warm. No cyanosis. Nails show no clubbing.   Psychiatric: He has a normal mood and affect. His behavior is normal. Judgment and thought content normal.   Nursing note and vitals reviewed.      Laboratory  Lab Results   Component Value Date    WBC 11.31 2022    RBC 3.57 (L) 2022    HGB 10.7 (L) 2022    HCT 36.2 (L) 2022     (H) 2022    MCH 30.0 2022    MCHC 29.6 (L) 2022    RDW " 14.8 (H) 04/25/2022     04/25/2022    MPV 10.2 04/25/2022    GRAN 8.1 (H) 04/25/2022    GRAN 71.7 04/25/2022    LYMPH 2.2 04/25/2022    LYMPH 19.0 04/25/2022    MONO 0.9 04/25/2022    MONO 7.8 04/25/2022    EOS 0.1 04/25/2022    BASO 0.02 04/25/2022    EOSINOPHIL 0.8 04/25/2022    BASOPHIL 0.2 04/25/2022       BMP  Lab Results   Component Value Date     04/25/2022    K 4.2 04/25/2022     04/25/2022    CO2 26 04/25/2022    BUN 37 (H) 04/25/2022    CREATININE 1.7 (H) 04/25/2022    CALCIUM 9.3 04/25/2022    ANIONGAP 10 04/25/2022    ESTGFRAFRICA 42.8 (A) 04/25/2022    EGFRNONAA 37.0 (A) 04/25/2022    AST 24 04/25/2022    ALT 22 04/25/2022    PROT 6.8 04/25/2022       No results found for: BNP    Lab Results   Component Value Date    TSH 3.799 08/25/2021       No results found for: SEDRATE    No results found for: CRP    No results found for: IGE    No results found for: ASPERGILLUS  No results found for: AFUMIGATUSCL     No results found for: ACE    Diagnostic Results:  I have personally reviewed today the following studies :    10/13/2021 CPFT  Grade A-D -acceptable quality of tracingsSevere obstruction. FEV1 43.00 % predicted. (FEV1/VC< LLN and FEV1> or equal to 35% predicted and < or equal to 49% predicted).Moderate reduction in diffusion capacity - unadjusted for hemoglobin. (DLCO > or equal    to 40% and < 60% predicted). Severe reduction in maximal voluntary ventilation. (MVV % predicted < or equal to 50% of predicted).Flow volume loop demonstrate an obstructive defect.Overall there is severe ventilatory impairment. (FEV1> or equal to 35%   predicted and < or equal to 49% of predicted)   Â Pattern of airway obstruction, and decreased diffusion capacity suggest emphysema. Clinical correlation suggested.    10/13/2021 6MWD No desaturations requiring supplemental oxygen at rest or exertion. Exercise capacity is significantly less than predicted.  Ordering Provider: Dr Diaz           "  Interpreting Provider: Dr Diaz  Performing nurse/tech/RT: KAMERON Hughes RRT  Diagnosis:  (CHRISTOPHER and COPD overlap syndrome)  Height: 6' 2" (188 cm)  Weight: (!) 139.7 kg (307 lb 14 oz)  BMI (Calculated): 39.5              Patient Race:                                                              Phase Oxygen Assessment Supplemental O2 Heart   Rate Blood Pressure Paul Dyspnea Scale Rating   Resting 97 % Room Air 73 bpm 137/59 2   Exercise             Minute             1 95 % Room Air 82 bpm       2 96 % Room Air 84 bpm       3 94 % Room Air 94 bpm       4 95 % Room Air 99 bpm       5 94 % Room Air 85 bpm       6  95 % Room Air 72 bpm 159/72 5-6   Recovery             Minute             1 97 % Room Air 69 bpm       2 97 % Room Air 66 bpm       3 98 % Room Air 65 bpm       4 100 % Room Air 68 bpm 157/60 3      Six Minute Walk Summary  6MWT Status: not completed  Patient Reported: Dyspnea, Lightheadedness (knee and back pain)                Interpretation:  Did the patient stop or pause?: Yes  How many times did the patient stop or pause?: 1  Stop Time 1: 260  Restart Time 1: 360  Pause Time 1: 100 seconds  Total Time Walked (Calculated): 260 seconds  Final Partial Lap Distance (feet): 100 feet  Total Distance Meters (Calculated): 30.48 meters  Predicted Distance Meters (Calculated): 461.76 meters  Percentage of Predicted (Calculated): 6.6  Peak VO2 (Calculated): 4.89  Mets: 1.4  Has The Patient Had a Previous Six Minute Walk Test?: No     Previous 6MWT Results  Has The Patient Had a Previous Six Minute Walk Test?: No        PFT 2017:    CPFS: Severe obstruction the FEV1 is 1.56 or 48% of predicted.. The lung volumes reveal mild restriction. The total lung capacity 73%. DLCO is moderately to reduce to 55%      CXR 2020:    COMPARISON: 4/8/2018     FINDINGS: Single frontal view of the chest demonstrates symmetrically aerated lungs.  No pleural effusion or pneumothorax is identified. The cardiomediastinal " silhouette is within normal limits.     IMPRESSION:   Negative chest.      ASSESSMENT/PLAN:     Requested Prescriptions      No prescriptions requested or ordered in this encounter       Problem List Items Addressed This Visit       CHRISTOPHER on CPAP - Primary     On CPAP 8 cm    AHI 0.8   Proceed with overnight on room air on cpap 8 cm           Relevant Orders    PULSE OXIMETRY OVERNIGHT    CHRISTOPHER and COPD overlap syndrome     On CPAP 8 cm with optimal control obstructive sleep apnea   COPD controlled on albuterol inhaler as needed.  Not using Trelegy 100 mcg felt increased blood pressure  Only willing to continue Albuterol inhaler since good results used about twice daily for shortness of breath.              COPD (chronic obstructive pulmonary disease)     Off Trelegy found increased blood pressure  Feels controlled on twice daily use Albuterol inhaler   He only wants Albuterol inhaler, feels well controlled.   Return 6 months CPFT, 6mwd            Relevant Orders    Complete PFT with bronchodilator    Class 2 severe obesity due to excess calories with serious comorbidity and body mass index (BMI) of 39.0 to 39.9 in adult     Encouraged calorie reduction and 30 minutes of exercise daily. Discussed impact of obesity on general health.  Wt Readings from Last 9 Encounters:   04/28/22 (!) 139.7 kg (307 lb 15.7 oz)   04/25/22 (!) 139.7 kg (308 lb)   02/02/22 133.4 kg (294 lb)   01/05/22 133.6 kg (294 lb 8.6 oz)   11/22/21 130.6 kg (288 lb)   11/16/21 131 kg (288 lb 11.1 oz)   11/16/21 131 kg (288 lb 11.1 oz)   10/25/21 134.6 kg (296 lb 11.8 oz)   10/13/21 (!) 139.7 kg (307 lb 14 oz)   Body mass index is 39.54 kg/m².               Allergic rhinitis     Controlled   Flonase, zyrtec                  Other Visit Diagnoses       SOB (shortness of breath) on exertion                 Continue CPAP 8 cm complete overnight on room air on CPAP 8 cm      Continue albuterol inhaler.     Pulmonary rehab referred by Dr. Diaz, not  interested significant pain in knees/arthritis. He is engaged with Home health exercise program.     Follow up in about 6 months (around 10/28/2022) for COPD, w/review cpft, CPAP 6 mo compliance download.      Andree Monroy, NP

## 2022-04-28 NOTE — ASSESSMENT & PLAN NOTE
Encouraged calorie reduction and 30 minutes of exercise daily. Discussed impact of obesity on general health.  Wt Readings from Last 9 Encounters:   04/28/22 (!) 139.7 kg (307 lb 15.7 oz)   04/25/22 (!) 139.7 kg (308 lb)   02/02/22 133.4 kg (294 lb)   01/05/22 133.6 kg (294 lb 8.6 oz)   11/22/21 130.6 kg (288 lb)   11/16/21 131 kg (288 lb 11.1 oz)   11/16/21 131 kg (288 lb 11.1 oz)   10/25/21 134.6 kg (296 lb 11.8 oz)   10/13/21 (!) 139.7 kg (307 lb 14 oz)   Body mass index is 39.54 kg/m².

## 2022-05-02 ENCOUNTER — TELEPHONE (OUTPATIENT)
Dept: FAMILY MEDICINE | Facility: CLINIC | Age: 82
End: 2022-05-02
Payer: MEDICARE

## 2022-05-02 ENCOUNTER — PATIENT OUTREACH (OUTPATIENT)
Dept: ADMINISTRATIVE | Facility: OTHER | Age: 82
End: 2022-05-02
Payer: MEDICARE

## 2022-05-02 ENCOUNTER — PATIENT MESSAGE (OUTPATIENT)
Dept: FAMILY MEDICINE | Facility: CLINIC | Age: 82
End: 2022-05-02
Payer: MEDICARE

## 2022-05-02 NOTE — TELEPHONE ENCOUNTER
----- Message from Emerald Gray sent at 5/2/2022  7:44 AM CDT -----  Contact: [phone]   would like to know if patient can retake his labs on 05-03. Please call her at [phone].back . Thanks/ar

## 2022-05-03 ENCOUNTER — OFFICE VISIT (OUTPATIENT)
Dept: OTOLARYNGOLOGY | Facility: CLINIC | Age: 82
End: 2022-05-03
Payer: MEDICARE

## 2022-05-03 ENCOUNTER — CLINICAL SUPPORT (OUTPATIENT)
Dept: AUDIOLOGY | Facility: CLINIC | Age: 82
End: 2022-05-03
Payer: MEDICARE

## 2022-05-03 ENCOUNTER — LAB VISIT (OUTPATIENT)
Dept: LAB | Facility: HOSPITAL | Age: 82
End: 2022-05-03
Attending: INTERNAL MEDICINE
Payer: MEDICARE

## 2022-05-03 ENCOUNTER — CLINICAL SUPPORT (OUTPATIENT)
Dept: PULMONOLOGY | Facility: CLINIC | Age: 82
End: 2022-05-03
Payer: MEDICARE

## 2022-05-03 VITALS — SYSTOLIC BLOOD PRESSURE: 150 MMHG | DIASTOLIC BLOOD PRESSURE: 68 MMHG | HEART RATE: 61 BPM | TEMPERATURE: 99 F

## 2022-05-03 DIAGNOSIS — H91.90 HEARING LOSS, UNSPECIFIED HEARING LOSS TYPE, UNSPECIFIED LATERALITY: ICD-10-CM

## 2022-05-03 DIAGNOSIS — H90.3 SENSORINEURAL HEARING LOSS (SNHL) OF BOTH EARS: Primary | ICD-10-CM

## 2022-05-03 DIAGNOSIS — H90.A22 SENSORINEURAL HEARING LOSS (SNHL) OF LEFT EAR WITH RESTRICTED HEARING OF RIGHT EAR: Primary | ICD-10-CM

## 2022-05-03 DIAGNOSIS — G47.33 OSA ON CPAP: ICD-10-CM

## 2022-05-03 DIAGNOSIS — D64.9 ANEMIA, UNSPECIFIED TYPE: Primary | ICD-10-CM

## 2022-05-03 DIAGNOSIS — H90.3 SENSORINEURAL HEARING LOSS, ASYMMETRICAL: ICD-10-CM

## 2022-05-03 DIAGNOSIS — N28.9 RENAL INSUFFICIENCY: ICD-10-CM

## 2022-05-03 DIAGNOSIS — H61.23 BILATERAL IMPACTED CERUMEN: ICD-10-CM

## 2022-05-03 DIAGNOSIS — D64.9 ANEMIA, UNSPECIFIED TYPE: ICD-10-CM

## 2022-05-03 LAB
ANION GAP SERPL CALC-SCNC: 11 MMOL/L (ref 8–16)
BASOPHILS # BLD AUTO: 0.03 K/UL (ref 0–0.2)
BASOPHILS NFR BLD: 0.3 % (ref 0–1.9)
BUN SERPL-MCNC: 39 MG/DL (ref 8–23)
CALCIUM SERPL-MCNC: 9 MG/DL (ref 8.7–10.5)
CHLORIDE SERPL-SCNC: 102 MMOL/L (ref 95–110)
CO2 SERPL-SCNC: 29 MMOL/L (ref 23–29)
CREAT SERPL-MCNC: 1.4 MG/DL (ref 0.5–1.4)
DIFFERENTIAL METHOD: ABNORMAL
EOSINOPHIL # BLD AUTO: 0.1 K/UL (ref 0–0.5)
EOSINOPHIL NFR BLD: 0.4 % (ref 0–8)
ERYTHROCYTE [DISTWIDTH] IN BLOOD BY AUTOMATED COUNT: 14.7 % (ref 11.5–14.5)
EST. GFR  (AFRICAN AMERICAN): 54.1 ML/MIN/1.73 M^2
EST. GFR  (NON AFRICAN AMERICAN): 46.8 ML/MIN/1.73 M^2
GLUCOSE SERPL-MCNC: 113 MG/DL (ref 70–110)
HCT VFR BLD AUTO: 35.2 % (ref 40–54)
HGB BLD-MCNC: 10.7 G/DL (ref 14–18)
IMM GRANULOCYTES # BLD AUTO: 0.04 K/UL (ref 0–0.04)
IMM GRANULOCYTES NFR BLD AUTO: 0.4 % (ref 0–0.5)
LYMPHOCYTES # BLD AUTO: 2 K/UL (ref 1–4.8)
LYMPHOCYTES NFR BLD: 17.3 % (ref 18–48)
MCH RBC QN AUTO: 29.1 PG (ref 27–31)
MCHC RBC AUTO-ENTMCNC: 30.4 G/DL (ref 32–36)
MCV RBC AUTO: 96 FL (ref 82–98)
MONOCYTES # BLD AUTO: 0.8 K/UL (ref 0.3–1)
MONOCYTES NFR BLD: 7.2 % (ref 4–15)
NEUTROPHILS # BLD AUTO: 8.5 K/UL (ref 1.8–7.7)
NEUTROPHILS NFR BLD: 74.4 % (ref 38–73)
NRBC BLD-RTO: 0 /100 WBC
PLATELET # BLD AUTO: 263 K/UL (ref 150–450)
PMV BLD AUTO: 9.8 FL (ref 9.2–12.9)
POTASSIUM SERPL-SCNC: 4.3 MMOL/L (ref 3.5–5.1)
RBC # BLD AUTO: 3.68 M/UL (ref 4.6–6.2)
SODIUM SERPL-SCNC: 142 MMOL/L (ref 136–145)
WBC # BLD AUTO: 11.36 K/UL (ref 3.9–12.7)

## 2022-05-03 PROCEDURE — 92567 PR TYMPA2METRY: ICD-10-PCS | Mod: S$GLB,,, | Performed by: AUDIOLOGIST-HEARING AID FITTER

## 2022-05-03 PROCEDURE — 80048 BASIC METABOLIC PNL TOTAL CA: CPT | Performed by: INTERNAL MEDICINE

## 2022-05-03 PROCEDURE — 85025 COMPLETE CBC W/AUTO DIFF WBC: CPT | Performed by: INTERNAL MEDICINE

## 2022-05-03 PROCEDURE — 1125F AMNT PAIN NOTED PAIN PRSNT: CPT | Mod: CPTII,S$GLB,, | Performed by: STUDENT IN AN ORGANIZED HEALTH CARE EDUCATION/TRAINING PROGRAM

## 2022-05-03 PROCEDURE — 99999 PR PBB SHADOW E&M-EST. PATIENT-LVL IV: CPT | Mod: PBBFAC,,, | Performed by: STUDENT IN AN ORGANIZED HEALTH CARE EDUCATION/TRAINING PROGRAM

## 2022-05-03 PROCEDURE — 3077F SYST BP >= 140 MM HG: CPT | Mod: CPTII,S$GLB,, | Performed by: STUDENT IN AN ORGANIZED HEALTH CARE EDUCATION/TRAINING PROGRAM

## 2022-05-03 PROCEDURE — 99203 PR OFFICE/OUTPT VISIT, NEW, LEVL III, 30-44 MIN: ICD-10-PCS | Mod: 25,S$GLB,, | Performed by: STUDENT IN AN ORGANIZED HEALTH CARE EDUCATION/TRAINING PROGRAM

## 2022-05-03 PROCEDURE — 92567 TYMPANOMETRY: CPT | Mod: S$GLB,,, | Performed by: AUDIOLOGIST-HEARING AID FITTER

## 2022-05-03 PROCEDURE — 99999 PR PBB SHADOW E&M-EST. PATIENT-LVL IV: ICD-10-PCS | Mod: PBBFAC,,, | Performed by: STUDENT IN AN ORGANIZED HEALTH CARE EDUCATION/TRAINING PROGRAM

## 2022-05-03 PROCEDURE — 3077F PR MOST RECENT SYSTOLIC BLOOD PRESSURE >= 140 MM HG: ICD-10-PCS | Mod: CPTII,S$GLB,, | Performed by: STUDENT IN AN ORGANIZED HEALTH CARE EDUCATION/TRAINING PROGRAM

## 2022-05-03 PROCEDURE — 36415 COLL VENOUS BLD VENIPUNCTURE: CPT | Performed by: INTERNAL MEDICINE

## 2022-05-03 PROCEDURE — 99203 OFFICE O/P NEW LOW 30 MIN: CPT | Mod: 25,S$GLB,, | Performed by: STUDENT IN AN ORGANIZED HEALTH CARE EDUCATION/TRAINING PROGRAM

## 2022-05-03 PROCEDURE — 94762 N-INVAS EAR/PLS OXIMTRY CONT: CPT | Mod: S$GLB,,, | Performed by: INTERNAL MEDICINE

## 2022-05-03 PROCEDURE — 99999 PR PBB SHADOW E&M-EST. PATIENT-LVL I: CPT | Mod: PBBFAC,,, | Performed by: AUDIOLOGIST-HEARING AID FITTER

## 2022-05-03 PROCEDURE — 69210 PR REMOVAL IMPACTED CERUMEN REQUIRING INSTRUMENTATION, UNILATERAL: ICD-10-PCS | Mod: S$GLB,,, | Performed by: STUDENT IN AN ORGANIZED HEALTH CARE EDUCATION/TRAINING PROGRAM

## 2022-05-03 PROCEDURE — 1159F MED LIST DOCD IN RCRD: CPT | Mod: CPTII,S$GLB,, | Performed by: STUDENT IN AN ORGANIZED HEALTH CARE EDUCATION/TRAINING PROGRAM

## 2022-05-03 PROCEDURE — 92557 COMPREHENSIVE HEARING TEST: CPT | Mod: S$GLB,,, | Performed by: AUDIOLOGIST-HEARING AID FITTER

## 2022-05-03 PROCEDURE — 3078F PR MOST RECENT DIASTOLIC BLOOD PRESSURE < 80 MM HG: ICD-10-PCS | Mod: CPTII,S$GLB,, | Performed by: STUDENT IN AN ORGANIZED HEALTH CARE EDUCATION/TRAINING PROGRAM

## 2022-05-03 PROCEDURE — 1159F PR MEDICATION LIST DOCUMENTED IN MEDICAL RECORD: ICD-10-PCS | Mod: CPTII,S$GLB,, | Performed by: STUDENT IN AN ORGANIZED HEALTH CARE EDUCATION/TRAINING PROGRAM

## 2022-05-03 PROCEDURE — 94762 PR NONINVASV OXYGEN SATUR, CONT: ICD-10-PCS | Mod: S$GLB,,, | Performed by: INTERNAL MEDICINE

## 2022-05-03 PROCEDURE — 99999 PR PBB SHADOW E&M-EST. PATIENT-LVL I: ICD-10-PCS | Mod: PBBFAC,,, | Performed by: AUDIOLOGIST-HEARING AID FITTER

## 2022-05-03 PROCEDURE — 92557 PR COMPREHENSIVE HEARING TEST: ICD-10-PCS | Mod: S$GLB,,, | Performed by: AUDIOLOGIST-HEARING AID FITTER

## 2022-05-03 PROCEDURE — 1125F PR PAIN SEVERITY QUANTIFIED, PAIN PRESENT: ICD-10-PCS | Mod: CPTII,S$GLB,, | Performed by: STUDENT IN AN ORGANIZED HEALTH CARE EDUCATION/TRAINING PROGRAM

## 2022-05-03 PROCEDURE — 3078F DIAST BP <80 MM HG: CPT | Mod: CPTII,S$GLB,, | Performed by: STUDENT IN AN ORGANIZED HEALTH CARE EDUCATION/TRAINING PROGRAM

## 2022-05-03 PROCEDURE — 69210 REMOVE IMPACTED EAR WAX UNI: CPT | Mod: S$GLB,,, | Performed by: STUDENT IN AN ORGANIZED HEALTH CARE EDUCATION/TRAINING PROGRAM

## 2022-05-03 NOTE — PROGRESS NOTES
Health Maintenance Due   Topic Date Due    Diabetes Urine Screening  Never done    TETANUS VACCINE  Never done    Aspirin/Antiplatelet Therapy  Never done    Shingles Vaccine (1 of 2) Never done    Foot Exam  01/03/2019    Eye Exam  07/12/2019    COVID-19 Vaccine (4 - Booster for Pfizer series) 02/06/2022     Updates were requested from care everywhere.  Chart was reviewed for overdue Proactive [facility] Encounters ([facility]) topics (CRS, Breast Cancer Screening, Eye exam)  Health Maintenance has been updated.  LINKS immunization registry triggered.  Immunizations were reconciled.

## 2022-05-03 NOTE — PROGRESS NOTES
Chief complaint:   Chief Complaint   Patient presents with    Cerumen Impaction        History of Present Illness:     Mr. Martinez is a 81 y.o. male presenting for evaluation of hearing loss.     He describes a equally, bilateral sided hearing loss starting many years ago and has been gradually worsening.      The patient reports the following risk factors for hearing loss (Negative unless checked off):   [] Familial deafness   [] Ototoxic medication exposure  [] Acoustic trauma  []  Occupational exposure  [] Head injury or trauma  [] Otologic infection  [] History of meningitis  [] Ear surgery (other than pediatric tympanostomy tubes)  [] Metabolic disease      Severity: moderate  Quality: muffled  Modifying factors:  None  Associated symptoms include   [] Vertigo  [x] Tinnitus - bilateral, infrequent, not bothersome   [] Otalgia  [] Drainage or pain   Previous treatments include none.    The patient denies significant eustachian tube risk factors: ear pressure, ear pain, sensation of clogging, ear symptoms with a cold or sinusitis, popping or crackling sensation, ringing in the ear, and muffled hearing.     The patient also denies pain deep within the ear, tenderness around the ear canal or pre-auricular area, or headaches.         Review of Systems     A complete 10 point ROS was completed and are positive as per above HPI.    Otherwise negative for fever, diplopia, chest pain, shortness of breath, vomiting, blood in urine, joint pain, skin rash, seizures and unusual bleeding.       History        Past Medical History:   Past Medical History:   Diagnosis Date    Hypertension     Ulcers of both lower legs 6/13/2018    .          Past Surgical History:  Past Surgical History:   Procedure Laterality Date    CATARACT EXTRACTION, BILATERAL      LIPOMA RESECTION Bilateral     neck     PROSTATE SURGERY      REPAIR OF EYELID      TOTAL KNEE ARTHROPLASTY Left    .         Medications: Medication list was  reviewed. He  has a current medication list which includes the following prescription(s): accu-chek anjana plus test strp, accu-chek softclix lancets, albuterol, alfuzosin, aspirin, atorvastatin, bd ultra-fine erik pen needle, bumetanide, cetirizine, dutasteride, fluticasone propionate, fluticasone-umeclidin-vilanter, fluzone highdose quad 20-21 pf, gabapentin, hydrocodone-acetaminophen, isosorbide mononitrate, lancets, lantus solostar u-100 insulin, levemir flextouch u-100 insuln, levofloxacin, levofloxacin, ofloxacin, pantoprazole, pen needle, diabetic, potassium chloride, prednisone, prednisone, silver sulfadiazine 1%, tamsulosin, tradjenta, trueplus insulin, valsartan, ventolin hfa, and blood-glucose meter.         Allergies: Review of patient's allergies indicates:  No Known Allergies         Family history: family history is not on file.         Social History          Alcohol use:  reports no history of alcohol use.            Tobacco:  reports that he quit smoking about 2 years ago. He has a 29.00 pack-year smoking history. He has quit using smokeless tobacco.         Please see the patient's intake form for full details of past medical history, past surgical history, family history, social history and review of systems. ?This information was reviewed by me and noted.      Physical Examination     General: Well developed, well nourished, well hydrated. Verbal with a strong voice and not dysphonic.     Head/Face: Normocephalic, atraumatic. No scars or lesions. Facial musculature equal.     Eyes: No scleral icterus or conjunctival hemorrhage. EOMI. PERRLA.     Ears: after disimpaction    · Right ear: No gross deformity. EAC is tortuous, but clear of debris and erythema. The TM is intact with a pneumatized middle ear. No signs of retraction, fluid or infection.      · Left ear: No gross deformity. EAC is tortuous, but clear of debris and erythema. The TM is intact with a pneumatized middle ear. No signs of  retraction, fluid or infection.     Neurologic: Moving all extremities without gross abnormality.CN II-XII grossly intact. House-Brackmann 1/6. No signs of nystagmus.      Psych: Alert and oriented to person, place, and time with an appropriate mood and affect.       Data Review    Review of records:      I reviewed records from the referring provider's office visits including the history, workup, and/or treatment of this problem thus far.    Audiogram     Audiogram, 05/03/2022 was independently reviewed:      Slight decrease in L compared to R    Procedure: ear microscopy with removal of cerumen    Description: The patient was in agreement with the examination and debridement of the ears. Removal of the cerumen required use of an operating microscope and multiple micro-instruments.     With the patient in the supine position, we used the operating microscope to examine both ears with the appropriate sized ear speculum.  A variety of sterile, micro-instruments were utilized to remove the cerumen atraumatically.  I performed the procedure which required a significant amount of time and effort. The tympanic membrane was then well visualized.  The patient tolerated the procedure well and there were no complications.    Findings:   Right ear had moderate wax, the EAC was normal, and the tympanic membrane was intact with no evidence of middle ear fluid.    Left ear had significant wax, the EAC was normal, and the tympanic membrane was intact with no evidence of middle ear fluid.         Assessment     Hearing loss, unspecified hearing loss type, unspecified laterality     Plan:      I spent a considerable amount of time educating the patient on hearing and hearing loss.  We discussed the basic characteristics of conductive hearing loss versus sensorineural hearing loss and the significant differences in treatment options between the two categories.      We discussed that in cases of conductive hearing loss, this suggests  a mechanical disorder that sometimes can be improved with medications &/or surgery.  It may occur secondary to external ear pathology (atresia, otitis externa, etc), tympanic membrane disorders (large perforations, immobility due to scarring or eustachian tube dysfunction), and middle ear disorders (effusions, ossicular disorders).    Sensorineural hearing loss is the expected hearing loss pattern with aging, but some disorders such as Meniere's may accelerate this process.  Additionally, amplification with hearing aids is generally the best option for hearing rehabilitation, except where the hearing loss is profound.  We discussed that this generally does not represent a dangerous condition, but in cases where there is a large discrepancy between the two ears in terms of nerve function, more investigation is often necessary due to the possiblity of vestibular schwannomas or meningiomas at the cerebellopontine angle.  The definitive test for this is an MRI with gadolinium.  However, these masses are usually very slow growing (1-2mm/year), so patients may elect to repeat an audiogram in about 6 months or obtain an ABR so long as they understand that this may result in a delay in diagnosis (although very unlikely that this would have a significant clinical impact on their outcome due to the slow growing nature of these masses) with the understanding that if ABR is abnormal or asymmetry increases, an MRI would then be required.    Jose Luis  presents with what appears to be sensorineural hearing loss, with a mild asymmetric component (present before, but slightly worse compared to 2020).  Based on this, my recommendation is repeat audiogram in 6-12 months, sooner if concern for worsening hearing, tinnitus, pain, dizziness.         Barak Barrera MD  [facility]   [facility] [place]  [id number] [place] [address].  [place] [id number]  P: [phone]  F: [phone]

## 2022-05-03 NOTE — PROGRESS NOTES
Referring provider: Dr. Jackie Martinez was seen 05/03/2022 for an audiological evaluation.  Patient complains of hearing loss starting many years ago and gradually declining. He noted an improvement following cerumen removal today but continues to endorse hearing loss. He will get tinnitus on occasion, non-bothersome. Patient's last audiogram was May 2020 and revealed left poorer than right SNHL.     Results reveal a mild-to-moderate sensorineural hearing loss 250-8000 Hz for the right ear, and a moderate-to-severe sensorineural hearing loss 250-8000 Hz for the left ear.   Speech Reception Thresholds were 40 dBHL for the right ear and 60 dBHL for the left ear.   Word recognition scores were fair for the right ear and poor for the left ear.   Tympanograms were Type A for the right ear and Type A for the left ear.    Since his previous audiogram, hearing for the right ear is stable and hearing for the left ear has declined in the low frequencies and for word recognition.   Patient was counseled on the above findings.    Recommendations:  1. ENT review  2. Annual audiogram to monitor hearing loss, or sooner if perceived changes in hearing, tinnitus, dizziness or otalgia.   3. Hearing aids. Patient is provided copies of his audiogram and is notified about Jarona Khushbooeachinng.

## 2022-05-04 ENCOUNTER — PATIENT MESSAGE (OUTPATIENT)
Dept: FAMILY MEDICINE | Facility: CLINIC | Age: 82
End: 2022-05-04
Payer: MEDICARE

## 2022-05-04 ENCOUNTER — PATIENT MESSAGE (OUTPATIENT)
Dept: HEMATOLOGY/ONCOLOGY | Facility: CLINIC | Age: 82
End: 2022-05-04
Payer: MEDICARE

## 2022-05-04 ENCOUNTER — TELEPHONE (OUTPATIENT)
Dept: FAMILY MEDICINE | Facility: CLINIC | Age: 82
End: 2022-05-04
Payer: MEDICARE

## 2022-05-04 ENCOUNTER — TELEPHONE (OUTPATIENT)
Dept: HEMATOLOGY/ONCOLOGY | Facility: CLINIC | Age: 82
End: 2022-05-04
Payer: MEDICARE

## 2022-05-04 DIAGNOSIS — D64.9 ANEMIA, UNSPECIFIED TYPE: Primary | ICD-10-CM

## 2022-05-04 NOTE — PROCEDURES
[address] [facility] * [place] [address]  Telephone: [phone]  Test date: 22 Start: 22 00:33:50 Jose Luis Martinez  Doctor: ARCADIO Monroy End: 22 07:38:50 [id number]  Oximetry: Summary Report  Comments: CPAP +8 and room air  Recording time: 07:05:00 Highest pulse: 89 Highest SpO2: 99%  Excluded samplin:01:28 Lowest pulse: 37 Lowest SpO2: 82%  Total valid samplin:03:32 Mean pulse: 56 Mean SpO2: 96.4%  1 S.D.: 10.2 1 S.D.: 1.4  Time with SpO2<90: 0:00:40, 0.2%  Time with SpO2<80: 0:00:00, 0.0%  Time with SpO2<70: 0:00:00, 0.0%  Time with SpO2<60: 0:00:00, 0.0%  Time with SpO2<89: 0:00:32, 0.1%  Time with SpO2 =>90: 7:02:52, 99.8%  Time with SpO2=>80 & <90: 0:00:40, 0.2%  Time with SpO2=>70 & <80: 0:00:00, 0.0%  Time with SpO2=>60 & <70: 0:00:00, 0.0%  The longest continuous time with saturation <=88 was 00:00:20, which started at  22 07:38:30.  A desaturation event was defined as a decrease of saturation by 4 or more.  No events were excluded due to artifact.  There were 3 desaturation events over 3 minutes duration.  There were 31 desaturation events of less than 3 minutes duration during which:  The mean high was 97.5%. The mean low was 92.6%.  The number of these events that were:  > 0 & <10 seconds: 2 > 0 seconds: 31  =>10 & <20 seconds: 4 =>10 seconds: 29  =>20 & <30 seconds: 8 =>20 seconds: 25  =>30 & <40 seconds: 4 =>30 seconds: 17  =>40 & <50 seconds: 2 =>40 seconds: 13  =>50 & <60 seconds: 1 =>50 seconds: 11  =>60 seconds: 10 =>60 seconds: 10  The mean length of desaturation events that were >=10 sec & <=3 mins was: 63.7 sec.  Desaturation event index (events >=10 sec per sampled hour): 4.1  Desaturation event index (events >= 0 sec per sampled hour): 4.4  2003-    OVERNIGHT OXIMETRY REPORT:    Dictated by: Marco Patricia MD  Test date: 2022  Dictated on: 2022      Comment: This test was performed on CPAP and Room Air     A desaturation event was defined as a  decrease of saturation by 4 or more.    REPORT SUMMARY  Total valid samplin:03:32   High SpO2: 99%    Low SpO2: 82%    Mean SpO2  96.4 %  Cumulative time with oxygen saturation less than 88% (TC88): 0:00:32    CLINICAL INTERPRETATION  Results are normal,  There is no significant nocturnal oxygen desaturation, and  Clinical correlation is advised    Medicare Criteria Comments:   Oximetry test results suggest the patient does not fall under Medicare Group 1 Criteria. ( Arterial oxygen saturation at or below 88% for at least 5 minutes taken during sleep)  Marco Patricia MD    Details about Medicare Group Criteria coverage can be found at http://www.cms.hhs.gov/manuals/downloads/

## 2022-05-18 ENCOUNTER — TELEPHONE (OUTPATIENT)
Dept: PULMONOLOGY | Facility: CLINIC | Age: 82
End: 2022-05-18
Payer: MEDICARE

## 2022-05-18 NOTE — TELEPHONE ENCOUNTER
Chronic Disease Management:   Called patient to confirm Pulmonary Disease Management appointment. No answer

## 2022-05-19 ENCOUNTER — CLINICAL SUPPORT (OUTPATIENT)
Dept: PULMONOLOGY | Facility: CLINIC | Age: 82
End: 2022-05-19
Payer: MEDICARE

## 2022-05-19 DIAGNOSIS — J44.9 OSA AND COPD OVERLAP SYNDROME: Primary | ICD-10-CM

## 2022-05-19 DIAGNOSIS — G47.33 OSA AND COPD OVERLAP SYNDROME: Primary | ICD-10-CM

## 2022-05-19 NOTE — PROGRESS NOTES
"Pulmonary Disease Management  [facility]  Follow up Visit  Chronic Care Management    Jose Luis Martinez  was seen 5/19/2022  09:45 AM in the Pulmonary Disease Management Clinic for evaluation, education, reinforcement of self management techniques and exacerbation action plan.    Lizzie Perez    Past Medical History:   Diagnosis Date    Hypertension     Ulcers of both lower legs 6/13/2018       Patient's Medications   New Prescriptions    No medications on file   Previous Medications    ACCU-CHEK DAVID PLUS TEST STRP STRP    Apply 1 each topically 2 (two) times daily as needed.    ACCU-CHEK SOFTCLIX LANCETS MISC    USE TO TEST TWICE DAILY    ALBUTEROL (PROVENTIL) 2.5 MG /3 ML (0.083 %) NEBULIZER SOLUTION    Take 3 mLs (2.5 mg total) by nebulization every 6 (six) hours as needed for Wheezing. Rescue    ALFUZOSIN (UROXATRAL) 10 MG TB24        ASPIRIN ORAL    1 tab    ATORVASTATIN (LIPITOR) 40 MG TABLET    TAKE ONE TABLET BY MOUTH ONCE A DAY    BD ULTRA-FINE VINNIE PEN NEEDLES 32 GAUGE X 5/32" NDLE        BLOOD-GLUCOSE METER KIT    Use as instructed    BUMETANIDE (BUMEX) 2 MG TABLET    Take 1 tablet (2 mg total) by mouth 2 (two) times daily.    CETIRIZINE (ZYRTEC) 10 MG TABLET    Take 10 mg by mouth.    DUTASTERIDE (AVODART) 0.5 MG CAPSULE        FLUTICASONE PROPIONATE (FLONASE) 50 MCG/ACTUATION NASAL SPRAY    USE 2 SPRAYS IN EACH NOSTRIL EVERY DAY    FLUTICASONE-UMECLIDIN-VILANTER (TRELEGY ELLIPTA) 100-62.5-25 MCG DSDV    Inhale 1 puff into the lungs once daily.    FLUZONE HIGHDOSE QUAD 20-21  MCG/0.7 ML SYRG        GABAPENTIN (NEURONTIN) 100 MG CAPSULE    Take 1 capsule (100 mg total) by mouth every evening.    HYDROCODONE-ACETAMINOPHEN 10-325MG (NORCO)  MG TAB        ISOSORBIDE MONONITRATE (IMDUR) 30 MG 24 HR TABLET        LANCETS 32 GAUGE MISC    1 lancet by Misc.(Non-Drug; Combo Route) route 2 (two) times daily.    LANALEENA PÉREZAR U-100 INSULIN GLARGINE 100 UNITS/ML (3ML) SUBQ PEN    " "INJECT 15 UNITS INTO THE SKIN EVERY EVENING.    LEVEMIR FLEXTOUCH U-100 INSULN 100 UNIT/ML (3 ML) INPN PEN    INJECT 15 UNITS INTO THE SKIN EVERY EVENING    LEVOFLOXACIN (LEVAQUIN) 750 MG TABLET    levofloxacin Take 1 Tablet (oral) 1 time per day for 7 days 20220421 tablet 1 time per day oral 7 days active 750 mg    LEVOFLOXACIN (LEVAQUIN) 750 MG TABLET    TAKE ONE TABLET BY MOUTH DAILY FOR SEVEN DAYS.    OFLOXACIN (FLOXIN) 0.3 % OTIC SOLUTION    Place 5 drops into the left ear 2 (two) times daily.    PANTOPRAZOLE (PROTONIX) 40 MG TABLET    TAKE 1 TABLET BY MOUTH ONCE DAILY.    PEN NEEDLE, DIABETIC (BD ULTRA-FINE VINNIE PEN NEEDLE) 32 GAUGE X 5/32" NDLE    1 Units by Misc.(Non-Drug; Combo Route) route once daily.    POTASSIUM CHLORIDE (KLOR-CON) 10 MEQ TBSR    Take 1 tablet (10 mEq total) by mouth once daily.    PREDNISONE (DELTASONE) 20 MG TABLET    prednisone Take 2 Tablet (oral) 1 time per day for 5 days 20220421 tablet 1 time per day oral 5 days active 20 MG    PREDNISONE (DELTASONE) 20 MG TABLET    TAKE 2 TABLETS BY MOUTH EVERY DAY FOR FIVE DAYS.    SILVER SULFADIAZINE 1% (SILVADENE) 1 % CREAM    Apply topically once daily.    TAMSULOSIN (FLOMAX) 0.4 MG CP24        TRADJENTA 5 MG TAB TABLET    TAKE 1 TABLET BY MOUTH ONCE DAILY    TRUEPLUS INSULIN 0.5 ML 31 GAUGE X 5/16" SYRG    INJECT TWO TIMES A DAY AS DIRECTED    VALSARTAN (DIOVAN) 160 MG TABLET    TAKE ONE TABLET BY MOUTH ONCE A DAY    VENTOLIN HFA 90 MCG/ACTUATION INHALER    INHALE 2 PUFFS INTO THE LUNGS EVERY 6 HOURS AS NEEDED FOR WHEEZING.   Modified Medications    No medications on file   Discontinued Medications    No medications on file             Educational assessment:   [x]            Good  []            Fair  []            Poor    Readiness to learn:   [x]            Good  []            Fair  []            Poor    Vision Status:   [x]            Good  []            Fair  []            Poor    Reading Ability:  [x]            Good  []            " Fair  []            Poor    Knowledge of condition:   []            Good  []            Fair  []            Poor    Language Barriers:   []            Good  []            Fair  []            Poor  [x]            None    Cognitive/ Physical Barriers:   []            Good  []            Fair  []            Poor  [x]            None    Learning best by:                       [x]            Seeing  [x]            Hearing  [x]            Reading                         [x]            Doing    Describe any barrier /Limitation or financial implications of care choices identified     []            Financial  []            Emotional  []            Education  []            Vision/Hearing  []            Physical  [x]            None  []                TOPIC /CONTENT FOR TODAY:    [x]            MDI with or without spacer  []            Dry powder inhaler  []            Acapella   []           Peak Flow meter  [x]            COPD action plan  [x]            Nebulizer use  []            Oxygen use safety  []            Breathing and cough techniques  []            Energy conservation  []            Infection prevention  [x]           OTHER________________________        Learner:    [x]            Patient   []            Caregiver    Method:    [x]            Verbal explanation  []            Audio visual    [x]            Literature  [x]            Teach back      Evaluation:    [x]            Teach back  []            Demonstrate  [x]            Follow up phone call    []            2 weeks     []            4 weeks   []            PRN    Additional comments:   Patient was seen today to review respiratory medication purpose and proper technique for use of inhalers. Patient practiced proper technique using MDI inhaler. He no longer take Trelegy and state he is doing well. Patient demonstrated understanding. Literature was given to patient.     Infection prevention was discussed. Patient was reminded to get influenza vaccine.  Hand hygiene and cleaning of respiratory equipment was also discussed. Patient verbalized understanding.      COPD action plan  And self monitoring were reviewed and literature was given to patient. Patient verbalized understanding.      Plan:  Monthly Pulmonary Disease Management Questionnaire  Follow-up PDM appointment scheduled for 10/27/22 at 1000 at [facility]    Reinforce education  Meds: Albuterol  DME Needs: [facility]  Action Plan: COPD  Immunization: Pneumococcal- CURRENT, Flu-CURRENT , Covid 19- CURRENT  Next Provider Visit: 10/27/22  Next Spirometry/CPFT: NOT SCHEDULED  Approximate time spent with patient: 30 MINUTES

## 2022-05-19 NOTE — PATIENT INSTRUCTIONS
ACTION PLAN    GREEN ZONE  My sputum is clear/white/usual color and easily cleared.  My breathing is no harder than usual.  I can do my usual activities.  I can think clearly.   Take your usual medicines, including oxygen, as you are told to do so by your health care provider.   YELLOW ZONE  My sputum has change (color, thickness, amount).  I am more short of breath than usual.  I cough or wheeze more.  I weigh more and my legs/feet swell.  I cannot do my usual activities without resting.   Call your health care provider. You will probably be told to begin taking an antibiotic and prednisone. Have your pharmacy phone number available.   RED ZONE  I cannot cough out my sputum.  I am much more short of breath than normal.  I need to sit up to breathe  I cannot do my usual activities.  I am unable to speak more than one or two words at a time.  I am confused.   Call your health care provider. You may be asked to come in to be seen, told to go to the emergency room, or told to call 9-1-1.

## 2022-06-03 RX ORDER — INSULIN GLARGINE 100 [IU]/ML
INJECTION, SOLUTION SUBCUTANEOUS
Qty: 6 ML | Refills: 5 | Status: SHIPPED | OUTPATIENT
Start: 2022-06-03 | End: 2022-07-03

## 2022-06-03 NOTE — TELEPHONE ENCOUNTER
Refill Routing Note   Medication(s) are not appropriate for processing by [facility] for the following reason(s):      - Required laboratory values are abnormal    [facility] action(s):  Defer Medication-related problems identified: Requires labs        Medication reconciliation completed: No     Appointments  past 12m or future 3m with PCP    Date Provider   Last Visit   4/25/2022 Matti Mejia MD   Next Visit   8/25/2022 Matti Mejia MD   ED visits in past 90 days: 0        Note composed:1:32 PM 06/03/2022

## 2022-06-03 NOTE — TELEPHONE ENCOUNTER
Care Due:                  Date            Visit Type   Department     Provider  --------------------------------------------------------------------------------                                EP -                              PRIMARY      [facility]  Last Visit: 04-      CARE ([facility])   MEDICINE       Matti Mejia                              EP -                              PRIMARY      [facility]  Next Visit: 08-      CARE ([facility])   DEE DEE Mejia                                                            Last  Test          Frequency    Reason                     Performed    Due Date  --------------------------------------------------------------------------------    Lipid Panel.  12 months..  atorvastatin.............  08- 08-    [facility] Embedded Care Gaps. Reference number: [id number]. 6/03/2022   8:54:57 AM CDT

## 2022-06-22 ENCOUNTER — TELEPHONE (OUTPATIENT)
Dept: HEMATOLOGY/ONCOLOGY | Facility: CLINIC | Age: 82
End: 2022-06-22
Payer: MEDICARE

## 2022-06-22 ENCOUNTER — PATIENT MESSAGE (OUTPATIENT)
Dept: HEMATOLOGY/ONCOLOGY | Facility: CLINIC | Age: 82
End: 2022-06-22
Payer: MEDICARE

## 2022-06-22 NOTE — TELEPHONE ENCOUNTER
Left message in reference to rescheduling today's  Hematology appointment.  Mychart message sent.

## 2022-06-23 ENCOUNTER — PATIENT OUTREACH (OUTPATIENT)
Dept: PULMONOLOGY | Facility: CLINIC | Age: 82
End: 2022-06-23
Payer: MEDICARE

## 2022-07-13 DIAGNOSIS — I10 ESSENTIAL HYPERTENSION: Primary | ICD-10-CM

## 2022-07-13 DIAGNOSIS — E11.65 TYPE 2 DIABETES MELLITUS WITH HYPERGLYCEMIA, WITH LONG-TERM CURRENT USE OF INSULIN: Primary | ICD-10-CM

## 2022-07-13 DIAGNOSIS — Z79.4 TYPE 2 DIABETES MELLITUS WITH HYPERGLYCEMIA, WITH LONG-TERM CURRENT USE OF INSULIN: Primary | ICD-10-CM

## 2022-07-20 ENCOUNTER — TELEPHONE (OUTPATIENT)
Dept: FAMILY MEDICINE | Facility: CLINIC | Age: 82
End: 2022-07-20
Payer: MEDICARE

## 2022-07-20 NOTE — TELEPHONE ENCOUNTER
----- Message from Katalina Gallagher sent at 7/20/2022 12:24 PM CDT -----  Contact: pt  The pt request a return call, no additional info given and can be reached at [phone]///thxMW

## 2022-07-20 NOTE — TELEPHONE ENCOUNTER
Spoke to pt he ran out of his albuterol inhaler and states that its too soon to fill and it will cost him $40 to refill it now.  He can not afford it.  He is having problems breathing and wants to know if you can call something else in to help

## 2022-08-02 ENCOUNTER — TELEPHONE (OUTPATIENT)
Dept: FAMILY MEDICINE | Facility: CLINIC | Age: 82
End: 2022-08-02
Payer: MEDICARE

## 2022-08-02 DIAGNOSIS — I83.009 VENOUS STASIS ULCER, UNSPECIFIED SITE, UNSPECIFIED ULCER STAGE, UNSPECIFIED WHETHER VARICOSE VEINS PRESENT: Primary | ICD-10-CM

## 2022-08-02 DIAGNOSIS — L97.909 VENOUS STASIS ULCER, UNSPECIFIED SITE, UNSPECIFIED ULCER STAGE, UNSPECIFIED WHETHER VARICOSE VEINS PRESENT: Primary | ICD-10-CM

## 2022-08-02 NOTE — TELEPHONE ENCOUNTER
----- Message from Joann Yoon sent at 8/2/2022  3:58 PM CDT -----  Contact: Carine ( wife)  Carine Martinez would like a call back at [phone], in regards to pt leg fluid.

## 2022-08-02 NOTE — TELEPHONE ENCOUNTER
Wife called pts leg is full of fluid again and skin is tearing off.  Can not get him anywhere to take care of it.  She is asking for [facility] to come out again like they did last time.

## 2022-08-03 NOTE — TELEPHONE ENCOUNTER
----- Message from Denice Gibbons sent at 8/3/2022  1:06 PM CDT -----  Pt wife called in re: appt for wound care for his legs needs [facility] to come out to home please call pt @ .[phone] please call wife Carine     [facility]

## 2022-08-03 NOTE — TELEPHONE ENCOUNTER
Care Due:                  Date            Visit Type   Department     Provider  --------------------------------------------------------------------------------                                EP -                              PRIMARY      [facility]  Last Visit: 04-      CARE ([facility])   DEE DEE Mejia                              EP -                              PRIMARY      [facility]  Next Visit: 08-      CARE ([facility])   DEE DEE Mejia                                                            Last  Test          Frequency    Reason                     Performed    Due Date  --------------------------------------------------------------------------------    HBA1C.......  6 months...  GRADY CONN,           04-   10-                             TRADJENTA................    Lipid Panel.  12 months..  atorvastatin.............  08- 08-    [facility] Embedded Care Gaps. Reference number: [id number]. 8/03/2022   12:51:51 PM CDT

## 2022-08-03 NOTE — TELEPHONE ENCOUNTER
Call returned.  Advised pt [facility] is not in network.  Working on trying to find someone else.  Advised her to take him to the Er if it gets any worse.  She stated she will wait to see how long before a H[facility] can get out there.

## 2022-08-04 RX ORDER — BLOOD SUGAR DIAGNOSTIC
STRIP MISCELLANEOUS
Qty: 200 STRIP | Refills: 12 | Status: SHIPPED | OUTPATIENT
Start: 2022-08-04

## 2022-08-04 NOTE — TELEPHONE ENCOUNTER
----- Message from Emerald Gray sent at 8/4/2022  9:24 AM CDT -----  Contact: Ruthann/[phone]  [facility] is calling for more information and they need list of medications and last office notes and last physical and they can admit on 8/5/2022. Please give Ruthann a call at [phone]. Thanks/mick

## 2022-08-04 NOTE — TELEPHONE ENCOUNTER
----- Message from Natalie Michael sent at 8/4/2022  4:01 PM CDT -----  Contact: Shirayanna  Pt's wife Carine is asking for an return call in reference to blood test strips being called into pharmacy for her , please call back at .[phone]

## 2022-08-05 ENCOUNTER — TELEPHONE (OUTPATIENT)
Dept: FAMILY MEDICINE | Facility: CLINIC | Age: 82
End: 2022-08-05
Payer: MEDICARE

## 2022-08-05 PROCEDURE — G0180 MD CERTIFICATION HHA PATIENT: HCPCS | Mod: ,,, | Performed by: INTERNAL MEDICINE

## 2022-08-05 PROCEDURE — G0180 PR HOME HEALTH MD CERTIFICATION: ICD-10-PCS | Mod: ,,, | Performed by: INTERNAL MEDICINE

## 2022-08-05 NOTE — TELEPHONE ENCOUNTER
JESUSITA was calling to see if it is ok to use parfin wrap on his wounds and if they can have a  come out also.

## 2022-08-05 NOTE — TELEPHONE ENCOUNTER
----- Message from Jaleelyajuan Michael sent at 8/5/2022  9:51 AM CDT -----  Contact: Naty/[facility]  Naty would like to consult with nurse regarding pt's wounds on both lower legs. She is needing instructions for care from Doctor.Please give her a call back at [phone].

## 2022-08-22 NOTE — TELEPHONE ENCOUNTER
Called pt to answer.  lvm to call back.  Need to know why he is asking for this, Dr. Mejia has never ordered him this and looks like someone gave him this back in 2017.

## 2022-08-22 NOTE — TELEPHONE ENCOUNTER
----- Message from Natalie Michael sent at 8/22/2022  4:53 PM CDT -----  Contact: self  .Type:  Patient Returning Call    Who Called:.Jose Luis Martinez  Who Left Message for Patient:  Does the patient know what this is regarding?: medication   Would the patient rather a call back or a response via [facility]? Call back   Best Call Back Number:.[phone]  Additional Information: pt states she missed a call

## 2022-08-22 NOTE — TELEPHONE ENCOUNTER
----- Message from Antony Fuller sent at 8/22/2022  4:45 PM CDT -----  Contact: [phone]  Requesting an RX refill or new RX.  Is this a refill or new RX: refill    RX name and strength (copy/paste from chart):  hydrocodone-acetaminophen 10-325mg (NORCO)  mg Tab    Is this a 30 day or 90 day RX: 10    Pharmacy name and phone # (copy/paste from chart):      [facility] - [place] - [address] [place] [address].  [address] [place] [address] Ave.  [address]  [facility] [address]  Phone: [phone] Fax: [phone]       The doctors have asked that we provide their patients with the following 2 reminders -- prescription refills can take up to 72 hours, and a friendly reminder that in the future you can use your [facility] account to request refills:

## 2022-08-23 NOTE — TELEPHONE ENCOUNTER
Spoke with wife pt is with pain management and she was wanting a refill on his norco.  Told her she would have to call pain management to get it that he has signed contract with them and we can not prescribe any pain medications.  She voiced understanding and will call them.

## 2022-08-23 NOTE — TELEPHONE ENCOUNTER
----- Message from Jessica Oviedo sent at 8/23/2022  8:45 AM CDT -----  Regarding: missed calls  Contact: wife, Carine  Returning call to office. Can be reached at  [phone]

## 2022-08-26 ENCOUNTER — EXTERNAL HOME HEALTH (OUTPATIENT)
Dept: HOME HEALTH SERVICES | Facility: HOSPITAL | Age: 82
End: 2022-08-26
Payer: MEDICARE

## 2022-08-26 ENCOUNTER — TELEPHONE (OUTPATIENT)
Dept: ADMINISTRATIVE | Facility: HOSPITAL | Age: 82
End: 2022-08-26
Payer: MEDICARE

## 2022-08-29 DIAGNOSIS — Z79.4 TYPE 2 DIABETES MELLITUS WITH HYPERGLYCEMIA, WITH LONG-TERM CURRENT USE OF INSULIN: ICD-10-CM

## 2022-08-29 DIAGNOSIS — E11.65 TYPE 2 DIABETES MELLITUS WITH HYPERGLYCEMIA, WITH LONG-TERM CURRENT USE OF INSULIN: ICD-10-CM

## 2022-08-29 RX ORDER — LINAGLIPTIN 5 MG/1
5 TABLET, FILM COATED ORAL DAILY
Qty: 90 TABLET | Refills: 3 | Status: SHIPPED | OUTPATIENT
Start: 2022-08-29 | End: 2022-10-18

## 2022-08-29 NOTE — TELEPHONE ENCOUNTER
No new care gaps identified.  [facility] Embedded Care Gaps. Reference number: [id number]. 8/29/2022   2:29:55 PM CDT

## 2022-09-07 ENCOUNTER — TELEPHONE (OUTPATIENT)
Dept: HEMATOLOGY/ONCOLOGY | Facility: CLINIC | Age: 82
End: 2022-09-07
Payer: MEDICARE

## 2022-09-07 DIAGNOSIS — R76.8 ELEVATED SERUM IMMUNOGLOBULIN FREE LIGHT CHAIN LEVEL: ICD-10-CM

## 2022-09-07 DIAGNOSIS — D64.9 ANEMIA, UNSPECIFIED TYPE: Primary | ICD-10-CM

## 2022-09-16 ENCOUNTER — TELEPHONE (OUTPATIENT)
Dept: INTERNAL MEDICINE | Facility: CLINIC | Age: 82
End: 2022-09-16
Payer: MEDICARE

## 2022-09-16 ENCOUNTER — TELEPHONE (OUTPATIENT)
Dept: HEMATOLOGY/ONCOLOGY | Facility: CLINIC | Age: 82
End: 2022-09-16
Payer: MEDICARE

## 2022-09-16 NOTE — TELEPHONE ENCOUNTER
----- Message from Joann Yoon sent at 9/16/2022  3:24 PM CDT -----  Contact: Carine (wife)  Shirayanna would like a call back at [phone], in regards to getting pt lab orders faxed over to [facility] before his scheduled appointment.           Spoke with pt's wife regarding lab orders being faxed over to [facility], Informed pt's wife I would call to get a fax number once we received confirmation that the fax was received, she would be notified. Pt's wife verbalized her understanding

## 2022-09-16 NOTE — TELEPHONE ENCOUNTER
----- Message from Joann Yoon sent at 9/16/2022  3:26 PM CDT -----  Contact: Carine (wife)  Carine would like a call back at [phone], in regards to medication for pt neuropathy.

## 2022-09-21 ENCOUNTER — TELEPHONE (OUTPATIENT)
Dept: ADMINISTRATIVE | Facility: HOSPITAL | Age: 82
End: 2022-09-21
Payer: MEDICARE

## 2022-09-30 ENCOUNTER — OFFICE VISIT (OUTPATIENT)
Dept: HEMATOLOGY/ONCOLOGY | Facility: CLINIC | Age: 82
End: 2022-09-30
Payer: MEDICARE

## 2022-09-30 DIAGNOSIS — R76.8 ELEVATED SERUM IMMUNOGLOBULIN FREE LIGHT CHAIN LEVEL: ICD-10-CM

## 2022-09-30 DIAGNOSIS — D64.9 ANEMIA, UNSPECIFIED TYPE: Primary | ICD-10-CM

## 2022-09-30 DIAGNOSIS — D47.2 MGUS (MONOCLONAL GAMMOPATHY OF UNKNOWN SIGNIFICANCE): ICD-10-CM

## 2022-09-30 PROCEDURE — 1160F RVW MEDS BY RX/DR IN RCRD: CPT | Mod: CPTII,95,,

## 2022-09-30 PROCEDURE — 1160F PR REVIEW ALL MEDS BY PRESCRIBER/CLIN PHARMACIST DOCUMENTED: ICD-10-PCS | Mod: CPTII,95,,

## 2022-09-30 PROCEDURE — 99214 PR OFFICE/OUTPT VISIT, EST, LEVL IV, 30-39 MIN: ICD-10-PCS | Mod: 95,,,

## 2022-09-30 PROCEDURE — 99214 OFFICE O/P EST MOD 30 MIN: CPT | Mod: 95,,,

## 2022-09-30 PROCEDURE — 1159F PR MEDICATION LIST DOCUMENTED IN MEDICAL RECORD: ICD-10-PCS | Mod: CPTII,95,,

## 2022-09-30 PROCEDURE — 1159F MED LIST DOCD IN RCRD: CPT | Mod: CPTII,95,,

## 2022-10-04 PROCEDURE — G0179 MD RECERTIFICATION HHA PT: HCPCS | Mod: ,,, | Performed by: INTERNAL MEDICINE

## 2022-10-04 PROCEDURE — G0179 PR HOME HEALTH MD RECERTIFICATION: ICD-10-PCS | Mod: ,,, | Performed by: INTERNAL MEDICINE

## 2022-10-12 ENCOUNTER — EXTERNAL HOME HEALTH (OUTPATIENT)
Dept: HOME HEALTH SERVICES | Facility: HOSPITAL | Age: 82
End: 2022-10-12
Payer: MEDICARE

## 2022-10-13 ENCOUNTER — TELEPHONE (OUTPATIENT)
Dept: FAMILY MEDICINE | Facility: CLINIC | Age: 82
End: 2022-10-13
Payer: MEDICARE

## 2022-10-13 NOTE — TELEPHONE ENCOUNTER
Pt. Wife stated that copay was to high on medication tradjenta.They want to know if something cheaper could be called in.

## 2022-10-13 NOTE — TELEPHONE ENCOUNTER
----- Message from Alaina Gardiner sent at 10/13/2022  2:47 PM CDT -----  Contact: Carine Hawk is calling with questions and concerns about the patient diabetic medication. Please give her a call back at     Thanks  LJ

## 2022-10-14 PROBLEM — D64.9 ANEMIA: Status: ACTIVE | Noted: 2018-09-26

## 2022-10-14 NOTE — ASSESSMENT & PLAN NOTE
Labs drawn outside facility  HGB 10.4 grams/deciliter  MCV 99      With prior workup, no notable iron deficiency and TSH within normal limits.   Likely r/t anemia of chronic illness/inflammation  We will continue surveillance and consider BMBx with worsening cytopenia

## 2022-10-14 NOTE — ASSESSMENT & PLAN NOTE
Unfortunately all labs requested not lab drawn  Previous workup included SPEP without paraprotein mild elevation free light chains.    Outside lab notes elevated lambda light chain 28.9g/dl; no FLC-R or kappa light free light chain available for comparison

## 2022-10-14 NOTE — PROGRESS NOTES
Subjective:      Patient ID: Jose Luis Martinez is a 82 y.o. male.    Chief Complaint: Follow-up (Anemia)    The patient location is: Home    The chief complaint leading to consultation is: follow-up    Visit type: audiovisual    Face to Face time with patient: 20  20 minutes of total time spent on the encounter, which includes face to face time and non-face to face time preparing to see the patient (eg, review of tests), Obtaining and/or reviewing separately obtained history, Documenting clinical information in the electronic or other health record, Independently interpreting results (not separately reported) and communicating results to the patient/family/caregiver, or Care coordination (not separately reported).       Each patient to whom he or she provides medical services by telemedicine is:  (1) informed of the relationship between the physician and patient and the respective role of any other health care provider with respect to management of the patient; and (2) notified that he or she may decline to receive medical services by telemedicine and may withdraw from such care at any time.    Notes:      HPI:  Mr. Martinez is a pleasant 82-year-old male, previously seen by Dr. Talbot, presents today with his wife via virtual visit for follow-up of anemia.  His comorbidities include type 2 diabetes, CAD, HTN, and CKD. Today, patient complains of fatigue, dizziness, and worsening breathing problems.  He states this is related to COPD and states his insurance will not cover medications that they have been helpful in treating COPD symptoms.  Denies NVDC, fever chills, abnormal bleeding/bruising.      Social History     Socioeconomic History    Marital status:    Tobacco Use    Smoking status: Former     Packs/day: 1.00     Years: 29.00     Pack years: 29.00     Types: Cigarettes     Quit date: 10/25/2019     Years since quittin.9    Smokeless tobacco: Former   Substance and Sexual Activity    Alcohol use: No  "   Drug use: No       History reviewed. No pertinent family history.    Past Surgical History:   Procedure Laterality Date    CATARACT EXTRACTION, BILATERAL      LIPOMA RESECTION Bilateral     neck     PROSTATE SURGERY      REPAIR OF EYELID      TOTAL KNEE ARTHROPLASTY Left        Past Medical History:   Diagnosis Date    Hypertension     Ulcers of both lower legs 6/13/2018       Review of Systems   Constitutional:  Positive for fatigue.   Respiratory:  Positive for cough and shortness of breath.    Cardiovascular:  Negative for chest pain.   Gastrointestinal:  Negative for anal bleeding, blood in stool, constipation, diarrhea, nausea and vomiting.   Genitourinary:  Negative for hematuria.   Musculoskeletal:  Positive for arthralgias and myalgias.   Neurological:  Positive for dizziness and weakness.   Hematological:  Bruises/bleeds easily.     Medication List with Changes/Refills   Current Medications    ACCU-CHEK DAVID PLUS TEST STRP STRP    TEST BLOOD SUGAR THREE TIMES A DAY AS NEEDED    ACCU-CHEK SOFTCLIX LANCETS MISC    USE TO TEST TWICE DAILY    ALFUZOSIN (UROXATRAL) 10 MG TB24        ASPIRIN ORAL    1 tab    ATORVASTATIN (LIPITOR) 40 MG TABLET    TAKE ONE TABLET BY MOUTH ONCE A DAY    BD ULTRA-FINE VINNIE PEN NEEDLES 32 GAUGE X 5/32" NDLE        BLOOD-GLUCOSE METER KIT    Use as instructed    BUMETANIDE (BUMEX) 2 MG TABLET    Take 1 tablet (2 mg total) by mouth 2 (two) times daily.    CETIRIZINE (ZYRTEC) 10 MG TABLET    Take 10 mg by mouth.    DUTASTERIDE (AVODART) 0.5 MG CAPSULE        FLUTICASONE PROPIONATE (FLONASE) 50 MCG/ACTUATION NASAL SPRAY    2 sprays (100 mcg total) by Each Nostril route once daily.    FLUTICASONE-UMECLIDIN-VILANTER (TRELEGY ELLIPTA) 100-62.5-25 MCG DSDV    Inhale 1 puff into the lungs once daily.    FLUZONE HIGHDOSE QUAD 20-21  MCG/0.7 ML SYRG        GABAPENTIN (NEURONTIN) 100 MG CAPSULE    Take 1 capsule (100 mg total) by mouth every evening.    HYDROCODONE-ACETAMINOPHEN " "10-325MG (NORCO)  MG TAB        ISOSORBIDE MONONITRATE (IMDUR) 30 MG 24 HR TABLET        LANCETS 32 GAUGE MISC    1 lancet by Misc.(Non-Drug; Combo Route) route 2 (two) times daily.    LANTUS SOLOSTAR U-100 INSULIN GLARGINE 100 UNITS/ML SUBQ PEN    INJECT 15 UNITS INTO THE SKIN EVERY EVENING.    LEVEMIR FLEXTOUCH U-100 INSULN 100 UNIT/ML (3 ML) INPN PEN    INJECT 15 UNITS INTO THE SKIN EVERY EVENING    LEVOFLOXACIN (LEVAQUIN) 750 MG TABLET    levofloxacin Take 1 Tablet (oral) 1 time per day for 7 days 20220421 tablet 1 time per day oral 7 days active 750 mg    LEVOFLOXACIN (LEVAQUIN) 750 MG TABLET    TAKE ONE TABLET BY MOUTH DAILY FOR SEVEN DAYS.    LINAGLIPTIN (TRADJENTA) 5 MG TAB TABLET    Take 1 tablet (5 mg total) by mouth once daily.    OFLOXACIN (FLOXIN) 0.3 % OTIC SOLUTION    Place 5 drops into the left ear 2 (two) times daily.    PANTOPRAZOLE (PROTONIX) 40 MG TABLET    TAKE 1 TABLET BY MOUTH ONCE DAILY.    PEN NEEDLE, DIABETIC (BD ULTRA-FINE VINNIE PEN NEEDLE) 32 GAUGE X 5/32" NDLE    1 Units by Misc.(Non-Drug; Combo Route) route once daily.    POTASSIUM CHLORIDE (KLOR-CON) 10 MEQ TBSR    Take 1 tablet (10 mEq total) by mouth once daily.    PREDNISONE (DELTASONE) 20 MG TABLET    prednisone Take 2 Tablet (oral) 1 time per day for 5 days 20220421 tablet 1 time per day oral 5 days active 20 MG    PREDNISONE (DELTASONE) 20 MG TABLET    TAKE 2 TABLETS BY MOUTH EVERY DAY FOR FIVE DAYS.    SILVER SULFADIAZINE 1% (SILVADENE) 1 % CREAM    Apply topically once daily.    TAMSULOSIN (FLOMAX) 0.4 MG CP24        TRUEPLUS INSULIN 0.5 ML 31 GAUGE X 5/16" SYRG    INJECT TWO TIMES A DAY AS DIRECTED    VALSARTAN (DIOVAN) 160 MG TABLET    TAKE ONE TABLET BY MOUTH ONCE A DAY    VENTOLIN HFA 90 MCG/ACTUATION INHALER    INHALE 2 PUFFS INTO THE LUNGS EVERY 6 HOURS AS NEEDED FOR WHEEZING.        Objective:   There were no vitals filed for this visit.    Physical Exam  Vitals reviewed: Virtual visit.   Constitutional:       " Appearance: Normal appearance.   HENT:      Head: Normocephalic.   Neurological:      Mental Status: He is alert.   Psychiatric:         Mood and Affect: Mood normal.         Behavior: Behavior normal.         Thought Content: Thought content normal.         Judgment: Judgment normal.       Lab Results   Component Value Date    WBC 11.36 05/03/2022    HGB 10.7 (L) 05/03/2022    HCT 35.2 (L) 05/03/2022    MCV 96 05/03/2022     05/03/2022       Lab Results   Component Value Date     05/03/2022    K 4.3 05/03/2022     05/03/2022    CO2 29 05/03/2022    BUN 39 (H) 05/03/2022    CREATININE 1.4 05/03/2022    CALCIUM 9.0 05/03/2022    ANIONGAP 11 05/03/2022    ESTGFRAFRICA 54.1 (A) 05/03/2022    EGFRNONAA 46.8 (A) 05/03/2022     Lab Results   Component Value Date    ALT 22 04/25/2022    AST 24 04/25/2022    ALKPHOS 81 04/25/2022    BILITOT 0.3 04/25/2022       Assessment/Plan:     Problem List Items Addressed This Visit          Oncology    Anemia - Primary     Labs drawn outside facility  HGB 10.4 grams/deciliter  MCV 99      With prior workup, no notable iron deficiency and TSH within normal limits.   Likely r/t anemia of chronic illness/inflammation  We will continue surveillance and consider BMBx with worsening cytopenia           Relevant Orders    CBC Auto Differential    Comprehensive Metabolic Panel    Immunofixation Electrophoresis    Immunoglobulin Free LT Chains Blood    Protein Electrophoresis, Serum    CBC Auto Differential    Comprehensive Metabolic Panel    Ferritin    Immunoglobulin Free LT Chains Blood    Immunoglobulins (IgG, IgA, IgM) Quantitative    Protein Electrophoresis, Serum    Iron and TIBC    MGUS (monoclonal gammopathy of unknown significance)     Unfortunately all labs requested not lab drawn  Previous workup included SPEP without paraprotein mild elevation free light chains.    Outside lab notes elevated lambda light chain 28.9g/dl; no FLC-R or kappa light free light chain  available for comparison          Other Visit Diagnoses       Elevated serum immunoglobulin free light chain level        Relevant Orders    CBC Auto Differential    Comprehensive Metabolic Panel    Immunofixation Electrophoresis    Immunoglobulin Free LT Chains Blood    Protein Electrophoresis, Serum    CBC Auto Differential    Comprehensive Metabolic Panel    Ferritin    Immunoglobulin Free LT Chains Blood    Immunoglobulins (IgG, IgA, IgM) Quantitative    Protein Electrophoresis, Serum    Iron and TIBC            Orders in for repeat labs to be drawn by home health in 1 week to include repeat CBC, CMP, CASEY, FLC and iron studies-will message patient through portal or by phone once resulted; follow-up in 6 months with repeat lab labs a few days prior        GUIDO Fink  Hematology/Oncology

## 2022-10-18 ENCOUNTER — LAB VISIT (OUTPATIENT)
Dept: LAB | Facility: HOSPITAL | Age: 82
End: 2022-10-18
Attending: INTERNAL MEDICINE
Payer: MEDICARE

## 2022-10-18 ENCOUNTER — OFFICE VISIT (OUTPATIENT)
Dept: FAMILY MEDICINE | Facility: CLINIC | Age: 82
End: 2022-10-18
Payer: MEDICARE

## 2022-10-18 VITALS
DIASTOLIC BLOOD PRESSURE: 68 MMHG | HEART RATE: 66 BPM | WEIGHT: 308 LBS | SYSTOLIC BLOOD PRESSURE: 138 MMHG | HEIGHT: 74 IN | TEMPERATURE: 97 F | BODY MASS INDEX: 39.53 KG/M2 | OXYGEN SATURATION: 97 %

## 2022-10-18 DIAGNOSIS — E11.65 TYPE 2 DIABETES MELLITUS WITH HYPERGLYCEMIA, WITH LONG-TERM CURRENT USE OF INSULIN: Primary | ICD-10-CM

## 2022-10-18 DIAGNOSIS — G47.33 OSA AND COPD OVERLAP SYNDROME: ICD-10-CM

## 2022-10-18 DIAGNOSIS — D64.9 ANEMIA, UNSPECIFIED TYPE: ICD-10-CM

## 2022-10-18 DIAGNOSIS — I25.10 CORONARY ARTERY DISEASE, UNSPECIFIED VESSEL OR LESION TYPE, UNSPECIFIED WHETHER ANGINA PRESENT, UNSPECIFIED WHETHER NATIVE OR TRANSPLANTED HEART: ICD-10-CM

## 2022-10-18 DIAGNOSIS — Z79.4 TYPE 2 DIABETES MELLITUS WITH HYPERGLYCEMIA, WITH LONG-TERM CURRENT USE OF INSULIN: Primary | ICD-10-CM

## 2022-10-18 DIAGNOSIS — E78.00 HYPERCHOLESTEROLEMIA: ICD-10-CM

## 2022-10-18 DIAGNOSIS — I73.9 PAD (PERIPHERAL ARTERY DISEASE): ICD-10-CM

## 2022-10-18 DIAGNOSIS — J44.9 OSA AND COPD OVERLAP SYNDROME: ICD-10-CM

## 2022-10-18 DIAGNOSIS — I10 ESSENTIAL HYPERTENSION: ICD-10-CM

## 2022-10-18 LAB
BILIRUB UR QL STRIP: NEGATIVE
CLARITY UR REFRACT.AUTO: CLEAR
COLOR UR AUTO: YELLOW
CREAT UR-MCNC: 105 MG/DL (ref 23–375)
GLUCOSE UR QL STRIP: NEGATIVE
HGB UR QL STRIP: NEGATIVE
KETONES UR QL STRIP: NEGATIVE
LEUKOCYTE ESTERASE UR QL STRIP: NEGATIVE
NITRITE UR QL STRIP: NEGATIVE
PH UR STRIP: 7 [PH] (ref 5–8)
PROT UR QL STRIP: ABNORMAL
PROT UR-MCNC: 20 MG/DL (ref 0–15)
PROT/CREAT UR: 0.19 MG/G{CREAT} (ref 0–0.2)
SP GR UR STRIP: 1.02 (ref 1–1.03)
URN SPEC COLLECT METH UR: ABNORMAL

## 2022-10-18 PROCEDURE — 99499 RISK ADDL DX/OHS AUDIT: ICD-10-PCS | Mod: S$GLB,,, | Performed by: INTERNAL MEDICINE

## 2022-10-18 PROCEDURE — 1100F PR PT FALLS ASSESS DOC 2+ FALLS/FALL W/INJURY/YR: ICD-10-PCS | Mod: CPTII,S$GLB,, | Performed by: INTERNAL MEDICINE

## 2022-10-18 PROCEDURE — 1125F AMNT PAIN NOTED PAIN PRSNT: CPT | Mod: CPTII,S$GLB,, | Performed by: INTERNAL MEDICINE

## 2022-10-18 PROCEDURE — 1125F PR PAIN SEVERITY QUANTIFIED, PAIN PRESENT: ICD-10-PCS | Mod: CPTII,S$GLB,, | Performed by: INTERNAL MEDICINE

## 2022-10-18 PROCEDURE — 82570 ASSAY OF URINE CREATININE: CPT | Performed by: INTERNAL MEDICINE

## 2022-10-18 PROCEDURE — 99499 UNLISTED E&M SERVICE: CPT | Mod: S$GLB,,, | Performed by: INTERNAL MEDICINE

## 2022-10-18 PROCEDURE — 99999 PR PBB SHADOW E&M-EST. PATIENT-LVL V: ICD-10-PCS | Mod: PBBFAC,,, | Performed by: INTERNAL MEDICINE

## 2022-10-18 PROCEDURE — 3288F PR FALLS RISK ASSESSMENT DOCUMENTED: ICD-10-PCS | Mod: CPTII,S$GLB,, | Performed by: INTERNAL MEDICINE

## 2022-10-18 PROCEDURE — 1100F PTFALLS ASSESS-DOCD GE2>/YR: CPT | Mod: CPTII,S$GLB,, | Performed by: INTERNAL MEDICINE

## 2022-10-18 PROCEDURE — 99999 PR PBB SHADOW E&M-EST. PATIENT-LVL V: CPT | Mod: PBBFAC,,, | Performed by: INTERNAL MEDICINE

## 2022-10-18 PROCEDURE — 81003 URINALYSIS AUTO W/O SCOPE: CPT | Performed by: INTERNAL MEDICINE

## 2022-10-18 PROCEDURE — 3288F FALL RISK ASSESSMENT DOCD: CPT | Mod: CPTII,S$GLB,, | Performed by: INTERNAL MEDICINE

## 2022-10-18 PROCEDURE — 1159F MED LIST DOCD IN RCRD: CPT | Mod: CPTII,S$GLB,, | Performed by: INTERNAL MEDICINE

## 2022-10-18 PROCEDURE — 1159F PR MEDICATION LIST DOCUMENTED IN MEDICAL RECORD: ICD-10-PCS | Mod: CPTII,S$GLB,, | Performed by: INTERNAL MEDICINE

## 2022-10-18 PROCEDURE — 3078F DIAST BP <80 MM HG: CPT | Mod: CPTII,S$GLB,, | Performed by: INTERNAL MEDICINE

## 2022-10-18 PROCEDURE — 3075F SYST BP GE 130 - 139MM HG: CPT | Mod: CPTII,S$GLB,, | Performed by: INTERNAL MEDICINE

## 2022-10-18 PROCEDURE — 99214 PR OFFICE/OUTPT VISIT, EST, LEVL IV, 30-39 MIN: ICD-10-PCS | Mod: S$GLB,,, | Performed by: INTERNAL MEDICINE

## 2022-10-18 PROCEDURE — 99214 OFFICE O/P EST MOD 30 MIN: CPT | Mod: S$GLB,,, | Performed by: INTERNAL MEDICINE

## 2022-10-18 PROCEDURE — 3075F PR MOST RECENT SYSTOLIC BLOOD PRESS GE 130-139MM HG: ICD-10-PCS | Mod: CPTII,S$GLB,, | Performed by: INTERNAL MEDICINE

## 2022-10-18 PROCEDURE — 3078F PR MOST RECENT DIASTOLIC BLOOD PRESSURE < 80 MM HG: ICD-10-PCS | Mod: CPTII,S$GLB,, | Performed by: INTERNAL MEDICINE

## 2022-10-18 RX ORDER — ISOSORBIDE DINITRATE 30 MG/1
30 TABLET ORAL DAILY
COMMUNITY
Start: 2022-10-14 | End: 2022-10-28 | Stop reason: SDUPTHER

## 2022-10-18 RX ORDER — NALOXONE HYDROCHLORIDE 4 MG/.1ML
1 SPRAY NASAL DAILY
COMMUNITY
Start: 2022-10-13 | End: 2022-10-28

## 2022-10-18 NOTE — PROGRESS NOTES
Subjective:       Patient ID: Jose Luis Martinez is a 82 y.o. male.    Chief Complaint: Follow-up, Hypertension, Hyperlipidemia, and Diabetes    Can not afford tradjenta---------on lantus 15 u a day-----------for diabetes    Follow-up  Associated symptoms include arthralgias and myalgias. Pertinent negatives include no abdominal pain, chest pain, chills, coughing, diaphoresis, fatigue, fever, headaches, joint swelling, nausea, neck pain, numbness, rash, sore throat, vomiting or weakness.   Hypertension  Pertinent negatives include no chest pain, headaches, neck pain, palpitations or shortness of breath.   Hyperlipidemia  Associated symptoms include myalgias. Pertinent negatives include no chest pain or shortness of breath.   Diabetes  Pertinent negatives for hypoglycemia include no confusion, dizziness, headaches, nervousness/anxiousness, pallor, seizures, speech difficulty or tremors. Pertinent negatives for diabetes include no chest pain, no fatigue, no polydipsia, no polyphagia, no polyuria and no weakness.   Past Medical History:   Diagnosis Date    Hypertension     Ulcers of both lower legs 2018     Past Surgical History:   Procedure Laterality Date    CATARACT EXTRACTION, BILATERAL      LIPOMA RESECTION Bilateral     neck     PROSTATE SURGERY      REPAIR OF EYELID      TOTAL KNEE ARTHROPLASTY Left      History reviewed. No pertinent family history.  Social History     Socioeconomic History    Marital status:    Tobacco Use    Smoking status: Former     Packs/day: 1.00     Years: 29.00     Pack years: 29.00     Types: Cigarettes     Quit date: 10/25/2019     Years since quittin.9    Smokeless tobacco: Former   Substance and Sexual Activity    Alcohol use: No    Drug use: No     Review of Systems   Constitutional:  Negative for activity change, appetite change, chills, diaphoresis, fatigue, fever and unexpected weight change.   HENT:  Negative for drooling, ear discharge, ear pain, facial swelling,  hearing loss, mouth sores, nosebleeds, postnasal drip, rhinorrhea, sinus pressure, sneezing, sore throat, tinnitus, trouble swallowing and voice change.    Eyes:  Negative for photophobia, redness and visual disturbance.   Respiratory:  Negative for apnea, cough, choking, chest tightness, shortness of breath and wheezing.    Cardiovascular:  Negative for chest pain and palpitations.   Gastrointestinal:  Negative for abdominal distention, abdominal pain, anal bleeding, blood in stool, constipation, diarrhea, nausea and vomiting.   Endocrine: Negative for cold intolerance, heat intolerance, polydipsia, polyphagia and polyuria.   Genitourinary:  Negative for difficulty urinating, dysuria, enuresis, flank pain, frequency, genital sores, hematuria and urgency.   Musculoskeletal:  Positive for arthralgias, back pain, gait problem and myalgias. Negative for joint swelling, neck pain and neck stiffness.   Skin:  Negative for color change, pallor, rash and wound.   Allergic/Immunologic: Negative for food allergies and immunocompromised state.   Neurological:  Negative for dizziness, tremors, seizures, syncope, facial asymmetry, speech difficulty, weakness, light-headedness, numbness and headaches.   Hematological:  Negative for adenopathy. Does not bruise/bleed easily.   Psychiatric/Behavioral:  Negative for agitation, behavioral problems, confusion, decreased concentration, dysphoric mood, hallucinations, self-injury, sleep disturbance and suicidal ideas. The patient is not nervous/anxious and is not hyperactive.      Objective:      Physical Exam  Vitals and nursing note reviewed.   Constitutional:       General: He is not in acute distress.     Appearance: Normal appearance. He is well-developed. He is not diaphoretic.   HENT:      Head: Normocephalic and atraumatic.      Mouth/Throat:      Pharynx: No oropharyngeal exudate.   Eyes:      General: No scleral icterus.     Pupils: Pupils are equal, round, and reactive to  light.   Neck:      Thyroid: No thyromegaly.      Vascular: No carotid bruit or JVD.      Trachea: No tracheal deviation.   Cardiovascular:      Rate and Rhythm: Normal rate and regular rhythm.      Heart sounds: Normal heart sounds.   Pulmonary:      Effort: Pulmonary effort is normal. No respiratory distress.      Breath sounds: Normal breath sounds. No wheezing or rales.   Chest:      Chest wall: No tenderness.   Abdominal:      General: Bowel sounds are normal. There is no distension.      Palpations: Abdomen is soft.      Tenderness: There is no abdominal tenderness. There is no guarding or rebound.   Musculoskeletal:         General: No tenderness. Normal range of motion.      Cervical back: Normal range of motion and neck supple.   Lymphadenopathy:      Cervical: No cervical adenopathy.   Skin:     General: Skin is warm and dry.      Coloration: Skin is not pale.      Findings: No erythema or rash.   Neurological:      Mental Status: He is alert and oriented to person, place, and time.   Psychiatric:         Behavior: Behavior normal.         Thought Content: Thought content normal.         Judgment: Judgment normal.       CMP  Sodium   Date Value Ref Range Status   05/03/2022 142 136 - 145 mmol/L Final     Potassium   Date Value Ref Range Status   05/03/2022 4.3 3.5 - 5.1 mmol/L Final     Chloride   Date Value Ref Range Status   05/03/2022 102 95 - 110 mmol/L Final     CO2   Date Value Ref Range Status   05/03/2022 29 23 - 29 mmol/L Final     Glucose   Date Value Ref Range Status   05/03/2022 113 (H) 70 - 110 mg/dL Final     BUN   Date Value Ref Range Status   05/03/2022 39 (H) 8 - 23 mg/dL Final     Creatinine   Date Value Ref Range Status   05/03/2022 1.4 0.5 - 1.4 mg/dL Final     Calcium   Date Value Ref Range Status   05/03/2022 9.0 8.7 - 10.5 mg/dL Final     Total Protein   Date Value Ref Range Status   04/25/2022 6.8 6.0 - 8.4 g/dL Final     Albumin   Date Value Ref Range Status   04/25/2022 3.1 (L)  3.5 - 5.2 g/dL Final     Total Bilirubin   Date Value Ref Range Status   04/25/2022 0.3 0.1 - 1.0 mg/dL Final     Comment:     For infants and newborns, interpretation of results should be based  on gestational age, weight and in agreement with clinical  observations.    Premature Infant recommended reference ranges:  Up to 24 hours.............<8.0 mg/dL  Up to 48 hours............<12.0 mg/dL  3-5 days..................<15.0 mg/dL  6-29 days.................<15.0 mg/dL       Alkaline Phosphatase   Date Value Ref Range Status   04/25/2022 81 55 - 135 U/L Final     AST   Date Value Ref Range Status   04/25/2022 24 10 - 40 U/L Final     ALT   Date Value Ref Range Status   04/25/2022 22 10 - 44 U/L Final     Anion Gap   Date Value Ref Range Status   05/03/2022 11 8 - 16 mmol/L Final     eGFR if    Date Value Ref Range Status   05/03/2022 54.1 (A) >60 mL/min/1.73 m^2 Final     eGFR if non    Date Value Ref Range Status   05/03/2022 46.8 (A) >60 mL/min/1.73 m^2 Final     Comment:     Calculation used to obtain the estimated glomerular filtration  rate (eGFR) is the CKD-EPI equation.        Lab Results   Component Value Date    WBC 11.36 05/03/2022    HGB 10.7 (L) 05/03/2022    HCT 35.2 (L) 05/03/2022    MCV 96 05/03/2022     05/03/2022     Lab Results   Component Value Date    CHOL 110 (L) 08/25/2021     Lab Results   Component Value Date    HDL 47 08/25/2021     Lab Results   Component Value Date    LDLCALC 53.0 (L) 08/25/2021     Lab Results   Component Value Date    TRIG 50 08/25/2021     Lab Results   Component Value Date    CHOLHDL 42.7 08/25/2021     Lab Results   Component Value Date    TSH 3.799 08/25/2021     Lab Results   Component Value Date    HGBA1C 7.4 (H) 04/25/2022     Assessment:       1. Type 2 diabetes mellitus with hyperglycemia, with long-term current use of insulin    2. PAD (peripheral artery disease)    3. CHRISTOPHER and COPD overlap syndrome    4.  Hypercholesterolemia    5. Essential hypertension    6. Coronary artery disease, unspecified vessel or lesion type, unspecified whether angina present, unspecified whether native or transplanted heart    7. Anemia, unspecified type          Plan:   Type 2 diabetes mellitus with hyperglycemia, with long-term current use of insulin------------stop tradjenta 2nd cost--------adjust lantus to control------  -     Hemoglobin A1C; Future; Expected date: 10/18/2022    PAD (peripheral artery disease)    CHRISTOPHER and COPD overlap syndrome    Hypercholesterolemia    Essential hypertension    Coronary artery disease, unspecified vessel or lesion type, unspecified whether angina present, unspecified whether native or transplanted heart    Anemia, unspecified type      Continue meds--------------as above----------------f/u pulm.                 F/u 4 months-

## 2022-10-26 ENCOUNTER — TELEPHONE (OUTPATIENT)
Dept: PULMONOLOGY | Facility: CLINIC | Age: 82
End: 2022-10-26
Payer: MEDICARE

## 2022-10-26 ENCOUNTER — PATIENT OUTREACH (OUTPATIENT)
Dept: ADMINISTRATIVE | Facility: HOSPITAL | Age: 82
End: 2022-10-26
Payer: MEDICARE

## 2022-10-27 ENCOUNTER — CLINICAL SUPPORT (OUTPATIENT)
Dept: PULMONOLOGY | Facility: CLINIC | Age: 82
End: 2022-10-27
Payer: MEDICARE

## 2022-10-27 ENCOUNTER — OFFICE VISIT (OUTPATIENT)
Dept: PULMONOLOGY | Facility: CLINIC | Age: 82
End: 2022-10-27
Payer: MEDICARE

## 2022-10-27 VITALS
SYSTOLIC BLOOD PRESSURE: 120 MMHG | RESPIRATION RATE: 18 BRPM | WEIGHT: 308 LBS | HEIGHT: 74 IN | HEART RATE: 68 BPM | BODY MASS INDEX: 39.53 KG/M2 | DIASTOLIC BLOOD PRESSURE: 72 MMHG | OXYGEN SATURATION: 98 %

## 2022-10-27 DIAGNOSIS — E66.01 CLASS 2 SEVERE OBESITY DUE TO EXCESS CALORIES WITH SERIOUS COMORBIDITY AND BODY MASS INDEX (BMI) OF 39.0 TO 39.9 IN ADULT: ICD-10-CM

## 2022-10-27 DIAGNOSIS — J44.9 CHRONIC OBSTRUCTIVE PULMONARY DISEASE, UNSPECIFIED COPD TYPE: Primary | ICD-10-CM

## 2022-10-27 DIAGNOSIS — J44.9 CHRONIC OBSTRUCTIVE PULMONARY DISEASE, UNSPECIFIED COPD TYPE: ICD-10-CM

## 2022-10-27 DIAGNOSIS — J44.9 OSA AND COPD OVERLAP SYNDROME: ICD-10-CM

## 2022-10-27 DIAGNOSIS — G47.33 OSA AND COPD OVERLAP SYNDROME: ICD-10-CM

## 2022-10-27 DIAGNOSIS — G47.33 OSA ON CPAP: Primary | ICD-10-CM

## 2022-10-27 DIAGNOSIS — I10 ESSENTIAL HYPERTENSION: ICD-10-CM

## 2022-10-27 PROCEDURE — 99999 PR PBB SHADOW E&M-EST. PATIENT-LVL II: ICD-10-PCS | Mod: PBBFAC,,,

## 2022-10-27 PROCEDURE — 99999 PR PBB SHADOW E&M-EST. PATIENT-LVL IV: CPT | Mod: PBBFAC,,, | Performed by: NURSE PRACTITIONER

## 2022-10-27 PROCEDURE — 1160F RVW MEDS BY RX/DR IN RCRD: CPT | Mod: CPTII,S$GLB,, | Performed by: NURSE PRACTITIONER

## 2022-10-27 PROCEDURE — 99214 PR OFFICE/OUTPT VISIT, EST, LEVL IV, 30-39 MIN: ICD-10-PCS | Mod: 25,S$GLB,, | Performed by: NURSE PRACTITIONER

## 2022-10-27 PROCEDURE — 3078F PR MOST RECENT DIASTOLIC BLOOD PRESSURE < 80 MM HG: ICD-10-PCS | Mod: CPTII,S$GLB,, | Performed by: NURSE PRACTITIONER

## 2022-10-27 PROCEDURE — 1160F PR REVIEW ALL MEDS BY PRESCRIBER/CLIN PHARMACIST DOCUMENTED: ICD-10-PCS | Mod: CPTII,S$GLB,, | Performed by: NURSE PRACTITIONER

## 2022-10-27 PROCEDURE — 99214 OFFICE O/P EST MOD 30 MIN: CPT | Mod: 25,S$GLB,, | Performed by: NURSE PRACTITIONER

## 2022-10-27 PROCEDURE — 3078F DIAST BP <80 MM HG: CPT | Mod: CPTII,S$GLB,, | Performed by: NURSE PRACTITIONER

## 2022-10-27 PROCEDURE — 3288F FALL RISK ASSESSMENT DOCD: CPT | Mod: CPTII,S$GLB,, | Performed by: NURSE PRACTITIONER

## 2022-10-27 PROCEDURE — 1159F MED LIST DOCD IN RCRD: CPT | Mod: CPTII,S$GLB,, | Performed by: NURSE PRACTITIONER

## 2022-10-27 PROCEDURE — 99999 PR PBB SHADOW E&M-EST. PATIENT-LVL II: CPT | Mod: PBBFAC,,,

## 2022-10-27 PROCEDURE — 1159F PR MEDICATION LIST DOCUMENTED IN MEDICAL RECORD: ICD-10-PCS | Mod: CPTII,S$GLB,, | Performed by: NURSE PRACTITIONER

## 2022-10-27 PROCEDURE — 95012 PR NITRIC OXIDE EXPIRED GAS DETERMINATION: ICD-10-PCS | Mod: S$GLB,,, | Performed by: INTERNAL MEDICINE

## 2022-10-27 PROCEDURE — 3288F PR FALLS RISK ASSESSMENT DOCUMENTED: ICD-10-PCS | Mod: CPTII,S$GLB,, | Performed by: NURSE PRACTITIONER

## 2022-10-27 PROCEDURE — 1101F PR PT FALLS ASSESS DOC 0-1 FALLS W/OUT INJ PAST YR: ICD-10-PCS | Mod: CPTII,S$GLB,, | Performed by: NURSE PRACTITIONER

## 2022-10-27 PROCEDURE — 95012 NITRIC OXIDE EXP GAS DETER: CPT | Mod: S$GLB,,, | Performed by: INTERNAL MEDICINE

## 2022-10-27 PROCEDURE — 99499 UNLISTED E&M SERVICE: CPT | Mod: S$GLB,,, | Performed by: NURSE PRACTITIONER

## 2022-10-27 PROCEDURE — 99499 RISK ADDL DX/OHS AUDIT: ICD-10-PCS | Mod: S$GLB,,, | Performed by: NURSE PRACTITIONER

## 2022-10-27 PROCEDURE — 3074F SYST BP LT 130 MM HG: CPT | Mod: CPTII,S$GLB,, | Performed by: NURSE PRACTITIONER

## 2022-10-27 PROCEDURE — 1101F PT FALLS ASSESS-DOCD LE1/YR: CPT | Mod: CPTII,S$GLB,, | Performed by: NURSE PRACTITIONER

## 2022-10-27 PROCEDURE — 99999 PR PBB SHADOW E&M-EST. PATIENT-LVL IV: ICD-10-PCS | Mod: PBBFAC,,, | Performed by: NURSE PRACTITIONER

## 2022-10-27 PROCEDURE — 3074F PR MOST RECENT SYSTOLIC BLOOD PRESSURE < 130 MM HG: ICD-10-PCS | Mod: CPTII,S$GLB,, | Performed by: NURSE PRACTITIONER

## 2022-10-27 RX ORDER — ALBUTEROL SULFATE 90 UG/1
2 AEROSOL, METERED RESPIRATORY (INHALATION) EVERY 4 HOURS PRN
Qty: 54 G | Refills: 3 | Status: SHIPPED | OUTPATIENT
Start: 2022-10-27 | End: 2023-07-17

## 2022-10-27 RX ORDER — ALBUTEROL SULFATE 0.83 MG/ML
2.5 SOLUTION RESPIRATORY (INHALATION) EVERY 4 HOURS PRN
Qty: 360 ML | Refills: 11 | Status: SHIPPED | OUTPATIENT
Start: 2022-10-27 | End: 2023-10-27

## 2022-10-27 RX ORDER — FLUTICASONE PROPIONATE AND SALMETEROL 250; 50 UG/1; UG/1
1 POWDER RESPIRATORY (INHALATION) 2 TIMES DAILY
Qty: 60 EACH | Refills: 11 | Status: SHIPPED | OUTPATIENT
Start: 2022-10-27 | End: 2023-04-13

## 2022-10-27 RX ORDER — ALBUTEROL SULFATE 90 UG/1
2 AEROSOL, METERED RESPIRATORY (INHALATION) EVERY 6 HOURS PRN
Qty: 54 G | Refills: 1 | Status: CANCELLED | OUTPATIENT
Start: 2022-10-27

## 2022-10-27 NOTE — PROGRESS NOTES
Outpatient Pulmonary Evaluation       SUBJECTIVE:     Chief Complaint   Patient presents with    Sleep Apnea    COPD       History of Present Illness:    Patient is a 82 y.o. male     HPI: Jose Luis Martinez is here for follow up for CHRISTOPHER and CPAP complaince assessment.   He is on CPAP of 8 cmH2O pressure.   He is compliant with CPAP use. Complaince download today reveals 87.3% of days with greater than 4 hours of device use.   Patient reports benefit from CPAP use and denies snoring and excessive daytime sleepiness.  Patient reports no complaints. Nasal pillows mask is used.     12/21/2021 PSG Split night severe obstructive sleep apnea.The apnea/hypopnea index 88.2 was events/hour. CPAP 10 cm optimal.   1/5/2022 orders CPAP 10 cm, per Dr. SUDHAKAR Diaz.   2/28/2022  pt request change to cpap 8 cm. not tolerating cpap 10 cm will need over night if cpap 8 is optimal, desat on split night titration  On CPAP 8 cm with optimal control. AHI 0.8.   5/4/2022 overnight oximetry on room air on CPAP 8 cm was normal, Cumulative time with oxygen saturation less than 88% (TC88): 0:00:32.    [place] Sleepiness Scale   EPWORTH SLEEPINESS SCALE 10/27/2022 4/28/2022 11/22/2021 10/25/2021 7/23/2021   Sitting and reading 1 3 3 3 1   Watching TV 1 3 3 3 1   Sitting, inactive in a public place (e.g. a theatre or a meeting) 1 0 3 3 0   As a passenger in a car for an hour without a break 0 0 3 2 1   Lying down to rest in the afternoon when circumstances permit 3 1 3 1 3   Sitting and talking to someone 1 0 3 2 1   Sitting quietly after a lunch without alcohol 1 0 3 2 3   In a car, while stopped for a few minutes in traffic 0 0 3 0 0   Total score 8 7 24 16 10        COPD   Off Trelegy found increased blood pressure  Feels controlled on twice daily use Albuterol inhaler   He only wants Albuterol inhaler, feels well controlled.     Quit smoking 3-4 years ago.  Thirty pack year smoker.  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    Not on home O2.    Bed time is 1030 - 1100  Wake time is 1000 - 1100    Neck circumference is 19.5 inches  Mallampati score 4    Review of Systems   Constitutional:  Negative for fever, chills and fatigue.   HENT:  Negative for nosebleeds.    Eyes:  Negative for redness.   Respiratory:  Positive for dyspnea on extertion. Negative for apnea, snoring, choking and somnolence.    Cardiovascular:  Negative for chest pain.   Genitourinary:  Negative for hematuria.   Endocrine:  Negative for cold intolerance.    Musculoskeletal:  Positive for arthralgias, back pain and gait problem.   Skin:  Negative for rash.   Gastrointestinal:  Negative for vomiting.   Neurological:  Negative for syncope.   Hematological:  Negative for adenopathy.   Psychiatric/Behavioral:  Negative for confusion and sleep disturbance.      Review of patient's allergies indicates:  No Known Allergies    Current Outpatient Medications   Medication Sig Dispense Refill    ACCU-CHEK DAVID PLUS TEST STRP Strp TEST BLOOD SUGAR THREE TIMES A DAY AS NEEDED 200 strip 12    ACCU-CHEK SOFTCLIX LANCETS Misc USE TO TEST TWICE DAILY 100 each 12    alfuzosin (UROXATRAL) 10 mg Tb24       atorvastatin (LIPITOR) 40 MG tablet TAKE ONE TABLET BY MOUTH ONCE A DAY 90 tablet 3    BD ULTRA-FINE VINNIE PEN NEEDLES 32 gauge x 5/32" Ndle       bumetanide (BUMEX) 2 MG tablet Take 1 tablet (2 mg total) by mouth 2 (two) times daily. 60 tablet 12    cetirizine (ZYRTEC) 10 MG tablet Take 10 mg by mouth.      dutasteride (AVODART) 0.5 mg capsule       fluticasone propionate (FLONASE) 50 mcg/actuation nasal spray 2 sprays (100 mcg total) by Each Nostril route once daily. 48 g 2    FLUZONE HIGHDOSE QUAD 20-21  mcg/0.7 mL Syrg       hydrocodone-acetaminophen 10-325mg (NORCO)  mg Tab   0    lancets 32 gauge Misc 1 lancet by Misc.(Non-Drug; Combo Route) route 2 (two) times daily. 200 each 12    LANTUS SOLOSTAR U-100 INSULIN " "glargine 100 units/mL SubQ pen INJECT 15 UNITS INTO THE SKIN EVERY EVENING. 6 mL 5    ofloxacin (FLOXIN) 0.3 % otic solution Place 5 drops into the left ear 2 (two) times daily. 5 mL 0    pantoprazole (PROTONIX) 40 MG tablet TAKE 1 TABLET BY MOUTH ONCE DAILY. 90 tablet 3    pen needle, diabetic (BD ULTRA-FINE VINNIE PEN NEEDLE) 32 gauge x 5/32" Ndle 1 Units by Misc.(Non-Drug; Combo Route) route once daily. 100 each 6    potassium chloride (KLOR-CON) 10 MEQ TbSR Take 1 tablet (10 mEq total) by mouth once daily. 90 tablet 3    silver sulfADIAZINE 1% (SILVADENE) 1 % cream Apply topically once daily. 400 g 3    tamsulosin (FLOMAX) 0.4 mg Cp24   11    TRUEPLUS INSULIN 0.5 mL 31 gauge x 5/16" Syrg INJECT TWO TIMES A DAY AS DIRECTED 100 each 6    valsartan (DIOVAN) 160 MG tablet TAKE ONE TABLET BY MOUTH ONCE A DAY 90 tablet 1    albuterol (PROVENTIL) 2.5 mg /3 mL (0.083 %) nebulizer solution Take 3 mLs (2.5 mg total) by nebulization every 4 (four) hours as needed for Wheezing or Shortness of Breath (cough). Rescue 360 mL 11    albuterol (PROVENTIL/VENTOLIN HFA) 90 mcg/actuation inhaler Inhale 2 puffs into the lungs every 4 (four) hours as needed for Wheezing or Shortness of Breath. Rescue 54 g 3    blood-glucose meter kit Use as instructed 1 each 0    fluticasone-salmeterol diskus inhaler 250-50 mcg Inhale 1 puff into the lungs 2 (two) times daily. Controller 60 each 11    gabapentin (NEURONTIN) 100 MG capsule Take 1 capsule (100 mg total) by mouth every evening. 30 capsule 4    isosorbide mononitrate (IMDUR) 30 MG 24 hr tablet        No current facility-administered medications for this visit.       Past Medical History:   Diagnosis Date    Hypertension     Ulcers of both lower legs 6/13/2018     Past Surgical History:   Procedure Laterality Date    CATARACT EXTRACTION, BILATERAL      LIPOMA RESECTION Bilateral     neck     PROSTATE SURGERY      REPAIR OF EYELID      TOTAL KNEE ARTHROPLASTY Left      History reviewed. No " "pertinent family history.  Social History     Tobacco Use    Smoking status: Former     Packs/day: 0.50     Years: 43.00     Pack years: 21.50     Types: Cigarettes     Start date:      Quit date: 2018     Years since quittin.8    Smokeless tobacco: Former   Substance Use Topics    Alcohol use: No    Drug use: Never          OBJECTIVE:     Vital Signs (Most Recent)  Vital Signs  Pulse: 68  Resp: 18  SpO2: 98 %  BP: 120/72  Height and Weight  Height: 6' 2" (188 cm)  Weight: (!) 139.7 kg (308 lb)  BSA (Calculated - sq m): 2.7 sq meters  BMI (Calculated): 39.5  Weight in (lb) to have BMI = 25: 194.3]  Wt Readings from Last 2 Encounters:   10/27/22 (!) 139.7 kg (308 lb)   10/18/22 (!) 139.7 kg (307 lb 15.7 oz)         Physical Exam:  Physical Exam   Constitutional: He is oriented to person, place, and time. He appears well-developed. No distress.   HENT:   Head: Normocephalic.   Cardiovascular: Normal rate and regular rhythm.   Pulmonary/Chest: Normal expansion and effort normal. No stridor. No respiratory distress. He has decreased breath sounds. He exhibits no tenderness.   Abdominal: He exhibits no distension.   Musculoskeletal:         General: No tenderness.      Cervical back: Neck supple.   Lymphadenopathy:     He has no cervical adenopathy.   Neurological: He is alert and oriented to person, place, and time. Gait abnormal.   On wheelchair ambulates with difficulty   Skin: Skin is warm. No cyanosis. Nails show no clubbing.   Psychiatric: He has a normal mood and affect. His behavior is normal. Judgment and thought content normal.   Nursing note and vitals reviewed.    Laboratory  Lab Results   Component Value Date    WBC 11.36 2022    RBC 3.68 (L) 2022    HGB 10.7 (L) 2022    HCT 35.2 (L) 2022    MCV 96 2022    MCH 29.1 2022    MCHC 30.4 (L) 2022    RDW 14.7 (H) 2022     2022    MPV 9.8 2022    GRAN 8.5 (H) 2022    GRAN 74.4 (H) " 05/03/2022    LYMPH 2.0 05/03/2022    LYMPH 17.3 (L) 05/03/2022    MONO 0.8 05/03/2022    MONO 7.2 05/03/2022    EOS 0.1 05/03/2022    BASO 0.03 05/03/2022    EOSINOPHIL 0.4 05/03/2022    BASOPHIL 0.3 05/03/2022       BMP  Lab Results   Component Value Date     10/18/2022    K 4.0 10/18/2022     10/18/2022    CO2 28 10/18/2022    BUN 34 (H) 10/18/2022    CREATININE 1.6 (H) 10/18/2022    CALCIUM 9.4 10/18/2022    ANIONGAP 10 10/18/2022    ESTGFRAFRICA 54.1 (A) 05/03/2022    EGFRNONAA 46.8 (A) 05/03/2022    AST 26 10/18/2022    ALT 19 10/18/2022    PROT 7.3 10/18/2022       No results found for: BNP    Lab Results   Component Value Date    TSH 3.799 08/25/2021       No results found for: SEDRATE    No results found for: CRP    No results found for: IGE    No results found for: ASPERGILLUS  No results found for: AFUMIGATUSCL     No results found for: ACE    Diagnostic Results:  I have personally reviewed today the following studies :    Clinical Guide to Interpretation or FeNO Levels :     FeNO  (ppb) LOW INTERMEDIATE HIGH   ADULT VALUES < 25 25-50          > 50   Th2-driven Inflammation Unlikely Likely Significant      Patients FeNO level at this visit : __28__ (ppb)     10/13/2021 CPFT  Grade A-D -acceptable quality of tracingsSevere obstruction. FEV1 43.00 % predicted. (FEV1/VC< LLN and FEV1> or equal to 35% predicted and < or equal to 49% predicted).Moderate reduction in diffusion capacity - unadjusted for hemoglobin. (DLCO > or equal    to 40% and < 60% predicted). Severe reduction in maximal voluntary ventilation. (MVV % predicted < or equal to 50% of predicted).Flow volume loop demonstrate an obstructive defect.Overall there is severe ventilatory impairment. (FEV1> or equal to 35%   predicted and < or equal to 49% of predicted)   Â Pattern of airway obstruction, and decreased diffusion capacity suggest emphysema. Clinical correlation suggested.    10/13/2021 6MWD No desaturations requiring  "supplemental oxygen at rest or exertion. Exercise capacity is significantly less than predicted.  Ordering Provider: Dr Diaz            Interpreting Provider: Dr Diaz  Performing nurse/tech/RT: KAMERON Hughes RRT  Diagnosis:  (CHRISTOPHER and COPD overlap syndrome)  Height: 6' 2" (188 cm)  Weight: (!) 139.7 kg (307 lb 14 oz)  BMI (Calculated): 39.5              Patient Race:                                                              Phase Oxygen Assessment Supplemental O2 Heart   Rate Blood Pressure Paul Dyspnea Scale Rating   Resting 97 % Room Air 73 bpm 137/59 2   Exercise             Minute             1 95 % Room Air 82 bpm       2 96 % Room Air 84 bpm       3 94 % Room Air 94 bpm       4 95 % Room Air 99 bpm       5 94 % Room Air 85 bpm       6  95 % Room Air 72 bpm 159/72 5-6   Recovery             Minute             1 97 % Room Air 69 bpm       2 97 % Room Air 66 bpm       3 98 % Room Air 65 bpm       4 100 % Room Air 68 bpm 157/60 3      Six Minute Walk Summary  6MWT Status: not completed  Patient Reported: Dyspnea, Lightheadedness (knee and back pain)                Interpretation:  Did the patient stop or pause?: Yes  How many times did the patient stop or pause?: 1  Stop Time 1: 260  Restart Time 1: 360  Pause Time 1: 100 seconds  Total Time Walked (Calculated): 260 seconds  Final Partial Lap Distance (feet): 100 feet  Total Distance Meters (Calculated): 30.48 meters  Predicted Distance Meters (Calculated): 461.76 meters  Percentage of Predicted (Calculated): 6.6  Peak VO2 (Calculated): 4.89  Mets: 1.4  Has The Patient Had a Previous Six Minute Walk Test?: No     Previous 6MWT Results  Has The Patient Had a Previous Six Minute Walk Test?: No        PFT 2017:    CPFS: Severe obstruction the FEV1 is 1.56 or 48% of predicted.. The lung volumes reveal mild restriction. The total lung capacity 73%. DLCO is moderately to reduce to 55%    12/21/2021 PSG Split night severe obstructive sleep apnea.The " apnea/hypopnea index 88.2 was events/hour. CPAP 10 cm optimal.     2022 overnight oximetry on room air on CPAP 8 cm was normal, Cumulative time with oxygen saturation less than 88% (TC88): 0:00:32.    ASSESSMENT/PLAN:     Requested Prescriptions     Signed Prescriptions Disp Refills    albuterol (PROVENTIL/VENTOLIN HFA) 90 mcg/actuation inhaler 54 g 3     Sig: Inhale 2 puffs into the lungs every 4 (four) hours as needed for Wheezing or Shortness of Breath. Rescue    fluticasone-salmeterol diskus inhaler 250-50 mcg 60 each 11     Sig: Inhale 1 puff into the lungs 2 (two) times daily. Controller    albuterol (PROVENTIL) 2.5 mg /3 mL (0.083 %) nebulizer solution 360 mL 11     Sig: Take 3 mLs (2.5 mg total) by nebulization every 4 (four) hours as needed for Wheezing or Shortness of Breath (cough). Rescue       Problem List Items Addressed This Visit       CHRISTOPHER and COPD overlap syndrome     On CPAP 8 cm with optimal control obstructive sleep apnea   COPD not well controlled on albuterol inhaler as needed.  Begin advair 250, continue Albuterol inhaler and albuterol nebs .            Essential hypertension     Stable and controlled. Continue current treatment plan as previously prescribed with your PCP.              COPD (chronic obstructive pulmonary disease) - Primary     Not well controlled off Trelegy found increased blood pressure and cost too high  feno 28 inflammation of lung suggested  Begin Advair 250 mcg.   Continue Albuterol inhaler             Relevant Medications    albuterol (PROVENTIL/VENTOLIN HFA) 90 mcg/actuation inhaler    fluticasone-salmeterol diskus inhaler 250-50 mcg    albuterol (PROVENTIL) 2.5 mg /3 mL (0.083 %) nebulizer solution    Other Relevant Orders    Fraction of  Nitric Oxide    Class 2 severe obesity due to excess calories with serious comorbidity and body mass index (BMI) of 39.0 to 39.9 in adult     Encouraged calorie reduction and 30 minutes of exercise daily. Discussed impact  of obesity on general health.  Wt Readings from Last 9 Encounters:   10/27/22 (!) 139.7 kg (308 lb)   10/18/22 (!) 139.7 kg (307 lb 15.7 oz)   04/28/22 (!) 139.7 kg (307 lb 15.7 oz)   04/25/22 (!) 139.7 kg (308 lb)   02/02/22 133.4 kg (294 lb)   01/05/22 133.6 kg (294 lb 8.6 oz)   11/22/21 130.6 kg (288 lb)   11/16/21 131 kg (288 lb 11.1 oz)   11/16/21 131 kg (288 lb 11.1 oz)   Body mass index is 39.54 kg/m².                 Pulmonary rehab referred by Dr. Diaz, not interested significant pain in knees/arthritis. He is engaged with Home health exercise program.     Follow up in about 8 weeks (around 12/21/2022) for COPD , FeNO.      Andree Monroy NP

## 2022-10-27 NOTE — PROCEDURES
Clinical Guide to Interpretation or FeNO Levels :    FeNO  (ppb) LOW INTERMEDIATE HIGH   ADULT VALUES < 25 25-50          > 50   Th2-driven Inflammation Unlikely Likely Significant     Patients FeNO level at this visit : __28__ (ppb)

## 2022-10-28 NOTE — PROGRESS NOTES
"Pulmonary Disease Management  [facility]  Follow up Visit  Chronic Care Management    Jose Luis Martinez  was seen 10/27/2022  11:00AM in the Pulmonary Disease Management Clinic for evaluation, education, reinforcement of self management techniques and exacerbation action plan.    Lizzie Perez    Past Medical History:   Diagnosis Date    Hypertension     Ulcers of both lower legs 6/13/2018       Patient's Medications   New Prescriptions    ALBUTEROL (PROVENTIL) 2.5 MG /3 ML (0.083 %) NEBULIZER SOLUTION    Take 3 mLs (2.5 mg total) by nebulization every 4 (four) hours as needed for Wheezing or Shortness of Breath (cough). Rescue    FLUTICASONE-SALMETEROL DISKUS INHALER 250-50 MCG    Inhale 1 puff into the lungs 2 (two) times daily. Controller   Previous Medications    ACCU-CHEK DAVID PLUS TEST STRP STRP    TEST BLOOD SUGAR THREE TIMES A DAY AS NEEDED    ACCU-CHEK SOFTCLIX LANCETS MISC    USE TO TEST TWICE DAILY    ALBUTEROL (PROVENTIL/VENTOLIN HFA) 90 MCG/ACTUATION INHALER    Inhale 2 puffs into the lungs every 4 (four) hours as needed for Wheezing or Shortness of Breath. Rescue    ALFUZOSIN (UROXATRAL) 10 MG TB24        ASPIRIN ORAL    1 tab    ATORVASTATIN (LIPITOR) 40 MG TABLET    TAKE ONE TABLET BY MOUTH ONCE A DAY    BD ULTRA-FINE VINNIE PEN NEEDLES 32 GAUGE X 5/32" NDLE        BLOOD-GLUCOSE METER KIT    Use as instructed    BUMETANIDE (BUMEX) 2 MG TABLET    Take 1 tablet (2 mg total) by mouth 2 (two) times daily.    CETIRIZINE (ZYRTEC) 10 MG TABLET    Take 10 mg by mouth.    DUTASTERIDE (AVODART) 0.5 MG CAPSULE        FLUTICASONE PROPIONATE (FLONASE) 50 MCG/ACTUATION NASAL SPRAY    2 sprays (100 mcg total) by Each Nostril route once daily.    FLUZONE HIGHDOSE QUAD 20-21  MCG/0.7 ML SYRG        GABAPENTIN (NEURONTIN) 100 MG CAPSULE    Take 1 capsule (100 mg total) by mouth every evening.    HYDROCODONE-ACETAMINOPHEN 10-325MG (NORCO)  MG TAB        ISOSORBIDE DINITRATE (ISORDIL) 30 MG TAB   " " Take 30 mg by mouth once daily.    ISOSORBIDE MONONITRATE (IMDUR) 30 MG 24 HR TABLET        LANCETS 32 GAUGE MISC    1 lancet by Misc.(Non-Drug; Combo Route) route 2 (two) times daily.    LANTUS SOLOSTAR U-100 INSULIN GLARGINE 100 UNITS/ML SUBQ PEN    INJECT 15 UNITS INTO THE SKIN EVERY EVENING.    LEVEMIR FLEXTOUCH U-100 INSULN 100 UNIT/ML (3 ML) INPN PEN    INJECT 15 UNITS INTO THE SKIN EVERY EVENING    LEVOFLOXACIN (LEVAQUIN) 750 MG TABLET    levofloxacin Take 1 Tablet (oral) 1 time per day for 7 days 20220421 tablet 1 time per day oral 7 days active 750 mg    LEVOFLOXACIN (LEVAQUIN) 750 MG TABLET    TAKE ONE TABLET BY MOUTH DAILY FOR SEVEN DAYS.    NALOXONE (NARCAN) 4 MG/ACTUATION SPRY    1 spray once daily.    OFLOXACIN (FLOXIN) 0.3 % OTIC SOLUTION    Place 5 drops into the left ear 2 (two) times daily.    PANTOPRAZOLE (PROTONIX) 40 MG TABLET    TAKE 1 TABLET BY MOUTH ONCE DAILY.    PEN NEEDLE, DIABETIC (BD ULTRA-FINE VINNIE PEN NEEDLE) 32 GAUGE X 5/32" NDLE    1 Units by Misc.(Non-Drug; Combo Route) route once daily.    POTASSIUM CHLORIDE (KLOR-CON) 10 MEQ TBSR    Take 1 tablet (10 mEq total) by mouth once daily.    PREDNISONE (DELTASONE) 20 MG TABLET    prednisone Take 2 Tablet (oral) 1 time per day for 5 days 20220421 tablet 1 time per day oral 5 days active 20 MG    PREDNISONE (DELTASONE) 20 MG TABLET    TAKE 2 TABLETS BY MOUTH EVERY DAY FOR FIVE DAYS.    SILVER SULFADIAZINE 1% (SILVADENE) 1 % CREAM    Apply topically once daily.    TAMSULOSIN (FLOMAX) 0.4 MG CP24        TRUEPLUS INSULIN 0.5 ML 31 GAUGE X 5/16" SYRG    INJECT TWO TIMES A DAY AS DIRECTED    VALSARTAN (DIOVAN) 160 MG TABLET    TAKE ONE TABLET BY MOUTH ONCE A DAY   Modified Medications    No medications on file   Discontinued Medications    No medications on file             Educational assessment:   [x]            Good  []            Fair  []            Poor    Readiness to learn:   [x]            Good  []            Fair  []            " Poor    Vision Status:   [x]            Good  []            Fair  []            Poor    Reading Ability:  [x]            Good  []            Fair  []            Poor    Knowledge of condition:   [x]            Good  []            Fair  []            Poor    Language Barriers:   []            Good  []            Fair  []            Poor  [x]            None    Cognitive/ Physical Barriers:   []            Good  []            Fair  []            Poor  [x]            None    Learning best by:                       [x]            Seeing  [x]            Hearing  [x]            Reading                         [x]            Doing    Describe any barrier /Limitation or financial implications of care choices identified     [x]            Financial  []            Emotional  []            Education  []            Vision/Hearing  []            Physical  []            None  []                TOPIC /CONTENT FOR TODAY:    [x]            MDI with or without spacer  [x]            Dry powder inhaler  []            Acapella   []           Peak Flow meter  [x]            COPD action plan  []            Nebulizer use  []            Oxygen use safety  [x]            Breathing and cough techniques  [x]            Energy conservation  [x]            Infection prevention  [x]           OTHER________________________        Learner:    [x]            Patient   []            Caregiver    Method:    [x]            Verbal explanation  [x]            Audio visual    [x]            Literature  [x]            Teach back      Evaluation:    [x]            Teach back  [x]            Demonstrate  [x]            Follow up phone call    []            2 weeks     [x]            4 weeks   []            PRN    Additional comments:   Patient was seen today to review respiratory medication purpose and proper technique for use of inhalers. Patient practiced proper technique using MDI with valved holding chamber (spacer) and DPI inhalers. Patient  demonstrated understanding. Literature was given to patient.     Infection prevention was discussed. Patient was reminded to get influenza vaccine. Hand hygiene and cleaning of respiratory equipment was also discussed. Patient verbalized understanding.      COPD action plan was reviewed and literature was given to patient. Patient verbalized understanding.      The patient states he cannot afford to pay copay ($45) for inhaler. Will determine if insurance will pay for nebulized medication with no out of pocket payment required from patient.    Plan:    Reinforce education  Meds: ICS, LABA, LAUREN  DME Needs: [facility]   Action Plan: COPD  Immunization: Pneumococcal- CURRENT, Flu-CURRENT , Covid 19- CURRENT  Next Provider Visit: 1/4/23  Next Spirometry/CPFT: NOT SCHEDULED  Approximate time spent with patient: 30 MINUTES

## 2022-10-28 NOTE — ASSESSMENT & PLAN NOTE
Encouraged calorie reduction and 30 minutes of exercise daily. Discussed impact of obesity on general health.  Wt Readings from Last 9 Encounters:   10/27/22 (!) 139.7 kg (308 lb)   10/18/22 (!) 139.7 kg (307 lb 15.7 oz)   04/28/22 (!) 139.7 kg (307 lb 15.7 oz)   04/25/22 (!) 139.7 kg (308 lb)   02/02/22 133.4 kg (294 lb)   01/05/22 133.6 kg (294 lb 8.6 oz)   11/22/21 130.6 kg (288 lb)   11/16/21 131 kg (288 lb 11.1 oz)   11/16/21 131 kg (288 lb 11.1 oz)   Body mass index is 39.54 kg/m².

## 2022-10-28 NOTE — ASSESSMENT & PLAN NOTE
Not well controlled off Trelegy found increased blood pressure and cost too high  feno 28 inflammation of lung suggested  Begin Advair 250 mcg.   Continue Albuterol inhaler

## 2022-10-28 NOTE — ASSESSMENT & PLAN NOTE
On CPAP 8 cm with optimal control obstructive sleep apnea   COPD not well controlled on albuterol inhaler as needed.  Begin advair 250, continue Albuterol inhaler and albuterol nebs .      Statement Selected

## 2022-11-16 ENCOUNTER — TELEPHONE (OUTPATIENT)
Dept: ADMINISTRATIVE | Facility: HOSPITAL | Age: 82
End: 2022-11-16
Payer: MEDICARE

## 2022-11-30 ENCOUNTER — DOCUMENT SCAN (OUTPATIENT)
Dept: HOME HEALTH SERVICES | Facility: HOSPITAL | Age: 82
End: 2022-11-30
Payer: MEDICARE

## 2022-12-13 ENCOUNTER — EXTERNAL HOME HEALTH (OUTPATIENT)
Dept: HOME HEALTH SERVICES | Facility: HOSPITAL | Age: 82
End: 2022-12-13
Payer: MEDICARE

## 2022-12-31 ENCOUNTER — DOCUMENT SCAN (OUTPATIENT)
Dept: HOME HEALTH SERVICES | Facility: HOSPITAL | Age: 82
End: 2022-12-31
Payer: MEDICARE

## 2023-01-06 ENCOUNTER — TELEPHONE (OUTPATIENT)
Dept: NEPHROLOGY | Facility: CLINIC | Age: 83
End: 2023-01-06
Payer: MEDICARE

## 2023-01-06 DIAGNOSIS — N28.9 RENAL INSUFFICIENCY: Primary | ICD-10-CM

## 2023-01-09 ENCOUNTER — LAB VISIT (OUTPATIENT)
Dept: LAB | Facility: HOSPITAL | Age: 83
End: 2023-01-09
Attending: INTERNAL MEDICINE
Payer: MEDICARE

## 2023-01-09 DIAGNOSIS — I83.023 VARICOSE VEINS OF LEFT LOWER EXTREMITY WITH ULCER OF ANKLE: ICD-10-CM

## 2023-01-09 DIAGNOSIS — L97.329 VARICOSE VEINS OF LEFT LOWER EXTREMITY WITH ULCER OF ANKLE: ICD-10-CM

## 2023-01-09 LAB
ALBUMIN SERPL BCP-MCNC: 3.3 G/DL (ref 3.5–5.2)
ANION GAP SERPL CALC-SCNC: 9 MMOL/L (ref 8–16)
BUN SERPL-MCNC: 32 MG/DL (ref 8–23)
CALCIUM SERPL-MCNC: 8.7 MG/DL (ref 8.7–10.5)
CHLORIDE SERPL-SCNC: 104 MMOL/L (ref 95–110)
CO2 SERPL-SCNC: 28 MMOL/L (ref 23–29)
CREAT SERPL-MCNC: 1.5 MG/DL (ref 0.5–1.4)
CREAT UR-MCNC: 30.7 MG/DL (ref 23–375)
EST. GFR  (NO RACE VARIABLE): 46.2 ML/MIN/1.73 M^2
GLUCOSE SERPL-MCNC: 105 MG/DL (ref 70–110)
PHOSPHATE SERPL-MCNC: 3.8 MG/DL (ref 2.7–4.5)
POTASSIUM SERPL-SCNC: 4.4 MMOL/L (ref 3.5–5.1)
PROT UR-MCNC: <7 MG/DL (ref 0–15)
PROT/CREAT UR: NORMAL MG/G{CREAT} (ref 0–0.2)
SODIUM SERPL-SCNC: 141 MMOL/L (ref 136–145)

## 2023-01-09 PROCEDURE — 80069 RENAL FUNCTION PANEL: CPT | Mod: PO | Performed by: INTERNAL MEDICINE

## 2023-01-09 PROCEDURE — 82570 ASSAY OF URINE CREATININE: CPT | Mod: PO | Performed by: INTERNAL MEDICINE

## 2023-01-10 ENCOUNTER — IMMUNIZATION (OUTPATIENT)
Dept: PHARMACY | Facility: CLINIC | Age: 83
End: 2023-01-10
Payer: MEDICARE

## 2023-01-10 ENCOUNTER — OFFICE VISIT (OUTPATIENT)
Dept: NEPHROLOGY | Facility: CLINIC | Age: 83
End: 2023-01-10
Payer: MEDICARE

## 2023-01-10 VITALS
HEIGHT: 74 IN | DIASTOLIC BLOOD PRESSURE: 70 MMHG | SYSTOLIC BLOOD PRESSURE: 128 MMHG | BODY MASS INDEX: 39.53 KG/M2 | HEART RATE: 56 BPM | WEIGHT: 308 LBS

## 2023-01-10 DIAGNOSIS — N18.30 STAGE 3 CHRONIC KIDNEY DISEASE, UNSPECIFIED WHETHER STAGE 3A OR 3B CKD: Primary | ICD-10-CM

## 2023-01-10 DIAGNOSIS — Z23 NEED FOR VACCINATION: Primary | ICD-10-CM

## 2023-01-10 DIAGNOSIS — I10 PRIMARY HYPERTENSION: ICD-10-CM

## 2023-01-10 PROCEDURE — 99999 PR PBB SHADOW E&M-EST. PATIENT-LVL V: CPT | Mod: PBBFAC,,, | Performed by: INTERNAL MEDICINE

## 2023-01-10 PROCEDURE — 99204 PR OFFICE/OUTPT VISIT, NEW, LEVL IV, 45-59 MIN: ICD-10-PCS | Mod: S$GLB,,, | Performed by: INTERNAL MEDICINE

## 2023-01-10 PROCEDURE — 3078F DIAST BP <80 MM HG: CPT | Mod: CPTII,S$GLB,, | Performed by: INTERNAL MEDICINE

## 2023-01-10 PROCEDURE — 99999 PR PBB SHADOW E&M-EST. PATIENT-LVL V: ICD-10-PCS | Mod: PBBFAC,,, | Performed by: INTERNAL MEDICINE

## 2023-01-10 PROCEDURE — 1160F PR REVIEW ALL MEDS BY PRESCRIBER/CLIN PHARMACIST DOCUMENTED: ICD-10-PCS | Mod: CPTII,S$GLB,, | Performed by: INTERNAL MEDICINE

## 2023-01-10 PROCEDURE — 1159F MED LIST DOCD IN RCRD: CPT | Mod: CPTII,S$GLB,, | Performed by: INTERNAL MEDICINE

## 2023-01-10 PROCEDURE — 1125F AMNT PAIN NOTED PAIN PRSNT: CPT | Mod: CPTII,S$GLB,, | Performed by: INTERNAL MEDICINE

## 2023-01-10 PROCEDURE — 3288F FALL RISK ASSESSMENT DOCD: CPT | Mod: CPTII,S$GLB,, | Performed by: INTERNAL MEDICINE

## 2023-01-10 PROCEDURE — 1125F PR PAIN SEVERITY QUANTIFIED, PAIN PRESENT: ICD-10-PCS | Mod: CPTII,S$GLB,, | Performed by: INTERNAL MEDICINE

## 2023-01-10 PROCEDURE — 1160F RVW MEDS BY RX/DR IN RCRD: CPT | Mod: CPTII,S$GLB,, | Performed by: INTERNAL MEDICINE

## 2023-01-10 PROCEDURE — 99499 RISK ADDL DX/OHS AUDIT: ICD-10-PCS | Mod: S$GLB,,, | Performed by: INTERNAL MEDICINE

## 2023-01-10 PROCEDURE — 99204 OFFICE O/P NEW MOD 45 MIN: CPT | Mod: S$GLB,,, | Performed by: INTERNAL MEDICINE

## 2023-01-10 PROCEDURE — 1159F PR MEDICATION LIST DOCUMENTED IN MEDICAL RECORD: ICD-10-PCS | Mod: CPTII,S$GLB,, | Performed by: INTERNAL MEDICINE

## 2023-01-10 PROCEDURE — 99499 UNLISTED E&M SERVICE: CPT | Mod: S$GLB,,, | Performed by: INTERNAL MEDICINE

## 2023-01-10 PROCEDURE — 3074F SYST BP LT 130 MM HG: CPT | Mod: CPTII,S$GLB,, | Performed by: INTERNAL MEDICINE

## 2023-01-10 PROCEDURE — 3288F PR FALLS RISK ASSESSMENT DOCUMENTED: ICD-10-PCS | Mod: CPTII,S$GLB,, | Performed by: INTERNAL MEDICINE

## 2023-01-10 PROCEDURE — 3078F PR MOST RECENT DIASTOLIC BLOOD PRESSURE < 80 MM HG: ICD-10-PCS | Mod: CPTII,S$GLB,, | Performed by: INTERNAL MEDICINE

## 2023-01-10 PROCEDURE — 1101F PR PT FALLS ASSESS DOC 0-1 FALLS W/OUT INJ PAST YR: ICD-10-PCS | Mod: CPTII,S$GLB,, | Performed by: INTERNAL MEDICINE

## 2023-01-10 PROCEDURE — 1101F PT FALLS ASSESS-DOCD LE1/YR: CPT | Mod: CPTII,S$GLB,, | Performed by: INTERNAL MEDICINE

## 2023-01-10 PROCEDURE — 3074F PR MOST RECENT SYSTOLIC BLOOD PRESSURE < 130 MM HG: ICD-10-PCS | Mod: CPTII,S$GLB,, | Performed by: INTERNAL MEDICINE

## 2023-01-10 NOTE — PROGRESS NOTES
Jose Luis Martinez is a 82 y.o. male    HPI:    He was noted to have mildly elevated creatinine on routine laboratory studies.  Nephrology has been consulted for evaluation.  In the clinic today he is doing well.  His primary complaint is of lower extremity swelling.  He states that it is controlled with 2 mg twice a day of Bumex.  He also relates that when he takes his Bumex see notices dramatic increase in urine output.  His laboratory studies medications were reviewed.  All Nephrology related questions were answered to his satisfaction.  His creatinine has ranged between 1.3 and 1.7 for the past several years.  He does not use extensive nonsteroidal anti-inflammatory agents.    PAST MEDICAL HISTORY:  He  has a past medical history of Hypertension and Ulcers of both lower legs (6/13/2018).    PAST SURGICAL HISTORY:  He  has a past surgical history that includes Total knee arthroplasty (Left); Prostate surgery; Lipoma resection (Bilateral); Repair of eyelid; and Cataract extraction, bilateral.    SOCIAL HISTORY:  He  reports that he quit smoking about 5 years ago. His smoking use included cigarettes. He started smoking about 48 years ago. He has a 21.50 pack-year smoking history. He has quit using smokeless tobacco. He reports that he does not drink alcohol and does not use drugs.      FAMILY MEDICAL HISTORY:  His family history is not on file.    Review of patient's allergies indicates:  No Known Allergies        Prior to Admission medications    Medication Sig Start Date End Date Taking? Authorizing Provider   ACCU-CHEK DAVID PLUS TEST STRP Strp TEST BLOOD SUGAR THREE TIMES A DAY AS NEEDED 8/4/22  Yes Matti Mejia MD   ACCU-CHEK SOFTCLIX LANCETS Misc USE TO TEST TWICE DAILY 6/11/20  Yes Matti Mejia MD   albuterol (PROVENTIL) 2.5 mg /3 mL (0.083 %) nebulizer solution Take 3 mLs (2.5 mg total) by nebulization every 4 (four) hours as needed for Wheezing or Shortness of Breath (cough). Rescue  "10/27/22 10/27/23 Yes Andree Monroy NP   albuterol (PROVENTIL/VENTOLIN HFA) 90 mcg/actuation inhaler Inhale 2 puffs into the lungs every 4 (four) hours as needed for Wheezing or Shortness of Breath. Rescue 10/27/22  Yes Andree Monroy NP   alfuzosin (UROXATRAL) 10 mg Tb24  11/3/20  Yes Historical Provider   atorvastatin (LIPITOR) 40 MG tablet TAKE ONE TABLET BY MOUTH ONCE A DAY 1/27/22  Yes Matti Mejia MD   BD ULTRA-FINE VINNIE PEN NEEDLES 32 gauge x 5/32" Ndle  8/18/17  Yes Historical Provider   blood-glucose meter kit Use as instructed 9/10/18 1/10/23 Yes Matti Mejia MD   bumetanide (BUMEX) 2 MG tablet TAKE 1 TABLET BY MOUTH TWO TIMES DAILY. 12/28/22  Yes Matti Mejia MD   dutasteride (AVODART) 0.5 mg capsule Take 0.5 mg by mouth once daily. 10/22/20  Yes Historical Provider   fluticasone propionate (FLONASE) 50 mcg/actuation nasal spray 2 sprays (100 mcg total) by Each Nostril route once daily. 9/2/22  Yes Matti Mejia MD   fluticasone-salmeterol diskus inhaler 250-50 mcg Inhale 1 puff into the lungs 2 (two) times daily. Controller 10/27/22 10/27/23 Yes Andree Monroy NP   gabapentin (NEURONTIN) 100 MG capsule Take 1 capsule (100 mg total) by mouth every evening. 8/20/21 1/10/23 Yes Matti Mejia MD   hydrocodone-acetaminophen 10-325mg (NORCO)  mg Tab  7/3/17  Yes Historical Provider   isosorbide mononitrate (IMDUR) 30 MG 24 hr tablet  9/28/17  Yes Historical Provider   lancets 32 gauge Misc 1 lancet by Misc.(Non-Drug; Combo Route) route 2 (two) times daily. 1/8/18  Yes Matti Mejia MD   LANTUS SOLOSTAR U-100 INSULIN glargine 100 units/mL SubQ pen INJECT 15 UNITS INTO THE SKIN EVERY EVENING. 12/22/22  Yes Matti Mejia MD   ofloxacin (FLOXIN) 0.3 % otic solution Place 5 drops into the left ear 2 (two) times daily. 2/1/21  Yes Afsaneh Warner MD   pantoprazole (PROTONIX) 40 MG tablet TAKE 1 TABLET BY MOUTH ONCE " "DAILY. 10/25/22  Yes Matti Mejia MD   pen needle, diabetic (BD ULTRA-FINE VINNIE PEN NEEDLE) 32 gauge x 5/32" Ndle 1 Units by Misc.(Non-Drug; Combo Route) route once daily. 3/2/21  Yes Kareem Gross NP   potassium chloride (KLOR-CON) 10 MEQ TbSR TAKE 1 TABLET BY MOUTH ONCE DAILY. 12/29/22  Yes Matti Mejia MD   silver sulfADIAZINE 1% (SILVADENE) 1 % cream Apply topically once daily. 6/13/18  Yes Matti Mejia MD   tamsulosin (FLOMAX) 0.4 mg Cp24  7/24/17  Yes Historical Provider   TRUEPLUS INSULIN 0.5 mL 31 gauge x 5/16" Syrg INJECT TWO TIMES A DAY AS DIRECTED 9/8/20  Yes Matti Mejia MD   valsartan (DIOVAN) 160 MG tablet TAKE ONE TABLET BY MOUTH ONCE A DAY 4/19/22  Yes Matti Mejia MD   cetirizine (ZYRTEC) 10 MG tablet Take 10 mg by mouth.  1/10/23  Historical Provider   FLUZONE HIGHDOSE QUAD 20-21  mcg/0.7 mL Syrg  12/17/20 1/10/23  Historical Provider      Sodium   Date Value Ref Range Status   01/09/2023 141 136 - 145 mmol/L Final   10/18/2022 142 136 - 145 mmol/L Final   05/03/2022 142 136 - 145 mmol/L Final     Potassium   Date Value Ref Range Status   01/09/2023 4.4 3.5 - 5.1 mmol/L Final   10/18/2022 4.0 3.5 - 5.1 mmol/L Final   05/03/2022 4.3 3.5 - 5.1 mmol/L Final     Chloride   Date Value Ref Range Status   01/09/2023 104 95 - 110 mmol/L Final   10/18/2022 104 95 - 110 mmol/L Final   05/03/2022 102 95 - 110 mmol/L Final     CO2   Date Value Ref Range Status   01/09/2023 28 23 - 29 mmol/L Final   10/18/2022 28 23 - 29 mmol/L Final   05/03/2022 29 23 - 29 mmol/L Final     Glucose   Date Value Ref Range Status   01/09/2023 105 70 - 110 mg/dL Final   10/18/2022 90 70 - 110 mg/dL Final   05/03/2022 113 (H) 70 - 110 mg/dL Final     BUN   Date Value Ref Range Status   01/09/2023 32 (H) 8 - 23 mg/dL Final   10/18/2022 34 (H) 8 - 23 mg/dL Final   05/03/2022 39 (H) 8 - 23 mg/dL Final     Creatinine   Date Value Ref Range Status   01/09/2023 1.5 " (H) 0.5 - 1.4 mg/dL Final   10/18/2022 1.6 (H) 0.5 - 1.4 mg/dL Final   05/03/2022 1.4 0.5 - 1.4 mg/dL Final     Calcium   Date Value Ref Range Status   01/09/2023 8.7 8.7 - 10.5 mg/dL Final   10/18/2022 9.4 8.7 - 10.5 mg/dL Final   05/03/2022 9.0 8.7 - 10.5 mg/dL Final     Total Protein   Date Value Ref Range Status   10/18/2022 7.3 6.0 - 8.4 g/dL Final   04/25/2022 6.8 6.0 - 8.4 g/dL Final   08/25/2021 7.1 6.0 - 8.4 g/dL Final     Albumin   Date Value Ref Range Status   01/09/2023 3.3 (L) 3.5 - 5.2 g/dL Final   10/18/2022 3.5 3.5 - 5.2 g/dL Final   04/25/2022 3.1 (L) 3.5 - 5.2 g/dL Final     Total Bilirubin   Date Value Ref Range Status   10/18/2022 0.5 0.1 - 1.0 mg/dL Final     Comment:     For infants and newborns, interpretation of results should be based  on gestational age, weight and in agreement with clinical  observations.    Premature Infant recommended reference ranges:  Up to 24 hours.............<8.0 mg/dL  Up to 48 hours............<12.0 mg/dL  3-5 days..................<15.0 mg/dL  6-29 days.................<15.0 mg/dL     04/25/2022 0.3 0.1 - 1.0 mg/dL Final     Comment:     For infants and newborns, interpretation of results should be based  on gestational age, weight and in agreement with clinical  observations.    Premature Infant recommended reference ranges:  Up to 24 hours.............<8.0 mg/dL  Up to 48 hours............<12.0 mg/dL  3-5 days..................<15.0 mg/dL  6-29 days.................<15.0 mg/dL     08/25/2021 0.5 0.1 - 1.0 mg/dL Final     Comment:     For infants and newborns, interpretation of results should be based  on gestational age, weight and in agreement with clinical  observations.    Premature Infant recommended reference ranges:  Up to 24 hours.............<8.0 mg/dL  Up to 48 hours............<12.0 mg/dL  3-5 days..................<15.0 mg/dL  6-29 days.................<15.0 mg/dL       Alkaline Phosphatase   Date Value Ref Range Status   10/18/2022 93 55 - 135 U/L  Final   04/25/2022 81 55 - 135 U/L Final   08/25/2021 95 55 - 135 U/L Final     AST   Date Value Ref Range Status   10/18/2022 26 10 - 40 U/L Final   04/25/2022 24 10 - 40 U/L Final   08/25/2021 23 10 - 40 U/L Final     ALT   Date Value Ref Range Status   10/18/2022 19 10 - 44 U/L Final   04/25/2022 22 10 - 44 U/L Final   08/25/2021 19 10 - 44 U/L Final     Anion Gap   Date Value Ref Range Status   01/09/2023 9 8 - 16 mmol/L Final   10/18/2022 10 8 - 16 mmol/L Final   05/03/2022 11 8 - 16 mmol/L Final     eGFR if    Date Value Ref Range Status   05/03/2022 54.1 (A) >60 mL/min/1.73 m^2 Final   04/25/2022 42.8 (A) >60 mL/min/1.73 m^2 Final   08/25/2021 59.6 (A) >60 mL/min/1.73 m^2 Final     eGFR if non    Date Value Ref Range Status   05/03/2022 46.8 (A) >60 mL/min/1.73 m^2 Final     Comment:     Calculation used to obtain the estimated glomerular filtration  rate (eGFR) is the CKD-EPI equation.      04/25/2022 37.0 (A) >60 mL/min/1.73 m^2 Final     Comment:     Calculation used to obtain the estimated glomerular filtration  rate (eGFR) is the CKD-EPI equation.      08/25/2021 51.5 (A) >60 mL/min/1.73 m^2 Final     Comment:     Calculation used to obtain the estimated glomerular filtration  rate (eGFR) is the CKD-EPI equation.        Protein, Urine Random   Date Value Ref Range Status   01/09/2023 <7 0 - 15 mg/dL Final   10/18/2022 20 (H) 0 - 15 mg/dL Final     Creatinine, Urine   Date Value Ref Range Status   01/09/2023 30.7 23.0 - 375.0 mg/dL Final   10/18/2022 105.0 23.0 - 375.0 mg/dL Final     Prot/Creat Ratio, Urine   Date Value Ref Range Status   01/09/2023 Unable to calculate 0.00 - 0.20 Final   10/18/2022 0.19 0.00 - 0.20 Final         REVIEW OF SYSTEMS:  Patient has no fever, fatigue, visual changes, chest pain, edema, cough, dyspnea, nausea, vomiting, constipation, diarrhea, arthralgias, pruritis, dizziness, weakness, depression, confusion.        PHYSICAL EXAM:   height is 6'  "2" (1.88 m) and weight is 139.7 kg (307 lb 15.7 oz) (abnormal). His blood pressure is 128/70 and his pulse is 56 (abnormal).   Gen: WDWN male in no apparent distress  Psych: Normal mood and affect  Skin: No rashes or ulcers  Eyes: Normal conjunctiva and lids, PERRLA  ENT: Normal hearing with no oropharyngeal lesions  Neck: No JVD  Chest: Clear with no rales, rhonchi, wheezing with normal effort  CV: Regular with no murmurs, gallops or rubs  Abd: Soft, nontender, no distension, positive bowel sounds  Ext: No cyanosis, clubbing or edema          IMPRESSION AND RECOMMENDATIONS:    1. CKD 3: Creatinine has run between 1.3 and 1.7 for the past several years.  This fluctuation is hemodynamic in nature.  His baseline creatinine tends to run between about 1.3 and 1.4.  EGFR is normal for his age.  Urinalysis is bland with no evidence of significant proteinuria.  He is on a RAAS inhibitor.    Potassium is stable 4.4.    Bicarbonate is stable at 28.    2. Hypertension:  Blood pressure is well controlled on current regimen.            "

## 2023-01-11 ENCOUNTER — TELEPHONE (OUTPATIENT)
Dept: ADMINISTRATIVE | Facility: HOSPITAL | Age: 83
End: 2023-01-11
Payer: MEDICARE

## 2023-02-09 ENCOUNTER — TELEPHONE (OUTPATIENT)
Dept: FAMILY MEDICINE | Facility: CLINIC | Age: 83
End: 2023-02-09
Payer: MEDICARE

## 2023-02-09 DIAGNOSIS — S81.802S WOUND OF LEFT LOWER EXTREMITY, SEQUELA: Primary | ICD-10-CM

## 2023-02-09 NOTE — TELEPHONE ENCOUNTER
----- Message from Matti Mejia MD sent at 2/9/2023  9:56 AM CST -----  Contact: Wife - [phone]  Wound care clinic consult--------go to er or urgent care if needed sooner-  ----- Message -----  From: Zulma Tavares MA  Sent: 2/9/2023   9:42 AM CST  To: Matti Mejia MD    ER?  ----- Message -----  From: Pat Gardiner  Sent: 2/9/2023   9:33 AM CST  To: Jackie HERNANDEZ Staff    Patient wife called in regarding an outbreak on hs legs and drainage. A wound care referral is also requested. Patient wife's would like a call back at [phone].

## 2023-02-09 NOTE — TELEPHONE ENCOUNTER
----- Message from Matti Mejia MD sent at 2/9/2023  9:56 AM CST -----  Contact: Wife - [phone]  Wound care clinic consult--------go to er or urgent care if needed sooner-  ----- Message -----  From: Zulma Tavares MA  Sent: 2/9/2023   9:42 AM CST  To: Matti Mejia MD    ER?  ----- Message -----  From: Pat Gardiner  Sent: 2/9/2023   9:33 AM CST  To: Jackie HERNANDEZ Staff    Patient wife called in regarding an outbreak on hs legs and drainage. A wound care referral is also requested. Patient wife's would like a call back at [phone].

## 2023-02-15 ENCOUNTER — TELEPHONE (OUTPATIENT)
Dept: ADMINISTRATIVE | Facility: HOSPITAL | Age: 83
End: 2023-02-15
Payer: MEDICARE

## 2023-02-20 ENCOUNTER — DOCUMENT SCAN (OUTPATIENT)
Dept: HOME HEALTH SERVICES | Facility: HOSPITAL | Age: 83
End: 2023-02-20
Payer: MEDICARE

## 2023-02-21 ENCOUNTER — TELEPHONE (OUTPATIENT)
Dept: PULMONOLOGY | Facility: CLINIC | Age: 83
End: 2023-02-21
Payer: MEDICARE

## 2023-02-21 NOTE — TELEPHONE ENCOUNTER
Chronic Disease Management    Patient is taking controller inhalers and using CPAP.    Time  spent with patient:  Minutes

## 2023-02-22 ENCOUNTER — TELEPHONE (OUTPATIENT)
Dept: FAMILY MEDICINE | Facility: CLINIC | Age: 83
End: 2023-02-22
Payer: MEDICARE

## 2023-02-22 NOTE — TELEPHONE ENCOUNTER
----- Message from Christelle Chen sent at 2/22/2023 12:16 PM CST -----  Type:  Patient Returning Call    Who Called:patient  Who Left Message for Patient:nurse  Does the patient know what this is regarding?:na  Would the patient rather a call back or a response via [facility]? Call back  Best Call Back Number:[phone]  Additional Information: na

## 2023-02-22 NOTE — TELEPHONE ENCOUNTER
----- Message from Court Francis sent at 2/22/2023 12:01 PM CST -----  Pt is requesting a call back concerning the referral for wound care. Call back number [phone]  x jm

## 2023-02-23 ENCOUNTER — TELEPHONE (OUTPATIENT)
Dept: FAMILY MEDICINE | Facility: CLINIC | Age: 83
End: 2023-02-23
Payer: MEDICARE

## 2023-02-23 DIAGNOSIS — Z51.89 ENCOUNTER FOR WOUND CARE: Primary | ICD-10-CM

## 2023-02-23 NOTE — TELEPHONE ENCOUNTER
Spoke with wife wants Patricia COTO.AMPARO to [place] for wound care.  [place]

## 2023-02-23 NOTE — TELEPHONE ENCOUNTER
----- Message from Carmita Bhatti sent at 2/23/2023 10:01 AM CST -----  Contact: Carine (Wife)  Carine stated that he was referred to go to [facility] wound care,stated that he doesn't go, Please call her [phone], stated that he was with [facility] but was discharged, stated that he needs it again.  Says he needs a new referral.

## 2023-02-27 ENCOUNTER — PES CALL (OUTPATIENT)
Dept: ADMINISTRATIVE | Facility: CLINIC | Age: 83
End: 2023-02-27
Payer: MEDICARE

## 2023-02-27 ENCOUNTER — OFFICE VISIT (OUTPATIENT)
Dept: FAMILY MEDICINE | Facility: CLINIC | Age: 83
End: 2023-02-27
Payer: MEDICARE

## 2023-02-27 ENCOUNTER — TELEPHONE (OUTPATIENT)
Dept: FAMILY MEDICINE | Facility: CLINIC | Age: 83
End: 2023-02-27
Payer: MEDICARE

## 2023-02-27 DIAGNOSIS — G89.29 CHRONIC LOW BACK PAIN WITHOUT SCIATICA, UNSPECIFIED BACK PAIN LATERALITY: ICD-10-CM

## 2023-02-27 DIAGNOSIS — Z79.4 TYPE 2 DIABETES MELLITUS WITH HYPERGLYCEMIA, WITH LONG-TERM CURRENT USE OF INSULIN: ICD-10-CM

## 2023-02-27 DIAGNOSIS — I10 ESSENTIAL HYPERTENSION: ICD-10-CM

## 2023-02-27 DIAGNOSIS — R60.9 EDEMA, UNSPECIFIED TYPE: ICD-10-CM

## 2023-02-27 DIAGNOSIS — L97.919 ULCERS OF BOTH LOWER LEGS: ICD-10-CM

## 2023-02-27 DIAGNOSIS — M54.50 CHRONIC LOW BACK PAIN WITHOUT SCIATICA, UNSPECIFIED BACK PAIN LATERALITY: ICD-10-CM

## 2023-02-27 DIAGNOSIS — J44.9 CHRONIC OBSTRUCTIVE PULMONARY DISEASE, UNSPECIFIED COPD TYPE: Primary | ICD-10-CM

## 2023-02-27 DIAGNOSIS — L97.929 ULCERS OF BOTH LOWER LEGS: ICD-10-CM

## 2023-02-27 DIAGNOSIS — E78.00 HYPERCHOLESTEROLEMIA: ICD-10-CM

## 2023-02-27 DIAGNOSIS — I73.9 PAD (PERIPHERAL ARTERY DISEASE): ICD-10-CM

## 2023-02-27 DIAGNOSIS — I25.10 CORONARY ARTERY DISEASE, UNSPECIFIED VESSEL OR LESION TYPE, UNSPECIFIED WHETHER ANGINA PRESENT, UNSPECIFIED WHETHER NATIVE OR TRANSPLANTED HEART: ICD-10-CM

## 2023-02-27 DIAGNOSIS — E11.65 TYPE 2 DIABETES MELLITUS WITH HYPERGLYCEMIA, WITH LONG-TERM CURRENT USE OF INSULIN: ICD-10-CM

## 2023-02-27 PROCEDURE — 99441 PR PHYSICIAN TELEPHONE EVALUATION 5-10 MIN: CPT | Mod: 95,,, | Performed by: INTERNAL MEDICINE

## 2023-02-27 PROCEDURE — 99441 PR PHYSICIAN TELEPHONE EVALUATION 5-10 MIN: ICD-10-PCS | Mod: 95,,, | Performed by: INTERNAL MEDICINE

## 2023-02-27 NOTE — PROGRESS NOTES
Subjective:       Patient ID: Jose Luis Martinez is a 82 y.o. male.    Chief Complaint: Hypertension, Hyperlipidemia, Diabetes, Leg Swelling, and leg wound    Audio Only Telehealth Visit     The [place]  The chief complaint leading to consultation is: f/u  Visit type: Virtual visit with audio only (telephone)  Total time spent with patient: 5 minutes   The reason for the audio only service rather than synchronous audio and video virtual visit was related to technical difficulties or patient preference/necessity.     Each patient to whom I provide medical services by telemedicine is:  (1) informed of the relationship between the physician and patient and the respective role of any other health care provider with respect to management of the patient; and (2) notified that they may decline to receive medical services by telemedicine and may withdraw from such care at any time. Patient verbally consented to receive this service via voice-only telephone call.       HPI: f/u medical condition ------taking his meds-------------legs swelling and one with some drainage.     Hard for him to get around.        Difficult with transportation  to wound care clinic---------no fever or chills-----------                             This service was not originating from a related E/M service provided within the previous 7 days nor will  to an E/M service or procedure within the next 24 hours or my soonest available appointment.  Prevailing standard of care was able to be met in this audio-only visit.         Hypertension  Associated symptoms include shortness of breath. Pertinent negatives include no chest pain or palpitations.   Hyperlipidemia  Associated symptoms include shortness of breath. Pertinent negatives include no chest pain.   Diabetes  Pertinent negatives for hypoglycemia include no confusion or dizziness. Associated symptoms include weakness. Pertinent negatives for diabetes include no chest  pain.   Past Medical History:   Diagnosis Date    Hypertension     Ulcers of both lower legs 2018     Past Surgical History:   Procedure Laterality Date    CATARACT EXTRACTION, BILATERAL      LIPOMA RESECTION Bilateral     neck     PROSTATE SURGERY      REPAIR OF EYELID      TOTAL KNEE ARTHROPLASTY Left      No family history on file.  Social History     Socioeconomic History    Marital status:    Tobacco Use    Smoking status: Former     Packs/day: 0.50     Years: 43.00     Pack years: 21.50     Types: Cigarettes     Start date:      Quit date: 2018     Years since quittin.1    Smokeless tobacco: Former   Substance and Sexual Activity    Alcohol use: No    Drug use: Never     Review of Systems   Constitutional:  Negative for chills and fever.   HENT: Negative.     Respiratory:  Positive for shortness of breath. Negative for apnea, cough, choking, chest tightness, wheezing and stridor.    Cardiovascular:  Positive for leg swelling. Negative for chest pain and palpitations.   Gastrointestinal:  Negative for abdominal pain, nausea and vomiting.   Musculoskeletal:  Positive for arthralgias, back pain and gait problem.   Neurological:  Positive for weakness. Negative for dizziness.   Psychiatric/Behavioral:  Negative for agitation, behavioral problems and confusion.      Objective:      Physical Exam  Neurological:      Mental Status: He is alert and oriented to person, place, and time.   Psychiatric:         Mood and Affect: Mood normal.         Behavior: Behavior normal.         Thought Content: Thought content normal.         Judgment: Judgment normal.       CMP  Sodium   Date Value Ref Range Status   2023 141 136 - 145 mmol/L Final     Potassium   Date Value Ref Range Status   2023 4.4 3.5 - 5.1 mmol/L Final     Chloride   Date Value Ref Range Status   2023 104 95 - 110 mmol/L Final     CO2   Date Value Ref Range Status   2023 28 23 - 29 mmol/L Final     Glucose   Date  Value Ref Range Status   01/09/2023 105 70 - 110 mg/dL Final     BUN   Date Value Ref Range Status   01/09/2023 32 (H) 8 - 23 mg/dL Final     Creatinine   Date Value Ref Range Status   01/09/2023 1.5 (H) 0.5 - 1.4 mg/dL Final     Calcium   Date Value Ref Range Status   01/09/2023 8.7 8.7 - 10.5 mg/dL Final     Total Protein   Date Value Ref Range Status   10/18/2022 7.3 6.0 - 8.4 g/dL Final     Albumin   Date Value Ref Range Status   01/09/2023 3.3 (L) 3.5 - 5.2 g/dL Final     Total Bilirubin   Date Value Ref Range Status   10/18/2022 0.5 0.1 - 1.0 mg/dL Final     Comment:     For infants and newborns, interpretation of results should be based  on gestational age, weight and in agreement with clinical  observations.    Premature Infant recommended reference ranges:  Up to 24 hours.............<8.0 mg/dL  Up to 48 hours............<12.0 mg/dL  3-5 days..................<15.0 mg/dL  6-29 days.................<15.0 mg/dL       Alkaline Phosphatase   Date Value Ref Range Status   10/18/2022 93 55 - 135 U/L Final     AST   Date Value Ref Range Status   10/18/2022 26 10 - 40 U/L Final     ALT   Date Value Ref Range Status   10/18/2022 19 10 - 44 U/L Final     Anion Gap   Date Value Ref Range Status   01/09/2023 9 8 - 16 mmol/L Final     eGFR if    Date Value Ref Range Status   05/03/2022 54.1 (A) >60 mL/min/1.73 m^2 Final     eGFR if non    Date Value Ref Range Status   05/03/2022 46.8 (A) >60 mL/min/1.73 m^2 Final     Comment:     Calculation used to obtain the estimated glomerular filtration  rate (eGFR) is the CKD-EPI equation.        Lab Results   Component Value Date    WBC 11.36 05/03/2022    HGB 10.7 (L) 05/03/2022    HCT 35.2 (L) 05/03/2022    MCV 96 05/03/2022     05/03/2022     Lab Results   Component Value Date    CHOL 110 (L) 08/25/2021     Lab Results   Component Value Date    HDL 47 08/25/2021     Lab Results   Component Value Date    LDLCALC 53.0 (L) 08/25/2021     Lab  Results   Component Value Date    TRIG 50 08/25/2021     Lab Results   Component Value Date    CHOLHDL 42.7 08/25/2021     Lab Results   Component Value Date    TSH 3.799 08/25/2021     Lab Results   Component Value Date    HGBA1C 6.8 (H) 10/18/2022     Assessment:       1. Chronic obstructive pulmonary disease, unspecified COPD type    2. Coronary artery disease, unspecified vessel or lesion type, unspecified whether angina present, unspecified whether native or transplanted heart    3. Chronic low back pain without sciatica, unspecified back pain laterality    4. Edema, unspecified type    5. Essential hypertension    6. Hypercholesterolemia    7. Type 2 diabetes mellitus with hyperglycemia, with long-term current use of insulin    8. PAD (peripheral artery disease)    9. Ulcers of both lower legs          Plan:   Chronic obstructive pulmonary disease, unspecified COPD type    Coronary artery disease, unspecified vessel or lesion type, unspecified whether angina present, unspecified whether native or transplanted heart    Chronic low back pain without sciatica, unspecified back pain laterality    Edema, unspecified type    Essential hypertension    Hypercholesterolemia    Type 2 diabetes mellitus with hyperglycemia, with long-term current use of insulin    PAD (peripheral artery disease)    Ulcers of both lower legs      Continue meds------------------as above-------------------------home health with wound care-------------------go to er for worsening symptoms---------------f/u 1 month-----

## 2023-02-28 ENCOUNTER — TELEPHONE (OUTPATIENT)
Dept: PULMONOLOGY | Facility: CLINIC | Age: 83
End: 2023-02-28
Payer: MEDICARE

## 2023-03-01 ENCOUNTER — TELEPHONE (OUTPATIENT)
Dept: VASCULAR SURGERY | Facility: CLINIC | Age: 83
End: 2023-03-01
Payer: MEDICARE

## 2023-03-01 DIAGNOSIS — L97.929 ULCERS OF BOTH LOWER LEGS: ICD-10-CM

## 2023-03-01 DIAGNOSIS — I73.9 PVD (PERIPHERAL VASCULAR DISEASE): Primary | ICD-10-CM

## 2023-03-01 DIAGNOSIS — L97.919 ULCERS OF BOTH LOWER LEGS: ICD-10-CM

## 2023-03-01 PROCEDURE — G0180 PR HOME HEALTH MD CERTIFICATION: ICD-10-PCS | Mod: ,,, | Performed by: INTERNAL MEDICINE

## 2023-03-01 PROCEDURE — G0180 MD CERTIFICATION HHA PATIENT: HCPCS | Mod: ,,, | Performed by: INTERNAL MEDICINE

## 2023-03-01 NOTE — TELEPHONE ENCOUNTER
----- Message from Galigregorio Aguirre sent at 3/1/2023  3:04 PM CST -----  Contact: Miroslava/[facility]  Type:  Sooner Apoointment Request    Caller is requesting a sooner appointment. Caller will not accept being placed on the waitlist and is requesting a message be sent to doctor.  Name of Caller:Miroslava  When is the first available appointment?unknown  Symptoms:recurring vascular ulcers to bilateral lower extremities  Would the patient rather a call back or a response via [facility]? call  Best Call Back Number:[phone]     Additional Information: Request to schedule patient's visit sooner than next available. Please give patient a call back when possible.   Thank you,  GH

## 2023-03-01 NOTE — TELEPHONE ENCOUNTER
Returned call to Miroslava with [facility] at patient's home. Scheduled an appointment with Vascular for PVD and leg Ulcers. They are going to request a referral from Dr. Deepak Mejia's office.

## 2023-03-07 DIAGNOSIS — D47.2 MGUS (MONOCLONAL GAMMOPATHY OF UNKNOWN SIGNIFICANCE): ICD-10-CM

## 2023-03-07 DIAGNOSIS — D52.0 DIETARY FOLATE DEFICIENCY ANEMIA: ICD-10-CM

## 2023-03-07 DIAGNOSIS — D64.9 ANEMIA, UNSPECIFIED TYPE: Primary | ICD-10-CM

## 2023-03-07 DIAGNOSIS — R76.8 ELEVATED SERUM IMMUNOGLOBULIN FREE LIGHT CHAIN LEVEL: ICD-10-CM

## 2023-03-07 DIAGNOSIS — D51.3 OTHER DIETARY VITAMIN B12 DEFICIENCY ANEMIA: ICD-10-CM

## 2023-03-08 ENCOUNTER — TELEPHONE (OUTPATIENT)
Dept: FAMILY MEDICINE | Facility: CLINIC | Age: 83
End: 2023-03-08
Payer: MEDICARE

## 2023-03-08 ENCOUNTER — INITIAL CONSULT (OUTPATIENT)
Dept: VASCULAR SURGERY | Facility: CLINIC | Age: 83
End: 2023-03-08
Payer: MEDICARE

## 2023-03-08 ENCOUNTER — LAB VISIT (OUTPATIENT)
Dept: LAB | Facility: HOSPITAL | Age: 83
End: 2023-03-08
Payer: MEDICARE

## 2023-03-08 DIAGNOSIS — D52.0 DIETARY FOLATE DEFICIENCY ANEMIA: ICD-10-CM

## 2023-03-08 DIAGNOSIS — L98.499 ULCER OF EXTREMITY DUE TO CHRONIC VENOUS INSUFFICIENCY: Primary | ICD-10-CM

## 2023-03-08 DIAGNOSIS — D51.3 OTHER DIETARY VITAMIN B12 DEFICIENCY ANEMIA: ICD-10-CM

## 2023-03-08 DIAGNOSIS — I87.2 ULCER OF EXTREMITY DUE TO CHRONIC VENOUS INSUFFICIENCY: Primary | ICD-10-CM

## 2023-03-08 DIAGNOSIS — L97.919 ULCERS OF BOTH LOWER LEGS: ICD-10-CM

## 2023-03-08 DIAGNOSIS — D64.9 ANEMIA, UNSPECIFIED TYPE: ICD-10-CM

## 2023-03-08 DIAGNOSIS — R76.8 ELEVATED SERUM IMMUNOGLOBULIN FREE LIGHT CHAIN LEVEL: ICD-10-CM

## 2023-03-08 DIAGNOSIS — I73.9 PVD (PERIPHERAL VASCULAR DISEASE): ICD-10-CM

## 2023-03-08 DIAGNOSIS — L97.929 ULCERS OF BOTH LOWER LEGS: ICD-10-CM

## 2023-03-08 DIAGNOSIS — D47.2 MGUS (MONOCLONAL GAMMOPATHY OF UNKNOWN SIGNIFICANCE): ICD-10-CM

## 2023-03-08 LAB
ALBUMIN SERPL BCP-MCNC: 3.2 G/DL (ref 3.5–5.2)
ALP SERPL-CCNC: 98 U/L (ref 55–135)
ALT SERPL W/O P-5'-P-CCNC: 25 U/L (ref 10–44)
ANION GAP SERPL CALC-SCNC: 13 MMOL/L (ref 8–16)
AST SERPL-CCNC: 22 U/L (ref 10–40)
BASOPHILS # BLD AUTO: 0.02 K/UL (ref 0–0.2)
BASOPHILS NFR BLD: 0.2 % (ref 0–1.9)
BILIRUB SERPL-MCNC: 0.4 MG/DL (ref 0.1–1)
BUN SERPL-MCNC: 41 MG/DL (ref 8–23)
CALCIUM SERPL-MCNC: 8.9 MG/DL (ref 8.7–10.5)
CHLORIDE SERPL-SCNC: 105 MMOL/L (ref 95–110)
CO2 SERPL-SCNC: 24 MMOL/L (ref 23–29)
CREAT SERPL-MCNC: 1.7 MG/DL (ref 0.5–1.4)
DIFFERENTIAL METHOD: ABNORMAL
EOSINOPHIL # BLD AUTO: 0.1 K/UL (ref 0–0.5)
EOSINOPHIL NFR BLD: 0.6 % (ref 0–8)
ERYTHROCYTE [DISTWIDTH] IN BLOOD BY AUTOMATED COUNT: 14.9 % (ref 11.5–14.5)
EST. GFR  (NO RACE VARIABLE): 40 ML/MIN/1.73 M^2
FERRITIN SERPL-MCNC: 176 NG/ML (ref 20–300)
FOLATE SERPL-MCNC: 8.9 NG/ML (ref 4–24)
GLUCOSE SERPL-MCNC: 118 MG/DL (ref 70–110)
HCT VFR BLD AUTO: 32.5 % (ref 40–54)
HGB BLD-MCNC: 10.3 G/DL (ref 14–18)
IGA SERPL-MCNC: 195 MG/DL (ref 40–350)
IGG SERPL-MCNC: 1458 MG/DL (ref 650–1600)
IGM SERPL-MCNC: 34 MG/DL (ref 50–300)
IMM GRANULOCYTES # BLD AUTO: 0.02 K/UL (ref 0–0.04)
IMM GRANULOCYTES NFR BLD AUTO: 0.2 % (ref 0–0.5)
IRON SERPL-MCNC: 28 UG/DL (ref 45–160)
LYMPHOCYTES # BLD AUTO: 1.2 K/UL (ref 1–4.8)
LYMPHOCYTES NFR BLD: 13.4 % (ref 18–48)
MCH RBC QN AUTO: 30.5 PG (ref 27–31)
MCHC RBC AUTO-ENTMCNC: 31.7 G/DL (ref 32–36)
MCV RBC AUTO: 96 FL (ref 82–98)
MONOCYTES # BLD AUTO: 0.7 K/UL (ref 0.3–1)
MONOCYTES NFR BLD: 7.7 % (ref 4–15)
NEUTROPHILS # BLD AUTO: 7.1 K/UL (ref 1.8–7.7)
NEUTROPHILS NFR BLD: 77.9 % (ref 38–73)
NRBC BLD-RTO: 0 /100 WBC
PLATELET # BLD AUTO: 242 K/UL (ref 150–450)
PMV BLD AUTO: 9.6 FL (ref 9.2–12.9)
POTASSIUM SERPL-SCNC: 4.1 MMOL/L (ref 3.5–5.1)
PROT SERPL-MCNC: 6.8 G/DL (ref 6–8.4)
RBC # BLD AUTO: 3.38 M/UL (ref 4.6–6.2)
SATURATED IRON: 11 % (ref 20–50)
SODIUM SERPL-SCNC: 142 MMOL/L (ref 136–145)
TOTAL IRON BINDING CAPACITY: 259 UG/DL (ref 250–450)
TRANSFERRIN SERPL-MCNC: 175 MG/DL (ref 200–375)
VIT B12 SERPL-MCNC: 426 PG/ML (ref 210–950)
WBC # BLD AUTO: 9.05 K/UL (ref 3.9–12.7)

## 2023-03-08 PROCEDURE — 84165 PROTEIN E-PHORESIS SERUM: CPT

## 2023-03-08 PROCEDURE — 84466 ASSAY OF TRANSFERRIN: CPT

## 2023-03-08 PROCEDURE — 84165 PATHOLOGIST INTERPRETATION SPE: ICD-10-PCS | Mod: 26,,, | Performed by: PATHOLOGY

## 2023-03-08 PROCEDURE — 82784 ASSAY IGA/IGD/IGG/IGM EACH: CPT | Mod: 59

## 2023-03-08 PROCEDURE — 82728 ASSAY OF FERRITIN: CPT

## 2023-03-08 PROCEDURE — 36415 COLL VENOUS BLD VENIPUNCTURE: CPT

## 2023-03-08 PROCEDURE — 85025 COMPLETE CBC W/AUTO DIFF WBC: CPT

## 2023-03-08 PROCEDURE — 82746 ASSAY OF FOLIC ACID SERUM: CPT

## 2023-03-08 PROCEDURE — 84165 PROTEIN E-PHORESIS SERUM: CPT | Mod: 26,,, | Performed by: PATHOLOGY

## 2023-03-08 PROCEDURE — 82607 VITAMIN B-12: CPT

## 2023-03-08 PROCEDURE — 80053 COMPREHEN METABOLIC PANEL: CPT

## 2023-03-08 PROCEDURE — 99204 OFFICE O/P NEW MOD 45 MIN: CPT | Mod: S$GLB,,, | Performed by: SURGERY

## 2023-03-08 PROCEDURE — 86334 PATHOLOGIST INTERPRETATION IFE: ICD-10-PCS | Mod: 26,,, | Performed by: PATHOLOGY

## 2023-03-08 PROCEDURE — 99999 PR PBB SHADOW E&M-EST. PATIENT-LVL III: CPT | Mod: PBBFAC,,, | Performed by: SURGERY

## 2023-03-08 PROCEDURE — 99999 PR PBB SHADOW E&M-EST. PATIENT-LVL III: ICD-10-PCS | Mod: PBBFAC,,, | Performed by: SURGERY

## 2023-03-08 PROCEDURE — 83521 IG LIGHT CHAINS FREE EACH: CPT | Mod: 59

## 2023-03-08 PROCEDURE — 99204 PR OFFICE/OUTPT VISIT, NEW, LEVL IV, 45-59 MIN: ICD-10-PCS | Mod: S$GLB,,, | Performed by: SURGERY

## 2023-03-08 PROCEDURE — 86334 IMMUNOFIX E-PHORESIS SERUM: CPT

## 2023-03-08 PROCEDURE — 86334 IMMUNOFIX E-PHORESIS SERUM: CPT | Mod: 26,,, | Performed by: PATHOLOGY

## 2023-03-08 NOTE — TELEPHONE ENCOUNTER
----- Message from Emerald Gray sent at 3/8/2023  2:41 PM CST -----  Contact: Danielle([facility])-[phone]  Type:  Patient Returning Call    Who Called:Danielle  Who Left Message for Patient:Zulma Chaitanya  Does the patient know what this is regarding?:wound card  Would the patient rather a call back or a response via [facility]? Call back   Best Call Back Number:[phone]  Additional Information:

## 2023-03-08 NOTE — TELEPHONE ENCOUNTER
----- Message from Sue Mccracken sent at 3/8/2023  2:11 PM CST -----  Contact: Pt home health nurse Danielle@[phone]--  Danielle home health nurse calling to see if wound care orders can be fax for the pt. Please fax to [phone]

## 2023-03-08 NOTE — PROGRESS NOTES
"[facility] Vascular Surgery  Congenital Cardiovascular Surgery  Consult Note    Patient Name: Jose Luis Martinez  MRN: [id number]  Admission Date: (Not on file)  Hospital Length of Stay: 0 days   Attending Physician: No att. providers found  Primary Care Provider: Matti Mejia MD    Patient information was obtained from patient and spouse/SO.     Consults  Subjective:     Chief Complaint bilateral leg swelling with ulcerations    History of Present Illness:  82-year-old female presents today for evaluation of bilateral lower extremity edema with venous stasis ulcerations.  Patient currently has home health and is applying home compression therapy with pneumatic pumps.  Patient was previously treated with compressive wraps.  States over the past several months his edema has worsened and has now developed painful and weeping bilateral venous stasis ulcerations.  Patient is currently on Bumex    Current Outpatient Medications   Medication    ACCU-CHEK DAVID PLUS TEST STRP Strp    ACCU-CHEK SOFTCLIX LANCETS Misc    albuterol (PROVENTIL) 2.5 mg /3 mL (0.083 %) nebulizer solution    albuterol (PROVENTIL/VENTOLIN HFA) 90 mcg/actuation inhaler    alfuzosin (UROXATRAL) 10 mg Tb24    atorvastatin (LIPITOR) 40 MG tablet    BD ULTRA-FINE VINNIE PEN NEEDLES 32 gauge x 5/32" Ndle    bumetanide (BUMEX) 2 MG tablet    dutasteride (AVODART) 0.5 mg capsule    fluticasone propionate (FLONASE) 50 mcg/actuation nasal spray    fluticasone-salmeterol diskus inhaler 250-50 mcg    hydrocodone-acetaminophen 10-325mg (NORCO)  mg Tab    isosorbide mononitrate (IMDUR) 30 MG 24 hr tablet    lancets 32 gauge Misc    LANTUS SOLOSTAR U-100 INSULIN glargine 100 units/mL SubQ pen    ofloxacin (FLOXIN) 0.3 % otic solution    pantoprazole (PROTONIX) 40 MG tablet    pen needle, diabetic (BD ULTRA-FINE VINNIE PEN NEEDLE) 32 gauge x 5/32" Ndle    potassium chloride (KLOR-CON) 10 MEQ TbSR    sars-cov-2, covid-19 pfizer omicron, (PFIZER COVID " "BIVAL,12Y UP,,PF,) 30 mcg/0.3 mL injection    silver sulfADIAZINE 1% (SILVADENE) 1 % cream    tamsulosin (FLOMAX) 0.4 mg Cp24    TRUEPLUS INSULIN 0.5 mL 31 gauge x 5/16" Syrg    valsartan (DIOVAN) 160 MG tablet    blood-glucose meter kit    gabapentin (NEURONTIN) 100 MG capsule     No current facility-administered medications for this visit.       Review of patient's allergies indicates:  No Known Allergies    Past Medical History:   Diagnosis Date    Hypertension     Ulcers of both lower legs 2018     Past Surgical History:   Procedure Laterality Date    CATARACT EXTRACTION, BILATERAL      LIPOMA RESECTION Bilateral     neck     PROSTATE SURGERY      REPAIR OF EYELID      TOTAL KNEE ARTHROPLASTY Left      Family History    None       Tobacco Use    Smoking status: Former     Packs/day: 0.50     Years: 43.00     Pack years: 21.50     Types: Cigarettes     Start date:      Quit date:      Years since quittin.1    Smokeless tobacco: Former   Substance and Sexual Activity    Alcohol use: No    Drug use: Never    Sexual activity: Not on file     Review of Systems   Constitutional:  Negative for activity change, appetite change, fatigue and fever.   HENT:  Negative for congestion.    Eyes:  Negative for photophobia, redness and visual disturbance.   Respiratory:  Negative for apnea, cough, chest tightness and shortness of breath.    Cardiovascular:  Positive for leg swelling. Negative for chest pain.   Gastrointestinal:  Negative for abdominal pain, nausea and vomiting.   Genitourinary:  Negative for difficulty urinating.   Musculoskeletal:  Negative for gait problem and myalgias.   Skin:  Negative for color change, rash and wound.   Neurological:  Negative for syncope, facial asymmetry, speech difficulty, weakness and numbness.   Objective:     Vital Signs (Most Recent):    Vital Signs (24h Range):  [unfilled]     Weight:  (unable to stand on scale)  There is no height or weight on file to calculate " BMI.            [unfilled]    Physical Exam  Constitutional:       Appearance: Normal appearance. He is obese.       HENT:      Head: Normocephalic and atraumatic.      Mouth/Throat:      Mouth: Mucous membranes are moist.   Eyes:      Extraocular Movements: Extraocular movements intact.      Conjunctiva/sclera: Conjunctivae normal.      Pupils: Pupils are equal, round, and reactive to light.   Neck:      Vascular: No carotid bruit.   Cardiovascular:      Rate and Rhythm: Normal rate and regular rhythm.      Pulses: Normal pulses.   Pulmonary:      Effort: Pulmonary effort is normal.      Breath sounds: Normal breath sounds.   Abdominal:      General: Abdomen is flat. Bowel sounds are normal.      Palpations: Abdomen is soft.   Musculoskeletal:      Cervical back: Neck supple.   Skin:     General: Skin is warm.      Capillary Refill: Capillary refill takes 2 to 3 seconds.   Neurological:      General: No focal deficit present.      Mental Status: He is alert and oriented to person, place, and time. Mental status is at baseline.      Cranial Nerves: No cranial nerve deficit.      Sensory: No sensory deficit.      Motor: No weakness.   Psychiatric:         Mood and Affect: Mood normal.         Behavior: Behavior normal.       Significant Labs:  I have reviewed all pertinent lab results within the past 24 hours.    Significant Diagnostics:  I have reviewed all pertinent imaging results/findings within the past 24 hours.    Assessment/Plan:     There are no hospital problems to display for this patient.      Bilateral lower extremity edema seems consistent with chronic venous stasis with ulcerations  Will obtain bilateral lower extremity venous ultrasound to evaluate for DVT and reflux  Patient recommended to continue compression therapy and home health follow-up    Thank you for your consult. I will follow-up with patient. Please contact us if you have any additional questions.    Jose Daniel Henning IV, MD  Vascular  Surgery  [facility]

## 2023-03-09 LAB
ALBUMIN SERPL ELPH-MCNC: 3.04 G/DL (ref 3.35–5.55)
ALPHA1 GLOB SERPL ELPH-MCNC: 0.36 G/DL (ref 0.17–0.41)
ALPHA2 GLOB SERPL ELPH-MCNC: 0.82 G/DL (ref 0.43–0.99)
B-GLOBULIN SERPL ELPH-MCNC: 0.72 G/DL (ref 0.5–1.1)
GAMMA GLOB SERPL ELPH-MCNC: 1.26 G/DL (ref 0.67–1.58)
INTERPRETATION SERPL IFE-IMP: NORMAL
KAPPA LC SER QL IA: 9.85 MG/DL (ref 0.33–1.94)
KAPPA LC/LAMBDA SER IA: 2.87 (ref 0.26–1.65)
LAMBDA LC SER QL IA: 3.43 MG/DL (ref 0.57–2.63)
PROT SERPL-MCNC: 6.2 G/DL (ref 6–8.4)

## 2023-03-10 ENCOUNTER — TELEPHONE (OUTPATIENT)
Dept: FAMILY MEDICINE | Facility: CLINIC | Age: 83
End: 2023-03-10
Payer: MEDICARE

## 2023-03-10 DIAGNOSIS — L97.909 ULCER OF LOWER EXTREMITY, UNSPECIFIED LATERALITY, UNSPECIFIED ULCER STAGE: Primary | ICD-10-CM

## 2023-03-10 NOTE — TELEPHONE ENCOUNTER
Needs appt with wound care clinic -([facility] or [facility])     for leg wounds---------------for home health orders-

## 2023-03-12 LAB — PATHOLOGIST INTERPRETATION SPE: NORMAL

## 2023-03-15 ENCOUNTER — TELEPHONE (OUTPATIENT)
Dept: FAMILY MEDICINE | Facility: CLINIC | Age: 83
End: 2023-03-15

## 2023-03-15 NOTE — TELEPHONE ENCOUNTER
----- Message from Court Francis sent at 3/15/2023 10:25 AM CDT -----  [facility] in [place] is requesting a call back concerning medication for the pt. Call back number is [phone] [facility]

## 2023-03-15 NOTE — TELEPHONE ENCOUNTER
Attempted to call pharmacy. Pharmacy answered and placed on hold. Pharmacy answered again and proceeded to hang up.

## 2023-03-15 NOTE — TELEPHONE ENCOUNTER
----- Message from Claudia Bryan sent at 3/15/2023  1:29 PM CDT -----  Contact: patient wife  Type:  Patient Call          Who Called:  patient wife         Does the patient know what this is regarding?: Requesting a call back to have a new meter ; pt old meter isn't working anymore ;please advise           Would the patient rather a call back or a response via [facility]?call           Best Call Back Number:[phone]             Additional Information:      [id number] - [place] - [address] AT [place] & [facility]  [address] [address]  Phone: [phone] Fax: [phone]

## 2023-03-16 DIAGNOSIS — E11.65 TYPE 2 DIABETES MELLITUS WITH HYPERGLYCEMIA, WITHOUT LONG-TERM CURRENT USE OF INSULIN: Primary | ICD-10-CM

## 2023-03-17 ENCOUNTER — OFFICE VISIT (OUTPATIENT)
Dept: HEMATOLOGY/ONCOLOGY | Facility: CLINIC | Age: 83
End: 2023-03-17
Payer: MEDICARE

## 2023-03-17 DIAGNOSIS — D50.9 IRON DEFICIENCY ANEMIA, UNSPECIFIED IRON DEFICIENCY ANEMIA TYPE: ICD-10-CM

## 2023-03-17 DIAGNOSIS — D64.9 ANEMIA, UNSPECIFIED TYPE: Primary | ICD-10-CM

## 2023-03-17 DIAGNOSIS — D47.2 MGUS (MONOCLONAL GAMMOPATHY OF UNKNOWN SIGNIFICANCE): ICD-10-CM

## 2023-03-17 DIAGNOSIS — R76.8 ELEVATED SERUM IMMUNOGLOBULIN FREE LIGHT CHAIN LEVEL: ICD-10-CM

## 2023-03-17 PROCEDURE — 1159F PR MEDICATION LIST DOCUMENTED IN MEDICAL RECORD: ICD-10-PCS | Mod: CPTII,95,,

## 2023-03-17 PROCEDURE — 99214 PR OFFICE/OUTPT VISIT, EST, LEVL IV, 30-39 MIN: ICD-10-PCS | Mod: 95,,,

## 2023-03-17 PROCEDURE — 1160F PR REVIEW ALL MEDS BY PRESCRIBER/CLIN PHARMACIST DOCUMENTED: ICD-10-PCS | Mod: CPTII,95,,

## 2023-03-17 PROCEDURE — 1160F RVW MEDS BY RX/DR IN RCRD: CPT | Mod: CPTII,95,,

## 2023-03-17 PROCEDURE — 99214 OFFICE O/P EST MOD 30 MIN: CPT | Mod: 95,,,

## 2023-03-17 PROCEDURE — 1159F MED LIST DOCD IN RCRD: CPT | Mod: CPTII,95,,

## 2023-03-17 RX ORDER — FERROUS SULFATE 325(65) MG
325 TABLET, DELAYED RELEASE (ENTERIC COATED) ORAL DAILY
Qty: 90 TABLET | Refills: 3 | Status: SHIPPED | OUTPATIENT
Start: 2023-03-17

## 2023-03-17 NOTE — ASSESSMENT & PLAN NOTE
SPEP: Normal total protein, normal pattern  CASEY: Pathologist review not resulted  FLC-R: 2.87<<3.27    SPEP without paraprotein mild elevation free light chains.    Will continue to monitor

## 2023-03-17 NOTE — PROGRESS NOTES
"Subjective:      Patient ID: Jose Luis Martinez is a 82 y.o. male.    Chief Complaint: Follow-up      The patient location is: Home    The chief complaint leading to consultation is: follow-up    Visit type: audiovisual    Face to Face time with patient: 20  20 minutes of total time spent on the encounter, which includes face to face time and non-face to face time preparing to see the patient (eg, review of tests), Obtaining and/or reviewing separately obtained history, Documenting clinical information in the electronic or other health record, Independently interpreting results (not separately reported) and communicating results to the patient/family/caregiver, or Care coordination (not separately reported).       Each patient to whom he or she provides medical services by telemedicine is:  (1) informed of the relationship between the physician and patient and the respective role of any other health care provider with respect to management of the patient; and (2) notified that he or she may decline to receive medical services by telemedicine and may withdraw from such care at any time.    Notes:      HPI:  Mr. Martinez is a pleasant 82-year-old male, previously seen by Dr. Talbot, presents today with his wife via virtual visit for follow-up of anemia and elevated FLC.  His comorbidities include type 2 diabetes, CAD, HTN, and CKD. Today, patient complains of fatigue, dizziness, and worsening breathing problems.  He states this is related to COPD and states his insurance will not cover medications that they have been helpful in treating COPD symptoms.      Interval History: Pt presents with his wife for routine f/u he c/o arthritic pain and "trouble breathing" r/t COPD but his wife notes he started on a new medication yesterday and they are hopeful breathing will improve. Denies NVDC, fever chills, abnormal bleeding/bruising.      Social History     Socioeconomic History    Marital status:    Tobacco Use    " "Smoking status: Former     Packs/day: 0.50     Years: 43.00     Pack years: 21.50     Types: Cigarettes     Start date:      Quit date: 2018     Years since quittin.2    Smokeless tobacco: Former   Substance and Sexual Activity    Alcohol use: No    Drug use: Never       No family history on file.    Past Surgical History:   Procedure Laterality Date    CATARACT EXTRACTION, BILATERAL      LIPOMA RESECTION Bilateral     neck     PROSTATE SURGERY      REPAIR OF EYELID      TOTAL KNEE ARTHROPLASTY Left        Past Medical History:   Diagnosis Date    Hypertension     Ulcers of both lower legs 2018       Review of Systems   Constitutional:  Positive for fatigue.   Respiratory:  Positive for cough and shortness of breath.    Cardiovascular:  Negative for chest pain.   Gastrointestinal:  Negative for anal bleeding, blood in stool, constipation, diarrhea, nausea and vomiting.   Genitourinary:  Negative for hematuria.   Musculoskeletal:  Positive for arthralgias and myalgias.   Neurological:  Positive for weakness.     Medication List with Changes/Refills   New Medications    FERROUS SULFATE 325 (65 FE) MG EC TABLET    Take 1 tablet (325 mg total) by mouth once daily.   Current Medications    ACCU-CHEK DAVID PLUS TEST STRP STRP    TEST BLOOD SUGAR THREE TIMES A DAY AS NEEDED    ACCU-CHEK SOFTCLIX LANCETS MISC    USE TO TEST TWICE DAILY    ALBUTEROL (PROVENTIL) 2.5 MG /3 ML (0.083 %) NEBULIZER SOLUTION    Take 3 mLs (2.5 mg total) by nebulization every 4 (four) hours as needed for Wheezing or Shortness of Breath (cough). Rescue    ALBUTEROL (PROVENTIL/VENTOLIN HFA) 90 MCG/ACTUATION INHALER    Inhale 2 puffs into the lungs every 4 (four) hours as needed for Wheezing or Shortness of Breath. Rescue    ALFUZOSIN (UROXATRAL) 10 MG TB24        ATORVASTATIN (LIPITOR) 40 MG TABLET    TAKE ONE TABLET BY MOUTH ONCE A DAY    BD ULTRA-FINE VINNIE PEN NEEDLES 32 GAUGE X 5/32" NDLE        BLOOD-GLUCOSE METER KIT    " "Use as instructed    BUMETANIDE (BUMEX) 2 MG TABLET    TAKE 1 TABLET BY MOUTH TWO TIMES DAILY.    DUTASTERIDE (AVODART) 0.5 MG CAPSULE    Take 0.5 mg by mouth once daily.    FLUTICASONE PROPIONATE (FLONASE) 50 MCG/ACTUATION NASAL SPRAY    2 sprays (100 mcg total) by Each Nostril route once daily.    FLUTICASONE-SALMETEROL DISKUS INHALER 250-50 MCG    Inhale 1 puff into the lungs 2 (two) times daily. Controller    GABAPENTIN (NEURONTIN) 100 MG CAPSULE    Take 1 capsule (100 mg total) by mouth every evening.    HYDROCODONE-ACETAMINOPHEN 10-325MG (NORCO)  MG TAB        ISOSORBIDE MONONITRATE (IMDUR) 30 MG 24 HR TABLET        LANCETS 32 GAUGE MISC    1 lancet by Misc.(Non-Drug; Combo Route) route 2 (two) times daily.    LANTUS SOLOSTAR U-100 INSULIN GLARGINE 100 UNITS/ML SUBQ PEN    INJECT 15 UNITS INTO THE SKIN EVERY EVENING.    OFLOXACIN (FLOXIN) 0.3 % OTIC SOLUTION    Place 5 drops into the left ear 2 (two) times daily.    PANTOPRAZOLE (PROTONIX) 40 MG TABLET    TAKE 1 TABLET BY MOUTH ONCE DAILY.    PEN NEEDLE, DIABETIC (BD ULTRA-FINE VINNIE PEN NEEDLE) 32 GAUGE X 5/32" NDLE    1 Units by Misc.(Non-Drug; Combo Route) route once daily.    POTASSIUM CHLORIDE (KLOR-CON) 10 MEQ TBSR    TAKE 1 TABLET BY MOUTH ONCE DAILY.    SARS-COV-2, COVID-19 PFIZER OMICRON, (PFIZER COVID BIVAL,12Y UP,,PF,) 30 MCG/0.3 ML INJECTION    Inject into the muscle.    SILVER SULFADIAZINE 1% (SILVADENE) 1 % CREAM    Apply topically once daily.    TAMSULOSIN (FLOMAX) 0.4 MG CP24        TRUEPLUS INSULIN 0.5 ML 31 GAUGE X 5/16" SYRG    INJECT TWO TIMES A DAY AS DIRECTED    VALSARTAN (DIOVAN) 160 MG TABLET    TAKE ONE TABLET BY MOUTH ONCE A DAY        Objective:   There were no vitals filed for this visit.    Physical Exam  Vitals reviewed: Virtual visit.   Constitutional:       Appearance: Normal appearance.   HENT:      Head: Normocephalic.   Neurological:      Mental Status: He is alert.   Psychiatric:         Mood and Affect: Mood normal.    "      Behavior: Behavior normal.       Lab Results   Component Value Date    WBC 9.05 03/08/2023    HGB 10.3 (L) 03/08/2023    HCT 32.5 (L) 03/08/2023    MCV 96 03/08/2023     03/08/2023       Lab Results   Component Value Date     03/08/2023    K 4.1 03/08/2023     03/08/2023    CO2 24 03/08/2023    BUN 41 (H) 03/08/2023    CREATININE 1.7 (H) 03/08/2023    CALCIUM 8.9 03/08/2023    ANIONGAP 13 03/08/2023    ESTGFRAFRICA 54.1 (A) 05/03/2022    EGFRNONAA 46.8 (A) 05/03/2022     Lab Results   Component Value Date    ALT 25 03/08/2023    AST 22 03/08/2023    ALKPHOS 98 03/08/2023    BILITOT 0.4 03/08/2023       Assessment/Plan:     Problem List Items Addressed This Visit          Oncology    Anemia - Primary    Relevant Orders    CBC Auto Differential    Comprehensive Metabolic Panel    Ferritin    Iron and TIBC    Immunofixation Electrophoresis    CBC Auto Differential    Comprehensive Metabolic Panel    Ferritin    Iron and TIBC    Immunofixation Electrophoresis    MGUS (monoclonal gammopathy of unknown significance)     SPEP: Normal total protein, normal pattern  CASEY: Pathologist review not resulted  FLC-R: 2.87<<3.27    SPEP without paraprotein mild elevation free light chains.    Will continue to monitor          Relevant Orders    CBC Auto Differential    Comprehensive Metabolic Panel    Ferritin    Iron and TIBC    Immunofixation Electrophoresis    Iron deficiency anemia     Lab Results   Component Value Date    HGB 10.3 (L) 03/08/2023   Chronic, stable anemia    Lab Results   Component Value Date    IRON 28 (L) 03/08/2023    TRANSFERRIN 175 (L) 03/08/2023    TIBC 259 03/08/2023    FESATURATED 11 (L) 03/08/2023      Plan to start pt on oral iron supllemetn daily  Encouraged incorporation of iron rich foods in diet  Will reevaluate at next visit, if no improvement/worsening noted will plan for IV iron therapy    F/u up in 3 months with labs a few days prior @ [facility];  VV okay         Relevant  Medications    ferrous sulfate 325 (65 FE) MG EC tablet    Other Relevant Orders    CBC Auto Differential    Comprehensive Metabolic Panel    Ferritin    Iron and TIBC    Immunofixation Electrophoresis     Other Visit Diagnoses       Elevated serum immunoglobulin free light chain level        Relevant Orders    CBC Auto Differential    Comprehensive Metabolic Panel    Ferritin    Iron and TIBC    Immunofixation Electrophoresis              Med Onc Chart Routing      Follow up with physician    Follow up with TREY 3 months. with labs a few days prior @ [facility] 10AM time preferred   Infusion scheduling note    Injection scheduling note    Labs CBC, CMP, ferritin, iron and TIBC and other   Scheduling:  Preferred lab:  Lab interval:  +CASEY   Imaging    Pharmacy appointment    Other referrals              DENNIS Jones, FNP-C  Hematology/Oncology

## 2023-03-17 NOTE — ASSESSMENT & PLAN NOTE
Lab Results   Component Value Date    HGB 10.3 (L) 03/08/2023   Chronic, stable anemia    Lab Results   Component Value Date    IRON 28 (L) 03/08/2023    TRANSFERRIN 175 (L) 03/08/2023    TIBC 259 03/08/2023    FESATURATED 11 (L) 03/08/2023      Plan to start pt on oral iron supllemetn daily  Encouraged incorporation of iron rich foods in diet  Will reevaluate at next visit, if no improvement/worsening noted will plan for IV iron therapy    F/u up in 3 months with labs a few days prior @ [facility]

## 2023-03-18 LAB — PATHOLOGIST INTERPRETATION IFE: NORMAL

## 2023-03-19 ENCOUNTER — DOCUMENT SCAN (OUTPATIENT)
Dept: HOME HEALTH SERVICES | Facility: HOSPITAL | Age: 83
End: 2023-03-19
Payer: MEDICARE

## 2023-03-24 ENCOUNTER — TELEPHONE (OUTPATIENT)
Dept: ADMINISTRATIVE | Facility: HOSPITAL | Age: 83
End: 2023-03-24
Payer: MEDICARE

## 2023-03-28 ENCOUNTER — TELEPHONE (OUTPATIENT)
Dept: ADMINISTRATIVE | Facility: HOSPITAL | Age: 83
End: 2023-03-28
Payer: MEDICARE

## 2023-03-31 ENCOUNTER — EXTERNAL HOME HEALTH (OUTPATIENT)
Dept: HOME HEALTH SERVICES | Facility: HOSPITAL | Age: 83
End: 2023-03-31
Payer: MEDICARE

## 2023-04-05 ENCOUNTER — INITIAL CONSULT (OUTPATIENT)
Dept: VASCULAR SURGERY | Facility: CLINIC | Age: 83
End: 2023-04-05
Payer: MEDICARE

## 2023-04-05 VITALS
BODY MASS INDEX: 39.33 KG/M2 | WEIGHT: 306.44 LBS | DIASTOLIC BLOOD PRESSURE: 58 MMHG | SYSTOLIC BLOOD PRESSURE: 132 MMHG | HEIGHT: 74 IN

## 2023-04-05 DIAGNOSIS — I87.2 ULCER OF EXTREMITY DUE TO CHRONIC VENOUS INSUFFICIENCY: Primary | ICD-10-CM

## 2023-04-05 DIAGNOSIS — L98.499 ULCER OF EXTREMITY DUE TO CHRONIC VENOUS INSUFFICIENCY: Primary | ICD-10-CM

## 2023-04-05 PROCEDURE — 99999 PR PBB SHADOW E&M-EST. PATIENT-LVL III: ICD-10-PCS | Mod: PBBFAC,,, | Performed by: SURGERY

## 2023-04-05 PROCEDURE — 99214 PR OFFICE/OUTPT VISIT, EST, LEVL IV, 30-39 MIN: ICD-10-PCS | Mod: S$GLB,,, | Performed by: SURGERY

## 2023-04-05 PROCEDURE — 99999 PR PBB SHADOW E&M-EST. PATIENT-LVL III: CPT | Mod: PBBFAC,,, | Performed by: SURGERY

## 2023-04-05 PROCEDURE — 99214 OFFICE O/P EST MOD 30 MIN: CPT | Mod: S$GLB,,, | Performed by: SURGERY

## 2023-04-05 NOTE — PROGRESS NOTES
"[facility] Vascular Surgery  Congenital Cardiovascular Surgery  Consult Note    Patient Name: Jose Luis Martinez  MRN: [id number]  Admission Date: (Not on file)  Hospital Length of Stay: 0 days   Attending Physician: No att. providers found  Primary Care Provider: Matti Mejia MD    Patient information was obtained from patient and ER records.     Consults  Subjective:     Chief Complaint follow-up    History of Present Illness: 82-year-old female presents today for evaluation of bilateral lower extremity edema with venous stasis ulcerations.  Patient currently has home health and is applying home compression therapy with pneumatic pumps.  Patient was previously treated with compressive wraps.  States over the past several months his edema has worsened and has now developed painful and weeping bilateral venous stasis ulcerations.  Patient is currently on Bumex    4/5/23  Returns for follow-up.  Duplex ultrasound negative for deep venous reflux.  Does have some mild reflux in the superficial system below the right knee.  No surgical intervention warranted.  Continues to have severe bilateral lower extremity edema with weeping ulcerations.    Current Outpatient Medications   Medication    ACCU-CHEK DAVID PLUS TEST STRP Strp    ACCU-CHEK SOFTCLIX LANCETS Misc    albuterol (PROVENTIL) 2.5 mg /3 mL (0.083 %) nebulizer solution    albuterol (PROVENTIL/VENTOLIN HFA) 90 mcg/actuation inhaler    alfuzosin (UROXATRAL) 10 mg Tb24    atorvastatin (LIPITOR) 40 MG tablet    BD ULTRA-FINE VINNIE PEN NEEDLES 32 gauge x 5/32" Ndle    bumetanide (BUMEX) 2 MG tablet    dutasteride (AVODART) 0.5 mg capsule    ferrous sulfate 325 (65 FE) MG EC tablet    fluticasone propionate (FLONASE) 50 mcg/actuation nasal spray    fluticasone-salmeterol diskus inhaler 250-50 mcg    hydrocodone-acetaminophen 10-325mg (NORCO)  mg Tab    isosorbide mononitrate (IMDUR) 30 MG 24 hr tablet    lancets 32 gauge Misc    LANTUS SOLOSTAR U-100 " "INSULIN glargine 100 units/mL SubQ pen    ofloxacin (FLOXIN) 0.3 % otic solution    pantoprazole (PROTONIX) 40 MG tablet    pen needle, diabetic (BD ULTRA-FINE VINNIE PEN NEEDLE) 32 gauge x 5/32" Ndle    potassium chloride (KLOR-CON) 10 MEQ TbSR    sars-cov-2, covid-19 pfizer omicron, (PFIZER COVID BIVAL,12Y UP,,PF,) 30 mcg/0.3 mL injection    silver sulfADIAZINE 1% (SILVADENE) 1 % cream    tamsulosin (FLOMAX) 0.4 mg Cp24    TRUEPLUS INSULIN 0.5 mL 31 gauge x 5/16" Syrg    valsartan (DIOVAN) 160 MG tablet    blood-glucose meter kit    gabapentin (NEURONTIN) 100 MG capsule     No current facility-administered medications for this visit.       Review of patient's allergies indicates:  No Known Allergies    Past Medical History:   Diagnosis Date    Hypertension     Ulcers of both lower legs 2018     Past Surgical History:   Procedure Laterality Date    CATARACT EXTRACTION, BILATERAL      LIPOMA RESECTION Bilateral     neck     PROSTATE SURGERY      REPAIR OF EYELID      TOTAL KNEE ARTHROPLASTY Left      Family History    None       Tobacco Use    Smoking status: Former     Packs/day: 0.50     Years: 43.00     Pack years: 21.50     Types: Cigarettes     Start date:      Quit date:      Years since quittin.2    Smokeless tobacco: Former   Substance and Sexual Activity    Alcohol use: No    Drug use: Never    Sexual activity: Not on file     Review of Systems   Constitutional:  Negative for activity change, appetite change, fatigue and fever.   HENT:  Negative for congestion.    Eyes:  Negative for photophobia, redness and visual disturbance.   Respiratory:  Negative for apnea, cough, chest tightness and shortness of breath.    Cardiovascular:  Positive for leg swelling. Negative for chest pain.   Gastrointestinal:  Negative for abdominal pain, nausea and vomiting.   Genitourinary:  Negative for difficulty urinating.   Musculoskeletal:  Negative for gait problem and myalgias.   Skin:  Negative for color " change, rash and wound.   Neurological:  Negative for syncope, facial asymmetry, speech difficulty, weakness and numbness.   Objective:     Vital Signs (Most Recent):  BP: (!) 132/58 (04/05/23 0803) Vital Signs (24h Range):  [unfilled]     Weight: (!) 139 kg (306 lb 7 oz)  Body mass index is 39.34 kg/m².            [unfilled]    Physical Exam  Vitals and nursing note reviewed.   Constitutional:       Appearance: Normal appearance. He is normal weight.   HENT:      Head: Normocephalic and atraumatic.      Mouth/Throat:      Mouth: Mucous membranes are moist.   Eyes:      Extraocular Movements: Extraocular movements intact.      Conjunctiva/sclera: Conjunctivae normal.      Pupils: Pupils are equal, round, and reactive to light.   Neck:      Vascular: No carotid bruit.   Cardiovascular:      Rate and Rhythm: Normal rate and regular rhythm.      Pulses: Normal pulses.           Femoral pulses are 2+ on the right side and 2+ on the left side.  Pulmonary:      Effort: Pulmonary effort is normal.      Breath sounds: Normal breath sounds.   Abdominal:      General: Abdomen is flat. Bowel sounds are normal.      Palpations: Abdomen is soft.   Musculoskeletal:      Cervical back: Neck supple.      Right lower leg: 3+ Pitting Edema present.      Left lower leg: 3+ Pitting Edema present.   Skin:     General: Skin is warm.      Capillary Refill: Capillary refill takes less than 2 seconds.   Neurological:      General: No focal deficit present.      Mental Status: He is alert and oriented to person, place, and time. Mental status is at baseline.      Cranial Nerves: No cranial nerve deficit.      Sensory: No sensory deficit.      Motor: No weakness.   Psychiatric:         Mood and Affect: Mood normal.         Behavior: Behavior normal.       Significant Labs:  I have reviewed all pertinent lab results within the past 24 hours.    Significant Diagnostics:  I have reviewed all pertinent imaging results/findings within the past 24  hours.    Assessment/Plan:     There are no hospital problems to display for this patient.      Severe bilateral lower extremity edema with ulcerations.  Duplex ultrasound negative for deep reflux.  No surgical intervention warranted.  Recommend patient follow up with lymphedema clinic in [facility].    Thank you for your consult. I will sign off. Please contact us if you have any additional questions.    Jose Daniel Henning IV, MD  Vascular Surgery  [facility] Vascular Surgery

## 2023-04-06 ENCOUNTER — TELEPHONE (OUTPATIENT)
Dept: VASCULAR SURGERY | Facility: CLINIC | Age: 83
End: 2023-04-06
Payer: MEDICARE

## 2023-04-06 NOTE — TELEPHONE ENCOUNTER
----- Message from Joann Yoon sent at 4/6/2023  2:52 PM CDT -----  Contact: Miroslava- [facility]  Miroslava would like a call back at [phone], in regards to pt home visit on today. She states the pt informed her that Dr. Henning would send a nurse out to wrap his leg. She would like to verify who will be sent out to do the leg wrap, due tho his past history of when he last had the wound wrapped.

## 2023-04-06 NOTE — TELEPHONE ENCOUNTER
Returned call to Miroslava with [facility]. States that she has been dealing with Mr. Amaya's would care for a while and because of his neuropathy he does not do well with and type of Bandage or Unna boot pressure on his legs. It causes him to have spasms and he removes them on his own. She just wanted to let Dr. Henning know. Message sent to him via In Basket.

## 2023-04-11 ENCOUNTER — TELEPHONE (OUTPATIENT)
Dept: VASCULAR SURGERY | Facility: CLINIC | Age: 83
End: 2023-04-11
Payer: MEDICARE

## 2023-04-11 NOTE — TELEPHONE ENCOUNTER
----- Message from Adeola Nihcolas sent at 4/10/2023  4:18 PM CDT -----  Contact: [phone]  Carine is requesting a call in regards to pt physical therapy. She stated stated that pt is needing inpatient physical therapy instead of outpatient. Please call her back at [phone]. Thanks KB

## 2023-04-12 ENCOUNTER — TELEPHONE (OUTPATIENT)
Dept: PULMONOLOGY | Facility: CLINIC | Age: 83
End: 2023-04-12
Payer: MEDICARE

## 2023-04-13 ENCOUNTER — CLINICAL SUPPORT (OUTPATIENT)
Dept: PULMONOLOGY | Facility: CLINIC | Age: 83
End: 2023-04-13
Payer: MEDICARE

## 2023-04-13 ENCOUNTER — OFFICE VISIT (OUTPATIENT)
Dept: PULMONOLOGY | Facility: CLINIC | Age: 83
End: 2023-04-13
Payer: MEDICARE

## 2023-04-13 VITALS
RESPIRATION RATE: 18 BRPM | BODY MASS INDEX: 40.43 KG/M2 | WEIGHT: 315 LBS | DIASTOLIC BLOOD PRESSURE: 78 MMHG | HEART RATE: 58 BPM | SYSTOLIC BLOOD PRESSURE: 130 MMHG | HEIGHT: 74 IN

## 2023-04-13 DIAGNOSIS — J30.9 ALLERGIC RHINITIS, UNSPECIFIED SEASONALITY, UNSPECIFIED TRIGGER: ICD-10-CM

## 2023-04-13 DIAGNOSIS — Z79.4 TYPE 2 DIABETES MELLITUS WITH HYPERGLYCEMIA, WITH LONG-TERM CURRENT USE OF INSULIN: ICD-10-CM

## 2023-04-13 DIAGNOSIS — J44.9 CHRONIC OBSTRUCTIVE PULMONARY DISEASE, UNSPECIFIED COPD TYPE: ICD-10-CM

## 2023-04-13 DIAGNOSIS — R06.02 SOB (SHORTNESS OF BREATH) ON EXERTION: ICD-10-CM

## 2023-04-13 DIAGNOSIS — E11.65 TYPE 2 DIABETES MELLITUS WITH HYPERGLYCEMIA, WITH LONG-TERM CURRENT USE OF INSULIN: ICD-10-CM

## 2023-04-13 DIAGNOSIS — J44.9 OSA AND COPD OVERLAP SYNDROME: ICD-10-CM

## 2023-04-13 DIAGNOSIS — G47.33 OSA AND COPD OVERLAP SYNDROME: ICD-10-CM

## 2023-04-13 DIAGNOSIS — I10 ESSENTIAL HYPERTENSION: ICD-10-CM

## 2023-04-13 DIAGNOSIS — G47.33 OSA ON CPAP: ICD-10-CM

## 2023-04-13 DIAGNOSIS — J44.9 COPD, SEVERE: Primary | ICD-10-CM

## 2023-04-13 PROCEDURE — 3288F FALL RISK ASSESSMENT DOCD: CPT | Mod: CPTII,S$GLB,, | Performed by: NURSE PRACTITIONER

## 2023-04-13 PROCEDURE — 99999 PR PBB SHADOW E&M-EST. PATIENT-LVL V: CPT | Mod: PBBFAC,,, | Performed by: NURSE PRACTITIONER

## 2023-04-13 PROCEDURE — 99215 PR OFFICE/OUTPT VISIT, EST, LEVL V, 40-54 MIN: ICD-10-PCS | Mod: 25,S$GLB,, | Performed by: NURSE PRACTITIONER

## 2023-04-13 PROCEDURE — 3078F PR MOST RECENT DIASTOLIC BLOOD PRESSURE < 80 MM HG: ICD-10-PCS | Mod: CPTII,S$GLB,, | Performed by: NURSE PRACTITIONER

## 2023-04-13 PROCEDURE — 1126F AMNT PAIN NOTED NONE PRSNT: CPT | Mod: CPTII,S$GLB,, | Performed by: NURSE PRACTITIONER

## 2023-04-13 PROCEDURE — 3075F SYST BP GE 130 - 139MM HG: CPT | Mod: CPTII,S$GLB,, | Performed by: NURSE PRACTITIONER

## 2023-04-13 PROCEDURE — 99215 OFFICE O/P EST HI 40 MIN: CPT | Mod: 25,S$GLB,, | Performed by: NURSE PRACTITIONER

## 2023-04-13 PROCEDURE — 3288F PR FALLS RISK ASSESSMENT DOCUMENTED: ICD-10-PCS | Mod: CPTII,S$GLB,, | Performed by: NURSE PRACTITIONER

## 2023-04-13 PROCEDURE — 3078F DIAST BP <80 MM HG: CPT | Mod: CPTII,S$GLB,, | Performed by: NURSE PRACTITIONER

## 2023-04-13 PROCEDURE — 3075F PR MOST RECENT SYSTOLIC BLOOD PRESS GE 130-139MM HG: ICD-10-PCS | Mod: CPTII,S$GLB,, | Performed by: NURSE PRACTITIONER

## 2023-04-13 PROCEDURE — 1159F PR MEDICATION LIST DOCUMENTED IN MEDICAL RECORD: ICD-10-PCS | Mod: CPTII,S$GLB,, | Performed by: NURSE PRACTITIONER

## 2023-04-13 PROCEDURE — 1160F PR REVIEW ALL MEDS BY PRESCRIBER/CLIN PHARMACIST DOCUMENTED: ICD-10-PCS | Mod: CPTII,S$GLB,, | Performed by: NURSE PRACTITIONER

## 2023-04-13 PROCEDURE — 1126F PR PAIN SEVERITY QUANTIFIED, NO PAIN PRESENT: ICD-10-PCS | Mod: CPTII,S$GLB,, | Performed by: NURSE PRACTITIONER

## 2023-04-13 PROCEDURE — 1159F MED LIST DOCD IN RCRD: CPT | Mod: CPTII,S$GLB,, | Performed by: NURSE PRACTITIONER

## 2023-04-13 PROCEDURE — 99999 PR PBB SHADOW E&M-EST. PATIENT-LVL V: ICD-10-PCS | Mod: PBBFAC,,, | Performed by: NURSE PRACTITIONER

## 2023-04-13 PROCEDURE — 95012 NITRIC OXIDE EXP GAS DETER: CPT | Mod: S$GLB,,, | Performed by: INTERNAL MEDICINE

## 2023-04-13 PROCEDURE — 1160F RVW MEDS BY RX/DR IN RCRD: CPT | Mod: CPTII,S$GLB,, | Performed by: NURSE PRACTITIONER

## 2023-04-13 PROCEDURE — 95012 PR NITRIC OXIDE EXPIRED GAS DETERMINATION: ICD-10-PCS | Mod: S$GLB,,, | Performed by: INTERNAL MEDICINE

## 2023-04-13 PROCEDURE — 1101F PR PT FALLS ASSESS DOC 0-1 FALLS W/OUT INJ PAST YR: ICD-10-PCS | Mod: CPTII,S$GLB,, | Performed by: NURSE PRACTITIONER

## 2023-04-13 PROCEDURE — 1101F PT FALLS ASSESS-DOCD LE1/YR: CPT | Mod: CPTII,S$GLB,, | Performed by: NURSE PRACTITIONER

## 2023-04-13 RX ORDER — ARFORMOTEROL TARTRATE 15 UG/2ML
15 SOLUTION RESPIRATORY (INHALATION) 2 TIMES DAILY
Qty: 120 ML | Refills: 11 | Status: SHIPPED | OUTPATIENT
Start: 2023-04-13

## 2023-04-13 RX ORDER — ISOSORBIDE DINITRATE 30 MG/1
30 TABLET ORAL
COMMUNITY
Start: 2023-02-24

## 2023-04-13 RX ORDER — BUDESONIDE 0.5 MG/2ML
0.5 INHALANT ORAL 2 TIMES DAILY
Qty: 120 ML | Refills: 11 | Status: SHIPPED | OUTPATIENT
Start: 2023-04-13

## 2023-04-13 NOTE — ASSESSMENT & PLAN NOTE
Ordered to be on Advair 250 mcg, he is not compliant with daily use related to cost of $45/mo.   4/13/2023 Feno 33   4/13/2023 stop advair. Begin pulmicort nebs and brovana nebs twice daily sent to [facility] mail order.

## 2023-04-13 NOTE — ASSESSMENT & PLAN NOTE
Continue management with primary care provider  Hemoglobin A1C   Date Value Ref Range Status   10/18/2022 6.8 (H) 4.0 - 5.6 % Final     Comment:     ADA Screening Guidelines:  5.7-6.4%  Consistent with prediabetes  >or=6.5%  Consistent with diabetes    High levels of fetal hemoglobin interfere with the HbA1C  assay. Heterozygous hemoglobin variants (HbS, HgC, etc)do  not significantly interfere with this assay.   However, presence of multiple variants may affect accuracy.     04/25/2022 7.4 (H) 4.0 - 5.6 % Final     Comment:     ADA Screening Guidelines:  5.7-6.4%  Consistent with prediabetes  >or=6.5%  Consistent with diabetes    High levels of fetal hemoglobin interfere with the HbA1C  assay. Heterozygous hemoglobin variants (HbS, HgC, etc)do  not significantly interfere with this assay.   However, presence of multiple variants may affect accuracy.     01/05/2022 8.0 (H) 4.0 - 5.6 % Final     Comment:     ADA Screening Guidelines:  5.7-6.4%  Consistent with prediabetes  >or=6.5%  Consistent with diabetes    High levels of fetal hemoglobin interfere with the HbA1C  assay. Heterozygous hemoglobin variants (HbS, HgC, etc)do  not significantly interfere with this assay.   However, presence of multiple variants may affect accuracy.

## 2023-04-13 NOTE — ASSESSMENT & PLAN NOTE
On CPAP 8 cm   5/4/2022 overnight on ra on cpap 8 cm normal.   Nasal mask   Up dated supply order  HME: [facility]

## 2023-04-13 NOTE — ASSESSMENT & PLAN NOTE
On CPAP 8 cm with optimal control obstructive sleep apnea   5/4/2022 overnight on ra on cpap 8 cm normal.   Ordered to be on Advair 250 mcg, he is not compliant with daily use related to cost of $45/mo.   4/13/2023 Feno 33   4/13/2023 stop advair. Begin pulmicort nebs and brovana nebs twice daily sent to [facility] mail order.

## 2023-04-13 NOTE — PROCEDURES
Clinical Guide to Interpretation or FeNO Levels :    FeNO  (ppb) LOW INTERMEDIATE HIGH   ADULT VALUES < 25 25-50          > 50   Th2-driven Inflammation Unlikely Likely Significant     Patients FeNO level at this visit : _33___ (ppb)    Interpretation of FeNO measurement in adults:  [] FENO is less than 25 ppb implies non eosinophilic airway inflammation or the absence of airway inflammation.    Comment: Low FENO (<25 ppb) in adult asthmatics with persistent symptoms suggests other etiologies for these symptoms and a lower likelihood of benefit from adding or increasing inhaled glucocorticoids.    [x] FENO between 25 and 50 ppb in adults should be interpreted cautiously with reference to the clinical situation (eg, symptomatic, on or off therapy, current smoking).    [] FENO greater than 50 ppb in adults  suggests eosinophilic airway inflammation   Comment: High FENO (>50 ppb) in adult asthmatics even with atypical symptoms suggests glucocorticoid responsiveness. High FENO (>50 ppb) can help identify poor adherence or uncontrolled inflammation in asthma patients with otherwise seemingly controlled asthma.    Discussion:  A FENO less than 25 ppb in adults and less than 20 ppb in children younger than 12 years of age implies noneosinophilic airway inflammation or the absence of airway inflammation.  A FENO greater than 50 ppb in adults or greater than 35 ppb in children suggests eosinophilic airway inflammation.   Values of FENO between 25 and 50 ppb in adults (20 to 35 ppb in children) should be interpreted cautiously with reference to the clinical situation (eg, symptomatic, on or off therapy, current smoking).  A rising FENO with a greater than 20 percent change and more than 25 ppb (20 ppb in children) from a previously stable level suggests increasing eosinophilic airway inflammation, but there are wide inter-individual differences.  A decrease in FENO greater than 20 percent for values over 50 ppb or more than  10 ppb for values less than 50 ppb may be clinically important.  ?FENO in other respiratory diseases - Several other diseases are associated with altered levels of exhaled NO: low levels of FENO have been noted in cystic fibrosis, current smoking, pulmonary hypertension, hypothermia, primary ciliary dyskinesia, and bronchopulmonary dysplasia. Elevated FENO has been noted in atopy, nonasthmatic eosinophilic bronchitis, COPD exacerbations, noncystic fibrosis bronchiectasis, and viral upper respiratory infections.    REFERENCE:  ATS Board of Directors, December 2004, and by the ERS Executive Committee, June 2004. ATS/ERS Recommendations for Standardized Procedures for the Online and Offline Measurement of Exhaled Lower Respiratory Nitric Oxide and Nasal Nitric Oxide. Guideline 2005

## 2023-04-13 NOTE — PROGRESS NOTES
Encouraged calorie reduction and 30 minutes of exercise daily. Discussed impact of obesity on general health.  Wt Readings from Last 9 Encounters:   04/13/23 (!) 142.9 kg (315 lb)   04/05/23 (!) 139 kg (306 lb 7 oz)   01/10/23 (!) 139.7 kg (307 lb 15.7 oz)   10/27/22 (!) 139.7 kg (308 lb)   10/18/22 (!) 139.7 kg (307 lb 15.7 oz)   04/28/22 (!) 139.7 kg (307 lb 15.7 oz)   04/25/22 (!) 139.7 kg (308 lb)   02/02/22 133.4 kg (294 lb)   01/05/22 133.6 kg (294 lb 8.6 oz)   Body mass index is 40.44 kg/m².

## 2023-04-13 NOTE — PROGRESS NOTES
Outpatient Pulmonary Evaluation       SUBJECTIVE:     Chief Complaint   Patient presents with    Sleep Apnea    COPD     Review feno       History of Present Illness:    Patient is a 82 y.o. male     HPI: Jose Luis Martinez is here for follow up for CHRISTOPHER and CPAP complaince assessment.   He is on CPAP of 8 cmH2O pressure.   He is compliant with CPAP use. Complaince download today reveals 75% of days with greater than 4 hours of device use.   Patient reports benefit from CPAP use and denies snoring and excessive daytime sleepiness.  Patient reports no complaints. Nasal pillows mask is used.     12/21/2021 PSG Split night severe obstructive sleep apnea.The apnea/hypopnea index 88.2 was events/hour. CPAP 10 cm optimal.   1/5/2022 orders CPAP 10 cm, per Dr. SUDHAKAR Diaz.   2/28/2022  pt request change to cpap 8 cm. not tolerating cpap 10 cm will need over night if cpap 8 is optimal, desat on split night titration  On CPAP 8 cm with optimal control. AHI 0.8.   5/4/2022 overnight oximetry on room air on CPAP 8 cm was normal, Cumulative time with oxygen saturation less than 88% (TC88): 0:00:32.        [place] Sleepiness Scale   EPWORTH SLEEPINESS SCALE 4/13/2023 10/27/2022 4/28/2022 11/22/2021 10/25/2021 7/23/2021   Sitting and reading 2 1 3 3 3 1   Watching TV 3 1 3 3 3 1   Sitting, inactive in a public place (e.g. a theatre or a meeting) 0 1 0 3 3 0   As a passenger in a car for an hour without a break 0 0 0 3 2 1   Lying down to rest in the afternoon when circumstances permit 0 3 1 3 1 3   Sitting and talking to someone 0 1 0 3 2 1   Sitting quietly after a lunch without alcohol 0 1 0 3 2 3   In a car, while stopped for a few minutes in traffic 0 0 0 3 0 0   Total score 5 8 7 24 16 10        COPD   Off Trelegy found increased blood pressure  Symptomatic shortness of breath exertional.    Ordered to be on Advair 250 mcg, he is not compliant with daily use related to cost of $45/mo.    4/13/2023 Feno 33   4/13/2023 stop advair. Begin pulmicort nebs and brovana nebs twice daily sent to [facility] mail order.     22 pack year former smoker. Quit smoking 2018.      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    Not on home O2.   4/13/2023 Patient was unable to attempt 6MWD related to does not feel stable to walk with history of falls. No desats requiring supplemental oxygen at rest.     Bed time is 1030 - 1100  Wake time is 1000 - 1100    Neck circumference is 19.5 inches  Mallampati score 4    Review of Systems   Constitutional:  Negative for fever, chills and fatigue.   HENT:  Negative for nosebleeds.    Eyes:  Negative for redness.   Respiratory:  Positive for dyspnea on extertion. Negative for apnea, snoring, choking and somnolence.    Cardiovascular:  Negative for chest pain.   Genitourinary:  Negative for hematuria.   Endocrine:  Negative for cold intolerance.    Musculoskeletal:  Positive for arthralgias, back pain and gait problem.   Skin:  Negative for rash.   Gastrointestinal:  Negative for vomiting.   Neurological:  Negative for syncope.   Hematological:  Negative for adenopathy.   Psychiatric/Behavioral:  Negative for confusion and sleep disturbance.      Review of patient's allergies indicates:  No Known Allergies    Current Outpatient Medications   Medication Sig Dispense Refill    ACCU-CHEK DAVID PLUS TEST STRP Strp TEST BLOOD SUGAR THREE TIMES A DAY AS NEEDED 200 strip 12    ACCU-CHEK SOFTCLIX LANCETS Misc USE TO TEST TWICE DAILY 100 each 12    albuterol (PROVENTIL) 2.5 mg /3 mL (0.083 %) nebulizer solution Take 3 mLs (2.5 mg total) by nebulization every 4 (four) hours as needed for Wheezing or Shortness of Breath (cough). Rescue 360 mL 11    albuterol (PROVENTIL/VENTOLIN HFA) 90 mcg/actuation inhaler Inhale 2 puffs into the lungs every 4 (four) hours as needed for Wheezing or Shortness of Breath. Rescue 54 g 3    alfuzosin (UROXATRAL) 10 mg Tb24        "atorvastatin (LIPITOR) 40 MG tablet TAKE ONE TABLET BY MOUTH ONCE A DAY 90 tablet 3    BD ULTRA-FINE VINNIE PEN NEEDLES 32 gauge x 5/32" Ndle       bumetanide (BUMEX) 2 MG tablet TAKE 1 TABLET BY MOUTH TWO TIMES DAILY. 60 tablet 12    dutasteride (AVODART) 0.5 mg capsule Take 0.5 mg by mouth once daily.      ferrous sulfate 325 (65 FE) MG EC tablet Take 1 tablet (325 mg total) by mouth once daily. 90 tablet 3    fluticasone propionate (FLONASE) 50 mcg/actuation nasal spray 2 sprays (100 mcg total) by Each Nostril route once daily. 48 g 2    hydrocodone-acetaminophen 10-325mg (NORCO)  mg Tab   0    isosorbide dinitrate (ISORDIL) 30 MG Tab Take 30 mg by mouth.      isosorbide mononitrate (IMDUR) 30 MG 24 hr tablet       lancets 32 gauge Misc 1 lancet by Misc.(Non-Drug; Combo Route) route 2 (two) times daily. 200 each 12    LANTUS SOLOSTAR U-100 INSULIN glargine 100 units/mL SubQ pen INJECT 15 UNITS INTO THE SKIN EVERY EVENING. 15 mL 0    pantoprazole (PROTONIX) 40 MG tablet TAKE 1 TABLET BY MOUTH ONCE DAILY. 90 tablet 3    pen needle, diabetic (BD ULTRA-FINE VINNIE PEN NEEDLE) 32 gauge x 5/32" Ndle 1 Units by Misc.(Non-Drug; Combo Route) route once daily. 100 each 6    potassium chloride (KLOR-CON) 10 MEQ TbSR TAKE 1 TABLET BY MOUTH ONCE DAILY. 90 tablet 3    sars-cov-2, covid-19 pfizer omicron, (PFIZER COVID BIVAL,12Y UP,,PF,) 30 mcg/0.3 mL injection Inject into the muscle. 0.3 mL 0    tamsulosin (FLOMAX) 0.4 mg Cp24   11    valsartan (DIOVAN) 160 MG tablet TAKE ONE TABLET BY MOUTH ONCE A DAY 90 tablet 1    arformoteroL (BROVANA) 15 mcg/2 mL Nebu Take 2 mLs (15 mcg total) by nebulization 2 (two) times a day. Controller 120 mL 11    blood-glucose meter kit Use as instructed 1 each 0    budesonide (PULMICORT) 0.5 mg/2 mL nebulizer solution Take 2 mLs (0.5 mg total) by nebulization 2 (two) times a day. Controller 120 mL 11    gabapentin (NEURONTIN) 100 MG capsule Take 1 capsule (100 mg total) by mouth every evening. " "30 capsule 4    ofloxacin (FLOXIN) 0.3 % otic solution Place 5 drops into the left ear 2 (two) times daily. (Patient not taking: Reported on 2023) 5 mL 0    silver sulfADIAZINE 1% (SILVADENE) 1 % cream Apply topically once daily. (Patient not taking: Reported on 2023) 400 g 3    TRUEPLUS INSULIN 0.5 mL 31 gauge x 5/16" Syrg INJECT TWO TIMES A DAY AS DIRECTED (Patient not taking: Reported on 2023) 100 each 6     No current facility-administered medications for this visit.       Past Medical History:   Diagnosis Date    Hypertension     Ulcers of both lower legs 2018     Past Surgical History:   Procedure Laterality Date    CATARACT EXTRACTION, BILATERAL      LIPOMA RESECTION Bilateral     neck     PROSTATE SURGERY      REPAIR OF EYELID      TOTAL KNEE ARTHROPLASTY Left      History reviewed. No pertinent family history.  Social History     Tobacco Use    Smoking status: Former     Packs/day: 0.50     Years: 43.00     Pack years: 21.50     Types: Cigarettes     Start date:      Quit date:      Years since quittin.2    Smokeless tobacco: Former   Substance Use Topics    Alcohol use: No    Drug use: Never          OBJECTIVE:     Vital Signs (Most Recent)  Vital Signs  Pulse: (!) 58  Resp: 18  BP: 130/78  Pain Score: 0-No pain  Height and Weight  Height: 6' 2" (188 cm)  Weight: (!) 142.9 kg (315 lb)  BSA (Calculated - sq m): 2.73 sq meters  BMI (Calculated): 40.4  Weight in (lb) to have BMI = 25: 194.3]  Wt Readings from Last 2 Encounters:   23 (!) 142.9 kg (315 lb)   23 (!) 139 kg (306 lb 7 oz)         Physical Exam:  Physical Exam   Constitutional: He is oriented to person, place, and time. He appears well-developed. No distress.   HENT:   Head: Normocephalic.   Cardiovascular: Normal rate and regular rhythm.   Pulmonary/Chest: Normal expansion and effort normal. No stridor. No respiratory distress. He has decreased breath sounds. He exhibits no tenderness.   Abdominal: He " exhibits no distension.   Musculoskeletal:         General: No tenderness.      Cervical back: Neck supple.   Lymphadenopathy:     He has no cervical adenopathy.   Neurological: He is alert and oriented to person, place, and time. Gait abnormal.   On wheelchair ambulates with difficulty   Skin: Skin is warm. No cyanosis. Nails show no clubbing.   Psychiatric: He has a normal mood and affect. His behavior is normal. Judgment and thought content normal.   Nursing note and vitals reviewed.    Laboratory  Lab Results   Component Value Date    WBC 9.05 03/08/2023    RBC 3.38 (L) 03/08/2023    HGB 10.3 (L) 03/08/2023    HCT 32.5 (L) 03/08/2023    MCV 96 03/08/2023    MCH 30.5 03/08/2023    MCHC 31.7 (L) 03/08/2023    RDW 14.9 (H) 03/08/2023     03/08/2023    MPV 9.6 03/08/2023    GRAN 7.1 03/08/2023    GRAN 77.9 (H) 03/08/2023    LYMPH 1.2 03/08/2023    LYMPH 13.4 (L) 03/08/2023    MONO 0.7 03/08/2023    MONO 7.7 03/08/2023    EOS 0.1 03/08/2023    BASO 0.02 03/08/2023    EOSINOPHIL 0.6 03/08/2023    BASOPHIL 0.2 03/08/2023       BMP  Lab Results   Component Value Date     03/08/2023    K 4.1 03/08/2023     03/08/2023    CO2 24 03/08/2023    BUN 41 (H) 03/08/2023    CREATININE 1.7 (H) 03/08/2023    CALCIUM 8.9 03/08/2023    ANIONGAP 13 03/08/2023    ESTGFRAFRICA 54.1 (A) 05/03/2022    EGFRNONAA 46.8 (A) 05/03/2022    AST 22 03/08/2023    ALT 25 03/08/2023    PROT 6.8 03/08/2023       No results found for: BNP    Lab Results   Component Value Date    TSH 3.799 08/25/2021       No results found for: SEDRATE    No results found for: CRP    No results found for: IGE    No results found for: ASPERGILLUS  No results found for: AFUMIGATUSCL     No results found for: ACE    Diagnostic Results:  I have personally reviewed today the following studies :    Clinical Guide to Interpretation or FeNO Levels :     FeNO  (ppb) LOW INTERMEDIATE HIGH   ADULT VALUES < 25 25-50          > 50   Th2-driven Inflammation  "Unlikely Likely Significant      Patients FeNO level at this visit : __28__ (ppb)     10/13/2021 CPFT  Grade A-D -acceptable quality of tracingsSevere obstruction. FEV1 43.00 % predicted. (FEV1/VC< LLN and FEV1> or equal to 35% predicted and < or equal to 49% predicted).Moderate reduction in diffusion capacity - unadjusted for hemoglobin. (DLCO > or equal    to 40% and < 60% predicted). Severe reduction in maximal voluntary ventilation. (MVV % predicted < or equal to 50% of predicted).Flow volume loop demonstrate an obstructive defect.Overall there is severe ventilatory impairment. (FEV1> or equal to 35%   predicted and < or equal to 49% of predicted)   Â Pattern of airway obstruction, and decreased diffusion capacity suggest emphysema. Clinical correlation suggested.    10/13/2021 6MWD No desaturations requiring supplemental oxygen at rest or exertion. Exercise capacity is significantly less than predicted.  Ordering Provider: Dr Diaz            Interpreting Provider: Dr Diaz  Performing nurse/tech/RT: KAMERON Hughes RRT  Diagnosis:  (CHRISTOPHER and COPD overlap syndrome)  Height: 6' 2" (188 cm)  Weight: (!) 139.7 kg (307 lb 14 oz)  BMI (Calculated): 39.5              Patient Race:                                                              Phase Oxygen Assessment Supplemental O2 Heart   Rate Blood Pressure Paul Dyspnea Scale Rating   Resting 97 % Room Air 73 bpm 137/59 2   Exercise             Minute             1 95 % Room Air 82 bpm       2 96 % Room Air 84 bpm       3 94 % Room Air 94 bpm       4 95 % Room Air 99 bpm       5 94 % Room Air 85 bpm       6  95 % Room Air 72 bpm 159/72 5-6   Recovery             Minute             1 97 % Room Air 69 bpm       2 97 % Room Air 66 bpm       3 98 % Room Air 65 bpm       4 100 % Room Air 68 bpm 157/60 3      Six Minute Walk Summary  6MWT Status: not completed  Patient Reported: Dyspnea, Lightheadedness (knee and back pain)                Interpretation:  Did " the patient stop or pause?: Yes  How many times did the patient stop or pause?: 1  Stop Time 1: 260  Restart Time 1: 360  Pause Time 1: 100 seconds  Total Time Walked (Calculated): 260 seconds  Final Partial Lap Distance (feet): 100 feet  Total Distance Meters (Calculated): 30.48 meters  Predicted Distance Meters (Calculated): 461.76 meters  Percentage of Predicted (Calculated): 6.6  Peak VO2 (Calculated): 4.89  Mets: 1.4  Has The Patient Had a Previous Six Minute Walk Test?: No     Previous 6MWT Results  Has The Patient Had a Previous Six Minute Walk Test?: No        PFT 2017:    CPFS: Severe obstruction the FEV1 is 1.56 or 48% of predicted.. The lung volumes reveal mild restriction. The total lung capacity 73%. DLCO is moderately to reduce to 55%    2021 PSG Split night severe obstructive sleep apnea.The apnea/hypopnea index 88.2 was events/hour. CPAP 10 cm optimal.     2022 overnight oximetry on room air on CPAP 8 cm was normal, Cumulative time with oxygen saturation less than 88% (TC88): 0:00:32.    ASSESSMENT/PLAN:     Requested Prescriptions     Signed Prescriptions Disp Refills    arformoteroL (BROVANA) 15 mcg/2 mL Nebu 120 mL 11     Sig: Take 2 mLs (15 mcg total) by nebulization 2 (two) times a day. Controller    budesonide (PULMICORT) 0.5 mg/2 mL nebulizer solution 120 mL 11     Sig: Take 2 mLs (0.5 mg total) by nebulization 2 (two) times a day. Controller       Problem List Items Addressed This Visit       COPD (chronic obstructive pulmonary disease)     Other Visit Diagnoses       COPD, severe    -  Primary    Relevant Medications    arformoteroL (BROVANA) 15 mcg/2 mL Nebu    budesonide (PULMICORT) 0.5 mg/2 mL nebulizer solution    Other Relevant Orders    Ambulatory referral/consult to Pulmonary Disease Management w/ Respiratory Therapist    Spirometry with/without bronchodilator    Fraction of  Nitric Oxide    Stress test, pulmonary    SOB (shortness of breath) on exertion         Relevant Orders    Stress test, pulmonary            Pulmonary rehab referred by Dr. Diaz, remains not interested significant pain in knees/arthritis. He is engaged with Home health exercise program.     Follow up in about 6 months (around 10/13/2023) for COPD boom/6mwd/feno.      Andree Monroy, NP

## 2023-04-19 ENCOUNTER — PATIENT MESSAGE (OUTPATIENT)
Dept: FAMILY MEDICINE | Facility: CLINIC | Age: 83
End: 2023-04-19
Payer: MEDICARE

## 2023-04-28 ENCOUNTER — TELEPHONE (OUTPATIENT)
Dept: PULMONOLOGY | Facility: CLINIC | Age: 83
End: 2023-04-28
Payer: MEDICARE

## 2023-04-30 PROCEDURE — G0179 MD RECERTIFICATION HHA PT: HCPCS | Mod: ,,, | Performed by: INTERNAL MEDICINE

## 2023-04-30 PROCEDURE — G0179 PR HOME HEALTH MD RECERTIFICATION: ICD-10-PCS | Mod: ,,, | Performed by: INTERNAL MEDICINE

## 2023-05-19 DIAGNOSIS — E11.65 TYPE 2 DIABETES MELLITUS WITH HYPERGLYCEMIA, WITH LONG-TERM CURRENT USE OF INSULIN: ICD-10-CM

## 2023-05-19 DIAGNOSIS — Z79.4 TYPE 2 DIABETES MELLITUS WITH HYPERGLYCEMIA, WITH LONG-TERM CURRENT USE OF INSULIN: ICD-10-CM

## 2023-05-19 RX ORDER — PEN NEEDLE, DIABETIC 31 GX5/16"
NEEDLE, DISPOSABLE MISCELLANEOUS
Qty: 200 EACH | Refills: 3 | Status: SHIPPED | OUTPATIENT
Start: 2023-05-19 | End: 2024-01-09 | Stop reason: SDUPTHER

## 2023-05-19 NOTE — TELEPHONE ENCOUNTER
No care due was identified.  [facility] Embedded Care Due Messages. Reference number: [id number].   5/19/2023 2:22:04 PM CDT

## 2023-05-19 NOTE — TELEPHONE ENCOUNTER
"----- Message from Remedios Tejada sent at 5/19/2023  3:55 PM CDT -----  Contact: Ceola/Wife  Pt wife is calling in regards to refill on BD ULTRA-FINE VINNIE PEN NEEDLES 32 gauge x 5/32" Ndle. Please call back at [phone]              [id number] - [place] - [address]  [address] [address]  Phone: [phone] Fax: [phone]        Thanks   KT    "

## 2023-05-24 ENCOUNTER — TELEPHONE (OUTPATIENT)
Dept: PULMONOLOGY | Facility: CLINIC | Age: 83
End: 2023-05-24
Payer: MEDICARE

## 2023-05-27 ENCOUNTER — EXTERNAL HOME HEALTH (OUTPATIENT)
Dept: HOME HEALTH SERVICES | Facility: HOSPITAL | Age: 83
End: 2023-05-27
Payer: MEDICARE

## 2023-05-31 ENCOUNTER — TELEPHONE (OUTPATIENT)
Dept: HEMATOLOGY/ONCOLOGY | Facility: CLINIC | Age: 83
End: 2023-05-31
Payer: MEDICARE

## 2023-05-31 NOTE — TELEPHONE ENCOUNTER
Brittany Tavera Staff  Caller: wife [phone] (Today,  9:55 AM)  Patients wife called in this morning requesting the blood work that is scheduled be scheduled to be done in the home. Please call back [phone]. Thanks tpw    DORIS Robins Staff  Good Morning,   Pt has an upcoming appointment with us, he is requesting to have labs drawn by home health nurse. Would you all be able to assist in this process? If not who do you recommend we reach out to?

## 2023-06-01 ENCOUNTER — DOCUMENT SCAN (OUTPATIENT)
Dept: HOME HEALTH SERVICES | Facility: HOSPITAL | Age: 83
End: 2023-06-01
Payer: MEDICARE

## 2023-06-09 ENCOUNTER — LAB VISIT (OUTPATIENT)
Dept: LAB | Facility: HOSPITAL | Age: 83
End: 2023-06-09
Attending: INTERNAL MEDICINE
Payer: MEDICARE

## 2023-06-09 DIAGNOSIS — D64.9 ANEMIA, UNSPECIFIED: ICD-10-CM

## 2023-06-09 LAB
ALBUMIN SERPL BCP-MCNC: 3.3 G/DL (ref 3.5–5.2)
ALP SERPL-CCNC: 91 U/L (ref 55–135)
ALT SERPL W/O P-5'-P-CCNC: 19 U/L (ref 10–44)
ANION GAP SERPL CALC-SCNC: 13 MMOL/L (ref 8–16)
AST SERPL-CCNC: 21 U/L (ref 10–40)
BASOPHILS # BLD AUTO: 0.03 K/UL (ref 0–0.2)
BASOPHILS NFR BLD: 0.4 % (ref 0–1.9)
BILIRUB SERPL-MCNC: 0.5 MG/DL (ref 0.1–1)
BUN SERPL-MCNC: 44 MG/DL (ref 8–23)
CALCIUM SERPL-MCNC: 8.7 MG/DL (ref 8.7–10.5)
CHLORIDE SERPL-SCNC: 106 MMOL/L (ref 95–110)
CO2 SERPL-SCNC: 26 MMOL/L (ref 23–29)
CREAT SERPL-MCNC: 1.5 MG/DL (ref 0.5–1.4)
DIFFERENTIAL METHOD: ABNORMAL
EOSINOPHIL # BLD AUTO: 0.1 K/UL (ref 0–0.5)
EOSINOPHIL NFR BLD: 0.8 % (ref 0–8)
ERYTHROCYTE [DISTWIDTH] IN BLOOD BY AUTOMATED COUNT: 16.1 % (ref 11.5–14.5)
EST. GFR  (NO RACE VARIABLE): 46.2 ML/MIN/1.73 M^2
GLUCOSE SERPL-MCNC: 128 MG/DL (ref 70–110)
HCT VFR BLD AUTO: 35.1 % (ref 40–54)
HGB BLD-MCNC: 11.1 G/DL (ref 14–18)
IMM GRANULOCYTES # BLD AUTO: 0.04 K/UL (ref 0–0.04)
IMM GRANULOCYTES NFR BLD AUTO: 0.5 % (ref 0–0.5)
IRON SERPL-MCNC: 39 UG/DL (ref 45–160)
LYMPHOCYTES # BLD AUTO: 1.5 K/UL (ref 1–4.8)
LYMPHOCYTES NFR BLD: 17.1 % (ref 18–48)
MCH RBC QN AUTO: 29.4 PG (ref 27–31)
MCHC RBC AUTO-ENTMCNC: 31.6 G/DL (ref 32–36)
MCV RBC AUTO: 93 FL (ref 82–98)
MONOCYTES # BLD AUTO: 0.6 K/UL (ref 0.3–1)
MONOCYTES NFR BLD: 6.8 % (ref 4–15)
NEUTROPHILS # BLD AUTO: 6.4 K/UL (ref 1.8–7.7)
NEUTROPHILS NFR BLD: 74.4 % (ref 38–73)
NRBC BLD-RTO: 0 /100 WBC
PLATELET # BLD AUTO: 233 K/UL (ref 150–450)
PMV BLD AUTO: 10.4 FL (ref 9.2–12.9)
POTASSIUM SERPL-SCNC: 3.9 MMOL/L (ref 3.5–5.1)
PROT SERPL-MCNC: 6.6 G/DL (ref 6–8.4)
RBC # BLD AUTO: 3.77 M/UL (ref 4.6–6.2)
SATURATED IRON: 14 % (ref 20–50)
SODIUM SERPL-SCNC: 145 MMOL/L (ref 136–145)
TOTAL IRON BINDING CAPACITY: 281 UG/DL (ref 250–450)
TRANSFERRIN SERPL-MCNC: 190 MG/DL (ref 200–375)
WBC # BLD AUTO: 8.54 K/UL (ref 3.9–12.7)

## 2023-06-09 PROCEDURE — 86334 IMMUNOFIX E-PHORESIS SERUM: CPT | Mod: 26,,, | Performed by: PATHOLOGY

## 2023-06-09 PROCEDURE — 85025 COMPLETE CBC W/AUTO DIFF WBC: CPT | Mod: PO | Performed by: INTERNAL MEDICINE

## 2023-06-09 PROCEDURE — 80053 COMPREHEN METABOLIC PANEL: CPT | Mod: PO | Performed by: INTERNAL MEDICINE

## 2023-06-09 PROCEDURE — 86334 PATHOLOGIST INTERPRETATION IFE: ICD-10-PCS | Mod: 26,,, | Performed by: PATHOLOGY

## 2023-06-09 PROCEDURE — 86334 IMMUNOFIX E-PHORESIS SERUM: CPT | Performed by: INTERNAL MEDICINE

## 2023-06-09 PROCEDURE — 84466 ASSAY OF TRANSFERRIN: CPT | Performed by: INTERNAL MEDICINE

## 2023-06-12 LAB — INTERPRETATION SERPL IFE-IMP: NORMAL

## 2023-06-13 ENCOUNTER — OFFICE VISIT (OUTPATIENT)
Dept: HEMATOLOGY/ONCOLOGY | Facility: CLINIC | Age: 83
End: 2023-06-13
Payer: MEDICARE

## 2023-06-13 VITALS
DIASTOLIC BLOOD PRESSURE: 61 MMHG | SYSTOLIC BLOOD PRESSURE: 131 MMHG | HEART RATE: 63 BPM | BODY MASS INDEX: 39.54 KG/M2 | WEIGHT: 308 LBS | TEMPERATURE: 98 F

## 2023-06-13 DIAGNOSIS — D50.9 IRON DEFICIENCY ANEMIA, UNSPECIFIED IRON DEFICIENCY ANEMIA TYPE: ICD-10-CM

## 2023-06-13 DIAGNOSIS — E53.8 DEFICIENCY OF OTHER SPECIFIED B GROUP VITAMINS: ICD-10-CM

## 2023-06-13 DIAGNOSIS — D47.2 MGUS (MONOCLONAL GAMMOPATHY OF UNKNOWN SIGNIFICANCE): ICD-10-CM

## 2023-06-13 LAB — PATHOLOGIST INTERPRETATION IFE: NORMAL

## 2023-06-13 PROCEDURE — 99999 PR PBB SHADOW E&M-EST. PATIENT-LVL IV: ICD-10-PCS | Mod: PBBFAC,,,

## 2023-06-13 PROCEDURE — 3078F DIAST BP <80 MM HG: CPT | Mod: CPTII,S$GLB,,

## 2023-06-13 PROCEDURE — 3078F PR MOST RECENT DIASTOLIC BLOOD PRESSURE < 80 MM HG: ICD-10-PCS | Mod: CPTII,S$GLB,,

## 2023-06-13 PROCEDURE — 3075F PR MOST RECENT SYSTOLIC BLOOD PRESS GE 130-139MM HG: ICD-10-PCS | Mod: CPTII,S$GLB,,

## 2023-06-13 PROCEDURE — 1125F PR PAIN SEVERITY QUANTIFIED, PAIN PRESENT: ICD-10-PCS | Mod: CPTII,S$GLB,,

## 2023-06-13 PROCEDURE — 3075F SYST BP GE 130 - 139MM HG: CPT | Mod: CPTII,S$GLB,,

## 2023-06-13 PROCEDURE — 99214 OFFICE O/P EST MOD 30 MIN: CPT | Mod: S$GLB,,,

## 2023-06-13 PROCEDURE — 3288F FALL RISK ASSESSMENT DOCD: CPT | Mod: CPTII,S$GLB,,

## 2023-06-13 PROCEDURE — 3288F PR FALLS RISK ASSESSMENT DOCUMENTED: ICD-10-PCS | Mod: CPTII,S$GLB,,

## 2023-06-13 PROCEDURE — 1101F PR PT FALLS ASSESS DOC 0-1 FALLS W/OUT INJ PAST YR: ICD-10-PCS | Mod: CPTII,S$GLB,,

## 2023-06-13 PROCEDURE — 99214 PR OFFICE/OUTPT VISIT, EST, LEVL IV, 30-39 MIN: ICD-10-PCS | Mod: S$GLB,,,

## 2023-06-13 PROCEDURE — 1125F AMNT PAIN NOTED PAIN PRSNT: CPT | Mod: CPTII,S$GLB,,

## 2023-06-13 PROCEDURE — 99999 PR PBB SHADOW E&M-EST. PATIENT-LVL IV: CPT | Mod: PBBFAC,,,

## 2023-06-13 PROCEDURE — 1160F RVW MEDS BY RX/DR IN RCRD: CPT | Mod: CPTII,S$GLB,,

## 2023-06-13 PROCEDURE — 1101F PT FALLS ASSESS-DOCD LE1/YR: CPT | Mod: CPTII,S$GLB,,

## 2023-06-13 PROCEDURE — 1159F PR MEDICATION LIST DOCUMENTED IN MEDICAL RECORD: ICD-10-PCS | Mod: CPTII,S$GLB,,

## 2023-06-13 PROCEDURE — 1159F MED LIST DOCD IN RCRD: CPT | Mod: CPTII,S$GLB,,

## 2023-06-13 PROCEDURE — 1160F PR REVIEW ALL MEDS BY PRESCRIBER/CLIN PHARMACIST DOCUMENTED: ICD-10-PCS | Mod: CPTII,S$GLB,,

## 2023-06-13 RX ORDER — FLUTICASONE PROPIONATE AND SALMETEROL 250; 50 UG/1; UG/1
POWDER RESPIRATORY (INHALATION)
COMMUNITY
Start: 2023-06-07 | End: 2023-11-03

## 2023-06-13 NOTE — ASSESSMENT & PLAN NOTE
No recent     SPEP: Normal total protein, normal pattern  CASEY: A free lambda light chain is present.   FLC-R: 2.87<<3.27      Will continue to monitor

## 2023-06-13 NOTE — PROGRESS NOTES
"Subjective:      Patient ID: Jose Luis Martinez is a 82 y.o. male.    Chief Complaint: Follow-up      HPI:  Mr. Martinez is a pleasant 82-year-old male, previously seen by Dr. Talbot, presents today with his wife via virtual visit for follow-up of anemia and elevated FLC.  His comorbidities include type 2 diabetes, CAD, HTN, and CKD. Today, patient complains of fatigue, dizziness, and worsening breathing problems.  He states this is related to COPD and states his insurance will not cover medications that they have been helpful in treating COPD symptoms.      Interval History: Pt presents with his wife for routine f/u he c/o continued arthritic pain and "trouble breathing" r/t COPD. He also notes wound to LLE for which is being cared for by home health. Denies NVDC, fever chills, abnormal bleeding/bruising.      Social History     Socioeconomic History    Marital status:    Tobacco Use    Smoking status: Former     Packs/day: 0.50     Years: 43.00     Pack years: 21.50     Types: Cigarettes     Start date:      Quit date:      Years since quittin.4    Smokeless tobacco: Former   Substance and Sexual Activity    Alcohol use: No    Drug use: Never       No family history on file.    Past Surgical History:   Procedure Laterality Date    CATARACT EXTRACTION, BILATERAL      LIPOMA RESECTION Bilateral     neck     PROSTATE SURGERY      REPAIR OF EYELID      TOTAL KNEE ARTHROPLASTY Left        Past Medical History:   Diagnosis Date    Hypertension     Ulcers of both lower legs 2018       Review of Systems   Constitutional:  Positive for fatigue.   Respiratory:  Positive for shortness of breath. Negative for cough.    Cardiovascular:  Negative for chest pain.   Gastrointestinal:  Negative for anal bleeding, blood in stool, constipation, diarrhea, nausea and vomiting.   Genitourinary:  Negative for hematuria.   Musculoskeletal:  Positive for arthralgias and myalgias.   Skin:  Positive for wound (RLE). " "  Neurological:  Positive for weakness.     Medication List with Changes/Refills   Current Medications    ACCU-CHEK DAVID PLUS TEST STRP STRP    TEST BLOOD SUGAR THREE TIMES A DAY AS NEEDED    ACCU-CHEK SOFTCLIX LANCETS MISC    USE TO TEST TWICE DAILY    ALBUTEROL (PROVENTIL) 2.5 MG /3 ML (0.083 %) NEBULIZER SOLUTION    Take 3 mLs (2.5 mg total) by nebulization every 4 (four) hours as needed for Wheezing or Shortness of Breath (cough). Rescue    ALBUTEROL (PROVENTIL/VENTOLIN HFA) 90 MCG/ACTUATION INHALER    Inhale 2 puffs into the lungs every 4 (four) hours as needed for Wheezing or Shortness of Breath. Rescue    ALFUZOSIN (UROXATRAL) 10 MG TB24        ARFORMOTEROL (BROVANA) 15 MCG/2 ML NEBU    Take 2 mLs (15 mcg total) by nebulization 2 (two) times a day. Controller    ATORVASTATIN (LIPITOR) 40 MG TABLET    TAKE ONE TABLET BY MOUTH ONCE A DAY    BD ULTRA-FINE VINNIE PEN NEEDLE 32 GAUGE X 5/32" NDLE    Inject insulin into skin once daily    BLOOD-GLUCOSE METER KIT    Use as instructed    BUDESONIDE (PULMICORT) 0.5 MG/2 ML NEBULIZER SOLUTION    Take 2 mLs (0.5 mg total) by nebulization 2 (two) times a day. Controller    BUMETANIDE (BUMEX) 2 MG TABLET    TAKE 1 TABLET BY MOUTH TWO TIMES DAILY.    DUTASTERIDE (AVODART) 0.5 MG CAPSULE    Take 0.5 mg by mouth once daily.    FERROUS SULFATE 325 (65 FE) MG EC TABLET    Take 1 tablet (325 mg total) by mouth once daily.    FLUTICASONE PROPIONATE (FLONASE) 50 MCG/ACTUATION NASAL SPRAY    2 sprays (100 mcg total) by Each Nostril route once daily.    FLUTICASONE-SALMETEROL DISKUS INHALER 250-50 MCG    INHALE 1 PUFF INTO THE LUNGS 2 TIMES DAILY.    GABAPENTIN (NEURONTIN) 100 MG CAPSULE    Take 1 capsule (100 mg total) by mouth every evening.    HYDROCODONE-ACETAMINOPHEN 10-325MG (NORCO)  MG TAB        ISOSORBIDE DINITRATE (ISORDIL) 30 MG TAB    Take 30 mg by mouth.    ISOSORBIDE MONONITRATE (IMDUR) 30 MG 24 HR TABLET        LANCETS 32 GAUGE MISC    1 lancet by " "Misc.(Non-Drug; Combo Route) route 2 (two) times daily.    LANTUS SOLOSTAR U-100 INSULIN GLARGINE 100 UNITS/ML SUBQ PEN    INJECT 15 UNITS INTO THE SKIN EVERY EVENING.    OFLOXACIN (FLOXIN) 0.3 % OTIC SOLUTION    Place 5 drops into the left ear 2 (two) times daily.    PANTOPRAZOLE (PROTONIX) 40 MG TABLET    TAKE 1 TABLET BY MOUTH ONCE DAILY.    PEN NEEDLE, DIABETIC (BD ULTRA-FINE VINNIE PEN NEEDLE) 32 GAUGE X 5/32" NDLE    1 Units by Misc.(Non-Drug; Combo Route) route once daily.    POTASSIUM CHLORIDE (KLOR-CON) 10 MEQ TBSR    TAKE 1 TABLET BY MOUTH ONCE DAILY.    SARS-COV-2, COVID-19 PFIZER OMICRON, (PFIZER COVID BIVAL,12Y UP,,PF,) 30 MCG/0.3 ML INJECTION    Inject into the muscle.    SILVER SULFADIAZINE 1% (SILVADENE) 1 % CREAM    Apply topically once daily.    TAMSULOSIN (FLOMAX) 0.4 MG CP24        TRUEPLUS INSULIN 0.5 ML 31 GAUGE X 5/16" SYRG    INJECT TWO TIMES A DAY AS DIRECTED    VALSARTAN (DIOVAN) 160 MG TABLET    TAKE ONE TABLET BY MOUTH ONCE A DAY        Objective:     Vitals:    06/13/23 1126   BP: 131/61   Pulse: 63   Temp: 98.1 °F (36.7 °C)       Physical Exam  Vitals reviewed: Virtual visit.   Constitutional:       Appearance: Normal appearance.   HENT:      Head: Normocephalic.   Neurological:      Mental Status: He is alert.   Psychiatric:         Mood and Affect: Mood normal.         Behavior: Behavior normal.       Lab Results   Component Value Date    WBC 8.54 06/09/2023    HGB 11.1 (L) 06/09/2023    HCT 35.1 (L) 06/09/2023    MCV 93 06/09/2023     06/09/2023       Lab Results   Component Value Date     06/09/2023    K 3.9 06/09/2023     06/09/2023    CO2 26 06/09/2023    BUN 44 (H) 06/09/2023    CREATININE 1.5 (H) 06/09/2023    CALCIUM 8.7 06/09/2023    ANIONGAP 13 06/09/2023    ESTGFRAFRICA 54.1 (A) 05/03/2022    EGFRNONAA 46.8 (A) 05/03/2022     Lab Results   Component Value Date    ALT 19 06/09/2023    AST 21 06/09/2023    ALKPHOS 91 06/09/2023    BILITOT 0.5 06/09/2023 "       Assessment/Plan:     Problem List Items Addressed This Visit          Oncology    MGUS (monoclonal gammopathy of unknown significance)     No recent     SPEP: Normal total protein, normal pattern  CASEY: A free lambda light chain is present.   FLC-R: 2.87<<3.27      Will continue to monitor          Relevant Orders    CBC Auto Differential    Comprehensive Metabolic Panel    Ferritin    Iron and TIBC    Folate    Vitamin B12    Immunoglobulin Free LT Chains Blood    Protein Electrophoresis, Serum    Iron deficiency anemia     Lab Results   Component Value Date    HGB 11.1 (L) 06/09/2023   Chronic, stable anemia, improved from prior    Lab Results   Component Value Date    IRON 39 (L) 06/09/2023    TRANSFERRIN 190 (L) 06/09/2023    TIBC 281 06/09/2023    FESATURATED 14 (L) 06/09/2023   Noted upward trend in iron indices   Continues on oral iron supplement daily  Encouraged incorporation of iron rich foods in diet  Plan to hold off on iv iron therapy at this time and continue with oral supplementation and reassess at next visit    F/u up in 3 months with labs a few days prior @ [place];  VV okay         Relevant Orders    CBC Auto Differential    Comprehensive Metabolic Panel    Ferritin    Iron and TIBC    Folate    Vitamin B12    Immunoglobulin Free LT Chains Blood    Protein Electrophoresis, Serum     Other Visit Diagnoses       Deficiency of other specified B group vitamins        Relevant Orders    Folate    Vitamin B12            Med Onc Chart Routing      Follow up with physician    Follow up with T[facility] 3 months. with labs prior in [place]; VV ok   Infusion scheduling note    Injection scheduling note    Labs CBC, CMP, ferritin, iron and TIBC, free light chains, SPEP, vitamin B12 and folate   Scheduling:  Preferred lab:  Lab interval:     Imaging    Pharmacy appointment    Other referrals               JENNIE FinkP-C  Hematology/Oncology

## 2023-06-13 NOTE — ASSESSMENT & PLAN NOTE
Lab Results   Component Value Date    HGB 11.1 (L) 06/09/2023   Chronic, stable anemia, improved from prior    Lab Results   Component Value Date    IRON 39 (L) 06/09/2023    TRANSFERRIN 190 (L) 06/09/2023    TIBC 281 06/09/2023    FESATURATED 14 (L) 06/09/2023     Noted upward trend in iron indices   Continues on oral iron supplement daily  Encouraged incorporation of iron rich foods in diet  Plan to hold off on iv iron therapy at this time and continue with oral supplementation and reassess at next visit    F/u up in 3 months with labs a few days prior @ [facility]

## 2023-06-21 ENCOUNTER — EXTERNAL HOME HEALTH (OUTPATIENT)
Dept: HOME HEALTH SERVICES | Facility: HOSPITAL | Age: 83
End: 2023-06-21
Payer: MEDICARE

## 2023-06-21 ENCOUNTER — DOCUMENT SCAN (OUTPATIENT)
Dept: HOME HEALTH SERVICES | Facility: HOSPITAL | Age: 83
End: 2023-06-21
Payer: MEDICARE

## 2023-06-29 PROCEDURE — G0179 MD RECERTIFICATION HHA PT: HCPCS | Mod: ,,, | Performed by: INTERNAL MEDICINE

## 2023-06-29 PROCEDURE — G0179 PR HOME HEALTH MD RECERTIFICATION: ICD-10-PCS | Mod: ,,, | Performed by: INTERNAL MEDICINE

## 2023-07-07 ENCOUNTER — PATIENT MESSAGE (OUTPATIENT)
Dept: INFECTIOUS DISEASES | Facility: CLINIC | Age: 83
End: 2023-07-07
Payer: MEDICARE

## 2023-07-10 ENCOUNTER — LAB VISIT (OUTPATIENT)
Dept: LAB | Facility: HOSPITAL | Age: 83
End: 2023-07-10
Attending: INTERNAL MEDICINE
Payer: MEDICARE

## 2023-07-10 ENCOUNTER — PATIENT MESSAGE (OUTPATIENT)
Dept: PULMONOLOGY | Facility: CLINIC | Age: 83
End: 2023-07-10
Payer: MEDICARE

## 2023-07-10 ENCOUNTER — PATIENT MESSAGE (OUTPATIENT)
Dept: FAMILY MEDICINE | Facility: CLINIC | Age: 83
End: 2023-07-10

## 2023-07-10 ENCOUNTER — OFFICE VISIT (OUTPATIENT)
Dept: FAMILY MEDICINE | Facility: CLINIC | Age: 83
End: 2023-07-10
Payer: MEDICARE

## 2023-07-10 VITALS
OXYGEN SATURATION: 97 % | HEART RATE: 62 BPM | HEIGHT: 74 IN | SYSTOLIC BLOOD PRESSURE: 132 MMHG | TEMPERATURE: 98 F | BODY MASS INDEX: 39.54 KG/M2 | DIASTOLIC BLOOD PRESSURE: 60 MMHG

## 2023-07-10 DIAGNOSIS — Z79.4 TYPE 2 DIABETES MELLITUS WITH HYPERGLYCEMIA, WITH LONG-TERM CURRENT USE OF INSULIN: ICD-10-CM

## 2023-07-10 DIAGNOSIS — M79.642 PAIN IN BOTH HANDS: ICD-10-CM

## 2023-07-10 DIAGNOSIS — I25.10 CORONARY ARTERY DISEASE, UNSPECIFIED VESSEL OR LESION TYPE, UNSPECIFIED WHETHER ANGINA PRESENT, UNSPECIFIED WHETHER NATIVE OR TRANSPLANTED HEART: ICD-10-CM

## 2023-07-10 DIAGNOSIS — E78.00 HYPERCHOLESTEROLEMIA: ICD-10-CM

## 2023-07-10 DIAGNOSIS — I10 ESSENTIAL HYPERTENSION: ICD-10-CM

## 2023-07-10 DIAGNOSIS — E11.65 TYPE 2 DIABETES MELLITUS WITH HYPERGLYCEMIA, WITH LONG-TERM CURRENT USE OF INSULIN: ICD-10-CM

## 2023-07-10 DIAGNOSIS — N40.0 BENIGN PROSTATIC HYPERPLASIA, UNSPECIFIED WHETHER LOWER URINARY TRACT SYMPTOMS PRESENT: ICD-10-CM

## 2023-07-10 DIAGNOSIS — M79.641 PAIN IN BOTH HANDS: ICD-10-CM

## 2023-07-10 DIAGNOSIS — J44.9 COPD, SEVERE: Primary | ICD-10-CM

## 2023-07-10 DIAGNOSIS — I73.9 PAD (PERIPHERAL ARTERY DISEASE): ICD-10-CM

## 2023-07-10 LAB
ESTIMATED AVG GLUCOSE: 148 MG/DL (ref 68–131)
HBA1C MFR BLD: 6.8 % (ref 4–5.6)

## 2023-07-10 PROCEDURE — 83036 HEMOGLOBIN GLYCOSYLATED A1C: CPT | Performed by: INTERNAL MEDICINE

## 2023-07-10 PROCEDURE — 1159F MED LIST DOCD IN RCRD: CPT | Mod: CPTII,S$GLB,, | Performed by: INTERNAL MEDICINE

## 2023-07-10 PROCEDURE — 36415 COLL VENOUS BLD VENIPUNCTURE: CPT | Mod: PO | Performed by: INTERNAL MEDICINE

## 2023-07-10 PROCEDURE — 3075F PR MOST RECENT SYSTOLIC BLOOD PRESS GE 130-139MM HG: ICD-10-PCS | Mod: CPTII,S$GLB,, | Performed by: INTERNAL MEDICINE

## 2023-07-10 PROCEDURE — 1125F AMNT PAIN NOTED PAIN PRSNT: CPT | Mod: CPTII,S$GLB,, | Performed by: INTERNAL MEDICINE

## 2023-07-10 PROCEDURE — 1159F PR MEDICATION LIST DOCUMENTED IN MEDICAL RECORD: ICD-10-PCS | Mod: CPTII,S$GLB,, | Performed by: INTERNAL MEDICINE

## 2023-07-10 PROCEDURE — 3078F DIAST BP <80 MM HG: CPT | Mod: CPTII,S$GLB,, | Performed by: INTERNAL MEDICINE

## 2023-07-10 PROCEDURE — 3078F PR MOST RECENT DIASTOLIC BLOOD PRESSURE < 80 MM HG: ICD-10-PCS | Mod: CPTII,S$GLB,, | Performed by: INTERNAL MEDICINE

## 2023-07-10 PROCEDURE — 99214 PR OFFICE/OUTPT VISIT, EST, LEVL IV, 30-39 MIN: ICD-10-PCS | Mod: S$GLB,,, | Performed by: INTERNAL MEDICINE

## 2023-07-10 PROCEDURE — 99214 OFFICE O/P EST MOD 30 MIN: CPT | Mod: S$GLB,,, | Performed by: INTERNAL MEDICINE

## 2023-07-10 PROCEDURE — 3075F SYST BP GE 130 - 139MM HG: CPT | Mod: CPTII,S$GLB,, | Performed by: INTERNAL MEDICINE

## 2023-07-10 PROCEDURE — 1101F PR PT FALLS ASSESS DOC 0-1 FALLS W/OUT INJ PAST YR: ICD-10-PCS | Mod: CPTII,S$GLB,, | Performed by: INTERNAL MEDICINE

## 2023-07-10 PROCEDURE — 3288F FALL RISK ASSESSMENT DOCD: CPT | Mod: CPTII,S$GLB,, | Performed by: INTERNAL MEDICINE

## 2023-07-10 PROCEDURE — 1101F PT FALLS ASSESS-DOCD LE1/YR: CPT | Mod: CPTII,S$GLB,, | Performed by: INTERNAL MEDICINE

## 2023-07-10 PROCEDURE — 99999 PR PBB SHADOW E&M-EST. PATIENT-LVL V: ICD-10-PCS | Mod: PBBFAC,,, | Performed by: INTERNAL MEDICINE

## 2023-07-10 PROCEDURE — 1125F PR PAIN SEVERITY QUANTIFIED, PAIN PRESENT: ICD-10-PCS | Mod: CPTII,S$GLB,, | Performed by: INTERNAL MEDICINE

## 2023-07-10 PROCEDURE — 3288F PR FALLS RISK ASSESSMENT DOCUMENTED: ICD-10-PCS | Mod: CPTII,S$GLB,, | Performed by: INTERNAL MEDICINE

## 2023-07-10 PROCEDURE — 99999 PR PBB SHADOW E&M-EST. PATIENT-LVL V: CPT | Mod: PBBFAC,,, | Performed by: INTERNAL MEDICINE

## 2023-07-10 NOTE — PROGRESS NOTES
Subjective:       Patient ID: Jose Luis Martinez is a 82 y.o. male.    Chief Complaint: Follow-up, Hypertension, Hyperlipidemia, Diabetes, and COPD    Follow-up  Associated symptoms include arthralgias, fatigue, myalgias and weakness. Pertinent negatives include no abdominal pain, chest pain, chills, coughing, diaphoresis, fever, headaches, joint swelling, nausea, neck pain, numbness, rash, sore throat or vomiting.   Hypertension  Associated symptoms include shortness of breath. Pertinent negatives include no chest pain, headaches, neck pain or palpitations.   Hyperlipidemia  Associated symptoms include myalgias and shortness of breath. Pertinent negatives include no chest pain.   Diabetes  Pertinent negatives for hypoglycemia include no confusion, dizziness, headaches, nervousness/anxiousness, pallor, seizures, speech difficulty or tremors. Associated symptoms include fatigue and weakness. Pertinent negatives for diabetes include no chest pain, no polydipsia, no polyphagia and no polyuria.   COPD  Associated symptoms include arthralgias, fatigue, myalgias and weakness. Pertinent negatives include no abdominal pain, chest pain, chills, coughing, diaphoresis, fever, headaches, joint swelling, nausea, neck pain, numbness, rash, sore throat or vomiting.   Past Medical History:   Diagnosis Date    Hypertension     Ulcers of both lower legs 2018     Past Surgical History:   Procedure Laterality Date    CATARACT EXTRACTION, BILATERAL      LIPOMA RESECTION Bilateral     neck     PROSTATE SURGERY      REPAIR OF EYELID      TOTAL KNEE ARTHROPLASTY Left      History reviewed. No pertinent family history.  Social History     Socioeconomic History    Marital status:    Tobacco Use    Smoking status: Former     Packs/day: 0.50     Years: 43.00     Pack years: 21.50     Types: Cigarettes     Start date:      Quit date: 2018     Years since quittin.5    Smokeless tobacco: Former   Substance and Sexual Activity     Alcohol use: No    Drug use: Never     Review of Systems   Constitutional:  Positive for fatigue. Negative for activity change, appetite change, chills, diaphoresis, fever and unexpected weight change.   HENT:  Negative for drooling, ear discharge, ear pain, facial swelling, hearing loss, mouth sores, nosebleeds, postnasal drip, rhinorrhea, sinus pressure, sneezing, sore throat, tinnitus, trouble swallowing and voice change.    Eyes:  Negative for photophobia, redness and visual disturbance.   Respiratory:  Positive for shortness of breath. Negative for apnea, cough, choking, chest tightness and wheezing.    Cardiovascular:  Positive for leg swelling. Negative for chest pain and palpitations.   Gastrointestinal:  Negative for abdominal distention, abdominal pain, anal bleeding, blood in stool, constipation, diarrhea, nausea and vomiting.   Endocrine: Negative for cold intolerance, heat intolerance, polydipsia, polyphagia and polyuria.   Genitourinary:  Negative for difficulty urinating, dysuria, enuresis, flank pain, frequency, genital sores, hematuria and urgency.   Musculoskeletal:  Positive for arthralgias, gait problem and myalgias. Negative for back pain, joint swelling, neck pain and neck stiffness.   Skin:  Negative for color change, pallor, rash and wound.   Allergic/Immunologic: Negative for food allergies and immunocompromised state.   Neurological:  Positive for weakness. Negative for dizziness, tremors, seizures, syncope, facial asymmetry, speech difficulty, light-headedness, numbness and headaches.   Hematological:  Negative for adenopathy. Does not bruise/bleed easily.   Psychiatric/Behavioral:  Negative for agitation, behavioral problems, confusion, decreased concentration, dysphoric mood, hallucinations, self-injury, sleep disturbance and suicidal ideas. The patient is not nervous/anxious and is not hyperactive.      Objective:      Physical Exam  Vitals and nursing note reviewed.   Constitutional:        General: He is not in acute distress.     Appearance: Normal appearance. He is well-developed. He is not diaphoretic.   HENT:      Head: Normocephalic and atraumatic.      Mouth/Throat:      Pharynx: No oropharyngeal exudate.   Eyes:      General: No scleral icterus.     Pupils: Pupils are equal, round, and reactive to light.   Neck:      Thyroid: No thyromegaly.      Vascular: No carotid bruit or JVD.      Trachea: No tracheal deviation.   Cardiovascular:      Rate and Rhythm: Normal rate and regular rhythm.      Heart sounds: Normal heart sounds.   Pulmonary:      Effort: Pulmonary effort is normal. No respiratory distress.      Breath sounds: Normal breath sounds. No wheezing or rales.   Chest:      Chest wall: No tenderness.   Abdominal:      General: Bowel sounds are normal. There is no distension.      Palpations: Abdomen is soft.      Tenderness: There is no abdominal tenderness. There is no guarding or rebound.   Musculoskeletal:         General: No tenderness. Normal range of motion.      Cervical back: Normal range of motion and neck supple.   Lymphadenopathy:      Cervical: No cervical adenopathy.   Skin:     General: Skin is warm and dry.      Coloration: Skin is not pale.      Findings: No erythema or rash.   Neurological:      Mental Status: He is alert and oriented to person, place, and time.   Psychiatric:         Behavior: Behavior normal.         Thought Content: Thought content normal.         Judgment: Judgment normal.       CMP  Sodium   Date Value Ref Range Status   06/09/2023 145 136 - 145 mmol/L Final     Potassium   Date Value Ref Range Status   06/09/2023 3.9 3.5 - 5.1 mmol/L Final     Chloride   Date Value Ref Range Status   06/09/2023 106 95 - 110 mmol/L Final     CO2   Date Value Ref Range Status   06/09/2023 26 23 - 29 mmol/L Final     Glucose   Date Value Ref Range Status   06/09/2023 128 (H) 70 - 110 mg/dL Final     BUN   Date Value Ref Range Status   06/09/2023 44 (H) 8 - 23  mg/dL Final     Creatinine   Date Value Ref Range Status   06/09/2023 1.5 (H) 0.5 - 1.4 mg/dL Final     Calcium   Date Value Ref Range Status   06/09/2023 8.7 8.7 - 10.5 mg/dL Final     Total Protein   Date Value Ref Range Status   06/09/2023 6.6 6.0 - 8.4 g/dL Final     Albumin   Date Value Ref Range Status   06/09/2023 3.3 (L) 3.5 - 5.2 g/dL Final     Total Bilirubin   Date Value Ref Range Status   06/09/2023 0.5 0.1 - 1.0 mg/dL Final     Comment:     For infants and newborns, interpretation of results should be based  on gestational age, weight and in agreement with clinical  observations.    Premature Infant recommended reference ranges:  Up to 24 hours.............<8.0 mg/dL  Up to 48 hours............<12.0 mg/dL  3-5 days..................<15.0 mg/dL  6-29 days.................<15.0 mg/dL       Alkaline Phosphatase   Date Value Ref Range Status   06/09/2023 91 55 - 135 U/L Final     AST   Date Value Ref Range Status   06/09/2023 21 10 - 40 U/L Final     ALT   Date Value Ref Range Status   06/09/2023 19 10 - 44 U/L Final     Anion Gap   Date Value Ref Range Status   06/09/2023 13 8 - 16 mmol/L Final     eGFR if    Date Value Ref Range Status   05/03/2022 54.1 (A) >60 mL/min/1.73 m^2 Final     eGFR if non    Date Value Ref Range Status   05/03/2022 46.8 (A) >60 mL/min/1.73 m^2 Final     Comment:     Calculation used to obtain the estimated glomerular filtration  rate (eGFR) is the CKD-EPI equation.        Lab Results   Component Value Date    WBC 8.54 06/09/2023    HGB 11.1 (L) 06/09/2023    HCT 35.1 (L) 06/09/2023    MCV 93 06/09/2023     06/09/2023     Lab Results   Component Value Date    CHOL 110 (L) 08/25/2021     Lab Results   Component Value Date    HDL 47 08/25/2021     Lab Results   Component Value Date    LDLCALC 53.0 (L) 08/25/2021     Lab Results   Component Value Date    TRIG 50 08/25/2021     Lab Results   Component Value Date    CHOLHDL 42.7 08/25/2021      Lab Results   Component Value Date    TSH 3.799 08/25/2021     Lab Results   Component Value Date    HGBA1C 6.8 (H) 10/18/2022     Assessment:       1. COPD, severe    2. Essential hypertension    3. Hypercholesterolemia    4. PAD (peripheral artery disease)    5. Coronary artery disease, unspecified vessel or lesion type, unspecified whether angina present, unspecified whether native or transplanted heart    6. Benign prostatic hyperplasia, unspecified whether lower urinary tract symptoms present    7. Type 2 diabetes mellitus with hyperglycemia, with long-term current use of insulin    8. Pain in both hands        Plan:   COPD, severe----------------------------f/u pulm--------------  -     Ambulatory referral/consult to Pulmonology; Future; Expected date: 07/17/2023  -     WHEELCHAIR FOR HOME USE    Essential hypertension    Hypercholesterolemia    PAD (peripheral artery disease)    Coronary artery disease, unspecified vessel or lesion type, unspecified whether angina present, unspecified whether native or transplanted heart    Benign prostatic hyperplasia, unspecified whether lower urinary tract symptoms present-------------------sees urology dr heredia--------    Type 2 diabetes mellitus with hyperglycemia, with long-term current use of insulin  -     Hemoglobin A1C; Future; Expected date: 07/10/2023    Pain in both hands  -     Ambulatory referral/consult to Physical/Occupational Therapy; Future; Expected date: 07/17/2023      Has home health, wound care------------------------continue meds-----------------------as above---------------------f/u 3 months-

## 2023-07-11 RX ORDER — BUSPIRONE HYDROCHLORIDE 7.5 MG/1
7.5 TABLET ORAL DAILY PRN
Qty: 30 TABLET | Refills: 3 | Status: SHIPPED | OUTPATIENT
Start: 2023-07-11 | End: 2024-07-10

## 2023-07-13 ENCOUNTER — DOCUMENT SCAN (OUTPATIENT)
Dept: HOME HEALTH SERVICES | Facility: HOSPITAL | Age: 83
End: 2023-07-13
Payer: MEDICARE

## 2023-07-14 ENCOUNTER — EXTERNAL HOME HEALTH (OUTPATIENT)
Dept: HOME HEALTH SERVICES | Facility: HOSPITAL | Age: 83
End: 2023-07-14
Payer: MEDICARE

## 2023-07-14 ENCOUNTER — TELEPHONE (OUTPATIENT)
Dept: FAMILY MEDICINE | Facility: CLINIC | Age: 83
End: 2023-07-14

## 2023-07-14 DIAGNOSIS — R29.898 WEAKNESS OF BOTH LOWER EXTREMITIES: ICD-10-CM

## 2023-07-14 DIAGNOSIS — I73.9 PAD (PERIPHERAL ARTERY DISEASE): Primary | ICD-10-CM

## 2023-07-14 NOTE — TELEPHONE ENCOUNTER
Pt is wanting to do PT through [facility]. Already has them coming out for wound care.

## 2023-07-14 NOTE — TELEPHONE ENCOUNTER
----- Message from Meaghan Dietrich sent at 7/14/2023 12:44 PM CDT -----  Regarding: pt called  Name of Who is Calling: TRACEY MARROQUIN [[id number]] Carine ( spouse)      What is the request in detail: pt is calling about the patients physical therapy with [facility] , and wheel chair. She states no one has contacted the patient.  Wheelchair should be an extra large due to pt being over 300 pounds she states. Please advise       Can the clinic reply by [facility]: No      What Number [facility] Back [facility]: Telephone Information:  Mobile          [phone]

## 2023-07-17 ENCOUNTER — TELEPHONE (OUTPATIENT)
Dept: FAMILY MEDICINE | Facility: CLINIC | Age: 83
End: 2023-07-17
Payer: MEDICARE

## 2023-07-17 DIAGNOSIS — J44.9 CHRONIC OBSTRUCTIVE PULMONARY DISEASE, UNSPECIFIED COPD TYPE: ICD-10-CM

## 2023-07-17 RX ORDER — ALBUTEROL SULFATE 90 UG/1
AEROSOL, METERED RESPIRATORY (INHALATION)
Qty: 8.5 G | Refills: 11 | Status: SHIPPED | OUTPATIENT
Start: 2023-07-17 | End: 2023-08-22

## 2023-07-20 ENCOUNTER — OFFICE VISIT (OUTPATIENT)
Dept: PULMONOLOGY | Facility: CLINIC | Age: 83
End: 2023-07-20
Payer: MEDICARE

## 2023-07-20 VITALS
HEIGHT: 74 IN | WEIGHT: 315 LBS | HEART RATE: 56 BPM | BODY MASS INDEX: 40.43 KG/M2 | RESPIRATION RATE: 18 BRPM | OXYGEN SATURATION: 96 % | DIASTOLIC BLOOD PRESSURE: 72 MMHG | SYSTOLIC BLOOD PRESSURE: 130 MMHG

## 2023-07-20 DIAGNOSIS — J44.9 COPD, SEVERE: Primary | ICD-10-CM

## 2023-07-20 DIAGNOSIS — I10 ESSENTIAL HYPERTENSION: ICD-10-CM

## 2023-07-20 DIAGNOSIS — G47.33 OSA AND COPD OVERLAP SYNDROME: ICD-10-CM

## 2023-07-20 DIAGNOSIS — J44.9 OSA AND COPD OVERLAP SYNDROME: ICD-10-CM

## 2023-07-20 DIAGNOSIS — E66.01 CLASS 2 SEVERE OBESITY DUE TO EXCESS CALORIES WITH SERIOUS COMORBIDITY AND BODY MASS INDEX (BMI) OF 39.0 TO 39.9 IN ADULT: ICD-10-CM

## 2023-07-20 PROCEDURE — 3075F PR MOST RECENT SYSTOLIC BLOOD PRESS GE 130-139MM HG: ICD-10-PCS | Mod: CPTII,S$GLB,, | Performed by: PHYSICIAN ASSISTANT

## 2023-07-20 PROCEDURE — 99214 OFFICE O/P EST MOD 30 MIN: CPT | Mod: S$GLB,,, | Performed by: PHYSICIAN ASSISTANT

## 2023-07-20 PROCEDURE — 99999 PR PBB SHADOW E&M-EST. PATIENT-LVL V: CPT | Mod: PBBFAC,,, | Performed by: PHYSICIAN ASSISTANT

## 2023-07-20 PROCEDURE — 3078F PR MOST RECENT DIASTOLIC BLOOD PRESSURE < 80 MM HG: ICD-10-PCS | Mod: CPTII,S$GLB,, | Performed by: PHYSICIAN ASSISTANT

## 2023-07-20 PROCEDURE — 1100F PTFALLS ASSESS-DOCD GE2>/YR: CPT | Mod: CPTII,S$GLB,, | Performed by: PHYSICIAN ASSISTANT

## 2023-07-20 PROCEDURE — 3288F PR FALLS RISK ASSESSMENT DOCUMENTED: ICD-10-PCS | Mod: CPTII,S$GLB,, | Performed by: PHYSICIAN ASSISTANT

## 2023-07-20 PROCEDURE — 99999 PR PBB SHADOW E&M-EST. PATIENT-LVL V: ICD-10-PCS | Mod: PBBFAC,,, | Performed by: PHYSICIAN ASSISTANT

## 2023-07-20 PROCEDURE — 3075F SYST BP GE 130 - 139MM HG: CPT | Mod: CPTII,S$GLB,, | Performed by: PHYSICIAN ASSISTANT

## 2023-07-20 PROCEDURE — 1160F RVW MEDS BY RX/DR IN RCRD: CPT | Mod: CPTII,S$GLB,, | Performed by: PHYSICIAN ASSISTANT

## 2023-07-20 PROCEDURE — 99214 PR OFFICE/OUTPT VISIT, EST, LEVL IV, 30-39 MIN: ICD-10-PCS | Mod: S$GLB,,, | Performed by: PHYSICIAN ASSISTANT

## 2023-07-20 PROCEDURE — 3078F DIAST BP <80 MM HG: CPT | Mod: CPTII,S$GLB,, | Performed by: PHYSICIAN ASSISTANT

## 2023-07-20 PROCEDURE — 1159F MED LIST DOCD IN RCRD: CPT | Mod: CPTII,S$GLB,, | Performed by: PHYSICIAN ASSISTANT

## 2023-07-20 PROCEDURE — 1159F PR MEDICATION LIST DOCUMENTED IN MEDICAL RECORD: ICD-10-PCS | Mod: CPTII,S$GLB,, | Performed by: PHYSICIAN ASSISTANT

## 2023-07-20 PROCEDURE — 1100F PR PT FALLS ASSESS DOC 2+ FALLS/FALL W/INJURY/YR: ICD-10-PCS | Mod: CPTII,S$GLB,, | Performed by: PHYSICIAN ASSISTANT

## 2023-07-20 PROCEDURE — 1160F PR REVIEW ALL MEDS BY PRESCRIBER/CLIN PHARMACIST DOCUMENTED: ICD-10-PCS | Mod: CPTII,S$GLB,, | Performed by: PHYSICIAN ASSISTANT

## 2023-07-20 PROCEDURE — 3288F FALL RISK ASSESSMENT DOCD: CPT | Mod: CPTII,S$GLB,, | Performed by: PHYSICIAN ASSISTANT

## 2023-07-20 RX ORDER — DOCUSATE SODIUM 100 MG/1
100 CAPSULE, LIQUID FILLED ORAL
COMMUNITY

## 2023-07-20 NOTE — ASSESSMENT & PLAN NOTE
Ordered to be on Advair 250 mcg, he is not compliant with daily use related to cost of $45/mo.   4/13/2023 Feno 33   4/13/2023 stop advair. Begin pulmicort nebs and brovana nebs twice daily sent to [facility] mail order.   7/20/23 patient reports he has not received pulmicort or brovana, has been using advair and albuterol intermittently  Will contact Pulmonary Disease Management for assistance on tracking down his nebulizer medications

## 2023-07-20 NOTE — ASSESSMENT & PLAN NOTE
Encouraged calorie reduction and 30 minutes of exercise daily. Discussed impact of obesity on general health.  Wt Readings from Last 9 Encounters:   07/20/23 (!) 142.9 kg (315 lb)   06/13/23 (!) 139.7 kg (308 lb)   04/13/23 (!) 142.9 kg (315 lb)   04/05/23 (!) 139 kg (306 lb 7 oz)   01/10/23 (!) 139.7 kg (307 lb 15.7 oz)   10/27/22 (!) 139.7 kg (308 lb)   10/18/22 (!) 139.7 kg (307 lb 15.7 oz)   04/28/22 (!) 139.7 kg (307 lb 15.7 oz)   04/25/22 (!) 139.7 kg (308 lb)   Body mass index is 40.44 kg/m².

## 2023-07-20 NOTE — PROGRESS NOTES
Subjective:       Patient ID: Jose Luis Martinez is a 82 y.o. male.    Chief Complaint: COPD      81yo male here for COPD follow up  Last seen 4/2023, plan was to stop Advair and start Brovana/Pulmicort neb, rx was faxed to [facility], patient says he did not receive these medications and only using albuterol hfa and neb prn and uses Advair intermittently  Since last visit he went to ER for confusion, SOB, and urinary retention; Chest X Ray and head CT negative; given breathing treatment and fluids with improvement in symptoms; plan for cardiology follow up for CHF andraal  He reports feeling well today, denies shortness of breath, cough, or wheezing  Family member says he gets very short of breath when walking around his house and completing ADLs, she thinks he needs oxygen  Reports compliance with CPAP nightly and benefits from use    COPD Questionnaire  How often do you cough?: Some of the time  How often do you have phlegm (mucus) in your chest?: Some of the time  How often does your chest feel tight?: A little of the time  When you walk up a hill or one flight of stairs, how often are you breathless?: Never  How often are you limited doing any activities at home?: Almost never  How often are you confident leaving the house despite your lung condition?: All of the time  How often do you sleep soundly?: Never  How often do you have energy?: A little of the time  Total score: 17      4/2023 Andree Monroy NP:    COPD   Off Trelegy found increased blood pressure  Symptomatic shortness of breath exertional.     Ordered to be on Advair 250 mcg, he is not compliant with daily use related to cost of $45/mo.   4/13/2023 Feno 33   4/13/2023 stop advair. Begin pulmicort nebs and brovana nebs twice daily sent to [facility] mail order.      22 pack year former smoker. Quit smoking 2018.       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.     Not on home O2.   4/13/2023 Patient was unable to attempt 6MWD related to  does not feel stable to walk with history of falls. No desats requiring supplemental oxygen at rest.      Bed time is 1030 - 1100  Wake time is 1000 - 1100     Neck circumference is 19.5 inches  Mallampati score 4    HPI: Jose Luis Martinez is here for follow up for CHRISTOPHER and CPAP complaince assessment.   He is on CPAP of 8 cmH2O pressure.   He is compliant with CPAP use. Complaince download today reveals 75% of days with greater than 4 hours of device use.   Patient reports benefit from CPAP use and denies snoring and excessive daytime sleepiness.  Patient reports no complaints. Nasal pillows mask is used.      12/21/2021 PSG Split night severe obstructive sleep apnea.The apnea/hypopnea index 88.2 was events/hour. CPAP 10 cm optimal.   1/5/2022 orders CPAP 10 cm, per Dr. SUDHAKAR Diaz.   2/28/2022  pt request change to cpap 8 cm. not tolerating cpap 10 cm will need over night if cpap 8 is optimal, desat on split night titration  On CPAP 8 cm with optimal control. AHI 0.8.   5/4/2022 overnight oximetry on room air on CPAP 8 cm was normal, Cumulative time with oxygen saturation less than 88% (TC88): 0:00:32.        Immunization History   Administered Date(s) Administered    COVID-19, MRNA, LN-S, PF (Pfizer) (Purple Cap) 01/13/2021, 02/03/2021, 10/06/2021    COVID-19, mRNA, LNP-S, bivalent booster, PF (PFIZER OMICRON) 01/10/2023    Influenza (FLUAD) - Quadrivalent - Adjuvanted - PF *Preferred* (65+) 10/13/2021    Influenza - High Dose - PF (65 years and older) 09/27/2017, 12/17/2018, 12/09/2019    Influenza - Quadrivalent - High Dose - PF (65 years and older) 12/17/2020    Influenza A (H1N1) 2009 Monovalent - IM 01/13/2010    Pneumococcal Conjugate - 13 Valent 12/17/2018    Pneumococcal Polysaccharide - 23 Valent 12/05/2017      Tobacco Use: Medium Risk    Smoking Tobacco Use: Former    Smokeless Tobacco Use: Former    Passive Exposure: Not on file      Past Medical History:   Diagnosis Date    Hypertension     Ulcers of  "both lower legs 6/13/2018      Current Outpatient Medications on File Prior to Visit   Medication Sig Dispense Refill    ACCU-CHEK DAVID PLUS TEST STRP Strp TEST BLOOD SUGAR THREE TIMES A DAY AS NEEDED 200 strip 12    ACCU-CHEK SOFTCLIX LANCETS Misc USE TO TEST TWICE DAILY 100 each 12    albuterol (PROVENTIL) 2.5 mg /3 mL (0.083 %) nebulizer solution Take 3 mLs (2.5 mg total) by nebulization every 4 (four) hours as needed for Wheezing or Shortness of Breath (cough). Rescue 360 mL 11    albuterol (PROVENTIL/VENTOLIN HFA) 90 mcg/actuation inhaler INHALE 2 PUFFS INTO THE LUNGS EVERY 4 HOURS AS NEEDED FOR WHEEZING OR SHORTNESS OF BREATH. RESCUE 8.5 g 11    alfuzosin (UROXATRAL) 10 mg Tb24       arformoteroL (BROVANA) 15 mcg/2 mL Nebu Take 2 mLs (15 mcg total) by nebulization 2 (two) times a day. Controller 120 mL 11    atorvastatin (LIPITOR) 40 MG tablet TAKE ONE TABLET BY MOUTH ONCE A DAY 90 tablet 3    BD ULTRA-FINE VINNIE PEN NEEDLE 32 gauge x 5/32" Ndle Inject insulin into skin once daily 200 each 3    budesonide (PULMICORT) 0.5 mg/2 mL nebulizer solution Take 2 mLs (0.5 mg total) by nebulization 2 (two) times a day. Controller 120 mL 11    bumetanide (BUMEX) 2 MG tablet TAKE 1 TABLET BY MOUTH TWO TIMES DAILY. 60 tablet 12    busPIRone (BUSPAR) 7.5 MG tablet Take 1 tablet (7.5 mg total) by mouth daily as needed (anxiety). 30 tablet 3    docusate sodium (COLACE) 100 MG capsule Take 100 mg by mouth.      dutasteride (AVODART) 0.5 mg capsule Take 0.5 mg by mouth once daily.      ferrous sulfate 325 (65 FE) MG EC tablet Take 1 tablet (325 mg total) by mouth once daily. 90 tablet 3    fluticasone propionate (FLONASE) 50 mcg/actuation nasal spray 2 sprays (100 mcg total) by Each Nostril route once daily. 48 g 2    fluticasone-salmeterol diskus inhaler 250-50 mcg INHALE 1 PUFF INTO THE LUNGS 2 TIMES DAILY.      hydrocodone-acetaminophen 10-325mg (NORCO)  mg Tab   0    isosorbide dinitrate (ISORDIL) 30 MG Tab Take 30 mg " "by mouth.      isosorbide mononitrate (IMDUR) 30 MG 24 hr tablet       lancets 32 gauge Misc 1 lancet by Misc.(Non-Drug; Combo Route) route 2 (two) times daily. 200 each 12    LANTUS SOLOSTAR U-100 INSULIN glargine 100 units/mL SubQ pen INJECT 15 UNITS INTO THE SKIN EVERY EVENING. 15 mL 0    ofloxacin (FLOXIN) 0.3 % otic solution Place 5 drops into the left ear 2 (two) times daily. 5 mL 0    pantoprazole (PROTONIX) 40 MG tablet TAKE 1 TABLET BY MOUTH ONCE DAILY. 90 tablet 3    pen needle, diabetic (BD ULTRA-FINE VINNIE PEN NEEDLE) 32 gauge x 5/32" Ndle 1 Units by Misc.(Non-Drug; Combo Route) route once daily. 100 each 6    potassium chloride (KLOR-CON) 10 MEQ TbSR TAKE 1 TABLET BY MOUTH ONCE DAILY. 90 tablet 3    sars-cov-2, covid-19 pfizer omicron, (PFIZER COVID BIVAL,12Y UP,,PF,) 30 mcg/0.3 mL injection Inject into the muscle. 0.3 mL 0    silver sulfADIAZINE 1% (SILVADENE) 1 % cream Apply topically once daily. 400 g 3    tamsulosin (FLOMAX) 0.4 mg Cp24   11    TRUEPLUS INSULIN 0.5 mL 31 gauge x 5/16" Syrg INJECT TWO TIMES A DAY AS DIRECTED 100 each 6    valsartan (DIOVAN) 160 MG tablet TAKE ONE TABLET BY MOUTH ONCE A DAY 90 tablet 1    blood-glucose meter kit Use as instructed 1 each 0    gabapentin (NEURONTIN) 100 MG capsule Take 1 capsule (100 mg total) by mouth every evening. 30 capsule 4     No current facility-administered medications on file prior to visit.        Review of Systems   Constitutional:  Positive for fatigue and weakness. Negative for fever, weight loss and appetite change.   HENT:  Negative for rhinorrhea, sinus pressure, trouble swallowing and congestion.    Respiratory:  Positive for cough, shortness of breath and dyspnea on extertion. Negative for sputum production, choking, chest tightness and wheezing.    Cardiovascular:  Positive for leg swelling. Negative for chest pain.   Musculoskeletal:  Positive for arthralgias and gait problem. Negative for joint swelling.   Gastrointestinal:  " "Negative for nausea and vomiting.   Neurological:  Negative for dizziness, weakness and headaches.   All other systems reviewed and are negative.    Objective:       Vitals:    07/20/23 0852   BP: 130/72   Pulse: (!) 56   Resp: 18   SpO2: 96%   Weight: (!) 142.9 kg (315 lb)   Height: 6' 2" (1.88 m)       Physical Exam   Constitutional: He is oriented to person, place, and time. He appears well-developed and well-nourished. No distress.   Appears chronically ill but in no acute distress  He is obese.   HENT:   Head: Normocephalic.   Nose: Nose normal.   Mouth/Throat: Oropharynx is clear and moist.   Cardiovascular: Normal rate and regular rhythm.   Pulmonary/Chest: Effort normal. No respiratory distress. He has no wheezes. He has no rhonchi. He has no rales.   Musculoskeletal:         General: Edema present.      Cervical back: Normal range of motion and neck supple.   Neurological: He is alert and oriented to person, place, and time.   Skin: Skin is warm and dry.   Psychiatric: He has a normal mood and affect.   Vitals reviewed.  Personal Diagnostic Review    XR CHEST 1 VIEW     CLINICAL INDICATION: shortness of breath, chest pain     COMPARISON: 10/22/2020     FINDINGS: Single frontal view of the chest demonstrates symmetrically aerated lungs.  No pleural effusion or pneumothorax is identified. The cardiomediastinal silhouette is within normal limits.  Exam End: 07/09/23 08:53 Last Resulted: 07/09/23 09:06         Assessment/Plan:       Problem List Items Addressed This Visit          Pulmonary    COPD, severe - Primary     Ordered to be on Advair 250 mcg, he is not compliant with daily use related to cost of $45/mo.   4/13/2023 Feno 33   4/13/2023 stop advair. Begin pulmicort nebs and brovana nebs twice daily sent to [facility] mail order.   7/20/23 patient reports he has not received pulmicort or brovana, has been using advair and albuterol intermittently  Will contact Pulmonary Disease Management for assistance on " tracking down his nebulizer medications            Cardiac/Vascular    Essential hypertension     Stable, continue current medication management             Endocrine    Class 2 severe obesity due to excess calories with serious comorbidity and body mass index (BMI) of 39.0 to 39.9 in adult     Encouraged calorie reduction and 30 minutes of exercise daily. Discussed impact of obesity on general health.  Wt Readings from Last 9 Encounters:   07/20/23 (!) 142.9 kg (315 lb)   06/13/23 (!) 139.7 kg (308 lb)   04/13/23 (!) 142.9 kg (315 lb)   04/05/23 (!) 139 kg (306 lb 7 oz)   01/10/23 (!) 139.7 kg (307 lb 15.7 oz)   10/27/22 (!) 139.7 kg (308 lb)   10/18/22 (!) 139.7 kg (307 lb 15.7 oz)   04/28/22 (!) 139.7 kg (307 lb 15.7 oz)   04/25/22 (!) 139.7 kg (308 lb)   Body mass index is 40.44 kg/m².                Other    CHRISTOPHER and COPD overlap syndrome     On CPAP 8 cm with optimal control obstructive sleep apnea   5/4/2022 overnight on ra on cpap 8 cm normal.   Ordered to be on Advair 250 mcg, he is not compliant with daily use related to cost of $45/mo.   4/13/2023 Feno 33   4/13/2023 stop advair. Begin pulmicort nebs and brovana nebs twice daily sent to [employer] mail order.   7/20/23: using CPAP nightly and benefits from use          Follow up 2 months with PFT, walk, FeNO.    Discussed diagnosis, its evaluation, treatment and usual course. All questions answered.    Patient verbalized understanding of plan and left in no acute distress    Thank you for the courtesy of participating in the care of this patient    Elizabeth G Blough, PA-C [facility]

## 2023-07-20 NOTE — ASSESSMENT & PLAN NOTE
On CPAP 8 cm with optimal control obstructive sleep apnea   5/4/2022 overnight on ra on cpap 8 cm normal.   Ordered to be on Advair 250 mcg, he is not compliant with daily use related to cost of $45/mo.   4/13/2023 Feno 33   4/13/2023 stop advair. Begin pulmicort nebs and brovana nebs twice daily sent to [facility] mail order.   7/20/23: using CPAP nightly and benefits from use

## 2023-07-24 ENCOUNTER — DOCUMENTATION ONLY (OUTPATIENT)
Dept: PULMONOLOGY | Facility: CLINIC | Age: 83
End: 2023-07-24
Payer: MEDICARE

## 2023-07-24 ENCOUNTER — TELEPHONE (OUTPATIENT)
Dept: PULMONOLOGY | Facility: CLINIC | Age: 83
End: 2023-07-24
Payer: MEDICARE

## 2023-08-10 ENCOUNTER — DOCUMENT SCAN (OUTPATIENT)
Dept: HOME HEALTH SERVICES | Facility: HOSPITAL | Age: 83
End: 2023-08-10
Payer: MEDICARE

## 2023-08-15 ENCOUNTER — PATIENT OUTREACH (OUTPATIENT)
Dept: PULMONOLOGY | Facility: CLINIC | Age: 83
End: 2023-08-15
Payer: MEDICARE

## 2023-08-15 NOTE — TELEPHONE ENCOUNTER
Chronic Disease Management  Called patient to complete Pulmonary Disease Management Questionnaire.    [facility] performist and budesonide were sent to patient on 7/31/23

## 2023-08-22 DIAGNOSIS — J44.9 CHRONIC OBSTRUCTIVE PULMONARY DISEASE, UNSPECIFIED COPD TYPE: ICD-10-CM

## 2023-08-22 RX ORDER — ALBUTEROL SULFATE 90 UG/1
AEROSOL, METERED RESPIRATORY (INHALATION)
Qty: 8.5 G | Refills: 11 | Status: SHIPPED | OUTPATIENT
Start: 2023-08-22

## 2023-08-28 PROCEDURE — G0179 MD RECERTIFICATION HHA PT: HCPCS | Mod: ,,, | Performed by: INTERNAL MEDICINE

## 2023-08-28 PROCEDURE — G0179 PR HOME HEALTH MD RECERTIFICATION: ICD-10-PCS | Mod: ,,, | Performed by: INTERNAL MEDICINE

## 2023-09-05 ENCOUNTER — PATIENT MESSAGE (OUTPATIENT)
Dept: PULMONOLOGY | Facility: CLINIC | Age: 83
End: 2023-09-05
Payer: MEDICARE

## 2023-09-06 ENCOUNTER — TELEPHONE (OUTPATIENT)
Dept: FAMILY MEDICINE | Facility: CLINIC | Age: 83
End: 2023-09-06
Payer: MEDICARE

## 2023-09-06 ENCOUNTER — TELEPHONE (OUTPATIENT)
Dept: HEMATOLOGY/ONCOLOGY | Facility: CLINIC | Age: 83
End: 2023-09-06
Payer: MEDICARE

## 2023-09-06 NOTE — TELEPHONE ENCOUNTER
----- Message from Inge Mireles sent at 9/6/2023  2:32 PM CDT -----  Name of Who is Calling:TRACEY MARROQUIN [[id number]]        What is the request in detail: Pt would like a call back from the office in regards to if the wound care (Home Health) nurse can draw pt labs in stead if visit to [facility] on 9/13. Please advise       Can the clinic reply by [facility]:NO        What Number to Call Back if not in [facility]:.Telephone Information:  Mobile          [phone]

## 2023-09-06 NOTE — TELEPHONE ENCOUNTER
Spoke with wife told her she will have to call the Hematologist Dr. Jones to change the lab orders for [facility] to draw.  She voiced understanding and will call them

## 2023-09-06 NOTE — TELEPHONE ENCOUNTER
----- Message -----  From: Lily Hart  Sent: 9/6/2023   3:12 PM CDT  To: Robert [facility]aver[facility] Staff  Carine called to ask if the [facility] nurse Miroslava could do the draws for the pts blood work scheduled 9/13 since he has trouble getting around. Please call her back at [phone].  Thanks  TS    Spoke with carine, will get lab orders sent over to  for collection at home per request

## 2023-09-07 ENCOUNTER — PATIENT MESSAGE (OUTPATIENT)
Dept: PULMONOLOGY | Facility: CLINIC | Age: 83
End: 2023-09-07
Payer: MEDICARE

## 2023-09-12 ENCOUNTER — EXTERNAL HOME HEALTH (OUTPATIENT)
Dept: HOME HEALTH SERVICES | Facility: HOSPITAL | Age: 83
End: 2023-09-12
Payer: MEDICARE

## 2023-09-13 ENCOUNTER — TELEPHONE (OUTPATIENT)
Dept: HEMATOLOGY/ONCOLOGY | Facility: CLINIC | Age: 83
End: 2023-09-13
Payer: MEDICARE

## 2023-09-13 NOTE — TELEPHONE ENCOUNTER
----- Message from Yury Baker sent at 9/13/2023 12:01 PM CDT -----  Contact: Hammad/[facility]  Hammad is calling in regards to pt having labs but Hammad wanted to if pt labs can be drawn 09/15. Please call back at [phone]   Thanks  KT        Spoke with hammad from [facility] ,informed her pt has been reschedule to October 3 for a follow up appointment, labs will have to be obtained prior to appointment. Hammad verbalized her understanding

## 2023-09-14 ENCOUNTER — CLINICAL SUPPORT (OUTPATIENT)
Dept: PULMONOLOGY | Facility: CLINIC | Age: 83
End: 2023-09-14
Payer: MEDICARE

## 2023-09-14 ENCOUNTER — OFFICE VISIT (OUTPATIENT)
Dept: PULMONOLOGY | Facility: CLINIC | Age: 83
End: 2023-09-14
Payer: MEDICARE

## 2023-09-14 VITALS
HEART RATE: 59 BPM | HEIGHT: 72 IN | SYSTOLIC BLOOD PRESSURE: 157 MMHG | RESPIRATION RATE: 16 BRPM | BODY MASS INDEX: 39.47 KG/M2 | DIASTOLIC BLOOD PRESSURE: 72 MMHG | WEIGHT: 291.44 LBS | OXYGEN SATURATION: 97 %

## 2023-09-14 DIAGNOSIS — E11.65 TYPE 2 DIABETES MELLITUS WITH HYPERGLYCEMIA, WITH LONG-TERM CURRENT USE OF INSULIN: ICD-10-CM

## 2023-09-14 DIAGNOSIS — E66.01 CLASS 2 SEVERE OBESITY DUE TO EXCESS CALORIES WITH SERIOUS COMORBIDITY AND BODY MASS INDEX (BMI) OF 39.0 TO 39.9 IN ADULT: ICD-10-CM

## 2023-09-14 DIAGNOSIS — J44.9 COPD, SEVERE: Primary | ICD-10-CM

## 2023-09-14 DIAGNOSIS — G47.33 OSA ON CPAP: ICD-10-CM

## 2023-09-14 DIAGNOSIS — J44.9 COPD, SEVERE: ICD-10-CM

## 2023-09-14 DIAGNOSIS — R06.02 SOB (SHORTNESS OF BREATH) ON EXERTION: ICD-10-CM

## 2023-09-14 DIAGNOSIS — Z79.4 TYPE 2 DIABETES MELLITUS WITH HYPERGLYCEMIA, WITH LONG-TERM CURRENT USE OF INSULIN: ICD-10-CM

## 2023-09-14 DIAGNOSIS — I10 ESSENTIAL HYPERTENSION: ICD-10-CM

## 2023-09-14 LAB
BRPFT: NORMAL
FEF 25 75 CHG: 37 %
FEF 25 75 LLN: 0.52
FEF 25 75 POST REF: 28.8 %
FEF 25 75 PRE REF: 21 %
FEF 25 75 REF: 1.84
FET100 CHG: -7.2 %
FEV1 CHG: 15.3 %
FEV1 FVC CHG: 11.6 %
FEV1 FVC LLN: 60
FEV1 FVC POST REF: 81.5 %
FEV1 FVC PRE REF: 73 %
FEV1 FVC REF: 74
FEV1 LLN: 1.74
FEV1 POST REF: 53.6 %
FEV1 PRE REF: 46.5 %
FEV1 REF: 2.69
FVC CHG: 3.2 %
FVC LLN: 2.54
FVC POST REF: 65 %
FVC PRE REF: 63 %
FVC REF: 3.67
PEF CHG: 10 %
PEF LLN: 5.53
PEF POST REF: 40.2 %
PEF PRE REF: 36.5 %
PEF REF: 8.5
POST FEF 25 75: 0.53 L/S
POST FET 100: 13.34 SEC
POST FEV1 FVC: 60.48 %
POST FEV1: 1.44 L
POST FVC: 2.39 L
POST PEF: 3.42 L/S
PRE FEF 25 75: 0.39 L/S
PRE FET 100: 14.38 SEC
PRE FEV1 FVC: 54.18 %
PRE FEV1: 1.25 L
PRE FVC: 2.31 L
PRE PEF: 3.11 L/S

## 2023-09-14 PROCEDURE — 95012 PR NITRIC OXIDE EXPIRED GAS DETERMINATION: ICD-10-PCS | Mod: S$GLB,,, | Performed by: INTERNAL MEDICINE

## 2023-09-14 PROCEDURE — 3078F PR MOST RECENT DIASTOLIC BLOOD PRESSURE < 80 MM HG: ICD-10-PCS | Mod: CPTII,S$GLB,, | Performed by: NURSE PRACTITIONER

## 2023-09-14 PROCEDURE — 1100F PTFALLS ASSESS-DOCD GE2>/YR: CPT | Mod: CPTII,S$GLB,, | Performed by: NURSE PRACTITIONER

## 2023-09-14 PROCEDURE — 99999 PR PBB SHADOW E&M-EST. PATIENT-LVL V: CPT | Mod: PBBFAC,,, | Performed by: NURSE PRACTITIONER

## 2023-09-14 PROCEDURE — 1159F MED LIST DOCD IN RCRD: CPT | Mod: CPTII,S$GLB,, | Performed by: NURSE PRACTITIONER

## 2023-09-14 PROCEDURE — 99214 OFFICE O/P EST MOD 30 MIN: CPT | Mod: 25,S$GLB,, | Performed by: NURSE PRACTITIONER

## 2023-09-14 PROCEDURE — 1160F RVW MEDS BY RX/DR IN RCRD: CPT | Mod: CPTII,S$GLB,, | Performed by: NURSE PRACTITIONER

## 2023-09-14 PROCEDURE — 1160F PR REVIEW ALL MEDS BY PRESCRIBER/CLIN PHARMACIST DOCUMENTED: ICD-10-PCS | Mod: CPTII,S$GLB,, | Performed by: NURSE PRACTITIONER

## 2023-09-14 PROCEDURE — 94060 PR EVAL OF BRONCHOSPASM: ICD-10-PCS | Mod: S$GLB,,, | Performed by: INTERNAL MEDICINE

## 2023-09-14 PROCEDURE — 99999 PR PBB SHADOW E&M-EST. PATIENT-LVL V: ICD-10-PCS | Mod: PBBFAC,,, | Performed by: NURSE PRACTITIONER

## 2023-09-14 PROCEDURE — 3078F DIAST BP <80 MM HG: CPT | Mod: CPTII,S$GLB,, | Performed by: NURSE PRACTITIONER

## 2023-09-14 PROCEDURE — 3077F PR MOST RECENT SYSTOLIC BLOOD PRESSURE >= 140 MM HG: ICD-10-PCS | Mod: CPTII,S$GLB,, | Performed by: NURSE PRACTITIONER

## 2023-09-14 PROCEDURE — 95012 NITRIC OXIDE EXP GAS DETER: CPT | Mod: S$GLB,,, | Performed by: INTERNAL MEDICINE

## 2023-09-14 PROCEDURE — 94060 EVALUATION OF WHEEZING: CPT | Mod: S$GLB,,, | Performed by: INTERNAL MEDICINE

## 2023-09-14 PROCEDURE — 3288F FALL RISK ASSESSMENT DOCD: CPT | Mod: CPTII,S$GLB,, | Performed by: NURSE PRACTITIONER

## 2023-09-14 PROCEDURE — 99214 PR OFFICE/OUTPT VISIT, EST, LEVL IV, 30-39 MIN: ICD-10-PCS | Mod: 25,S$GLB,, | Performed by: NURSE PRACTITIONER

## 2023-09-14 PROCEDURE — 1100F PR PT FALLS ASSESS DOC 2+ FALLS/FALL W/INJURY/YR: ICD-10-PCS | Mod: CPTII,S$GLB,, | Performed by: NURSE PRACTITIONER

## 2023-09-14 PROCEDURE — 3288F PR FALLS RISK ASSESSMENT DOCUMENTED: ICD-10-PCS | Mod: CPTII,S$GLB,, | Performed by: NURSE PRACTITIONER

## 2023-09-14 PROCEDURE — 1159F PR MEDICATION LIST DOCUMENTED IN MEDICAL RECORD: ICD-10-PCS | Mod: CPTII,S$GLB,, | Performed by: NURSE PRACTITIONER

## 2023-09-14 PROCEDURE — 3077F SYST BP >= 140 MM HG: CPT | Mod: CPTII,S$GLB,, | Performed by: NURSE PRACTITIONER

## 2023-09-14 NOTE — PROCEDURES
Clinical Guide to Interpretation or FeNO Levels :    FeNO  (ppb) LOW INTERMEDIATE HIGH   ADULT VALUES < 25 25-50          > 50   Th2-driven Inflammation Unlikely Likely Significant     Patients FeNO level at this visit : ___24_ (ppb)    Interpretation of FeNO measurement in adults:  [] FENO is less than 25 ppb implies non eosinophilic airway inflammation or the absence of airway inflammation.    Comment: Low FENO (<25 ppb) in adult asthmatics with persistent symptoms suggests other etiologies for these symptoms and a lower likelihood of benefit from adding or increasing inhaled glucocorticoids.    [] FENO between 25 and 50 ppb in adults should be interpreted cautiously with reference to the clinical situation (eg, symptomatic, on or off therapy, current smoking).    [] FENO greater than 50 ppb in adults  suggests eosinophilic airway inflammation   Comment: High FENO (>50 ppb) in adult asthmatics even with atypical symptoms suggests glucocorticoid responsiveness. High FENO (>50 ppb) can help identify poor adherence or uncontrolled inflammation in asthma patients with otherwise seemingly controlled asthma.    Discussion:  A FENO less than 25 ppb in adults and less than 20 ppb in children younger than 12 years of age implies noneosinophilic airway inflammation or the absence of airway inflammation.  A FENO greater than 50 ppb in adults or greater than 35 ppb in children suggests eosinophilic airway inflammation.   Values of FENO between 25 and 50 ppb in adults (20 to 35 ppb in children) should be interpreted cautiously with reference to the clinical situation (eg, symptomatic, on or off therapy, current smoking).  A rising FENO with a greater than 20 percent change and more than 25 ppb (20 ppb in children) from a previously stable level suggests increasing eosinophilic airway inflammation, but there are wide inter-individual differences.  A decrease in FENO greater than 20 percent for values over 50 ppb or more than  10 ppb for values less than 50 ppb may be clinically important.  ?FENO in other respiratory diseases - Several other diseases are associated with altered levels of exhaled NO: low levels of FENO have been noted in cystic fibrosis, current smoking, pulmonary hypertension, hypothermia, primary ciliary dyskinesia, and bronchopulmonary dysplasia. Elevated FENO has been noted in atopy, nonasthmatic eosinophilic bronchitis, COPD exacerbations, noncystic fibrosis bronchiectasis, and viral upper respiratory infections.    REFERENCE:  ATS Board of Directors, December 2004, and by the ERS Executive Committee, June 2004. ATS/ERS Recommendations for Standardized Procedures for the Online and Offline Measurement of Exhaled Lower Respiratory Nitric Oxide and Nasal Nitric Oxide. Guideline 2005

## 2023-09-14 NOTE — PROGRESS NOTES
Subjective:       Patient ID: Jose Luis Martinez is a 83 y.o. male.    Chief Complaint: COPD and Apnea      9/14/2023 Gahn NP   Jose Luis Martinez here with wife for review spirometry and feno.    Patient  presents stable.  Improved with adherence to Pulmicort and Brovana nebs twice daily  Weight loss 24 lb since July which patient is very happy with his calorie reduction equating in weight loss.  Required cough.  No mucus production.  There is chronic shortness of breath with exertional activity.  Patient reports he is basically wheelchair bound most of the time he does get up and move but only short distances.  He was not willing to attempt 6 minute walk distance that was ordered at this visit to determine if O2 is indicated with activity.     9/14/2023 Spirometry shows obstruction with severe ventilatory impairment. This impairment may be due to the obstruction alone but restrictive disease is not ruled out. Spirometry shows borderline improvement following bronchodilator. .    9/14/2023 FeNO 24    Current treatment:  Brovana with Pulmicort nebs twice daily. Albuterol inhaler. Albuterol nebs.    HPI: Jose Luis Martinez is here for follow up for CHRISTOPHER and CPAP complaince assessment.   He is on CPAP of 8 cmH2O pressure which is optimally controlling sleep apnea with apneic index (AHI) 4.6 events an hour.   He is compliant with CPAP use. Complaince download today reveals 100% of days with greater than 4 hours of device use.   Patient reports benefit from CPAP use and denies snoring and excessive daytime sleepiness.  Patient reports no complaints. Nasal mask is used. High leak, encouraged chin strap. Patient not willing to change to Full face mask.     Viewed dnld on [employer] CPAP machine.     Dyer Sleepiness Scale       9/14/2023    10:08 AM 4/13/2023     8:34 AM 10/27/2022    10:20 AM 4/28/2022     9:00 AM 11/22/2021    10:00 AM 10/25/2021     9:00 AM 7/23/2021    11:00 AM   EPWORTH SLEEPINESS SCALE   Sitting and reading 3 2  1 3 3 3 1   Watching TV 3 3 1 3 3 3 1   Sitting, inactive in a public place (e.g. a theatre or a meeting) 0 0 1 0 3 3 0   As a passenger in a car for an hour without a break 0 0 0 0 3 2 1   Lying down to rest in the afternoon when circumstances permit 3 0 3 1 3 1 3   Sitting and talking to someone 1 0 1 0 3 2 1   Sitting quietly after a lunch without alcohol 1 0 1 0 3 2 3   In a car, while stopped for a few minutes in traffic 0 0 0 0 3 0 0   Total score 11 5 8 7 24 16 10             7/20/2023 Natalia RODRIGUEZ   81yo male here for COPD follow up  Last seen 4/2023, plan was to stop Advair and start Brovana/Pulmicort neb, rx was faxed to [facility], patient says he did not receive these medications and only using albuterol hfa and neb prn and uses Advair intermittently  Since last visit he went to ER for confusion, SOB, and urinary retention; Chest X Ray and head CT negative; given breathing treatment and fluids with improvement in symptoms; plan for cardiology follow up for CHF andraal  He reports feeling well today, denies shortness of breath, cough, or wheezing  Family member says he gets very short of breath when walking around his house and completing ADLs, she thinks he needs oxygen  Reports compliance with CPAP nightly and benefits from use    COPD Questionnaire  How often do you cough?: A little of the time  How often do you have phlegm (mucus) in your chest?: A little of the time  How often does your chest feel tight?: A little of the time  When you walk up a hill or one flight of stairs, how often are you breathless?: All of the time  How often are you limited doing any activities at home?: Never  How often are you confident leaving the house despite your lung condition?: Almost Never  How often do you sleep soundly?: A little of the time  How often do you have energy?: A little of the time  Total score: 21      4/2023 Andree Monroy NP:    COPD   Off Trelegy found increased blood pressure  Symptomatic shortness of breath  exertional.     Ordered to be on Advair 250 mcg, he is not compliant with daily use related to cost of $45/mo.   4/13/2023 Feno 33   4/13/2023 stop advair. Begin pulmicort nebs and brovana nebs twice daily sent to [facility] mail order.      22 pack year former smoker. Quit smoking 2018.       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     Not on home O2.   4/13/2023 Patient was unable to attempt 6MWD related to does not feel stable to walk with history of falls. No desats requiring supplemental oxygen at rest.      Bed time is 1030 - 1100  Wake time is 1000 - 1100     Neck circumference is 19.5 inches  Mallampati score 4    HPI: Jose Luis Martinez is here for follow up for CHRISTOPHER and CPAP complaince assessment.   He is on CPAP of 8 cmH2O pressure.   He is compliant with CPAP use. Complaince download today reveals 75% of days with greater than 4 hours of device use.   Patient reports benefit from CPAP use and denies snoring and excessive daytime sleepiness.  Patient reports no complaints. Nasal pillows mask is used.      12/21/2021 PSG Split night severe obstructive sleep apnea.The apnea/hypopnea index 88.2 was events/hour. CPAP 10 cm optimal.   1/5/2022 orders CPAP 10 cm, per Dr. SUDHAKAR Diaz.   2/28/2022  pt request change to cpap 8 cm. not tolerating cpap 10 cm will need over night if cpap 8 is optimal, desat on split night titration  On CPAP 8 cm with optimal control. AHI 0.8.   5/4/2022 overnight oximetry on room air on CPAP 8 cm was normal, Cumulative time with oxygen saturation less than 88% (TC88): 0:00:32.        Immunization History   Administered Date(s) Administered    COVID-19, MRNA, LN-S, PF (Pfizer) (Purple Cap) 01/13/2021, 02/03/2021, 10/06/2021    COVID-19, mRNA, LNP-S, bivalent booster, PF (PFIZER OMICRON) 01/10/2023    Influenza (FLUAD) - Quadrivalent - Adjuvanted - PF *Preferred* (65+) 10/13/2021, 09/14/2023    Influenza - High Dose - PF (65 years and older) 09/27/2017, 12/17/2018,  "12/09/2019    Influenza - Quadrivalent - High Dose - PF (65 years and older) 12/17/2020    Influenza A (H1N1) 2009 Monovalent - IM 01/13/2010    Pneumococcal Conjugate - 13 Valent 12/17/2018    Pneumococcal Polysaccharide - 23 Valent 12/05/2017      Tobacco Use: Medium Risk (9/14/2023)    Patient History     Smoking Tobacco Use: Former     Smokeless Tobacco Use: Former     Passive Exposure: Not on file      Past Medical History:   Diagnosis Date    Hypertension     Ulcers of both lower legs 6/13/2018      Current Outpatient Medications on File Prior to Visit   Medication Sig Dispense Refill    ACCU-CHEK DAVID PLUS TEST STRP Strp TEST BLOOD SUGAR THREE TIMES A DAY AS NEEDED 200 strip 12    ACCU-CHEK SOFTCLIX LANCETS Misc USE TO TEST TWICE DAILY 100 each 12    albuterol (PROVENTIL) 2.5 mg /3 mL (0.083 %) nebulizer solution Take 3 mLs (2.5 mg total) by nebulization every 4 (four) hours as needed for Wheezing or Shortness of Breath (cough). Rescue 360 mL 11    albuterol (PROVENTIL/VENTOLIN HFA) 90 mcg/actuation inhaler INHALE 2 PUFFS INTO THE LUNGS EVERY 4 HOURS AS NEEDED FOR WHEEZING OR SHORTNESS OF BREATH. RESCUE 8.5 g 11    alfuzosin (UROXATRAL) 10 mg Tb24       arformoteroL (BROVANA) 15 mcg/2 mL Nebu Take 2 mLs (15 mcg total) by nebulization 2 (two) times a day. Controller 120 mL 11    atorvastatin (LIPITOR) 40 MG tablet TAKE ONE TABLET BY MOUTH ONCE A DAY 90 tablet 3    BD ULTRA-FINE VINNIE PEN NEEDLE 32 gauge x 5/32" Ndle Inject insulin into skin once daily 200 each 3    budesonide (PULMICORT) 0.5 mg/2 mL nebulizer solution Take 2 mLs (0.5 mg total) by nebulization 2 (two) times a day. Controller 120 mL 11    bumetanide (BUMEX) 2 MG tablet TAKE 1 TABLET BY MOUTH TWO TIMES DAILY. 60 tablet 12    busPIRone (BUSPAR) 7.5 MG tablet Take 1 tablet (7.5 mg total) by mouth daily as needed (anxiety). 30 tablet 3    docusate sodium (COLACE) 100 MG capsule Take 100 mg by mouth.      dutasteride (AVODART) 0.5 mg capsule Take 0.5 " "mg by mouth once daily.      ferrous sulfate 325 (65 FE) MG EC tablet Take 1 tablet (325 mg total) by mouth once daily. 90 tablet 3    fluticasone propionate (FLONASE) 50 mcg/actuation nasal spray 2 sprays (100 mcg total) by Each Nostril route once daily. 48 g 2    fluticasone-salmeterol diskus inhaler 250-50 mcg INHALE 1 PUFF INTO THE LUNGS 2 TIMES DAILY.      hydrocodone-acetaminophen 10-325mg (NORCO)  mg Tab   0    isosorbide dinitrate (ISORDIL) 30 MG Tab Take 30 mg by mouth.      isosorbide mononitrate (IMDUR) 30 MG 24 hr tablet       lancets 32 gauge Misc 1 lancet by Misc.(Non-Drug; Combo Route) route 2 (two) times daily. 200 each 12    LANTUS SOLOSTAR U-100 INSULIN glargine 100 units/mL SubQ pen INJECT 15 UNITS INTO THE SKIN EVERY EVENING. 15 mL 0    ofloxacin (FLOXIN) 0.3 % otic solution Place 5 drops into the left ear 2 (two) times daily. 5 mL 0    pantoprazole (PROTONIX) 40 MG tablet TAKE 1 TABLET BY MOUTH ONCE DAILY. 90 tablet 3    pen needle, diabetic (BD ULTRA-FINE VINNIE PEN NEEDLE) 32 gauge x 5/32" Ndle 1 Units by Misc.(Non-Drug; Combo Route) route once daily. 100 each 6    potassium chloride (KLOR-CON) 10 MEQ TbSR TAKE 1 TABLET BY MOUTH ONCE DAILY. 90 tablet 3    sars-cov-2, covid-19 pfizer omicron, (PFIZER COVID BIVAL,12Y UP,,PF,) 30 mcg/0.3 mL injection Inject into the muscle. 0.3 mL 0    silver sulfADIAZINE 1% (SILVADENE) 1 % cream Apply topically once daily. 400 g 3    tamsulosin (FLOMAX) 0.4 mg Cp24   11    TRUEPLUS INSULIN 0.5 mL 31 gauge x 5/16" Syrg INJECT TWO TIMES A DAY AS DIRECTED 100 each 6    valsartan (DIOVAN) 160 MG tablet TAKE ONE TABLET BY MOUTH ONCE A DAY 90 tablet 1    blood-glucose meter kit Use as instructed 1 each 0    gabapentin (NEURONTIN) 100 MG capsule Take 1 capsule (100 mg total) by mouth every evening. 30 capsule 4     No current facility-administered medications on file prior to visit.        Review of Systems   Constitutional:  Positive for fatigue and weakness. " Negative for fever, weight loss and appetite change.   HENT:  Negative for rhinorrhea, sinus pressure, trouble swallowing and congestion.    Respiratory:  Positive for shortness of breath and dyspnea on extertion. Negative for cough, sputum production, choking, chest tightness and wheezing.    Cardiovascular:  Positive for leg swelling. Negative for chest pain.   Musculoskeletal:  Positive for arthralgias and gait problem. Negative for joint swelling.   Gastrointestinal:  Negative for nausea and vomiting.   Neurological:  Negative for dizziness, weakness and headaches.   All other systems reviewed and are negative.      Objective:       Vitals:    09/14/23 0942   BP: (!) 157/72   Pulse: (!) 59   Resp: 16   SpO2: 97%   Weight: 132.2 kg (291 lb 7.2 oz)   Height: 6' (1.829 m)       Physical Exam   Constitutional: He is oriented to person, place, and time. He appears well-developed and well-nourished. No distress.   Appears chronically ill but in no acute distress  He is obese.   HENT:   Head: Normocephalic.   Nose: Nose normal.   Mouth/Throat: Oropharynx is clear and moist.   Cardiovascular: Normal rate and regular rhythm.   Pulmonary/Chest: Effort normal. No respiratory distress. He has no wheezes. He has no rhonchi. He has no rales.   Musculoskeletal:         General: Edema present.      Cervical back: Normal range of motion and neck supple.   Neurological: He is alert and oriented to person, place, and time.   Skin: Skin is warm and dry.   Psychiatric: He has a normal mood and affect.   Vitals reviewed.    Personal Diagnostic Review       9/14/2023 Spirometry shows obstruction with severe ventilatory impairment. This impairment may be due to the obstruction alone but restrictive disease is not ruled out. Spirometry shows borderline improvement following bronchodilator. .    9/14/2023 FeNO 24  Clinical Guide to Interpretation or FeNO Levels :     FeNO  (ppb) LOW INTERMEDIATE HIGH   ADULT VALUES < 25 25-50          >  50   Th2-driven Inflammation Unlikely Likely Significant      Patients FeNO level at this visit : ___24_ (ppb)       X-Ray Hand 3 View Bilateral  Narrative: EXAMINATION:  XR HAND COMPLETE 3 VIEWS BILATERAL    CLINICAL HISTORY:  . Pain in right hand    TECHNIQUE:  PA, lateral, and oblique views of both hands were performed.    COMPARISON:  None    FINDINGS:  No acute fracture.  Bilateral OA findings noted.  Bilateral 1st IP as well as 1st, 2nd and 3rd MCP joint space loss and subcortical cystic change and or erosions noted.  Less prevalent findings noted within the right 4th and 5th MCP joints.  No focal soft tissue abnormality.  Impression: As above    Electronically signed by: Cy Mcbride MD  Date:    02/02/2022  Time:    08:54      XR CHEST 1 VIEW     CLINICAL INDICATION: shortness of breath, chest pain     COMPARISON: 10/22/2020     FINDINGS: Single frontal view of the chest demonstrates symmetrically aerated lungs.  No pleural effusion or pneumothorax is identified. The cardiomediastinal silhouette is within normal limits.  Exam End: 07/09/23 08:53 Last Resulted: 07/09/23 09:06         Assessment/Plan:       Problem List Items Addressed This Visit       Type 2 diabetes mellitus with hyperglycemia, with long-term current use of insulin     Continue management with primary care provider  Hemoglobin A1C   Date Value Ref Range Status   07/10/2023 6.8 (H) 4.0 - 5.6 % Final     Comment:     ADA Screening Guidelines:  5.7-6.4%  Consistent with prediabetes  >or=6.5%  Consistent with diabetes    High levels of fetal hemoglobin interfere with the HbA1C  assay. Heterozygous hemoglobin variants (HbS, HgC, etc)do  not significantly interfere with this assay.   However, presence of multiple variants may affect accuracy.     10/18/2022 6.8 (H) 4.0 - 5.6 % Final     Comment:     ADA Screening Guidelines:  5.7-6.4%  Consistent with prediabetes  >or=6.5%  Consistent with diabetes    High levels of fetal hemoglobin interfere  with the HbA1C  assay. Heterozygous hemoglobin variants (HbS, HgC, etc)do  not significantly interfere with this assay.   However, presence of multiple variants may affect accuracy.     04/25/2022 7.4 (H) 4.0 - 5.6 % Final     Comment:     ADA Screening Guidelines:  5.7-6.4%  Consistent with prediabetes  >or=6.5%  Consistent with diabetes    High levels of fetal hemoglobin interfere with the HbA1C  assay. Heterozygous hemoglobin variants (HbS, HgC, etc)do  not significantly interfere with this assay.   However, presence of multiple variants may affect accuracy.                CHRISTOPHER on CPAP     On CPAP 8 cm with benefit and optimal use and control of obstructive sleep apnea   Continue CPAP 8 cm on room air  Nasal mask   Updated supply order  [facility]              Relevant Orders    CPAP/BIPAP SUPPLIES    Essential hypertension     Improve control indicated, continue follow up primary care provider/cardiology           COPD, severe - Primary     The current medical regimen is effective;  continue present plan and medications Pulmicort and budesonide nebs twice daily.  Albuterol inhaler albuterol nebs as needed.  9/14/2023 Spirometry shows obstruction with severe ventilatory impairment. This impairment may be due to the obstruction alone but restrictive disease is not ruled out. Spirometry shows borderline improvement following bronchodilator. .  9/14/2023 FeNO 24  Follow up 6 mo review chest xray            Relevant Orders    X-Ray Chest PA And Lateral    Class 2 severe obesity due to excess calories with serious comorbidity and body mass index (BMI) of 39.0 to 39.9 in adult     Encouraged calorie reduction and 30 minutes of exercise daily. Discussed impact of obesity on general health.  Patient is working on weight loss efforts with success.   Wt Readings from Last 9 Encounters:   09/14/23 132.2 kg (291 lb 7.2 oz)   07/20/23 (!) 142.9 kg (315 lb)   06/13/23 (!) 139.7 kg (308 lb)   04/13/23 (!) 142.9 kg (315 lb)    04/05/23 (!) 139 kg (306 lb 7 oz)   01/10/23 (!) 139.7 kg (307 lb 15.7 oz)   10/27/22 (!) 139.7 kg (308 lb)   10/18/22 (!) 139.7 kg (307 lb 15.7 oz)   04/28/22 (!) 139.7 kg (307 lb 15.7 oz)   Body mass index is 39.53 kg/m².              Discussed diagnosis, its evaluation, treatment and usual course. All questions answered.    Patient verbalized understanding of plan and left in no acute distress    Thank you for the courtesy of participating in the care of this patient    Andree Monroy NP  [facility]

## 2023-09-14 NOTE — ASSESSMENT & PLAN NOTE
The current medical regimen is effective;  continue present plan and medications Pulmicort and budesonide nebs twice daily.  Albuterol inhaler albuterol nebs as needed.  9/14/2023 Spirometry shows obstruction with severe ventilatory impairment. This impairment may be due to the obstruction alone but restrictive disease is not ruled out. Spirometry shows borderline improvement following bronchodilator. .  9/14/2023 FeNO 24  Follow up 6 mo review chest xray

## 2023-09-14 NOTE — PROGRESS NOTES
Patient unable to complete walk due to recent injuries from fall. Karyn with pulmonary staff made aware.

## 2023-09-14 NOTE — ASSESSMENT & PLAN NOTE
On CPAP 8 cm with benefit and optimal use and control of obstructive sleep apnea   Continue CPAP 8 cm on room air  Nasal mask   Updated supply order  [facility]

## 2023-09-14 NOTE — ASSESSMENT & PLAN NOTE
Continue management with primary care provider  Hemoglobin A1C   Date Value Ref Range Status   07/10/2023 6.8 (H) 4.0 - 5.6 % Final     Comment:     ADA Screening Guidelines:  5.7-6.4%  Consistent with prediabetes  >or=6.5%  Consistent with diabetes    High levels of fetal hemoglobin interfere with the HbA1C  assay. Heterozygous hemoglobin variants (HbS, HgC, etc)do  not significantly interfere with this assay.   However, presence of multiple variants may affect accuracy.     10/18/2022 6.8 (H) 4.0 - 5.6 % Final     Comment:     ADA Screening Guidelines:  5.7-6.4%  Consistent with prediabetes  >or=6.5%  Consistent with diabetes    High levels of fetal hemoglobin interfere with the HbA1C  assay. Heterozygous hemoglobin variants (HbS, HgC, etc)do  not significantly interfere with this assay.   However, presence of multiple variants may affect accuracy.     04/25/2022 7.4 (H) 4.0 - 5.6 % Final     Comment:     ADA Screening Guidelines:  5.7-6.4%  Consistent with prediabetes  >or=6.5%  Consistent with diabetes    High levels of fetal hemoglobin interfere with the HbA1C  assay. Heterozygous hemoglobin variants (HbS, HgC, etc)do  not significantly interfere with this assay.   However, presence of multiple variants may affect accuracy.

## 2023-09-22 ENCOUNTER — LAB VISIT (OUTPATIENT)
Dept: LAB | Facility: HOSPITAL | Age: 83
End: 2023-09-22
Attending: NURSE PRACTITIONER
Payer: MEDICARE

## 2023-09-22 DIAGNOSIS — D47.2 MONOCLONAL PARAPROTEINEMIA: ICD-10-CM

## 2023-09-22 DIAGNOSIS — D50.9 IRON DEFICIENCY ANEMIA, UNSPECIFIED: ICD-10-CM

## 2023-09-22 LAB
ALBUMIN SERPL BCP-MCNC: 3.4 G/DL (ref 3.5–5.2)
ALP SERPL-CCNC: 90 U/L (ref 55–135)
ALT SERPL W/O P-5'-P-CCNC: 20 U/L (ref 10–44)
ANION GAP SERPL CALC-SCNC: 12 MMOL/L (ref 8–16)
AST SERPL-CCNC: 21 U/L (ref 10–40)
BASOPHILS # BLD AUTO: 0.03 K/UL (ref 0–0.2)
BASOPHILS NFR BLD: 0.3 % (ref 0–1.9)
BILIRUB SERPL-MCNC: 0.4 MG/DL (ref 0.1–1)
BUN SERPL-MCNC: 31 MG/DL (ref 8–23)
CALCIUM SERPL-MCNC: 8.8 MG/DL (ref 8.7–10.5)
CHLORIDE SERPL-SCNC: 106 MMOL/L (ref 95–110)
CO2 SERPL-SCNC: 27 MMOL/L (ref 23–29)
CREAT SERPL-MCNC: 1.4 MG/DL (ref 0.5–1.4)
DIFFERENTIAL METHOD: ABNORMAL
EOSINOPHIL # BLD AUTO: 0.1 K/UL (ref 0–0.5)
EOSINOPHIL NFR BLD: 1.1 % (ref 0–8)
ERYTHROCYTE [DISTWIDTH] IN BLOOD BY AUTOMATED COUNT: 15.9 % (ref 11.5–14.5)
EST. GFR  (NO RACE VARIABLE): 49.9 ML/MIN/1.73 M^2
FOLATE SERPL-MCNC: 8.3 NG/ML (ref 4–24)
GLUCOSE SERPL-MCNC: 116 MG/DL (ref 70–110)
HCT VFR BLD AUTO: 36.7 % (ref 40–54)
HGB BLD-MCNC: 11.6 G/DL (ref 14–18)
IMM GRANULOCYTES # BLD AUTO: 0.03 K/UL (ref 0–0.04)
IMM GRANULOCYTES NFR BLD AUTO: 0.3 % (ref 0–0.5)
IRON SERPL-MCNC: 47 UG/DL (ref 45–160)
LYMPHOCYTES # BLD AUTO: 2.1 K/UL (ref 1–4.8)
LYMPHOCYTES NFR BLD: 23.6 % (ref 18–48)
MCH RBC QN AUTO: 29.8 PG (ref 27–31)
MCHC RBC AUTO-ENTMCNC: 31.6 G/DL (ref 32–36)
MCV RBC AUTO: 94 FL (ref 82–98)
MONOCYTES # BLD AUTO: 0.6 K/UL (ref 0.3–1)
MONOCYTES NFR BLD: 7.3 % (ref 4–15)
NEUTROPHILS # BLD AUTO: 5.9 K/UL (ref 1.8–7.7)
NEUTROPHILS NFR BLD: 67.4 % (ref 38–73)
NRBC BLD-RTO: 0 /100 WBC
PLATELET # BLD AUTO: 225 K/UL (ref 150–450)
PMV BLD AUTO: 10.8 FL (ref 9.2–12.9)
POTASSIUM SERPL-SCNC: 4 MMOL/L (ref 3.5–5.1)
PROT SERPL-MCNC: 6.7 G/DL (ref 6–8.4)
RBC # BLD AUTO: 3.89 M/UL (ref 4.6–6.2)
SODIUM SERPL-SCNC: 145 MMOL/L (ref 136–145)
VIT B12 SERPL-MCNC: 425 PG/ML (ref 210–950)
WBC # BLD AUTO: 8.73 K/UL (ref 3.9–12.7)

## 2023-09-22 PROCEDURE — 84165 PROTEIN E-PHORESIS SERUM: CPT | Mod: 26,,, | Performed by: PATHOLOGY

## 2023-09-22 PROCEDURE — 80053 COMPREHEN METABOLIC PANEL: CPT | Mod: PO | Performed by: NURSE PRACTITIONER

## 2023-09-22 PROCEDURE — 83540 ASSAY OF IRON: CPT | Performed by: NURSE PRACTITIONER

## 2023-09-22 PROCEDURE — 85025 COMPLETE CBC W/AUTO DIFF WBC: CPT | Mod: PO | Performed by: NURSE PRACTITIONER

## 2023-09-22 PROCEDURE — 83521 IG LIGHT CHAINS FREE EACH: CPT | Mod: 59 | Performed by: NURSE PRACTITIONER

## 2023-09-22 PROCEDURE — 84165 PROTEIN E-PHORESIS SERUM: CPT | Performed by: NURSE PRACTITIONER

## 2023-09-22 PROCEDURE — 82607 VITAMIN B-12: CPT | Performed by: NURSE PRACTITIONER

## 2023-09-22 PROCEDURE — 82746 ASSAY OF FOLIC ACID SERUM: CPT | Performed by: NURSE PRACTITIONER

## 2023-09-22 PROCEDURE — 84165 PATHOLOGIST INTERPRETATION SPE: ICD-10-PCS | Mod: 26,,, | Performed by: PATHOLOGY

## 2023-09-25 ENCOUNTER — DOCUMENT SCAN (OUTPATIENT)
Dept: HOME HEALTH SERVICES | Facility: HOSPITAL | Age: 83
End: 2023-09-25
Payer: MEDICARE

## 2023-09-25 LAB
ALBUMIN SERPL ELPH-MCNC: 3.3 G/DL (ref 3.35–5.55)
ALPHA1 GLOB SERPL ELPH-MCNC: 0.36 G/DL (ref 0.17–0.41)
ALPHA2 GLOB SERPL ELPH-MCNC: 0.87 G/DL (ref 0.43–0.99)
B-GLOBULIN SERPL ELPH-MCNC: 0.77 G/DL (ref 0.5–1.1)
GAMMA GLOB SERPL ELPH-MCNC: 1.4 G/DL (ref 0.67–1.58)
KAPPA LC SER QL IA: 10.26 MG/DL (ref 0.33–1.94)
KAPPA LC/LAMBDA SER IA: 3.19 (ref 0.26–1.65)
LAMBDA LC SER QL IA: 3.22 MG/DL (ref 0.57–2.63)
PROT SERPL-MCNC: 6.7 G/DL (ref 6–8.4)

## 2023-09-26 LAB — PATHOLOGIST INTERPRETATION SPE: NORMAL

## 2023-09-29 RX ORDER — INSULIN GLARGINE 100 [IU]/ML
INJECTION, SOLUTION SUBCUTANEOUS
Qty: 15 ML | Refills: 6 | Status: SHIPPED | OUTPATIENT
Start: 2023-09-29 | End: 2023-12-29 | Stop reason: SDUPTHER

## 2023-09-29 NOTE — TELEPHONE ENCOUNTER
Care Due:                  Date            Visit Type   Department     Provider  --------------------------------------------------------------------------------                                EP -                              PRIMARY      [facility]  Last Visit: 07-      CARE ([facility])   MEDICINE       Matti Mejia                              EP -                              PRIMARY      [facility]  Next Visit: 10-      CARE ([facility])   DEE DEE Mejia                                                            Last  Test          Frequency    Reason                     Performed    Due Date  --------------------------------------------------------------------------------    Lipid Panel.  12 months..  atorvastatin.............  Not Found    Overdue    [employer] Embedded Care Due Messages. Reference number: [id number].   9/29/2023 10:45:44 AM CDT

## 2023-10-02 ENCOUNTER — OFFICE VISIT (OUTPATIENT)
Dept: HOME HEALTH SERVICES | Facility: CLINIC | Age: 83
End: 2023-10-02
Payer: MEDICARE

## 2023-10-02 VITALS
SYSTOLIC BLOOD PRESSURE: 136 MMHG | HEIGHT: 72 IN | OXYGEN SATURATION: 99 % | RESPIRATION RATE: 19 BRPM | DIASTOLIC BLOOD PRESSURE: 72 MMHG | WEIGHT: 291 LBS | HEART RATE: 78 BPM | BODY MASS INDEX: 39.42 KG/M2 | TEMPERATURE: 98 F

## 2023-10-02 DIAGNOSIS — I10 ESSENTIAL HYPERTENSION: ICD-10-CM

## 2023-10-02 DIAGNOSIS — I25.10 CORONARY ARTERY DISEASE, UNSPECIFIED VESSEL OR LESION TYPE, UNSPECIFIED WHETHER ANGINA PRESENT, UNSPECIFIED WHETHER NATIVE OR TRANSPLANTED HEART: ICD-10-CM

## 2023-10-02 DIAGNOSIS — G89.29 CHRONIC LOW BACK PAIN WITHOUT SCIATICA, UNSPECIFIED BACK PAIN LATERALITY: ICD-10-CM

## 2023-10-02 DIAGNOSIS — D47.2 MGUS (MONOCLONAL GAMMOPATHY OF UNKNOWN SIGNIFICANCE): ICD-10-CM

## 2023-10-02 DIAGNOSIS — N40.0 BENIGN PROSTATIC HYPERPLASIA, UNSPECIFIED WHETHER LOWER URINARY TRACT SYMPTOMS PRESENT: ICD-10-CM

## 2023-10-02 DIAGNOSIS — D50.9 IRON DEFICIENCY ANEMIA, UNSPECIFIED IRON DEFICIENCY ANEMIA TYPE: ICD-10-CM

## 2023-10-02 DIAGNOSIS — J44.9 OSA AND COPD OVERLAP SYNDROME: ICD-10-CM

## 2023-10-02 DIAGNOSIS — R60.9 EDEMA, UNSPECIFIED TYPE: ICD-10-CM

## 2023-10-02 DIAGNOSIS — Z79.4 TYPE 2 DIABETES MELLITUS WITH HYPERGLYCEMIA, WITH LONG-TERM CURRENT USE OF INSULIN: ICD-10-CM

## 2023-10-02 DIAGNOSIS — I73.9 PAD (PERIPHERAL ARTERY DISEASE): ICD-10-CM

## 2023-10-02 DIAGNOSIS — J30.9 ALLERGIC RHINITIS, UNSPECIFIED SEASONALITY, UNSPECIFIED TRIGGER: ICD-10-CM

## 2023-10-02 DIAGNOSIS — D64.9 ANEMIA, UNSPECIFIED TYPE: ICD-10-CM

## 2023-10-02 DIAGNOSIS — G47.33 OSA AND COPD OVERLAP SYNDROME: ICD-10-CM

## 2023-10-02 DIAGNOSIS — E66.01 CLASS 2 SEVERE OBESITY DUE TO EXCESS CALORIES WITH SERIOUS COMORBIDITY AND BODY MASS INDEX (BMI) OF 39.0 TO 39.9 IN ADULT: ICD-10-CM

## 2023-10-02 DIAGNOSIS — M17.0 OSTEOARTHRITIS OF BOTH KNEES, UNSPECIFIED OSTEOARTHRITIS TYPE: ICD-10-CM

## 2023-10-02 DIAGNOSIS — K21.9 GASTROESOPHAGEAL REFLUX DISEASE WITHOUT ESOPHAGITIS: ICD-10-CM

## 2023-10-02 DIAGNOSIS — G47.33 OSA ON CPAP: ICD-10-CM

## 2023-10-02 DIAGNOSIS — M54.50 CHRONIC LOW BACK PAIN WITHOUT SCIATICA, UNSPECIFIED BACK PAIN LATERALITY: ICD-10-CM

## 2023-10-02 DIAGNOSIS — E11.65 TYPE 2 DIABETES MELLITUS WITH HYPERGLYCEMIA, WITH LONG-TERM CURRENT USE OF INSULIN: ICD-10-CM

## 2023-10-02 DIAGNOSIS — E78.00 HYPERCHOLESTEROLEMIA: ICD-10-CM

## 2023-10-02 DIAGNOSIS — F11.20 UNCOMPLICATED OPIOID DEPENDENCE: ICD-10-CM

## 2023-10-02 DIAGNOSIS — J44.9 COPD, SEVERE: ICD-10-CM

## 2023-10-02 DIAGNOSIS — Z00.00 ENCOUNTER FOR ANNUAL WELLNESS VISIT (AWV) IN MEDICARE PATIENT: Primary | ICD-10-CM

## 2023-10-02 PROCEDURE — 3078F DIAST BP <80 MM HG: CPT | Mod: CPTII,S$GLB,,

## 2023-10-02 PROCEDURE — 1100F PTFALLS ASSESS-DOCD GE2>/YR: CPT | Mod: CPTII,S$GLB,,

## 2023-10-02 PROCEDURE — 3288F FALL RISK ASSESSMENT DOCD: CPT | Mod: CPTII,S$GLB,,

## 2023-10-02 PROCEDURE — 3075F PR MOST RECENT SYSTOLIC BLOOD PRESS GE 130-139MM HG: ICD-10-PCS | Mod: CPTII,S$GLB,,

## 2023-10-02 PROCEDURE — 1159F PR MEDICATION LIST DOCUMENTED IN MEDICAL RECORD: ICD-10-PCS | Mod: CPTII,S$GLB,,

## 2023-10-02 PROCEDURE — G0439 PPPS, SUBSEQ VISIT: HCPCS | Mod: S$GLB,,,

## 2023-10-02 PROCEDURE — 1170F FXNL STATUS ASSESSED: CPT | Mod: CPTII,S$GLB,,

## 2023-10-02 PROCEDURE — 1170F PR FUNCTIONAL STATUS ASSESSED: ICD-10-PCS | Mod: CPTII,S$GLB,,

## 2023-10-02 PROCEDURE — 1159F MED LIST DOCD IN RCRD: CPT | Mod: CPTII,S$GLB,,

## 2023-10-02 PROCEDURE — G0439 PR MEDICARE ANNUAL WELLNESS SUBSEQUENT VISIT: ICD-10-PCS | Mod: S$GLB,,,

## 2023-10-02 PROCEDURE — 3075F SYST BP GE 130 - 139MM HG: CPT | Mod: CPTII,S$GLB,,

## 2023-10-02 PROCEDURE — 1160F PR REVIEW ALL MEDS BY PRESCRIBER/CLIN PHARMACIST DOCUMENTED: ICD-10-PCS | Mod: CPTII,S$GLB,,

## 2023-10-02 PROCEDURE — 1160F RVW MEDS BY RX/DR IN RCRD: CPT | Mod: CPTII,S$GLB,,

## 2023-10-02 PROCEDURE — 3078F PR MOST RECENT DIASTOLIC BLOOD PRESSURE < 80 MM HG: ICD-10-PCS | Mod: CPTII,S$GLB,,

## 2023-10-02 PROCEDURE — 3288F PR FALLS RISK ASSESSMENT DOCUMENTED: ICD-10-PCS | Mod: CPTII,S$GLB,,

## 2023-10-02 PROCEDURE — 99499 UNLISTED E&M SERVICE: CPT | Mod: S$GLB,,,

## 2023-10-02 PROCEDURE — 1100F PR PT FALLS ASSESS DOC 2+ FALLS/FALL W/INJURY/YR: ICD-10-PCS | Mod: CPTII,S$GLB,,

## 2023-10-02 NOTE — PROGRESS NOTES
"  Jose Luis Martinez presented for a  Medicare AWV and comprehensive Health Risk Assessment today. The following components were reviewed and updated:    Medical history  Family History  Social history  Allergies and Current Medications  Health Risk Assessment  Health Maintenance  Care Team         ** See Completed Assessments for Annual Wellness Visit within the encounter summary.**         The following assessments were completed:  Living Situation  CAGE  Depression Screening  Timed Get Up and Go  Whisper Test  Cognitive Function Screening  Nutrition Screening  ADL Screening  PAQ Screening    Current Outpatient Medications on File Prior to Visit   Medication Sig Dispense Refill    ACCU-CHEK DAVID PLUS TEST STRP Strp TEST BLOOD SUGAR THREE TIMES A DAY AS NEEDED 200 strip 12    ACCU-CHEK SOFTCLIX LANCETS Misc USE TO TEST TWICE DAILY 100 each 12    albuterol (PROVENTIL) 2.5 mg /3 mL (0.083 %) nebulizer solution Take 3 mLs (2.5 mg total) by nebulization every 4 (four) hours as needed for Wheezing or Shortness of Breath (cough). Rescue 360 mL 11    albuterol (PROVENTIL/VENTOLIN HFA) 90 mcg/actuation inhaler INHALE 2 PUFFS INTO THE LUNGS EVERY 4 HOURS AS NEEDED FOR WHEEZING OR SHORTNESS OF BREATH. RESCUE 8.5 g 11    alfuzosin (UROXATRAL) 10 mg Tb24       arformoteroL (BROVANA) 15 mcg/2 mL Nebu Take 2 mLs (15 mcg total) by nebulization 2 (two) times a day. Controller 120 mL 11    atorvastatin (LIPITOR) 40 MG tablet TAKE ONE TABLET BY MOUTH ONCE A DAY 90 tablet 3    BD ULTRA-FINE VINNIE PEN NEEDLE 32 gauge x 5/32" Ndle Inject insulin into skin once daily 200 each 3    blood-glucose meter kit Use as instructed 1 each 0    budesonide (PULMICORT) 0.5 mg/2 mL nebulizer solution Take 2 mLs (0.5 mg total) by nebulization 2 (two) times a day. Controller 120 mL 11    bumetanide (BUMEX) 2 MG tablet TAKE 1 TABLET BY MOUTH TWO TIMES DAILY. 60 tablet 12    busPIRone (BUSPAR) 7.5 MG tablet Take 1 tablet (7.5 mg total) by mouth daily as " "needed (anxiety). 30 tablet 3    docusate sodium (COLACE) 100 MG capsule Take 100 mg by mouth.      dutasteride (AVODART) 0.5 mg capsule Take 0.5 mg by mouth once daily.      ferrous sulfate 325 (65 FE) MG EC tablet Take 1 tablet (325 mg total) by mouth once daily. 90 tablet 3    fluticasone propionate (FLONASE) 50 mcg/actuation nasal spray 2 sprays (100 mcg total) by Each Nostril route once daily. 48 g 2    fluticasone-salmeterol diskus inhaler 250-50 mcg INHALE 1 PUFF INTO THE LUNGS 2 TIMES DAILY.      gabapentin (NEURONTIN) 100 MG capsule Take 1 capsule (100 mg total) by mouth every evening. 30 capsule 4    hydrocodone-acetaminophen 10-325mg (NORCO)  mg Tab   0    insulin (LANTUS SOLOSTAR U-100 INSULIN) glargine 100 units/mL SubQ pen INJECT 15 UNITS INTO THE SKIN EVERY EVENING. 15 mL 6    isosorbide dinitrate (ISORDIL) 30 MG Tab Take 30 mg by mouth.      isosorbide mononitrate (IMDUR) 30 MG 24 hr tablet       lancets 32 gauge Misc 1 lancet by Misc.(Non-Drug; Combo Route) route 2 (two) times daily. 200 each 12    ofloxacin (FLOXIN) 0.3 % otic solution Place 5 drops into the left ear 2 (two) times daily. 5 mL 0    pantoprazole (PROTONIX) 40 MG tablet TAKE 1 TABLET BY MOUTH ONCE DAILY. 90 tablet 3    pen needle, diabetic (BD ULTRA-FINE VINNIE PEN NEEDLE) 32 gauge x 5/32" Ndle 1 Units by Misc.(Non-Drug; Combo Route) route once daily. 100 each 6    potassium chloride (KLOR-CON) 10 MEQ TbSR TAKE 1 TABLET BY MOUTH ONCE DAILY. 90 tablet 3    sars-cov-2, covid-19 pfizer omicron, (PFIZER COVID BIVAL,12Y UP,,PF,) 30 mcg/0.3 mL injection Inject into the muscle. 0.3 mL 0    silver sulfADIAZINE 1% (SILVADENE) 1 % cream Apply topically once daily. 400 g 3    tamsulosin (FLOMAX) 0.4 mg Cp24   11    TRUEPLUS INSULIN 0.5 mL 31 gauge x 5/16" Syrg INJECT TWO TIMES A DAY AS DIRECTED 100 each 6    valsartan (DIOVAN) 160 MG tablet TAKE ONE TABLET BY MOUTH ONCE A DAY 90 tablet 1     No current facility-administered medications on " file prior to visit.        Vitals:    10/02/23 1224   BP: 136/72   Pulse: 78   Resp: 19   Temp: 98.4 °F (36.9 °C)   SpO2: 99%   Weight: 132 kg (291 lb)   Height: 6' (1.829 m)     Body mass index is 39.47 kg/m².  Physical Exam  Vitals reviewed.   Constitutional:       General: He is not in acute distress.     Appearance: Normal appearance. He is well-developed. He is obese.   HENT:      Head: Normocephalic and atraumatic.      Nose: Nose normal.      Mouth/Throat:      Mouth: Mucous membranes are dry.      Pharynx: Oropharynx is clear.   Eyes:      Pupils: Pupils are equal, round, and reactive to light.   Cardiovascular:      Rate and Rhythm: Normal rate and regular rhythm.      Pulses: Normal pulses.      Heart sounds: Normal heart sounds.   Pulmonary:      Effort: Pulmonary effort is normal.      Breath sounds: Normal breath sounds.   Abdominal:      General: Bowel sounds are normal.      Palpations: Abdomen is soft.   Musculoskeletal:         General: Normal range of motion.      Cervical back: Normal range of motion and neck supple.      Right lower leg: Edema present.      Left lower leg: Edema present.   Skin:     General: Skin is warm and dry.   Neurological:      General: No focal deficit present.      Mental Status: He is alert and oriented to person, place, and time. Mental status is at baseline.      Motor: Weakness present.      Gait: Gait abnormal.   Psychiatric:         Mood and Affect: Mood normal.         Behavior: Behavior normal.         Thought Content: Thought content normal.         Judgment: Judgment normal.               Diagnoses and health risks identified today and associated recommendations/orders:    1. Encounter for annual wellness visit (AWV) in Medicare patient  AWV completed.     2. Uncomplicated opioid dependence  Patient medication list reviewed, patient is taking prescription opioids. Patient is using opioids as prescribed. He is monitored by pain management. Patient is at low risk  of substance abuse based on this opioid use history.     3. COPD, severe  Stable.  Continue current management.  Follow up with PCP.     4. Allergic rhinitis, unspecified seasonality, unspecified trigger  Stable.  Continue current management.  Follow up with PCP.     5. Essential hypertension  Stable.  Continue current management.  Follow up with PCP.     6. Coronary artery disease, unspecified vessel or lesion type, unspecified whether angina present, unspecified whether native or transplanted heart  Stable.  Continue current management.  Follow up with PCP/cardiology.    7. Hypercholesterolemia  Stable.  Continue current management.  Follow up with PCP.     8. PAD (peripheral artery disease)  Stable.  Continue current management.  Follow up with PCP/cardiology.    9. Benign prostatic hyperplasia, unspecified whether lower urinary tract symptoms present  Stable.  Continue current management.  Follow up with PCP/urology.    10. Anemia, unspecified type  Stable.  Continue current management.  Follow up with PCP.     11. Iron deficiency anemia, unspecified iron deficiency anemia type  Stable.  Continue current management.  Follow up with PCP.     12. MGUS (monoclonal gammopathy of unknown significance)  Stable.  Continue current management.  Follow up with PCP.     13. Type 2 diabetes mellitus with hyperglycemia, with long-term current use of insulin  Chronic and stable on current management. See med list above. Reviewed diabetic diet and preferred BS levels. Instructed patient on diabetic foot care. Follow up with PCP as instructed. F/u with opthalmology at least once a year.      14. Class 2 severe obesity due to excess calories with serious comorbidity and body mass index (BMI) of 39.0 to 39.9 in adult  Stable.  Continue current management.  Follow up with PCP.     15. Gastroesophageal reflux disease without esophagitis  Stable.  Continue current management.  Follow up with PCP.     16. Chronic low back pain  without sciatica, unspecified back pain laterality  Stable.  Continue current management.  Follow up with PCP.     17. Osteoarthritis of both knees, unspecified osteoarthritis type  Stable.  Continue current management.  Follow up with PCP.     18. CHRISTOPHER and COPD overlap syndrome  Stable.  Continue current management.  Follow up with PCP.     19. Edema, unspecified type  Stable.  Continue current management.  Follow up with PCP.     20. CHRISTOPHER on CPAP  Stable.  Continue current management.  Follow up with PCP.       Provided Jose Luis with a 5-10 year written screening schedule and personal prevention plan. Recommendations were developed using the USPSTF age appropriate recommendations. Education, counseling, and referrals were provided as needed. After Visit Summary printed and given to patient which includes a list of additional screenings\tests needed.    No follow-ups on file.    Bryce Caraballo NP

## 2023-10-04 NOTE — TELEPHONE ENCOUNTER
----- Message from Viv iglesia sent at 10/4/2023  2:09 PM CDT -----  Name of Who is Calling:Wife           What is the request in detail:Wife is requesting to speak with nurse regarding medication. Patient will be out of medication soon and she has made multiple attempts to get the refill.           Can the clinic reply by [facility]:no           What Number to Call Back if not in [facility]: [phone]

## 2023-10-05 DIAGNOSIS — K21.9 GASTROESOPHAGEAL REFLUX DISEASE WITHOUT ESOPHAGITIS: ICD-10-CM

## 2023-10-05 RX ORDER — PANTOPRAZOLE SODIUM 40 MG/1
TABLET, DELAYED RELEASE ORAL
Qty: 90 TABLET | Refills: 3 | Status: SHIPPED | OUTPATIENT
Start: 2023-10-05

## 2023-10-05 NOTE — TELEPHONE ENCOUNTER
No care due was identified.  [facility] Embedded Care Due Messages. Reference number: [id number].   10/05/2023 2:55:48 PM CDT

## 2023-10-05 NOTE — TELEPHONE ENCOUNTER
Refill Decision Note   Jose Luis Martinez  is requesting a refill authorization.  Brief Assessment and Rationale for Refill:  Approve     Medication Therapy Plan:         Comments:     Note composed:5:48 PM 10/05/2023             Appointments     Last Visit   7/10/2023 Matti Mejia MD   Next Visit   10/10/2023 Matti Mejia MD

## 2023-10-10 ENCOUNTER — OFFICE VISIT (OUTPATIENT)
Dept: FAMILY MEDICINE | Facility: CLINIC | Age: 83
End: 2023-10-10
Payer: MEDICARE

## 2023-10-10 ENCOUNTER — LAB VISIT (OUTPATIENT)
Dept: LAB | Facility: HOSPITAL | Age: 83
End: 2023-10-10
Attending: INTERNAL MEDICINE
Payer: MEDICARE

## 2023-10-10 VITALS
TEMPERATURE: 97 F | BODY MASS INDEX: 39.82 KG/M2 | HEART RATE: 61 BPM | WEIGHT: 294 LBS | SYSTOLIC BLOOD PRESSURE: 138 MMHG | HEIGHT: 72 IN | OXYGEN SATURATION: 97 % | DIASTOLIC BLOOD PRESSURE: 70 MMHG

## 2023-10-10 DIAGNOSIS — N40.0 BENIGN PROSTATIC HYPERPLASIA, UNSPECIFIED WHETHER LOWER URINARY TRACT SYMPTOMS PRESENT: ICD-10-CM

## 2023-10-10 DIAGNOSIS — E11.65 TYPE 2 DIABETES MELLITUS WITH HYPERGLYCEMIA, WITH LONG-TERM CURRENT USE OF INSULIN: ICD-10-CM

## 2023-10-10 DIAGNOSIS — E78.00 HYPERCHOLESTEROLEMIA: ICD-10-CM

## 2023-10-10 DIAGNOSIS — I10 ESSENTIAL HYPERTENSION: Primary | ICD-10-CM

## 2023-10-10 DIAGNOSIS — I25.10 CORONARY ARTERY DISEASE, UNSPECIFIED VESSEL OR LESION TYPE, UNSPECIFIED WHETHER ANGINA PRESENT, UNSPECIFIED WHETHER NATIVE OR TRANSPLANTED HEART: ICD-10-CM

## 2023-10-10 DIAGNOSIS — Z79.4 TYPE 2 DIABETES MELLITUS WITH HYPERGLYCEMIA, WITH LONG-TERM CURRENT USE OF INSULIN: ICD-10-CM

## 2023-10-10 DIAGNOSIS — D64.9 ANEMIA, UNSPECIFIED TYPE: ICD-10-CM

## 2023-10-10 DIAGNOSIS — J44.9 COPD, SEVERE: ICD-10-CM

## 2023-10-10 LAB
CHOLEST SERPL-MCNC: 114 MG/DL (ref 120–199)
CHOLEST/HDLC SERPL: 2.3 {RATIO} (ref 2–5)
ESTIMATED AVG GLUCOSE: 154 MG/DL (ref 68–131)
HBA1C MFR BLD: 7 % (ref 4–5.6)
HDLC SERPL-MCNC: 50 MG/DL (ref 40–75)
HDLC SERPL: 43.9 % (ref 20–50)
LDLC SERPL CALC-MCNC: 54 MG/DL (ref 63–159)
NONHDLC SERPL-MCNC: 64 MG/DL
TRIGL SERPL-MCNC: 50 MG/DL (ref 30–150)

## 2023-10-10 PROCEDURE — 3078F PR MOST RECENT DIASTOLIC BLOOD PRESSURE < 80 MM HG: ICD-10-PCS | Mod: CPTII,S$GLB,, | Performed by: INTERNAL MEDICINE

## 2023-10-10 PROCEDURE — 1126F AMNT PAIN NOTED NONE PRSNT: CPT | Mod: CPTII,S$GLB,, | Performed by: INTERNAL MEDICINE

## 2023-10-10 PROCEDURE — 3075F PR MOST RECENT SYSTOLIC BLOOD PRESS GE 130-139MM HG: ICD-10-PCS | Mod: CPTII,S$GLB,, | Performed by: INTERNAL MEDICINE

## 2023-10-10 PROCEDURE — 99999 PR PBB SHADOW E&M-EST. PATIENT-LVL V: CPT | Mod: PBBFAC,,, | Performed by: INTERNAL MEDICINE

## 2023-10-10 PROCEDURE — 1101F PR PT FALLS ASSESS DOC 0-1 FALLS W/OUT INJ PAST YR: ICD-10-PCS | Mod: CPTII,S$GLB,, | Performed by: INTERNAL MEDICINE

## 2023-10-10 PROCEDURE — 1126F PR PAIN SEVERITY QUANTIFIED, NO PAIN PRESENT: ICD-10-PCS | Mod: CPTII,S$GLB,, | Performed by: INTERNAL MEDICINE

## 2023-10-10 PROCEDURE — 99214 OFFICE O/P EST MOD 30 MIN: CPT | Mod: S$GLB,,, | Performed by: INTERNAL MEDICINE

## 2023-10-10 PROCEDURE — 3288F PR FALLS RISK ASSESSMENT DOCUMENTED: ICD-10-PCS | Mod: CPTII,S$GLB,, | Performed by: INTERNAL MEDICINE

## 2023-10-10 PROCEDURE — 36415 COLL VENOUS BLD VENIPUNCTURE: CPT | Mod: PO | Performed by: INTERNAL MEDICINE

## 2023-10-10 PROCEDURE — 3078F DIAST BP <80 MM HG: CPT | Mod: CPTII,S$GLB,, | Performed by: INTERNAL MEDICINE

## 2023-10-10 PROCEDURE — 80061 LIPID PANEL: CPT | Performed by: INTERNAL MEDICINE

## 2023-10-10 PROCEDURE — 3288F FALL RISK ASSESSMENT DOCD: CPT | Mod: CPTII,S$GLB,, | Performed by: INTERNAL MEDICINE

## 2023-10-10 PROCEDURE — 99214 PR OFFICE/OUTPT VISIT, EST, LEVL IV, 30-39 MIN: ICD-10-PCS | Mod: S$GLB,,, | Performed by: INTERNAL MEDICINE

## 2023-10-10 PROCEDURE — 1159F PR MEDICATION LIST DOCUMENTED IN MEDICAL RECORD: ICD-10-PCS | Mod: CPTII,S$GLB,, | Performed by: INTERNAL MEDICINE

## 2023-10-10 PROCEDURE — 83036 HEMOGLOBIN GLYCOSYLATED A1C: CPT | Performed by: INTERNAL MEDICINE

## 2023-10-10 PROCEDURE — 99999 PR PBB SHADOW E&M-EST. PATIENT-LVL V: ICD-10-PCS | Mod: PBBFAC,,, | Performed by: INTERNAL MEDICINE

## 2023-10-10 PROCEDURE — 3075F SYST BP GE 130 - 139MM HG: CPT | Mod: CPTII,S$GLB,, | Performed by: INTERNAL MEDICINE

## 2023-10-10 PROCEDURE — 1159F MED LIST DOCD IN RCRD: CPT | Mod: CPTII,S$GLB,, | Performed by: INTERNAL MEDICINE

## 2023-10-10 PROCEDURE — 1101F PT FALLS ASSESS-DOCD LE1/YR: CPT | Mod: CPTII,S$GLB,, | Performed by: INTERNAL MEDICINE

## 2023-10-10 NOTE — PROGRESS NOTES
Subjective:       Patient ID: Jose Luis Martinez is a 83 y.o. male.    Chief Complaint: Follow-up (3 month), Hypertension, Hyperlipidemia, Diabetes, and COPD    Follow-up  Associated symptoms include arthralgias and myalgias. Pertinent negatives include no abdominal pain, chest pain, chills, coughing, diaphoresis, fatigue, fever, headaches, joint swelling, nausea, neck pain, numbness, rash, sore throat, vomiting or weakness.   Hypertension  Pertinent negatives include no chest pain, headaches, neck pain, palpitations or shortness of breath.   Hyperlipidemia  Associated symptoms include myalgias. Pertinent negatives include no chest pain or shortness of breath.   Diabetes  Pertinent negatives for hypoglycemia include no confusion, dizziness, headaches, nervousness/anxiousness, pallor, seizures, speech difficulty or tremors. Pertinent negatives for diabetes include no chest pain, no fatigue, no polydipsia, no polyphagia, no polyuria and no weakness.   COPD  Associated symptoms include arthralgias and myalgias. Pertinent negatives include no abdominal pain, chest pain, chills, coughing, diaphoresis, fatigue, fever, headaches, joint swelling, nausea, neck pain, numbness, rash, sore throat, vomiting or weakness.     Past Medical History:   Diagnosis Date    Hypertension     Ulcers of both lower legs 2018     Past Surgical History:   Procedure Laterality Date    CATARACT EXTRACTION, BILATERAL      LIPOMA RESECTION Bilateral     neck     PROSTATE SURGERY      REPAIR OF EYELID      TOTAL KNEE ARTHROPLASTY Left      History reviewed. No pertinent family history.  Social History     Socioeconomic History    Marital status:    Tobacco Use    Smoking status: Former     Current packs/day: 0.00     Average packs/day: 0.5 packs/day for 43.0 years (21.5 ttl pk-yrs)     Types: Cigarettes     Start date:      Quit date: 2018     Years since quittin.7    Smokeless tobacco: Former   Substance and Sexual Activity     Alcohol use: No    Drug use: Never     Social Determinants of Health     Financial Resource Strain: Low Risk  (10/2/2023)    Overall Financial Resource Strain (CARDIA)     Difficulty of Paying Living Expenses: Not hard at all   Food Insecurity: No Food Insecurity (10/2/2023)    Hunger Vital Sign     Worried About Running Out of Food in the Last Year: Never true     Ran Out of Food in the Last Year: Never true   Transportation Needs: No Transportation Needs (10/2/2023)    PRAPARE - Transportation     Lack of Transportation (Medical): No     Lack of Transportation (Non-Medical): No   Physical Activity: Insufficiently Active (10/2/2023)    Exercise Vital Sign     Days of Exercise per Week: 7 days     Minutes of Exercise per Session: 20 min   Stress: No Stress Concern Present (10/2/2023)    [employer] - Occupational Stress Questionnaire     Feeling of Stress : Not at all   Social Connections: Moderately Isolated (10/2/2023)    Social Connection and Isolation Panel [NHANES]     Frequency of Communication with Friends and Family: Three times a week     Frequency of Social Gatherings with Friends and Family: Three times a week     Attends Gnosticist Services: Never     Active Member of Clubs or Organizations: No     Attends Club or Organization Meetings: Never     Marital Status:    Housing Stability: Low Risk  (10/2/2023)    Housing Stability Vital Sign     Unable to Pay for Housing in the Last Year: No     Number of Places Lived in the Last Year: 1     Unstable Housing in the Last Year: No     Review of Systems   Constitutional:  Negative for activity change, appetite change, chills, diaphoresis, fatigue, fever and unexpected weight change.   HENT:  Negative for drooling, ear discharge, ear pain, facial swelling, hearing loss, mouth sores, nosebleeds, postnasal drip, rhinorrhea, sinus pressure, sneezing, sore throat, tinnitus, trouble swallowing and voice change.    Eyes:  Negative for  photophobia, redness and visual disturbance.   Respiratory:  Negative for apnea, cough, choking, chest tightness, shortness of breath and wheezing.    Cardiovascular:  Negative for chest pain, palpitations and leg swelling.   Gastrointestinal:  Negative for abdominal distention, abdominal pain, anal bleeding, blood in stool, constipation, diarrhea, nausea and vomiting.   Endocrine: Negative for cold intolerance, heat intolerance, polydipsia, polyphagia and polyuria.   Genitourinary:  Negative for difficulty urinating, dysuria, enuresis, flank pain, frequency, genital sores, hematuria and urgency.   Musculoskeletal:  Positive for arthralgias, gait problem and myalgias. Negative for back pain, joint swelling, neck pain and neck stiffness.   Skin:  Negative for color change, pallor, rash and wound.   Allergic/Immunologic: Negative for food allergies and immunocompromised state.   Neurological:  Negative for dizziness, tremors, seizures, syncope, facial asymmetry, speech difficulty, weakness, light-headedness, numbness and headaches.   Hematological:  Negative for adenopathy. Does not bruise/bleed easily.   Psychiatric/Behavioral:  Negative for agitation, behavioral problems, confusion, decreased concentration, dysphoric mood, hallucinations, self-injury, sleep disturbance and suicidal ideas. The patient is not nervous/anxious and is not hyperactive.        Objective:      Physical Exam  Vitals and nursing note reviewed.   Constitutional:       General: He is not in acute distress.     Appearance: Normal appearance. He is well-developed. He is not diaphoretic.   HENT:      Head: Normocephalic and atraumatic.      Mouth/Throat:      Pharynx: No oropharyngeal exudate.   Eyes:      General: No scleral icterus.     Pupils: Pupils are equal, round, and reactive to light.   Neck:      Thyroid: No thyromegaly.      Vascular: No carotid bruit or JVD.      Trachea: No tracheal deviation.   Cardiovascular:      Rate and Rhythm:  Normal rate and regular rhythm.      Heart sounds: Normal heart sounds.   Pulmonary:      Effort: Pulmonary effort is normal. No respiratory distress.      Breath sounds: Normal breath sounds. No wheezing or rales.   Chest:      Chest wall: No tenderness.   Abdominal:      General: Bowel sounds are normal. There is no distension.      Palpations: Abdomen is soft.      Tenderness: There is no abdominal tenderness. There is no guarding or rebound.   Musculoskeletal:         General: No tenderness. Normal range of motion.      Cervical back: Normal range of motion and neck supple.   Lymphadenopathy:      Cervical: No cervical adenopathy.   Skin:     General: Skin is warm and dry.      Coloration: Skin is not pale.      Findings: No erythema or rash.   Neurological:      Mental Status: He is alert and oriented to person, place, and time.   Psychiatric:         Behavior: Behavior normal.         Thought Content: Thought content normal.         Judgment: Judgment normal.         CMP  Sodium   Date Value Ref Range Status   09/22/2023 145 136 - 145 mmol/L Final     Potassium   Date Value Ref Range Status   09/22/2023 4.0 3.5 - 5.1 mmol/L Final     Chloride   Date Value Ref Range Status   09/22/2023 106 95 - 110 mmol/L Final     CO2   Date Value Ref Range Status   09/22/2023 27 23 - 29 mmol/L Final     Glucose   Date Value Ref Range Status   09/22/2023 116 (H) 70 - 110 mg/dL Final     BUN   Date Value Ref Range Status   09/22/2023 31 (H) 8 - 23 mg/dL Final     Creatinine   Date Value Ref Range Status   09/22/2023 1.4 0.5 - 1.4 mg/dL Final     Calcium   Date Value Ref Range Status   09/22/2023 8.8 8.7 - 10.5 mg/dL Final     Total Protein   Date Value Ref Range Status   09/22/2023 6.7 6.0 - 8.4 g/dL Final     Albumin   Date Value Ref Range Status   09/22/2023 3.4 (L) 3.5 - 5.2 g/dL Final     Total Bilirubin   Date Value Ref Range Status   09/22/2023 0.4 0.1 - 1.0 mg/dL Final     Comment:     For infants and newborns,  interpretation of results should be based  on gestational age, weight and in agreement with clinical  observations.    Premature Infant recommended reference ranges:  Up to 24 hours.............<8.0 mg/dL  Up to 48 hours............<12.0 mg/dL  3-5 days..................<15.0 mg/dL  6-29 days.................<15.0 mg/dL       Alkaline Phosphatase   Date Value Ref Range Status   09/22/2023 90 55 - 135 U/L Final     AST   Date Value Ref Range Status   09/22/2023 21 10 - 40 U/L Final     ALT   Date Value Ref Range Status   09/22/2023 20 10 - 44 U/L Final     Anion Gap   Date Value Ref Range Status   09/22/2023 12 8 - 16 mmol/L Final     eGFR if    Date Value Ref Range Status   05/03/2022 54.1 (A) >60 mL/min/1.73 m^2 Final     eGFR if non    Date Value Ref Range Status   05/03/2022 46.8 (A) >60 mL/min/1.73 m^2 Final     Comment:     Calculation used to obtain the estimated glomerular filtration  rate (eGFR) is the CKD-EPI equation.        Lab Results   Component Value Date    WBC 8.73 09/22/2023    HGB 11.6 (L) 09/22/2023    HCT 36.7 (L) 09/22/2023    MCV 94 09/22/2023     09/22/2023     Lab Results   Component Value Date    CHOL 110 (L) 08/25/2021     Lab Results   Component Value Date    HDL 47 08/25/2021     Lab Results   Component Value Date    LDLCALC 53.0 (L) 08/25/2021     Lab Results   Component Value Date    TRIG 50 08/25/2021     Lab Results   Component Value Date    CHOLHDL 42.7 08/25/2021     Lab Results   Component Value Date    TSH 3.799 08/25/2021     Lab Results   Component Value Date    HGBA1C 6.8 (H) 07/10/2023     Assessment:       1. Essential hypertension    2. Hypercholesterolemia    3. Type 2 diabetes mellitus with hyperglycemia, with long-term current use of insulin    4. Benign prostatic hyperplasia, unspecified whether lower urinary tract symptoms present    5. Coronary artery disease, unspecified vessel or lesion type, unspecified whether angina present,  unspecified whether native or transplanted heart    6. Anemia, unspecified type    7. COPD, severe        Plan:   Essential hypertension    Hypercholesterolemia  -     Lipid Panel; Future; Expected date: 10/10/2023    Type 2 diabetes mellitus with hyperglycemia, with long-term current use of insulin  -     Hemoglobin A1C; Future; Expected date: 10/10/2023    Benign prostatic hyperplasia, unspecified whether lower urinary tract symptoms present    Coronary artery disease, unspecified vessel or lesion type, unspecified whether angina present, unspecified whether native or transplanted heart  -     WHEELCHAIR FOR HOME USE    Anemia, unspecified type  -     WHEELCHAIR FOR HOME USE    COPD, severe  -     WHEELCHAIR FOR HOME USE      Stable--------continue current meds--------------as above---------------f/u 4 months-----------

## 2023-10-27 PROCEDURE — G0179 PR HOME HEALTH MD RECERTIFICATION: ICD-10-PCS | Mod: ,,, | Performed by: INTERNAL MEDICINE

## 2023-10-27 PROCEDURE — G0179 MD RECERTIFICATION HHA PT: HCPCS | Mod: ,,, | Performed by: INTERNAL MEDICINE

## 2023-11-03 RX ORDER — FLUTICASONE PROPIONATE AND SALMETEROL 250; 50 UG/1; UG/1
POWDER RESPIRATORY (INHALATION)
Qty: 60 EACH | Refills: 11 | Status: SHIPPED | OUTPATIENT
Start: 2023-11-03

## 2023-11-19 ENCOUNTER — DOCUMENT SCAN (OUTPATIENT)
Dept: HOME HEALTH SERVICES | Facility: HOSPITAL | Age: 83
End: 2023-11-19
Payer: MEDICARE

## 2023-11-30 ENCOUNTER — EXTERNAL HOME HEALTH (OUTPATIENT)
Dept: HOME HEALTH SERVICES | Facility: HOSPITAL | Age: 83
End: 2023-11-30
Payer: MEDICARE

## 2023-12-12 ENCOUNTER — TELEPHONE (OUTPATIENT)
Dept: FAMILY MEDICINE | Facility: CLINIC | Age: 83
End: 2023-12-12
Payer: MEDICARE

## 2023-12-12 NOTE — TELEPHONE ENCOUNTER
----- Message from Remedios Tejada sent at 12/12/2023  4:04 PM CST -----  Contact: Ceola/wife  Pt wife is calling in regards to getting an order for a large hospital bed.please call back at .[phone]          Thanks  ANN MARIE

## 2023-12-13 ENCOUNTER — TELEPHONE (OUTPATIENT)
Dept: FAMILY MEDICINE | Facility: CLINIC | Age: 83
End: 2023-12-13
Payer: MEDICARE

## 2023-12-13 NOTE — TELEPHONE ENCOUNTER
----- Message from Remedios Tejada sent at 12/13/2023  4:13 PM CST -----  Contact: [place]/wife  Pt wife is calling in regards to the order for an extra large bed for her .please call back at .[phone]              Thanks  ANN MARIE

## 2023-12-26 PROCEDURE — G0179 MD RECERTIFICATION HHA PT: HCPCS | Mod: ,,, | Performed by: INTERNAL MEDICINE

## 2023-12-27 ENCOUNTER — TELEPHONE (OUTPATIENT)
Dept: FAMILY MEDICINE | Facility: CLINIC | Age: 83
End: 2023-12-27
Payer: MEDICARE

## 2023-12-27 NOTE — TELEPHONE ENCOUNTER
Pt wife calling for information regarding supplies for  stating that he can not get in and out of bed or walk around the house as much as he used to due to infection to BLE. Pt stated that Home Health would be coming to asses pt. Will inform pt that Home Health would possibly be supplying supplies for pt.

## 2023-12-27 NOTE — TELEPHONE ENCOUNTER
----- Message from Court Francis sent at 12/27/2023  8:53 AM CST -----  Patient is requesting the nurse give him a call back concerning some equipment. Call back at [phone]

## 2023-12-29 NOTE — TELEPHONE ENCOUNTER
No care due was identified.  [facility] Embedded Care Due Messages. Reference number: [id number].   12/29/2023 3:11:39 PM CST

## 2023-12-30 RX ORDER — INSULIN GLARGINE 100 [IU]/ML
INJECTION, SOLUTION SUBCUTANEOUS
Qty: 15 ML | Refills: 1 | Status: SHIPPED | OUTPATIENT
Start: 2023-12-30

## 2023-12-30 NOTE — TELEPHONE ENCOUNTER
Refill Decision Note   Jose Luis Martinez  is requesting a refill authorization.  Brief Assessment and Rationale for Refill:  Approve     Medication Therapy Plan:         Comments:     Note composed:4:49 PM 12/30/2023

## 2024-01-02 RX ORDER — INSULIN GLARGINE 100 [IU]/ML
INJECTION, SOLUTION SUBCUTANEOUS
Qty: 15 ML | Refills: 1 | OUTPATIENT
Start: 2024-01-02

## 2024-01-02 NOTE — TELEPHONE ENCOUNTER
Refill Decision Note  Quick DC. Duplicate Request-  med pended in previous encounter, awaiting assessment       Jose Luis Martinez  is requesting a refill authorization.  Brief Assessment and Rationale for Refill:  Quick Discontinue     Medication Therapy Plan:  Receipt confirmed by pharmacy (12/30/2023  4:50 PM CST) [facility]      Comments:     Note composed:9:57 AM 01/02/2024

## 2024-01-02 NOTE — TELEPHONE ENCOUNTER
No care due was identified.  [facility] Embedded Care Due Messages. Reference number: [id number].   1/02/2024 9:46:38 AM CST

## 2024-01-09 ENCOUNTER — TELEPHONE (OUTPATIENT)
Dept: FAMILY MEDICINE | Facility: CLINIC | Age: 84
End: 2024-01-09

## 2024-01-09 RX ORDER — POTASSIUM CHLORIDE 750 MG/1
TABLET, EXTENDED RELEASE ORAL
Qty: 90 TABLET | Refills: 3 | Status: SHIPPED | OUTPATIENT
Start: 2024-01-09

## 2024-01-09 RX ORDER — PEN NEEDLE, DIABETIC 31 GX5/16"
NEEDLE, DISPOSABLE MISCELLANEOUS
Qty: 200 EACH | Refills: 3 | Status: SHIPPED | OUTPATIENT
Start: 2024-01-09

## 2024-01-09 RX ORDER — ATORVASTATIN CALCIUM 40 MG/1
TABLET, FILM COATED ORAL
Qty: 90 TABLET | Refills: 3 | Status: SHIPPED | OUTPATIENT
Start: 2024-01-09

## 2024-01-09 NOTE — TELEPHONE ENCOUNTER
"----- Message from Ronel Samayoa sent at 1/9/2024  1:40 PM CST -----  Contact: Ceola/ wife  .Patients wife  is calling to speak with the nurse regarding questions and concerns . Reports needing pen needle, diabetic (BD ULTRA-FINE VINNIE PEN NEEDLE) 32 gauge x 5/32" Ndle  . Please give patient a call back at .[phone].  Please send to the   [facility] - [facility]ASON Arredondo [address]  [address] [place] [address] Ave.  [address]  [place] [address]  Phone: [phone] Fax: [phone]        "

## 2024-01-09 NOTE — TELEPHONE ENCOUNTER
Care Due:                  Date            Visit Type   Department     Provider  --------------------------------------------------------------------------------                                EP -                              PRIMARY      [facility]  Last Visit: 10-      CARE ([facility])   DEE DEE Mejia                              EP -                              PRIMARY      [facility]  Next Visit: 02-      CARE ([facility])   DEE DEE Mejia                                                            Last  Test          Frequency    Reason                     Performed    Due Date  --------------------------------------------------------------------------------    HBA1C.......  6 months...  insulin..................  10-   04-    Health [facility] Embedded Care Due Messages. Reference number: [id number].   1/09/2024 10:38:22 AM CST

## 2024-01-24 ENCOUNTER — EXTERNAL HOME HEALTH (OUTPATIENT)
Dept: HOME HEALTH SERVICES | Facility: HOSPITAL | Age: 84
End: 2024-01-24

## 2024-01-29 RX ORDER — BUMETANIDE 2 MG/1
TABLET ORAL
Qty: 180 TABLET | Refills: 2 | Status: SHIPPED | OUTPATIENT
Start: 2024-01-29

## 2024-01-29 NOTE — TELEPHONE ENCOUNTER
Refill Decision Note   Jose Luis Hodgsonoine  is requesting a refill authorization.  Brief Assessment and Rationale for Refill:  Approve     Medication Therapy Plan:         Comments:     Note composed:5:00 PM 01/29/2024

## 2024-01-29 NOTE — TELEPHONE ENCOUNTER
No care due was identified.  Health [facility] Embedded Care Due Messages. Reference number: [id number].   1/29/2024 9:30:30 AM CST

## 2024-01-30 ENCOUNTER — EXTERNAL HOME HEALTH (OUTPATIENT)
Dept: HOME HEALTH SERVICES | Facility: HOSPITAL | Age: 84
End: 2024-01-30
Payer: MEDICARE

## 2024-02-05 RX ORDER — FLUTICASONE PROPIONATE 50 MCG
2 SPRAY, SUSPENSION (ML) NASAL
Qty: 48 G | Refills: 2 | Status: SHIPPED | OUTPATIENT
Start: 2024-02-05 | End: 2024-06-10

## 2024-02-05 NOTE — TELEPHONE ENCOUNTER
No care due was identified.  [facility] Embedded Care Due Messages. Reference number: [id number].   2/05/2024 4:03:57 PM CST

## 2024-02-06 NOTE — TELEPHONE ENCOUNTER
Refill Decision Note   Jose Luis Martinez  is requesting a refill authorization.  Brief Assessment and Rationale for Refill:  Approve     Medication Therapy Plan:         Comments:     Note composed:11:54 PM 02/05/2024

## 2024-02-19 ENCOUNTER — TELEPHONE (OUTPATIENT)
Dept: FAMILY MEDICINE | Facility: CLINIC | Age: 84
End: 2024-02-19
Payer: MEDICARE

## 2024-02-19 DIAGNOSIS — L97.921 ULCERS OF BOTH LOWER LEGS, LIMITED TO BREAKDOWN OF SKIN: ICD-10-CM

## 2024-02-19 DIAGNOSIS — L97.911 ULCERS OF BOTH LOWER LEGS, LIMITED TO BREAKDOWN OF SKIN: ICD-10-CM

## 2024-02-19 DIAGNOSIS — R60.0 BILATERAL LEG EDEMA: Primary | ICD-10-CM

## 2024-02-19 NOTE — TELEPHONE ENCOUNTER
----- Message from Marion Molina sent at 2/19/2024  7:06 AM CST -----  Regarding: medical Advice  Contact: Mrs. Martinez  Type:  Needs Medical Advice    Who Called: mrs. Martinez  Symptoms (please be specific):    How long has patient had these symptoms:    Pharmacy name and phone #:    Would the patient rather a call back or a response via [facility]? 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

## 2024-02-19 NOTE — TELEPHONE ENCOUNTER
Wife called in and states patient is developing sores from leg edema again. States she is trying to stay on top of it before it gets bad. Wants to get home health ordered again . States they were with [facility] before.

## 2024-02-19 NOTE — TELEPHONE ENCOUNTER
Called pt. Caregiver and informed them that referral was faxed over to [facility].  Pt. Caregiver verbalized understanding.

## 2024-02-22 ENCOUNTER — DOCUMENT SCAN (OUTPATIENT)
Dept: HOME HEALTH SERVICES | Facility: HOSPITAL | Age: 84
End: 2024-02-22
Payer: MEDICARE

## 2024-02-26 ENCOUNTER — TELEPHONE (OUTPATIENT)
Dept: FAMILY MEDICINE | Facility: CLINIC | Age: 84
End: 2024-02-26
Payer: MEDICARE

## 2024-02-26 NOTE — TELEPHONE ENCOUNTER
----- Message from Libia Greenberg sent at 2/26/2024 10:26 AM CST -----  Contact: lisa Hawk is calling to see which company patient's hospital bed was being delivered from, so that she can give them a call. Please call her back at [phone]         Thanks  DD

## 2024-02-26 NOTE — TELEPHONE ENCOUNTER
Called pt. And explained [facility] sent orders for wheel chair and bed. Orders were faxed to us and  signed. Orders faxed back to [facility] on 2/20/2024 and refaxed today. Number given to lisa dunham to follow up.

## 2024-02-28 ENCOUNTER — TELEPHONE (OUTPATIENT)
Dept: FAMILY MEDICINE | Facility: CLINIC | Age: 84
End: 2024-02-28

## 2024-02-28 DIAGNOSIS — R26.81 UNSTEADY GAIT: ICD-10-CM

## 2024-02-28 DIAGNOSIS — I73.9 PVD (PERIPHERAL VASCULAR DISEASE): ICD-10-CM

## 2024-02-28 DIAGNOSIS — J44.9 CHRONIC OBSTRUCTIVE PULMONARY DISEASE, UNSPECIFIED COPD TYPE: Primary | ICD-10-CM

## 2024-02-28 NOTE — TELEPHONE ENCOUNTER
----- Message from Libia Greenberg sent at 2/28/2024 11:24 AM CST -----  Contact: lisa Hawk Stated that an wheelchair was ordered for patient but it is a standard one so it is too small. Please call her back to discuss ordering an new one at [phone].      Thanks  DD

## 2024-03-02 PROCEDURE — G0179 MD RECERTIFICATION HHA PT: HCPCS | Mod: ,,, | Performed by: INTERNAL MEDICINE

## 2024-03-11 ENCOUNTER — DOCUMENT SCAN (OUTPATIENT)
Dept: HOME HEALTH SERVICES | Facility: HOSPITAL | Age: 84
End: 2024-03-11
Payer: MEDICARE

## 2024-04-01 ENCOUNTER — TELEPHONE (OUTPATIENT)
Dept: FAMILY MEDICINE | Facility: CLINIC | Age: 84
End: 2024-04-01
Payer: MEDICARE

## 2024-04-01 NOTE — TELEPHONE ENCOUNTER
----- Message from Nona Samano MA sent at 4/1/2024  9:47 AM CDT -----  Contact: roly@ [phone]  Carine Martinez (pt wife) called                In regards to needing provider to send documentation to fax number  below stating that pt cannot attend Jury Duty being that pt  is Home bound and can't move without assistance. Also, Ms. Hawk would like a call back once documentation is sent and to send it by mail as well for a copy.            Fax number:[phone]

## 2024-04-02 ENCOUNTER — EXTERNAL HOME HEALTH (OUTPATIENT)
Dept: HOME HEALTH SERVICES | Facility: HOSPITAL | Age: 84
End: 2024-04-02
Payer: MEDICARE

## 2024-04-03 ENCOUNTER — PATIENT MESSAGE (OUTPATIENT)
Dept: PULMONOLOGY | Facility: CLINIC | Age: 84
End: 2024-04-03
Payer: MEDICARE

## 2024-04-04 ENCOUNTER — DOCUMENT SCAN (OUTPATIENT)
Dept: HOME HEALTH SERVICES | Facility: HOSPITAL | Age: 84
End: 2024-04-04
Payer: MEDICARE

## 2024-04-04 ENCOUNTER — TELEPHONE (OUTPATIENT)
Dept: FAMILY MEDICINE | Facility: CLINIC | Age: 84
End: 2024-04-04
Payer: MEDICARE

## 2024-04-04 NOTE — TELEPHONE ENCOUNTER
Contact pt and pt stated she spoke with the court and the lady she spoke with stated that she would take of the jury duty paperwork for pt. Pt wife would call back she had any other problem concerning jury duty for spouse.

## 2024-04-04 NOTE — TELEPHONE ENCOUNTER
----- Message from Matti Mejia MD sent at 4/1/2024 10:59 AM CDT -----  Regarding: FW: Please advise  Contact: juan@ [phone]  If over 70-------you can opt out of jury duty-----  ----- Message -----  From: Noe Sullivan MA  Sent: 4/1/2024  10:37 AM CDT  To: Matti Mejia MD  Subject: Please advise                                      ----- Message -----  From: Nona Samano MA  Sent: 4/1/2024   9:54 AM CDT  To: Jackie HERNANDEZ Staff    Carine Hodgsonoine (pt wife) called                In regards to needing provider to send documentation to fax number  below stating that pt cannot attend Jury Duty being that pt  is Home bound and can't move without assistance. Also, Ms. Hawk would like a call back once documentation is sent and to send it by mail as well for a copy.            Fax number:[phone]

## 2024-04-30 ENCOUNTER — DOCUMENT SCAN (OUTPATIENT)
Dept: HOME HEALTH SERVICES | Facility: HOSPITAL | Age: 84
End: 2024-04-30
Payer: MEDICARE

## 2024-05-08 ENCOUNTER — TELEPHONE (OUTPATIENT)
Dept: FAMILY MEDICINE | Facility: CLINIC | Age: 84
End: 2024-05-08
Payer: MEDICARE

## 2024-05-08 DIAGNOSIS — L97.909 ULCER OF LOWER EXTREMITY, UNSPECIFIED LATERALITY, UNSPECIFIED ULCER STAGE: Primary | ICD-10-CM

## 2024-05-08 NOTE — TELEPHONE ENCOUNTER
----- Message from Joe Moreno sent at 5/8/2024  1:00 PM CDT -----  Contact: Carine/spouse  Carine Is calling regarding Jose Luis needing another referral for [facility] due to him having another break out on his leg. Please give her a call back at [phone]

## 2024-05-28 ENCOUNTER — TELEPHONE (OUTPATIENT)
Dept: FAMILY MEDICINE | Facility: CLINIC | Age: 84
End: 2024-05-28
Payer: MEDICARE

## 2024-05-28 DIAGNOSIS — L98.499 SKIN ULCER, UNSPECIFIED ULCER STAGE: Primary | ICD-10-CM

## 2024-05-28 NOTE — TELEPHONE ENCOUNTER
----- Message from Adeola Nicholas sent at 5/28/2024  3:19 PM CDT -----  Contact: [phone]  Type: Orders Request    What orders/ testing are being requested? Home Health order    Is there a future appointment scheduled for the patient with PCP? N/A     When? N/A     Would you prefer a response via [employer]? Call Back     Comments: pt is needing order to go to [facility]        Thanks KB

## 2024-05-28 NOTE — TELEPHONE ENCOUNTER
----- Message from Matti Mejia MD sent at 5/28/2024 11:07 AM CDT -----  Home health ordered-  ----- Message -----  From: Zulma Tavares MA  Sent: 5/28/2024  10:37 AM CDT  To: Matti Mejia MD      ----- Message -----  From: Belinda Corey  Sent: 5/28/2024  10:22 AM CDT  To: Jackie HERNANDEZ Staff    Patient's wife called regarding sending an order to [facility] so they can restart wound care again. Call back number is [phone]. Thx.EL

## 2024-05-29 ENCOUNTER — TELEPHONE (OUTPATIENT)
Dept: FAMILY MEDICINE | Facility: CLINIC | Age: 84
End: 2024-05-29
Payer: MEDICARE

## 2024-05-29 NOTE — TELEPHONE ENCOUNTER
[facility] called stating that they need a new referral for a Mr. Jose Luis Martinez.     Reason- Leg issues (Both legs are weeping).     Patient was last seen on 10/10/2023.    Will he need to come in? Or can you just send a referral over?    Please advise.     [phone] Fax

## 2024-05-29 NOTE — TELEPHONE ENCOUNTER
----- Message from Maggie Kaplan MA sent at 5/29/2024  3:24 PM CDT -----  Contact: Carine/ Wife    ----- Message -----  From: Melva Salazar  Sent: 5/29/2024   3:20 PM CDT  To: Bonnie Almodovar Staff    Wife calling because [facility] state they have not received the order yet, and need it to be refaxed.  Please callback  Mrs. Hawk [phone]

## 2024-05-31 DIAGNOSIS — J44.9 COPD, SEVERE: Primary | ICD-10-CM

## 2024-05-31 RX ORDER — BUDESONIDE 0.5 MG/2ML
0.5 INHALANT ORAL 2 TIMES DAILY
Qty: 60 ML | Refills: 11 | Status: SHIPPED | OUTPATIENT
Start: 2024-05-31

## 2024-05-31 RX ORDER — ALBUTEROL SULFATE 0.83 MG/ML
SOLUTION RESPIRATORY (INHALATION)
Qty: 360 ML | Refills: 11 | Status: SHIPPED | OUTPATIENT
Start: 2024-05-31

## 2024-05-31 RX ORDER — ARFORMOTEROL TARTRATE 15 UG/2ML
SOLUTION RESPIRATORY (INHALATION)
Qty: 60 EACH | Refills: 11 | Status: SHIPPED | OUTPATIENT
Start: 2024-05-31

## 2024-06-03 ENCOUNTER — TELEPHONE (OUTPATIENT)
Dept: FAMILY MEDICINE | Facility: CLINIC | Age: 84
End: 2024-06-03
Payer: MEDICARE

## 2024-06-03 NOTE — TELEPHONE ENCOUNTER
----- Message from Dagoberto Salazar sent at 6/3/2024  4:12 PM CDT -----  Contact:  (wife)   called in regards to a referral that was to go to Home Health on 5/28/24 to have care to have his wounds clean  medication on the wounds. Would like a call back [phone]

## 2024-06-04 ENCOUNTER — TELEPHONE (OUTPATIENT)
Dept: FAMILY MEDICINE | Facility: CLINIC | Age: 84
End: 2024-06-04
Payer: MEDICARE

## 2024-06-04 NOTE — TELEPHONE ENCOUNTER
----- Message from Minerva Powell sent at 6/4/2024  2:05 PM CDT -----  Regarding: Danielle/[facility]  States she needs the nurse to give her a call regarding his referral. Please call Danielle [phone]. Thank you

## 2024-06-04 NOTE — TELEPHONE ENCOUNTER
Home health stated pt needs new referral and an appointment before he can restart home health , pt would like to come Friday - is it ok to override apt- next available apt not until July 31st

## 2024-06-10 ENCOUNTER — OFFICE VISIT (OUTPATIENT)
Dept: FAMILY MEDICINE | Facility: CLINIC | Age: 84
End: 2024-06-10
Payer: MEDICARE

## 2024-06-10 ENCOUNTER — LAB VISIT (OUTPATIENT)
Dept: LAB | Facility: HOSPITAL | Age: 84
End: 2024-06-10
Attending: INTERNAL MEDICINE
Payer: MEDICARE

## 2024-06-10 ENCOUNTER — DOCUMENT SCAN (OUTPATIENT)
Dept: HOME HEALTH SERVICES | Facility: HOSPITAL | Age: 84
End: 2024-06-10
Payer: MEDICARE

## 2024-06-10 VITALS
RESPIRATION RATE: 18 BRPM | HEIGHT: 72 IN | SYSTOLIC BLOOD PRESSURE: 136 MMHG | BODY MASS INDEX: 39.87 KG/M2 | TEMPERATURE: 99 F | OXYGEN SATURATION: 97 % | DIASTOLIC BLOOD PRESSURE: 60 MMHG | HEART RATE: 64 BPM

## 2024-06-10 DIAGNOSIS — D64.9 ANEMIA, UNSPECIFIED TYPE: Primary | ICD-10-CM

## 2024-06-10 DIAGNOSIS — I10 ESSENTIAL HYPERTENSION: ICD-10-CM

## 2024-06-10 DIAGNOSIS — E11.65 TYPE 2 DIABETES MELLITUS WITH HYPERGLYCEMIA, WITH LONG-TERM CURRENT USE OF INSULIN: ICD-10-CM

## 2024-06-10 DIAGNOSIS — E78.00 HYPERCHOLESTEROLEMIA: ICD-10-CM

## 2024-06-10 DIAGNOSIS — Z79.4 TYPE 2 DIABETES MELLITUS WITH HYPERGLYCEMIA, WITH LONG-TERM CURRENT USE OF INSULIN: ICD-10-CM

## 2024-06-10 DIAGNOSIS — Z74.09 IMPAIRED MOBILITY: ICD-10-CM

## 2024-06-10 DIAGNOSIS — I73.9 PAD (PERIPHERAL ARTERY DISEASE): ICD-10-CM

## 2024-06-10 DIAGNOSIS — L97.929 ULCERS OF BOTH LOWER LEGS: ICD-10-CM

## 2024-06-10 DIAGNOSIS — I25.10 CORONARY ARTERY DISEASE, UNSPECIFIED VESSEL OR LESION TYPE, UNSPECIFIED WHETHER ANGINA PRESENT, UNSPECIFIED WHETHER NATIVE OR TRANSPLANTED HEART: ICD-10-CM

## 2024-06-10 DIAGNOSIS — J44.9 COPD, SEVERE: ICD-10-CM

## 2024-06-10 DIAGNOSIS — R60.9 EDEMA, UNSPECIFIED TYPE: ICD-10-CM

## 2024-06-10 DIAGNOSIS — L97.919 ULCERS OF BOTH LOWER LEGS: ICD-10-CM

## 2024-06-10 LAB
ALBUMIN SERPL BCP-MCNC: 2.9 G/DL (ref 3.5–5.2)
ALP SERPL-CCNC: 95 U/L (ref 55–135)
ALT SERPL W/O P-5'-P-CCNC: 14 U/L (ref 10–44)
ANION GAP SERPL CALC-SCNC: 12 MMOL/L (ref 8–16)
AST SERPL-CCNC: 18 U/L (ref 10–40)
BASOPHILS # BLD AUTO: 0.03 K/UL (ref 0–0.2)
BASOPHILS NFR BLD: 0.2 % (ref 0–1.9)
BILIRUB SERPL-MCNC: 0.5 MG/DL (ref 0.1–1)
BUN SERPL-MCNC: 43 MG/DL (ref 8–23)
CALCIUM SERPL-MCNC: 9.1 MG/DL (ref 8.7–10.5)
CHLORIDE SERPL-SCNC: 107 MMOL/L (ref 95–110)
CO2 SERPL-SCNC: 23 MMOL/L (ref 23–29)
CREAT SERPL-MCNC: 1.4 MG/DL (ref 0.5–1.4)
DIFFERENTIAL METHOD BLD: ABNORMAL
EOSINOPHIL # BLD AUTO: 0 K/UL (ref 0–0.5)
EOSINOPHIL NFR BLD: 0.2 % (ref 0–8)
ERYTHROCYTE [DISTWIDTH] IN BLOOD BY AUTOMATED COUNT: 14.9 % (ref 11.5–14.5)
EST. GFR  (NO RACE VARIABLE): 49.9 ML/MIN/1.73 M^2
ESTIMATED AVG GLUCOSE: 171 MG/DL (ref 68–131)
GLUCOSE SERPL-MCNC: 132 MG/DL (ref 70–110)
HBA1C MFR BLD: 7.6 % (ref 4–5.6)
HCT VFR BLD AUTO: 34.8 % (ref 40–54)
HGB BLD-MCNC: 11.1 G/DL (ref 14–18)
IMM GRANULOCYTES # BLD AUTO: 0.05 K/UL (ref 0–0.04)
IMM GRANULOCYTES NFR BLD AUTO: 0.4 % (ref 0–0.5)
LYMPHOCYTES # BLD AUTO: 1.7 K/UL (ref 1–4.8)
LYMPHOCYTES NFR BLD: 12.4 % (ref 18–48)
MCH RBC QN AUTO: 30.3 PG (ref 27–31)
MCHC RBC AUTO-ENTMCNC: 31.9 G/DL (ref 32–36)
MCV RBC AUTO: 95 FL (ref 82–98)
MONOCYTES # BLD AUTO: 1 K/UL (ref 0.3–1)
MONOCYTES NFR BLD: 7.4 % (ref 4–15)
NEUTROPHILS # BLD AUTO: 11 K/UL (ref 1.8–7.7)
NEUTROPHILS NFR BLD: 79.4 % (ref 38–73)
NRBC BLD-RTO: 0 /100 WBC
PLATELET # BLD AUTO: 249 K/UL (ref 150–450)
PMV BLD AUTO: 10.5 FL (ref 9.2–12.9)
POTASSIUM SERPL-SCNC: 3.8 MMOL/L (ref 3.5–5.1)
PROT SERPL-MCNC: 7 G/DL (ref 6–8.4)
RBC # BLD AUTO: 3.66 M/UL (ref 4.6–6.2)
SODIUM SERPL-SCNC: 142 MMOL/L (ref 136–145)
WBC # BLD AUTO: 13.83 K/UL (ref 3.9–12.7)

## 2024-06-10 PROCEDURE — 36415 COLL VENOUS BLD VENIPUNCTURE: CPT | Mod: PO | Performed by: INTERNAL MEDICINE

## 2024-06-10 PROCEDURE — 80053 COMPREHEN METABOLIC PANEL: CPT | Performed by: INTERNAL MEDICINE

## 2024-06-10 PROCEDURE — 83036 HEMOGLOBIN GLYCOSYLATED A1C: CPT | Performed by: INTERNAL MEDICINE

## 2024-06-10 PROCEDURE — 85025 COMPLETE CBC W/AUTO DIFF WBC: CPT | Performed by: INTERNAL MEDICINE

## 2024-06-10 PROCEDURE — 99999 PR PBB SHADOW E&M-EST. PATIENT-LVL V: CPT | Mod: PBBFAC,,, | Performed by: INTERNAL MEDICINE

## 2024-06-10 RX ORDER — ACETAMINOPHEN 500 MG
1 TABLET ORAL DAILY PRN
Qty: 1 EACH | Refills: 0 | Status: SHIPPED | OUTPATIENT
Start: 2024-06-10

## 2024-06-10 RX ORDER — FLUTICASONE PROPIONATE 50 MCG
1 SPRAY, SUSPENSION (ML) NASAL DAILY
Qty: 16 G | Refills: 0 | Status: SHIPPED | OUTPATIENT
Start: 2024-06-10

## 2024-06-10 NOTE — PROGRESS NOTES
Body mass index is 32 72 kg/m²     · Affects all aspects of care, encourage weight loss Subjective:       Patient ID: Jose Luis Martinez is a 83 y.o. male.    Chief Complaint: Referral, Hypertension, Hyperlipidemia, and Diabetes    Hypertension  Pertinent negatives include no chest pain, headaches, neck pain, palpitations or shortness of breath.   Hyperlipidemia  Associated symptoms include myalgias. Pertinent negatives include no chest pain or shortness of breath.   Diabetes  Pertinent negatives for hypoglycemia include no confusion, dizziness, headaches, nervousness/anxiousness, pallor, seizures, speech difficulty or tremors. Associated symptoms include fatigue and weakness. Pertinent negatives for diabetes include no chest pain, no polydipsia, no polyphagia and no polyuria.     Past Medical History:   Diagnosis Date    Hypertension     Ulcers of both lower legs 2018     Past Surgical History:   Procedure Laterality Date    CATARACT EXTRACTION, BILATERAL      LIPOMA RESECTION Bilateral     neck     PROSTATE SURGERY      REPAIR OF EYELID      TOTAL KNEE ARTHROPLASTY Left      No family history on file.  Social History     Socioeconomic History    Marital status:    Tobacco Use    Smoking status: Former     Current packs/day: 0.00     Average packs/day: 0.5 packs/day for 43.0 years (21.5 ttl pk-yrs)     Types: Cigarettes     Start date:      Quit date: 2018     Years since quittin.4    Smokeless tobacco: Former   Substance and Sexual Activity    Alcohol use: No    Drug use: Never     Social Determinants of Health     Financial Resource Strain: Low Risk  (10/2/2023)    Overall Financial Resource Strain (CARDIA)     Difficulty of Paying Living Expenses: Not hard at all   Food Insecurity: No Food Insecurity (10/2/2023)    Hunger Vital Sign     Worried About Running Out of Food in the Last Year: Never true     Ran Out of Food in the Last Year: Never true   Transportation Needs: No Transportation Needs (10/2/2023)    PRAPARE - Transportation     Lack of Transportation (Medical): No     Lack  of Transportation (Non-Medical): No   Physical Activity: Insufficiently Active (10/2/2023)    Exercise Vital Sign     Days of Exercise per Week: 7 days     Minutes of Exercise per Session: 20 min   Stress: No Stress Concern Present (10/2/2023)    [employer] - Occupational Stress Questionnaire     Feeling of Stress : Not at all   Housing Stability: Low Risk  (10/2/2023)    Housing Stability Vital Sign     Unable to Pay for Housing in the Last Year: No     Number of Places Lived in the Last Year: 1     Unstable Housing in the Last Year: No     Review of Systems   Constitutional:  Positive for fatigue. Negative for activity change, appetite change, chills, diaphoresis, fever and unexpected weight change.   HENT:  Negative for drooling, ear discharge, ear pain, facial swelling, hearing loss, mouth sores, nosebleeds, postnasal drip, rhinorrhea, sinus pressure, sneezing, sore throat, tinnitus, trouble swallowing and voice change.    Eyes:  Negative for photophobia, redness and visual disturbance.   Respiratory:  Negative for apnea, cough, choking, chest tightness, shortness of breath and wheezing.    Cardiovascular:  Positive for leg swelling. Negative for chest pain and palpitations.   Gastrointestinal:  Negative for abdominal distention, abdominal pain, anal bleeding, blood in stool, constipation, diarrhea, nausea and vomiting.   Endocrine: Negative for cold intolerance, heat intolerance, polydipsia, polyphagia and polyuria.   Genitourinary:  Negative for difficulty urinating, dysuria, enuresis, flank pain, frequency, genital sores, hematuria and urgency.   Musculoskeletal:  Positive for arthralgias, gait problem and myalgias. Negative for back pain, joint swelling, neck pain and neck stiffness.   Skin:  Positive for wound. Negative for color change, pallor and rash.   Allergic/Immunologic: Negative for food allergies and immunocompromised state.   Neurological:  Positive for weakness.  Negative for dizziness, tremors, seizures, syncope, facial asymmetry, speech difficulty, light-headedness, numbness and headaches.   Hematological:  Negative for adenopathy. Does not bruise/bleed easily.   Psychiatric/Behavioral:  Negative for agitation, behavioral problems, confusion, decreased concentration, dysphoric mood, hallucinations, self-injury, sleep disturbance and suicidal ideas. The patient is not nervous/anxious and is not hyperactive.        Objective:      Physical Exam  Vitals and nursing note reviewed.   Constitutional:       General: He is not in acute distress.     Appearance: Normal appearance. He is well-developed. He is not diaphoretic.   HENT:      Head: Normocephalic and atraumatic.      Mouth/Throat:      Pharynx: No oropharyngeal exudate.   Eyes:      General: No scleral icterus.     Pupils: Pupils are equal, round, and reactive to light.   Neck:      Thyroid: No thyromegaly.      Vascular: No carotid bruit or JVD.      Trachea: No tracheal deviation.   Cardiovascular:      Rate and Rhythm: Normal rate and regular rhythm.      Heart sounds: Normal heart sounds.   Pulmonary:      Effort: Pulmonary effort is normal. No respiratory distress.      Breath sounds: Normal breath sounds. No wheezing or rales.   Chest:      Chest wall: No tenderness.   Abdominal:      General: Bowel sounds are normal. There is no distension.      Palpations: Abdomen is soft.      Tenderness: There is no abdominal tenderness. There is no guarding or rebound.   Musculoskeletal:         General: No tenderness. Normal range of motion.      Cervical back: Normal range of motion and neck supple.   Lymphadenopathy:      Cervical: No cervical adenopathy.   Skin:     General: Skin is warm and dry.      Coloration: Skin is not pale.      Findings: No erythema or rash.      Comments: Bilat leg ulcers-   Neurological:      Mental Status: He is alert and oriented to person, place, and time.   Psychiatric:         Behavior:  Behavior normal.         Thought Content: Thought content normal.         Judgment: Judgment normal.         CMP  Sodium   Date Value Ref Range Status   09/22/2023 145 136 - 145 mmol/L Final     Potassium   Date Value Ref Range Status   09/22/2023 4.0 3.5 - 5.1 mmol/L Final     Chloride   Date Value Ref Range Status   09/22/2023 106 95 - 110 mmol/L Final     CO2   Date Value Ref Range Status   09/22/2023 27 23 - 29 mmol/L Final     Glucose   Date Value Ref Range Status   09/22/2023 116 (H) 70 - 110 mg/dL Final     BUN   Date Value Ref Range Status   09/22/2023 31 (H) 8 - 23 mg/dL Final     Creatinine   Date Value Ref Range Status   09/22/2023 1.4 0.5 - 1.4 mg/dL Final     Calcium   Date Value Ref Range Status   09/22/2023 8.8 8.7 - 10.5 mg/dL Final     Total Protein   Date Value Ref Range Status   09/22/2023 6.7 6.0 - 8.4 g/dL Final     Albumin   Date Value Ref Range Status   09/22/2023 3.4 (L) 3.5 - 5.2 g/dL Final     Total Bilirubin   Date Value Ref Range Status   09/22/2023 0.4 0.1 - 1.0 mg/dL Final     Comment:     For infants and newborns, interpretation of results should be based  on gestational age, weight and in agreement with clinical  observations.    Premature Infant recommended reference ranges:  Up to 24 hours.............<8.0 mg/dL  Up to 48 hours............<12.0 mg/dL  3-5 days..................<15.0 mg/dL  6-29 days.................<15.0 mg/dL       Alkaline Phosphatase   Date Value Ref Range Status   09/22/2023 90 55 - 135 U/L Final     AST   Date Value Ref Range Status   09/22/2023 21 10 - 40 U/L Final     ALT   Date Value Ref Range Status   09/22/2023 20 10 - 44 U/L Final     Anion Gap   Date Value Ref Range Status   09/22/2023 12 8 - 16 mmol/L Final     eGFR if    Date Value Ref Range Status   05/03/2022 54.1 (A) >60 mL/min/1.73 m^2 Final     eGFR if non    Date Value Ref Range Status   05/03/2022 46.8 (A) >60 mL/min/1.73 m^2 Final     Comment:     Calculation  used to obtain the estimated glomerular filtration  rate (eGFR) is the CKD-EPI equation.        Lab Results   Component Value Date    WBC 8.73 09/22/2023    HGB 11.6 (L) 09/22/2023    HCT 36.7 (L) 09/22/2023    MCV 94 09/22/2023     09/22/2023     Lab Results   Component Value Date    CHOL 114 (L) 10/10/2023     Lab Results   Component Value Date    HDL 50 10/10/2023     Lab Results   Component Value Date    LDLCALC 54.0 (L) 10/10/2023     Lab Results   Component Value Date    TRIG 50 10/10/2023     Lab Results   Component Value Date    CHOLHDL 43.9 10/10/2023     Lab Results   Component Value Date    TSH 3.799 08/25/2021     Lab Results   Component Value Date    HGBA1C 7.0 (H) 10/10/2023     Assessment:       1. Anemia, unspecified type    2. Coronary artery disease, unspecified vessel or lesion type, unspecified whether angina present, unspecified whether native or transplanted heart    3. COPD, severe    4. Edema, unspecified type    5. Essential hypertension    6. Hypercholesterolemia    7. PAD (peripheral artery disease)    8. Type 2 diabetes mellitus with hyperglycemia, with long-term current use of insulin    9. Ulcers of both lower legs    10. Impaired mobility        Plan:   Anemia, unspecified type    Coronary artery disease, unspecified vessel or lesion type, unspecified whether angina present, unspecified whether native or transplanted heart    COPD, severe    Edema, unspecified type    Essential hypertension  -     Comprehensive Metabolic Panel; Future; Expected date: 06/10/2024  -     CBC Auto Differential; Future; Expected date: 06/10/2024    Hypercholesterolemia    PAD (peripheral artery disease)    Type 2 diabetes mellitus with hyperglycemia, with long-term current use of insulin  -     Hemoglobin A1C; Future; Expected date: 06/10/2024  -     Ambulatory referral/consult to Home Health; Future; Expected date: 06/11/2024  -     DIABETIC SUPPLIES FOR HOME USE    Ulcers of both lower  legs----------------------home health with wound care---------------------------  -     Ambulatory referral/consult to Home Health; Future; Expected date: 06/11/2024    Impaired mobility  -     Ambulatory referral/consult to Home Health; Future; Expected date: 06/11/2024    Other orders  -     blood pressure monitor Kit; 1 each by Misc.(Non-Drug; Combo Route) route daily as needed.  Dispense: 1 each; Refill: 0      Continue meds--------------------as above--------------------------f/u 4 months-----------------sees pain dr canela--------------

## 2024-06-11 ENCOUNTER — TELEPHONE (OUTPATIENT)
Dept: FAMILY MEDICINE | Facility: CLINIC | Age: 84
End: 2024-06-11
Payer: MEDICARE

## 2024-06-11 DIAGNOSIS — D64.9 ANEMIA, UNSPECIFIED TYPE: Primary | ICD-10-CM

## 2024-06-11 NOTE — TELEPHONE ENCOUNTER
----- Message from Mera Hardy sent at 6/11/2024 10:50 AM CDT -----  Contact: [phone]  Type:  Patient Returning Call    Who Called:JOSE CARLOS MARROQUIN  Who Left Message for Patient:wife  Does the patient know what this is regarding?:who is the carrier for his wheelchair  Would the patient rather a call back or a response via [facility]? Call back  Best Call Back Number:[phone]  Additional Information: she forgot to ask you on yesterday, because she need to pay the bill and been having the wheelchair for 4-5 months

## 2024-06-12 PROCEDURE — G0180 MD CERTIFICATION HHA PATIENT: HCPCS | Mod: ,,, | Performed by: INTERNAL MEDICINE

## 2024-07-05 ENCOUNTER — TELEPHONE (OUTPATIENT)
Dept: FAMILY MEDICINE | Facility: CLINIC | Age: 84
End: 2024-07-05
Payer: MEDICARE

## 2024-07-05 DIAGNOSIS — Z74.09 IMPAIRED MOBILITY: Primary | ICD-10-CM

## 2024-07-05 NOTE — TELEPHONE ENCOUNTER
----- Message from Felipe Silva sent at 7/5/2024  9:58 AM CDT -----  .Type: Patient Call Back        Who called:      Gricelda With [facility]   What is the request in detail:    Called in concerning orders put in for physical therapy   Can the clinic reply by [facility]?           Would the patient rather a call back or a response via [facility]?   call      Best call back number:

## 2024-07-05 NOTE — TELEPHONE ENCOUNTER
Attempted to call patient and no answer; Lvm. For Karin to return call to inform of new Rx for O.T.

## 2024-07-05 NOTE — TELEPHONE ENCOUNTER
I spoke to Gricelda With [facility]. She is requesting occupational therapy for patient. Pls advise.

## 2024-07-12 ENCOUNTER — EXTERNAL HOME HEALTH (OUTPATIENT)
Dept: HOME HEALTH SERVICES | Facility: HOSPITAL | Age: 84
End: 2024-07-12
Payer: MEDICARE

## 2024-09-03 DIAGNOSIS — J44.9 CHRONIC OBSTRUCTIVE PULMONARY DISEASE, UNSPECIFIED COPD TYPE: ICD-10-CM

## 2024-09-03 RX ORDER — ALBUTEROL SULFATE 90 UG/1
INHALANT RESPIRATORY (INHALATION)
Qty: 8.5 G | Refills: 11 | Status: SHIPPED | OUTPATIENT
Start: 2024-09-03

## 2024-09-05 ENCOUNTER — DOCUMENT SCAN (OUTPATIENT)
Dept: HOME HEALTH SERVICES | Facility: HOSPITAL | Age: 84
End: 2024-09-05
Payer: MEDICARE

## 2024-09-12 RX ORDER — BUMETANIDE 2 MG/1
TABLET ORAL
Qty: 180 TABLET | Refills: 2 | Status: SHIPPED | OUTPATIENT
Start: 2024-09-12

## 2024-09-12 NOTE — TELEPHONE ENCOUNTER
Care Due:                  Date            Visit Type   Department     Provider  --------------------------------------------------------------------------------                                EP -                              PRIMARY      [facility]  Last Visit: 06-      CARE ([facility])   DEE DEE Mejia                              EP -                              PRIMARY      [facility]  Next Visit: 10-      CARE ([facility])   DEE DEE Mejia                                                            Last  Test          Frequency    Reason                     Performed    Due Date  --------------------------------------------------------------------------------    HBA1C.......  6 months...  insulin..................  06- 12-    Health [facility] Embedded Care Due Messages. Reference number: [id number].   9/12/2024 11:05:11 AM CDT

## 2024-09-12 NOTE — TELEPHONE ENCOUNTER
Refill Decision Note   Jose Luis Martinez  is requesting a refill authorization.  Brief Assessment and Rationale for Refill:  Approve     Medication Therapy Plan:         Comments:     Note composed:4:11 PM 09/12/2024

## 2024-10-01 ENCOUNTER — NURSE TRIAGE (OUTPATIENT)
Dept: ADMINISTRATIVE | Facility: CLINIC | Age: 84
End: 2024-10-01
Payer: MEDICARE

## 2024-10-01 NOTE — TELEPHONE ENCOUNTER
Pt reports SOB and difficulty breathing. Supposed to use inhalers but has been unable to fill. Care advice to call  now. Pt refuses dispo. Reiterated care advice to pt many of times. Pt states he just needs to get his medication and he will be okay. Pt asking for me to speak with his wife. Attempt x2 to contact wife. Pt will call wife and I will call pt back in a couple of minutes. Spoke with pt's wife and wife verbalized that pt is out of medication and can not be refilled until 10/12/24. Reiterated care advice to pt's wife and wife verbalizes understanding but reports that they are not going to follow care advice and is asking for another prescription.   Reason for Disposition   SEVERE difficulty breathing (e.g., struggling for each breath, speaks in single words, pulse > 120)    Protocols used: Breathing Difficulty-A-[place]

## 2024-10-02 ENCOUNTER — TELEPHONE (OUTPATIENT)
Dept: PULMONOLOGY | Facility: CLINIC | Age: 84
End: 2024-10-02
Payer: MEDICARE

## 2024-10-02 NOTE — TELEPHONE ENCOUNTER
Spoke to pt wife.  She stated that pt was sob due to being out of inhaler.  She stated that insurance will not pay for a refill until 10/12/24.  She stated that he will wait until then to get it.  Advised her to take pt to the ER for immediate tx if sob continues or worsens.  She stated understanding.

## 2024-10-04 ENCOUNTER — PATIENT MESSAGE (OUTPATIENT)
Dept: PULMONOLOGY | Facility: CLINIC | Age: 84
End: 2024-10-04
Payer: MEDICARE

## 2024-10-07 ENCOUNTER — OFFICE VISIT (OUTPATIENT)
Dept: PULMONOLOGY | Facility: CLINIC | Age: 84
End: 2024-10-07
Attending: PHYSICIAN ASSISTANT
Payer: MEDICARE

## 2024-10-07 ENCOUNTER — HOSPITAL ENCOUNTER (OUTPATIENT)
Dept: RADIOLOGY | Facility: HOSPITAL | Age: 84
Discharge: HOME | End: 2024-10-07
Attending: PHYSICIAN ASSISTANT
Payer: MEDICARE

## 2024-10-07 VITALS
SYSTOLIC BLOOD PRESSURE: 130 MMHG | BODY MASS INDEX: 39.87 KG/M2 | HEART RATE: 100 BPM | HEIGHT: 72 IN | OXYGEN SATURATION: 95 % | RESPIRATION RATE: 20 BRPM | DIASTOLIC BLOOD PRESSURE: 72 MMHG

## 2024-10-07 DIAGNOSIS — K21.9 GASTROESOPHAGEAL REFLUX DISEASE WITHOUT ESOPHAGITIS: ICD-10-CM

## 2024-10-07 DIAGNOSIS — J44.9 CHRONIC OBSTRUCTIVE PULMONARY DISEASE, UNSPECIFIED COPD TYPE: ICD-10-CM

## 2024-10-07 DIAGNOSIS — J44.9 COPD, SEVERE: ICD-10-CM

## 2024-10-07 DIAGNOSIS — G47.33 OSA ON CPAP: Primary | ICD-10-CM

## 2024-10-07 PROCEDURE — 1125F AMNT PAIN NOTED PAIN PRSNT: CPT | Mod: CPTII,S$GLB,, | Performed by: HOSPITALIST

## 2024-10-07 PROCEDURE — 1159F MED LIST DOCD IN RCRD: CPT | Mod: CPTII,S$GLB,, | Performed by: HOSPITALIST

## 2024-10-07 PROCEDURE — 1100F PTFALLS ASSESS-DOCD GE2>/YR: CPT | Mod: CPTII,S$GLB,, | Performed by: HOSPITALIST

## 2024-10-07 PROCEDURE — 99214 OFFICE O/P EST MOD 30 MIN: CPT | Mod: S$GLB,,, | Performed by: HOSPITALIST

## 2024-10-07 PROCEDURE — 71045 X-RAY EXAM CHEST 1 VIEW: CPT | Mod: 26,,, | Performed by: RADIOLOGY

## 2024-10-07 PROCEDURE — 3075F SYST BP GE 130 - 139MM HG: CPT | Mod: CPTII,S$GLB,, | Performed by: HOSPITALIST

## 2024-10-07 PROCEDURE — 1160F RVW MEDS BY RX/DR IN RCRD: CPT | Mod: CPTII,S$GLB,, | Performed by: HOSPITALIST

## 2024-10-07 PROCEDURE — 3078F DIAST BP <80 MM HG: CPT | Mod: CPTII,S$GLB,, | Performed by: HOSPITALIST

## 2024-10-07 PROCEDURE — 3288F FALL RISK ASSESSMENT DOCD: CPT | Mod: CPTII,S$GLB,, | Performed by: HOSPITALIST

## 2024-10-07 PROCEDURE — 71045 X-RAY EXAM CHEST 1 VIEW: CPT | Mod: TC

## 2024-10-07 PROCEDURE — 99999 PR PBB SHADOW E&M-EST. PATIENT-LVL V: CPT | Mod: PBBFAC,,, | Performed by: HOSPITALIST

## 2024-10-07 RX ORDER — PANTOPRAZOLE SODIUM 40 MG/1
TABLET, DELAYED RELEASE ORAL
Qty: 90 TABLET | Refills: 2 | Status: SHIPPED | OUTPATIENT
Start: 2024-10-07

## 2024-10-07 NOTE — TELEPHONE ENCOUNTER
Refill Decision Note   Jose Luis Martinez  is requesting a refill authorization.  Brief Assessment and Rationale for Refill:  Approve     Medication Therapy Plan:        Comments:     Note composed:11:30 AM 10/07/2024

## 2024-10-07 NOTE — TELEPHONE ENCOUNTER
No care due was identified.  [facility] Embedded Care Due Messages. Reference number: [id number].   10/07/2024 11:11:33 AM CDT

## 2024-10-07 NOTE — PATIENT INSTRUCTIONS
You are eligible for a new nebulizer 8/2026. The out of pocket cost for one would be around $80.     Breathing Medication Schedule:    AM and PM:    Arfometerol (Brovana) nebulizer treatment  Budesonide (Pulmicort) nebulizer treatment    If feeling chest tightness, short of breath, wheezing upon waking- do an Albuterol nebulizer treatment and try to cough up phlegm afterwards.     You can do either the Albuterol nebulizer treatment or the Albuterol inhaler every 4-6 hours as needed.

## 2024-10-07 NOTE — ASSESSMENT & PLAN NOTE
- typed out nebulizer schedule, patient's nephew going to look into what pt has been doing at home and speak with pt's wife  - next nebulizer eligibility 8/2026

## 2024-10-07 NOTE — PROGRESS NOTES
Subjective:      Patient ID: Jose Luis Martinez is a 84 y.o. male.    Chief Complaint: COPD, sleep apnea    HPI 10/7/24:    84 year old male with history of HTN, CAD, PAD, MGUS, ELKIN, DM2, GERD, sleep apnea, COPD- asthma who presents to Pulmonary clinic for routine follow up. He was last seen 9/2023 by Andree Monroy NP-continued on Brovana and Pulmicort nebs, continued on CPAP.     Mr. Martinez is accompanied by his nephew, his wife is currently at her own appointment elsewhere. Mr Amaya has been experiencing more shortness of breath lately- on further interview, I think this is at least partly due to him having secretions that collect in the back of his throat that he has difficulty expectorating. Along with this he has voice changes during speech that improve after throat clearing, but inability to expectorate. During one of these episodes he noted feeling like he could not breathe and wished to have his albuterol inhaler, but improved with throat clearing. He is not sure if he has been using the nebulizer or CPAP at night- his wife helps him with these tasks.     Pertinent Work Up:  - CXR- elevated left hemidiaphragm with possible bowel/stomach gas dilation, no prior to visually compare, not noted on outside cxr report 7/2023  - [place] 9/2023- Obstruction with post FEV1 53.6%, significant BD response    Pulmonary Interventions:  - Brovana neb  - Pulmicort neb  - Albuterol neb  - Albuterol HFA- 3-4x/day      Review of Systems   Respiratory:  Positive for shortness of breath and dyspnea on extertion.      Objective:     Physical Exam   Constitutional: He is oriented to person, place, and time. He appears well-developed and well-nourished. He is not obese.   Cardiovascular: Normal rate and regular rhythm.   Pulmonary/Chest: Normal expansion, effort normal and breath sounds normal.   Musculoskeletal:         General: Edema present.   Neurological: He is alert and oriented to person, place, and time.   Skin: Skin is  "warm and dry.   Psychiatric:   Poor memory     Personal Diagnostic Review  As Above      6/10/2024     9:08 AM 10/10/2023     9:18 AM 10/2/2023    12:24 PM 9/14/2023     9:42 AM 7/20/2023     8:52 AM 7/10/2023     9:24 AM 6/13/2023    11:26 AM   Pulmonary Function Tests   SpO2 97 % 97 % 99 % 97 % 96 % 97 %    Height 6' (1.829 m) 6' (1.829 m) 6' (1.829 m) 6' (1.829 m) 6' 2" (1.88 m) 6' 2" (1.88 m)    Weight  133.4 kg (294 lb) 132 kg (291 lb) 132.2 kg (291 lb 7.2 oz) 142.9 kg (315 lb)  139.7 kg (308 lb)   BMI (Calculated)  39.9 39.5 39.5 40.4          Assessment:     No diagnosis found.     Outpatient Encounter Medications as of 10/7/2024   Medication Sig Dispense Refill    ACCU-CHEK DAVID PLUS TEST STRP Strp TEST BLOOD SUGAR THREE TIMES A DAY AS NEEDED 200 strip 12    ACCU-CHEK SOFTCLIX LANCETS Misc USE TO TEST TWICE DAILY 100 each 12    albuterol (PROVENTIL) 2.5 mg /3 mL (0.083 %) nebulizer solution USE 1 VIAL IN NEBULIZER EVERY 4 HOURS - and as need for rescue (max 30 doses/month) 360 mL 11    albuterol (PROVENTIL/VENTOLIN HFA) 90 mcg/actuation inhaler INHALE 2 PUFFS INTO THE LUNGS EVERY 4 HOURS AS NEEDED FOR WHEEZING OR SHORTNESS OF BREATH. RESCUE 8.5 g 11    alfuzosin (UROXATRAL) 10 mg Tb24       arformoteroL (BROVANA) 15 mcg/2 mL Nebu USE 1 VIAL  IN  NEBULIZER TWICE  DAILY - Morning and Evening 60 each 11    atorvastatin (LIPITOR) 40 MG tablet TAKE ONE TABLET BY MOUTH ONCE A DAY 90 tablet 3    BD ULTRA-FINE VINNIE PEN NEEDLE 32 gauge x 5/32" Ndle Inject insulin into skin once daily 200 each 3    blood pressure monitor Kit 1 each by Misc.(Non-Drug; Combo Route) route daily as needed. 1 each 0    blood-glucose meter kit Use as instructed 1 each 0    budesonide (PULMICORT) 0.5 mg/2 mL nebulizer solution Take 2 mLs (0.5 mg total) by nebulization 2 (two) times a day. 60 mL 11    bumetanide (BUMEX) 2 MG tablet TAKE 1 TABLET BY MOUTH TWO TIMES DAILY. 180 tablet 2    busPIRone (BUSPAR) 7.5 MG tablet Take 1 tablet (7.5 mg " "total) by mouth daily as needed (anxiety). 30 tablet 3    docusate sodium (COLACE) 100 MG capsule Take 100 mg by mouth.      dutasteride (AVODART) 0.5 mg capsule Take 0.5 mg by mouth once daily.      ferrous sulfate 325 (65 FE) MG EC tablet Take 1 tablet (325 mg total) by mouth once daily. 90 tablet 3    fluticasone propionate (FLONASE) 50 mcg/actuation nasal spray 1 spray (50 mcg total) by Each Nostril route once daily. 16 g 0    fluticasone-salmeterol diskus inhaler 250-50 mcg INHALE 1 PUFF INTO THE LUNGS 2 TIMES DAILY. 60 each 11    gabapentin (NEURONTIN) 100 MG capsule Take 1 capsule (100 mg total) by mouth every evening. 30 capsule 4    hydrocodone-acetaminophen 10-325mg (NORCO)  mg Tab   0    insulin (LANTUS SOLOSTAR U-100 INSULIN) glargine 100 units/mL SubQ pen INJECT 15 UNITS INTO THE SKIN EVERY EVENING. 15 mL 1    isosorbide dinitrate (ISORDIL) 30 MG Tab Take 30 mg by mouth.      isosorbide mononitrate (IMDUR) 30 MG 24 hr tablet       lancets 32 gauge Misc 1 lancet by Misc.(Non-Drug; Combo Route) route 2 (two) times daily. 200 each 12    ofloxacin (FLOXIN) 0.3 % otic solution Place 5 drops into the left ear 2 (two) times daily. 5 mL 0    pantoprazole (PROTONIX) 40 MG tablet TAKE 1 TABLET BY MOUTH ONCE DAILY. 90 tablet 3    pen needle, diabetic (BD ULTRA-FINE VINNIE PEN NEEDLE) 32 gauge x 5/32" Ndle 1 Units by Misc.(Non-Drug; Combo Route) route once daily. 100 each 6    potassium chloride (KLOR-CON) 10 MEQ TbSR TAKE 1 TABLET BY MOUTH ONCE DAILY. 90 tablet 3    sars-cov-2, covid-19 pfizer omicron, (PFIZER COVID BIVAL,12Y UP,,PF,) 30 mcg/0.3 mL injection Inject into the muscle. 0.3 mL 0    silver sulfADIAZINE 1% (SILVADENE) 1 % cream Apply topically once daily. 400 g 3    tamsulosin (FLOMAX) 0.4 mg Cp24   11    TRUEPLUS INSULIN 0.5 mL 31 gauge x 5/16" Syrg INJECT TWO TIMES A DAY AS DIRECTED 100 each 6    valsartan (DIOVAN) 160 MG tablet TAKE ONE TABLET BY MOUTH ONCE A DAY 90 tablet 1    [DISCONTINUED] " bumetanide (BUMEX) 2 MG tablet TAKE 1 TABLET BY MOUTH TWO TIMES DAILY. 180 tablet 2     No facility-administered encounter medications on file as of 10/7/2024.     No orders of the defined types were placed in this encounter.      Plan:     Problem List Items Addressed This Visit          Pulmonary    COPD, severe     - typed out nebulizer schedule, patient's nephew going to look into what pt has been doing at home and speak with pt's wife  - next nebulizer eligibility 8/2026            Other    CHRISTOPHER on CPAP - Primary     - CPAP supplies ordered         Relevant Orders    CPAP/BIPAP SUPPLIES     Other Visit Diagnoses       Chronic obstructive pulmonary disease, unspecified COPD type        Relevant Orders    NEBULIZER KIT (SUPPLIES) FOR HOME USE    NEBULIZER FOR HOME USE          Follow up in one year or sooner as needed.

## 2024-10-08 RX ORDER — ATORVASTATIN CALCIUM 40 MG/1
40 TABLET, FILM COATED ORAL DAILY
Qty: 90 TABLET | Refills: 3 | Status: SHIPPED | OUTPATIENT
Start: 2024-10-08

## 2024-10-08 NOTE — TELEPHONE ENCOUNTER
----- Message from Dasia sent at 10/8/2024  9:52 AM CDT -----  Contact: Carine Martinez & Jose Luis Martinez [phone]  Pt needs a refill on atorvastatin (LIPITOR) 40 MG tablet called into     [facility] - [address] - [address]  [address]  [address]  [place] [address]  Phone: [phone] Fax: [phone]      Pt mom/dad/guardian can be reached at [phone]    Carine Martinez is calling to request a refill on the pt's RX. Carine Martinez states the pt has been out of the RX for the last 2 days now. Please call Carine Martinez back for advice

## 2024-10-08 NOTE — TELEPHONE ENCOUNTER
No care due was identified.  [facility] Embedded Care Due Messages. Reference number: [id number].   10/08/2024 10:11:04 AM CDT

## 2024-10-23 RX ORDER — POTASSIUM CHLORIDE 750 MG/1
TABLET, EXTENDED RELEASE ORAL
Qty: 90 TABLET | Refills: 2 | Status: SHIPPED | OUTPATIENT
Start: 2024-10-23

## 2024-10-23 NOTE — TELEPHONE ENCOUNTER
Care Due:                  Date            Visit Type   Department     Provider  --------------------------------------------------------------------------------                                EP -                              PRIMARY      [facility]  Last Visit: 06-      CARE ([facility])   MEDICINE       Matti Mejia                              EP -                              PRIMARY      [facility]  Next Visit: 12-      CARE ([facility])   DEE DEE Mejia                                                            Last  Test          Frequency    Reason                     Performed    Due Date  --------------------------------------------------------------------------------    Lipid Panel.  12 months..  atorvastatin.............  10-   10-    [facility] Embedded Care Due Messages. Reference number: [id number].   10/23/2024 5:05:50 PM CDT

## 2024-10-24 NOTE — TELEPHONE ENCOUNTER
Provider Staff:  Action required for this patient    Requires labs      Please see care gap opportunities below in Care Due Message.    Thanks!  [facility]     Appointments      Date Provider   Last Visit   6/10/2024 Matti Mejia MD   Next Visit   12/11/2024 Matti Mejia MD     Refill Decision Note   Jose Luis Martinez  is requesting a refill authorization.  Brief Assessment and Rationale for Refill:  Approve     Medication Therapy Plan:         Comments:     Note composed:10:51 PM 10/23/2024

## 2024-11-07 DIAGNOSIS — Z79.4 TYPE 2 DIABETES MELLITUS WITH HYPERGLYCEMIA, WITH LONG-TERM CURRENT USE OF INSULIN: ICD-10-CM

## 2024-11-07 DIAGNOSIS — E11.65 TYPE 2 DIABETES MELLITUS WITH HYPERGLYCEMIA, WITH LONG-TERM CURRENT USE OF INSULIN: ICD-10-CM

## 2024-11-07 RX ORDER — PEN NEEDLE, DIABETIC 30 GX3/16"
1 NEEDLE, DISPOSABLE MISCELLANEOUS DAILY
Qty: 100 EACH | Refills: 6 | Status: SHIPPED | OUTPATIENT
Start: 2024-11-07

## 2024-11-07 NOTE — TELEPHONE ENCOUNTER
No care due was identified.  [facility] Embedded Care Due Messages. Reference number: [id number].   11/07/2024 10:54:56 AM CST

## 2024-11-12 RX ORDER — FLUTICASONE PROPIONATE 50 MCG
2 SPRAY, SUSPENSION (ML) NASAL
Qty: 16 G | Refills: 1 | Status: SHIPPED | OUTPATIENT
Start: 2024-11-12

## 2024-11-12 NOTE — TELEPHONE ENCOUNTER
Refill Routing Note   Medication(s) are not appropriate for processing by [facility] for the following reason(s):        Clarification of medication (Rx) details    [facility] action(s):  Defer   Requires labs : Yes      Medication Therapy Plan: 1 OR 2 SPRAYS IN EACH NOSTRIL ONCE DAILY      Appointments  past 12m or future 3m with PCP    Date Provider   Last Visit   6/10/2024 Matti Mejia MD   Next Visit   12/11/2024 Matti Mejia MD   ED visits in past 90 days: 0        Note composed:2:27 PM 11/12/2024

## 2024-11-12 NOTE — TELEPHONE ENCOUNTER
Care Due:                  Date            Visit Type   Department     Provider  --------------------------------------------------------------------------------                                EP -                              PRIMARY      [facility]  Last Visit: 06-      CARE ([facility])   DEE DEE Mejia                              EP -                              PRIMARY      [facility]  Next Visit: 12-      CARE ([facility])   DEE DEE Mejia                                                            Last  Test          Frequency    Reason                     Performed    Due Date  --------------------------------------------------------------------------------    HBA1C.......  6 months...  insulin..................  06- 12-    [facility] Embedded Care Due Messages. Reference number: [id number].   11/12/2024 2:20:04 PM CST

## 2024-11-18 ENCOUNTER — TELEPHONE (OUTPATIENT)
Dept: FAMILY MEDICINE | Facility: CLINIC | Age: 84
End: 2024-11-18
Payer: MEDICARE

## 2024-11-18 DIAGNOSIS — Z74.09 IMPAIRED MOBILITY: ICD-10-CM

## 2024-11-18 DIAGNOSIS — I89.0 LYMPHEDEMA: Primary | ICD-10-CM

## 2024-11-18 NOTE — TELEPHONE ENCOUNTER
----- Message from Radha sent at 11/18/2024  4:03 PM CST -----  Contact: Wife, Carine, [phone]  Calling to request an order for a portable bedside commode that can hold over 300 lbs. Please advise. Thanks.

## 2024-11-18 NOTE — TELEPHONE ENCOUNTER
----- Message from Joe sent at 11/18/2024  2:23 PM CST -----  Contact: Carine/spouse  Type:  Patient Requesting a call back     Who Called:Carine  What is the call back request regarding?:Requesting a call back ion regards to getting a heavy duty potty chair for Jose Luis that will hold at least up to 300 pounds  Would the patient rather a call back or a response via [facility]?call  Best Call Back Number:[phone]   Additional Information:

## 2024-12-02 DIAGNOSIS — J44.9 CHRONIC OBSTRUCTIVE PULMONARY DISEASE, UNSPECIFIED COPD TYPE: ICD-10-CM

## 2024-12-03 RX ORDER — ALBUTEROL SULFATE 90 UG/1
INHALANT RESPIRATORY (INHALATION)
Qty: 8.5 G | Refills: 11 | Status: SHIPPED | OUTPATIENT
Start: 2024-12-03

## 2024-12-19 RX ORDER — FLUTICASONE PROPIONATE 50 MCG
2 SPRAY, SUSPENSION (ML) NASAL
Qty: 48 G | Refills: 1 | Status: SHIPPED | OUTPATIENT
Start: 2024-12-19

## 2024-12-19 NOTE — TELEPHONE ENCOUNTER
Refill Decision Note   Jose Luis Martinez  is requesting a refill authorization.  Brief Assessment and Rationale for Refill:  Approve     Medication Therapy Plan:         Comments:     Note composed:1:40 PM 12/19/2024

## 2024-12-19 NOTE — TELEPHONE ENCOUNTER
No care due was identified.  [facility] Embedded Care Due Messages. Reference number: [id number].   12/19/2024 9:30:04 AM CST

## 2025-01-01 ENCOUNTER — RESULTS FOLLOW-UP (OUTPATIENT)
Dept: NEUROLOGY | Facility: CLINIC | Age: 85
End: 2025-01-01

## 2025-01-03 ENCOUNTER — TELEPHONE (OUTPATIENT)
Dept: FAMILY MEDICINE | Facility: CLINIC | Age: 85
End: 2025-01-03
Payer: MEDICARE

## 2025-01-03 NOTE — TELEPHONE ENCOUNTER
----- Message from Joe sent at 1/3/2025  8:12 AM CST -----  Contact: Ceola/spouse  Type:  Needs Medical Advice    Who Called: Carine  Symptoms (please be specific): Pain medication is making him hallucinate    How long has patient had these symptoms:  off and on   Pharmacy name and phone #:    [facility] - [address] - [address]  [address]  [address]  [place] [address]  Phone: [phone] Fax: [phone]    Would the patient rather a call back or a response via [facility]? call  Best Call Back Number: [phone]  Additional Information:

## 2025-01-03 NOTE — TELEPHONE ENCOUNTER
Pt's wife says he has been taking the hydrocodone-acetaminophen , please advise. She said he has been hallucinating off and on for a couple of months.

## 2025-01-06 NOTE — TELEPHONE ENCOUNTER
Care Due:                  Date            Visit Type   Department     Provider  --------------------------------------------------------------------------------                                EP -                              PRIMARY      [facility]  Last Visit: 06-      CARE ([facility])   DEE DEE Mejia                              EP -                              PRIMARY      [facility]  Next Visit: 02-      CARE ([facility])   DEE DEE Mejia                                                            Last  Test          Frequency    Reason                     Performed    Due Date  --------------------------------------------------------------------------------    Lipid Panel.  12 months..  atorvastatin.............  10-   10-    [facility] Embedded Care Due Messages. Reference number: [id number].   1/06/2025 11:25:48 AM CST

## 2025-01-07 RX ORDER — INSULIN GLARGINE 100 [IU]/ML
15 INJECTION, SOLUTION SUBCUTANEOUS NIGHTLY
Qty: 15 ML | Refills: 0 | Status: SHIPPED | OUTPATIENT
Start: 2025-01-07

## 2025-01-07 NOTE — TELEPHONE ENCOUNTER
Refill Routing Note   Medication(s) are not appropriate for processing by [facility] for the following reason(s):        Required labs outdated    [facility] action(s):  Defer   Requires labs : Yes               Appointments  past 12m or future 3m with PCP    Date Provider   Last Visit   6/10/2024 Matti Mejia MD   Next Visit   2/12/2025 Matti Mejia MD   ED visits in past 90 days: 0        Note composed:11:12 PM 01/06/2025

## 2025-01-26 PROBLEM — I50.30 DIASTOLIC CONGESTIVE HEART FAILURE, UNSPECIFIED HF CHRONICITY: Status: ACTIVE | Noted: 2025-01-26

## 2025-01-26 PROBLEM — C61 MALIGNANT NEOPLASM OF PROSTATE: Status: ACTIVE | Noted: 2025-01-26

## 2025-01-26 PROBLEM — E11.69 TYPE 2 DIABETES MELLITUS WITH OTHER SPECIFIED COMPLICATION, UNSPECIFIED WHETHER LONG TERM INSULIN USE: Status: ACTIVE | Noted: 2025-01-26

## 2025-01-26 PROBLEM — L97.929 ULCERS OF BOTH LOWER LEGS: Status: RESOLVED | Noted: 2024-06-10 | Resolved: 2025-01-26

## 2025-01-26 PROBLEM — L97.919 ULCERS OF BOTH LOWER LEGS: Status: RESOLVED | Noted: 2024-06-10 | Resolved: 2025-01-26

## 2025-01-26 PROBLEM — N18.31 CHRONIC KIDNEY DISEASE, STAGE 3A: Status: ACTIVE | Noted: 2025-01-26

## 2025-01-26 NOTE — PROGRESS NOTES
Jose Luis Martinez  MRN:  [id number]  84 y.o. male      PCP:  Matti Mejia MD    Patient Care Team:  Matti Mejia MD as PCP - General (Internal Medicine)  Crista Tovar RD, CDE as Dietitian (Diabetes)  Kareem Gross, NP as Nurse Practitioner (Family Medicine)  Edward Zuniga MD as Consulting Physician (Pain Medicine)  Pascual Torres MD (Urology)      SUBJECTIVE:     CHIEF COMPLAINT:  Concerns about progressive memory loss and cognitive decline.    HPI:  Mr. Martinez, brought in today by his wife, has been experiencing gradually worsening memory issues over the past year, with significant decline in the last 3 months. Symptoms include difficulty navigating within the house, such as locating the bathroom or his room. Mr. Martinez's wife reports he frequently repeats questions and has trouble recognizing her at times.    There is a significant family history of cognitive decline, with father, brother, and sister diagnosed with Alzheimer's disease. Mr. Martinez denies any history of strokes, TIAs, or traumatic head injuries.    Mr. Martinez reports generalized pain, attributed to arthritis, particularly affecting his knees. He has been under Dr. Zuniga's care for pain management and received a prescription in December. The pain medication reportedly induced hallucinations, raising concerns about its use.    Mr. Crandalls blood sugar have been monitored at home, with morning readings ranging from 116 to 124 mg/dL before eating, and around 180 mg/dL before supper.      Review of Systems   Constitutional:  Positive for malaise/fatigue. Negative for chills, diaphoresis, fever and weight loss.   Respiratory:  Positive for shortness of breath.    Cardiovascular:  Positive for leg swelling. Negative for chest pain and palpitations.   Neurological:  Positive for weakness. Negative for dizziness, tingling, tremors, seizures, loss of consciousness and headaches.   Psychiatric/Behavioral:  Positive  "for memory loss. The patient has insomnia.        Review of patient's allergies indicates:  No Known Allergies      Patient Active Problem List   Diagnosis    CHRISTOPHER and COPD overlap syndrome    Essential hypertension    Hypercholesterolemia    Type 2 diabetes mellitus with hyperglycemia, with long-term current use of insulin    BPH (benign prostatic hyperplasia)    CAD (coronary artery disease)    Chronic back pain    Osteoarthritis of both knees    Allergic rhinitis    PAD (peripheral artery disease)    Edema    Class 2 severe obesity due to excess calories with serious comorbidity and body mass index (BMI) of 39.0 to 39.9 in adult    Anemia    MGUS (monoclonal gammopathy of unknown significance)    Gastroesophageal reflux disease without esophagitis    Uncomplicated opioid dependence    CHRISTOPHER on CPAP    Iron deficiency anemia    COPD, severe    Impaired mobility    Type 2 diabetes mellitus with other specified complication, unspecified whether long term insulin use    Malignant neoplasm of prostate    Diastolic congestive heart failure, unspecified HF chronicity    Chronic kidney disease, stage 3a       Current Outpatient Medications   Medication Instructions    ACCU-CHEK DAVID PLUS TEST STRP Strp TEST BLOOD SUGAR THREE TIMES A DAY AS NEEDED    ACCU-CHEK SOFTCLIX LANCETS Misc USE TO TEST TWICE DAILY    albuterol (PROVENTIL) 2.5 mg /3 mL (0.083 %) nebulizer solution USE 1 VIAL IN NEBULIZER EVERY 4 HOURS - and as need for rescue (max 30 doses/month)    albuterol (PROVENTIL/VENTOLIN HFA) 90 mcg/actuation inhaler INHALE 2 PUFFS INTO THE LUNGS EVERY 4 HOURS AS NEEDED FOR WHEEZING OR SHORTNESS OF BREATH. RESCUE    alfuzosin (UROXATRAL) 10 mg Tb24 No dose, route, or frequency recorded.    arformoteroL (BROVANA) 15 mcg/2 mL Nebu USE 1 VIAL  IN  NEBULIZER TWICE  DAILY - Morning and Evening    atorvastatin (LIPITOR) 40 mg, Oral, Daily    BD ULTRA-FINE VINNIE PEN NEEDLE 32 gauge x 5/32" Ndle Inject insulin into skin once daily    " "blood pressure monitor Kit 1 each, Misc.(Non-Drug; Combo Route), Daily PRN    blood-glucose meter kit Use as instructed    budesonide (PULMICORT) 0.5 mg, Nebulization, 2 times daily    bumetanide (BUMEX) 2 MG tablet TAKE 1 TABLET BY MOUTH TWO TIMES DAILY.    busPIRone (BUSPAR) 7.5 mg, Oral, Daily PRN    docusate sodium (COLACE) 100 mg    dutasteride (AVODART) 0.5 mg, Daily    ferrous sulfate 325 mg, Oral, Daily    fluticasone propionate (FLONASE) 100 mcg, Each Nostril    gabapentin (NEURONTIN) 100 mg, Oral, Nightly    GEMTESA 75 mg Tab 1 tablet    hydrocodone-acetaminophen 10-325mg (NORCO)  mg Tab No dose, route, or frequency recorded.    isosorbide dinitrate (ISORDIL) 30 mg    isosorbide mononitrate (IMDUR) 30 MG 24 hr tablet No dose, route, or frequency recorded.    lancets 32 gauge Misc 1 lancet , Misc.(Non-Drug; Combo Route), 2 times daily    LANTUS SOLOSTAR U-100 INSULIN 15 Units, Subcutaneous, Nightly    ofloxacin (FLOXIN) 0.3 % otic solution 5 drops, Left Ear, 2 times daily    pantoprazole (PROTONIX) 40 MG tablet TAKE 1 TABLET BY MOUTH ONCE DAILY.    pen needle, diabetic (BD ULTRA-FINE VINNIE PEN NEEDLE) 1 Units, Misc.(Non-Drug; Combo Route), Daily    potassium chloride (KLOR-CON) 10 MEQ TbSR TAKE 1 TABLET BY MOUTH ONCE DAILY.    tamsulosin (FLOMAX) 0.4 mg Cp24 No dose, route, or frequency recorded.    TRUEPLUS INSULIN 0.5 mL 31 gauge x 5/16" Syrg INJECT TWO TIMES A DAY AS DIRECTED    valsartan (DIOVAN) 160 MG tablet TAKE ONE TABLET BY MOUTH ONCE A DAY         Past medical, surgical, family and social histories have been reviewed today.      OBJECTIVE:     Vitals:    01/27/25 1034   BP: 134/72   BP Location: Right arm   Patient Position: Sitting   Pulse: 66   Temp: 97.2 °F (36.2 °C)   TempSrc: Tympanic   SpO2: 99%   Height: 6' (1.829 m)       Physical Exam  Vitals reviewed.   Cardiovascular:      Rate and Rhythm: Normal rate and regular rhythm.   Pulmonary:      Effort: Pulmonary effort is normal.      " Breath sounds: Normal breath sounds.   Neurological:      Mental Status: He is alert.      Comments: Name --- able to report 1st name only   --- unable to report  City -- able to state [place]  State --- reports [place]  Wife --- able to recognize and state her name  ~~~~  Long-term --- he is not able to report place of birth, number of siblings or events in childhood   Psychiatric:         Attention and Perception: He does not perceive auditory or visual hallucinations.         Mood and Affect: Mood is anxious. Affect is tearful.         Speech: Speech is delayed.         Behavior: Behavior is agitated. Behavior is not combative.         Thought Content: Thought content is not paranoid or delusional. Thought content does not include homicidal or suicidal ideation.         Cognition and Memory: Cognition is impaired. Memory is impaired.         Judgment: Judgment is inappropriate.         ASSESSMENT:     1. Memory deficit ---- chronic changes over past year with rapid decline over past 3 months, see # 3  -     Ambulatory referral/consult to Neurology; Future; Expected date: 2025    2. Cognitive changes --- see # 1  -     Ambulatory referral/consult to Neurology; Future; Expected date: 2025    3. Family history of Alzheimer's disease ----- see # 1  -     Ambulatory referral/consult to Neurology; Future; Expected date: 2025    4. Type 2 diabetes mellitus with other specified complication, with long-term current use of insulin ---- chronic issue, uncontrolled on current medication based on A1c but wife reports home FBS ~ 116.  Per PCP.    Lab Results   Component Value Date    HGBA1C 7.6 (H) 06/10/2024     5. Chronic kidney disease, stage 3a ---- current GFR 49.9, stable    Lab Results   Component Value Date     06/10/2024    K 3.8 06/10/2024     06/10/2024    CO2 23 06/10/2024    BUN 43 (H) 06/10/2024    CREATININE 1.4 06/10/2024    CALCIUM 9.1 06/10/2024    ANIONGAP 12 06/10/2024     "EGFRNORACEVR 49.9 (A) 06/10/2024       6. Diastolic congestive heart failure, unspecified HF chronicity ----- chronic issue, stable, per Card.    7. COPD, severe ----- chronic issue, per Pulmonary, stable.    8. Malignant neoplasm of prostate ----- stable, followed by Urology.  History of "urological procedure", no surgery.  On medication as ordered.    9. Uncomplicated opioid dependence ---- chronic back and knee pain, on Norco 10 per Pain Mgmt.  With c/o uncontrolled pain on current medication, advised to discuss with Dr. Zuniga.    10. Class 2 severe obesity due to excess calories with serious comorbidity and body mass index (BMI) of 39.0 to 39.9 in adult ---- healthy dietary choices discussed, activity level not ideal due to chronic pain issues        PLAN:     Neuro referral.  Follow-up with PCP on 2/12/25 as scheduled.  RTC as directed and/or prn.        SARAHY Quintero  [facility]       Future Appointments   Date Time Provider Department Center   2/12/2025 10:00 AM Matti Mejia MD [facility]   10/13/2025 10:30 AM Teena Womack PA-C [facility]     "

## 2025-01-27 ENCOUNTER — OFFICE VISIT (OUTPATIENT)
Dept: FAMILY MEDICINE | Facility: CLINIC | Age: 85
End: 2025-01-27
Payer: MEDICARE

## 2025-01-27 VITALS
TEMPERATURE: 97 F | BODY MASS INDEX: 39.87 KG/M2 | DIASTOLIC BLOOD PRESSURE: 72 MMHG | SYSTOLIC BLOOD PRESSURE: 134 MMHG | HEIGHT: 72 IN | HEART RATE: 66 BPM | OXYGEN SATURATION: 99 %

## 2025-01-27 DIAGNOSIS — R41.3 MEMORY DEFICIT: Primary | ICD-10-CM

## 2025-01-27 DIAGNOSIS — C61 MALIGNANT NEOPLASM OF PROSTATE: ICD-10-CM

## 2025-01-27 DIAGNOSIS — I50.30 DIASTOLIC CONGESTIVE HEART FAILURE, UNSPECIFIED HF CHRONICITY: ICD-10-CM

## 2025-01-27 DIAGNOSIS — R41.89 COGNITIVE CHANGES: ICD-10-CM

## 2025-01-27 DIAGNOSIS — J44.9 COPD, SEVERE: ICD-10-CM

## 2025-01-27 DIAGNOSIS — E66.01 CLASS 2 SEVERE OBESITY DUE TO EXCESS CALORIES WITH SERIOUS COMORBIDITY AND BODY MASS INDEX (BMI) OF 39.0 TO 39.9 IN ADULT: ICD-10-CM

## 2025-01-27 DIAGNOSIS — N18.31 CHRONIC KIDNEY DISEASE, STAGE 3A: ICD-10-CM

## 2025-01-27 DIAGNOSIS — E11.69 TYPE 2 DIABETES MELLITUS WITH OTHER SPECIFIED COMPLICATION, WITH LONG-TERM CURRENT USE OF INSULIN: ICD-10-CM

## 2025-01-27 DIAGNOSIS — F11.20 UNCOMPLICATED OPIOID DEPENDENCE: ICD-10-CM

## 2025-01-27 DIAGNOSIS — Z82.0 FAMILY HISTORY OF ALZHEIMER'S DISEASE: ICD-10-CM

## 2025-01-27 DIAGNOSIS — Z79.4 TYPE 2 DIABETES MELLITUS WITH OTHER SPECIFIED COMPLICATION, WITH LONG-TERM CURRENT USE OF INSULIN: ICD-10-CM

## 2025-01-27 DIAGNOSIS — E66.812 CLASS 2 SEVERE OBESITY DUE TO EXCESS CALORIES WITH SERIOUS COMORBIDITY AND BODY MASS INDEX (BMI) OF 39.0 TO 39.9 IN ADULT: ICD-10-CM

## 2025-01-27 PROCEDURE — 3078F DIAST BP <80 MM HG: CPT | Mod: CPTII,S$GLB,, | Performed by: REGISTERED NURSE

## 2025-01-27 PROCEDURE — 1101F PT FALLS ASSESS-DOCD LE1/YR: CPT | Mod: CPTII,S$GLB,, | Performed by: REGISTERED NURSE

## 2025-01-27 PROCEDURE — 3075F SYST BP GE 130 - 139MM HG: CPT | Mod: CPTII,S$GLB,, | Performed by: REGISTERED NURSE

## 2025-01-27 PROCEDURE — 99214 OFFICE O/P EST MOD 30 MIN: CPT | Mod: S$GLB,,, | Performed by: REGISTERED NURSE

## 2025-01-27 PROCEDURE — 1125F AMNT PAIN NOTED PAIN PRSNT: CPT | Mod: CPTII,S$GLB,, | Performed by: REGISTERED NURSE

## 2025-01-27 PROCEDURE — G2211 COMPLEX E/M VISIT ADD ON: HCPCS | Mod: S$GLB,,, | Performed by: REGISTERED NURSE

## 2025-01-27 PROCEDURE — 3288F FALL RISK ASSESSMENT DOCD: CPT | Mod: CPTII,S$GLB,, | Performed by: REGISTERED NURSE

## 2025-01-27 PROCEDURE — 99999 PR PBB SHADOW E&M-EST. PATIENT-LVL III: CPT | Mod: PBBFAC,,, | Performed by: REGISTERED NURSE

## 2025-01-27 RX ORDER — VIBEGRON 75 MG/1
1 TABLET, FILM COATED ORAL
COMMUNITY
Start: 2025-01-06

## 2025-02-06 ENCOUNTER — TELEPHONE (OUTPATIENT)
Dept: FAMILY MEDICINE | Facility: CLINIC | Age: 85
End: 2025-02-06
Payer: MEDICARE

## 2025-02-06 DIAGNOSIS — Z02.2 ENCOUNTER FOR EXAMINATION FOR ADMISSION TO NURSING HOME: Primary | ICD-10-CM

## 2025-02-06 NOTE — TELEPHONE ENCOUNTER
Pt saw Kareem Gross, [place] 01/27/25, please advise if pt needs an appt with you to order this TB test. Or I can check with Kareem.

## 2025-02-06 NOTE — TELEPHONE ENCOUNTER
Spoke with [facility], she wanted to know if we can fax her the TB order? Please order and I will fax to her.

## 2025-02-06 NOTE — TELEPHONE ENCOUNTER
----- Message from Radha sent at 2/6/2025  2:01 PM CST -----  Contact: Mira with [facility] phone [phone] fax [phone]  [email or web address]     Patient is calling for Medical Advice regarding: Calling to request an order for a TB test for Nursing Home placement.    How long has patient had these symptoms: N/A    Pharmacy name and phone#: N/A    Patient wants a call back or thru [facility]: Call back    Comments: Please advise. Thanks.    Please advise patient replies from provider may take up to 48 hours.

## 2025-02-07 DIAGNOSIS — I10 HYPERTENSION, UNSPECIFIED TYPE: Primary | ICD-10-CM

## 2025-02-10 ENCOUNTER — TELEPHONE (OUTPATIENT)
Dept: FAMILY MEDICINE | Facility: CLINIC | Age: 85
End: 2025-02-10
Payer: MEDICARE

## 2025-02-10 RX ORDER — BUSPIRONE HYDROCHLORIDE 7.5 MG/1
7.5 TABLET ORAL DAILY PRN
Qty: 30 TABLET | Refills: 2 | Status: SHIPPED | OUTPATIENT
Start: 2025-02-10 | End: 2026-02-10

## 2025-02-10 NOTE — TELEPHONE ENCOUNTER
----- Message from Susanna sent at 2/10/2025  9:26 AM CST -----  Contact: Jackie HOWARD(wife )  ..Type:  Patient Requesting Call    Who Called:Jackie HOWARD(wife )  Does the patient know what this is regarding?:pts wife states pt is not sleeping and experiencing dementia symptoms and pt is scheduled to see neurologist in may but was wondering call you call a prescription in to calm the pt down   Would the patient rather a call back or a response via [facility]? call  Best Call Back Number:.[phone]    Additional Information:

## 2025-02-10 NOTE — TELEPHONE ENCOUNTER
----- Message from Marion sent at 2/10/2025  4:19 PM CST -----  Regarding: medical advice  Contact: Mrs. Martinez  .Type:  Needs Medical Advice    Who Called: mrs. Martinez   Symptoms (please be specific):    How long has patient had these symptoms:    Pharmacy name and phone #:    Would the patient rather a call back or a response via [facility]? call  Best Call Back Number: [phone]     Additional Information:  Jose Luis's wife called for an authorization or lyft chair recliner.

## 2025-02-11 ENCOUNTER — TELEPHONE (OUTPATIENT)
Dept: FAMILY MEDICINE | Facility: CLINIC | Age: 85
End: 2025-02-11
Payer: MEDICARE

## 2025-02-11 DIAGNOSIS — M17.0 OSTEOARTHRITIS OF BOTH KNEES, UNSPECIFIED OSTEOARTHRITIS TYPE: ICD-10-CM

## 2025-02-11 DIAGNOSIS — Z74.09 IMPAIRED MOBILITY: Primary | ICD-10-CM

## 2025-02-11 DIAGNOSIS — I50.9 CONGESTIVE HEART FAILURE, UNSPECIFIED HF CHRONICITY, UNSPECIFIED HEART FAILURE TYPE: ICD-10-CM

## 2025-02-11 NOTE — TELEPHONE ENCOUNTER
----- Message from Tia sent at 2/11/2025 11:19 AM CST -----  Who Called: Pt wife    What is the request in detail: Requesting call back to discuss PA for lift chair for insurance. Please advise    Can the clinic reply by [facility]? No    Best Call Back Number: [phone]      Additional Information:

## 2025-02-11 NOTE — TELEPHONE ENCOUNTER
Pt's wife called and asked if a lift chair can be ordered for this pt, his old one broke. Please advise.

## 2025-02-12 ENCOUNTER — TELEPHONE (OUTPATIENT)
Dept: FAMILY MEDICINE | Facility: CLINIC | Age: 85
End: 2025-02-12
Payer: MEDICARE

## 2025-02-12 NOTE — TELEPHONE ENCOUNTER
Wife is wanting to know if Dr. Meija can order a big bed for him to go to the nursing home, she is unsure if they have them there. Please advise.

## 2025-02-12 NOTE — TELEPHONE ENCOUNTER
----- Message from Bee sent at 2/12/2025 12:30 PM CST -----  Contact: Carine (wife)  Mrs. Hawk needs a call back at .[phone] in regards to Mr. Amaya transferring to the nursing home. She stated they she was reaching out to see about ordering him a big bed for him.  The nursing home he will be attending is [facility]- [address] [place] [id number] [phone]      Thanks

## 2025-02-12 NOTE — TELEPHONE ENCOUNTER
----- Message from Mera sent at 2/12/2025 11:48 AM CST -----  Contact: [phone]  Type:  Needs Medical Advice    Who Called: THERESA WITH [facility] [phone]  Symptoms (please be specific): need to talk to the nurse in reference to the lift chair request   How long has patient had these symptoms:    Pharmacy name and phone #:    Would the patient rather a call back or a response via [facility]? Call back  Best Call Back Number:  [phone]  Additional Information: n [phone]

## 2025-02-18 ENCOUNTER — TELEPHONE (OUTPATIENT)
Dept: FAMILY MEDICINE | Facility: CLINIC | Age: 85
End: 2025-02-18
Payer: MEDICARE

## 2025-02-18 NOTE — TELEPHONE ENCOUNTER
----- Message from Viv sent at 2/18/2025  1:07 PM CST -----  Type:  Patient Requesting ReferralWho Called:insuranceReferral to What Specialty:lift chair ordersIs the specialist an [facility] or [facility]?:nonPatient Requesting a Response?:yesWould the patient rather a call back or a response via [facility]? callBest Call Back Number:[phone] Information: requesting status on orders

## 2025-02-18 NOTE — TELEPHONE ENCOUNTER
Spoke with patient wife Carine wanted to know if we heard anything from insurance, told her the order was sent and we hadn't heard anything, she should follow-up with insurance for approval, voiced understanding.

## 2025-02-20 ENCOUNTER — TELEPHONE (OUTPATIENT)
Dept: FAMILY MEDICINE | Facility: CLINIC | Age: 85
End: 2025-02-20
Payer: MEDICARE

## 2025-02-20 NOTE — TELEPHONE ENCOUNTER
Spoke with patient wife Carine I informed her to call the nursing home and ask to speak with the director of nursing in regards to getting rails placed on patient bed, the facility has its on doctor, voiced understanding.

## 2025-02-20 NOTE — TELEPHONE ENCOUNTER
----- Message from Risa sent at 2/20/2025  8:06 AM CST -----  Contact: [facility]  Type:  Needs Medical AdviceWho Called: Rhonda-[facility]Would the patient rather a call back or a response via [facility]? Call [facility] Call Back Number: [phone] Information: Need a call back to get information on the lift chair order that was sent. She wants to make sure the order is for a lift chair

## 2025-02-20 NOTE — TELEPHONE ENCOUNTER
----- Message from Susanna sent at 2/20/2025  4:11 PM CST -----  Contact: TRACEY MARROQUIN [[id number]]  ..Type:  Patient Requesting CallWho Called:Carine (wife )Does the patient know what this is regarding?:pt wife wants to know if the provider can send over orders to the nursing home for railing on the pt bed to keep the pt from falling Would the patient rather a call back or a response via [facility]? callBe Call Back Number:.[phone] Information:

## 2025-02-24 ENCOUNTER — TELEPHONE (OUTPATIENT)
Dept: FAMILY MEDICINE | Facility: CLINIC | Age: 85
End: 2025-02-24
Payer: MEDICARE

## 2025-02-24 NOTE — TELEPHONE ENCOUNTER
Spoke with patient wife Carine informed her to reach out to the nursing home social worker or the nurse to help her with getting any chair that was needed since he is a resident of the nursing home, voiced understanding.

## 2025-02-24 NOTE — TELEPHONE ENCOUNTER
----- Message from Bernard sent at 2/24/2025 11:44 AM CST -----  Contact: lisa/wife  Lisa is requesting a call back in regards to getting a PA for the lifting chairPlease call her at [phone]

## 2025-03-10 ENCOUNTER — PATIENT MESSAGE (OUTPATIENT)
Dept: ADMINISTRATIVE | Facility: HOSPITAL | Age: 85
End: 2025-03-10
Payer: MEDICARE

## 2025-05-07 ENCOUNTER — PATIENT MESSAGE (OUTPATIENT)
Dept: NEUROLOGY | Facility: CLINIC | Age: 85
End: 2025-05-07
Payer: MEDICARE

## 2025-05-08 ENCOUNTER — TELEPHONE (OUTPATIENT)
Dept: NEUROSURGERY | Facility: CLINIC | Age: 85
End: 2025-05-08
Payer: MEDICARE

## 2025-05-08 NOTE — TELEPHONE ENCOUNTER
----- Message from Court sent at 5/8/2025  8:55 AM CDT -----  Contact: TRACEY MARROQUIN [[id number]]  .Type:  Patient Requesting CallWho Called:TRACEY MARROQUIN [[id number]]Does the patient know what this is regarding?:Patient requesting a call back in regards to appointment that was rescheduled fort his morning to July 29. Patients stated that is too long to wait to be seenWould the patient rather a call back or a response via [facility]? Call [facility] Call Back Number:[phone] Information:

## 2025-05-26 DIAGNOSIS — Z00.00 ENCOUNTER FOR MEDICARE ANNUAL WELLNESS EXAM: ICD-10-CM

## 2025-06-09 ENCOUNTER — PATIENT MESSAGE (OUTPATIENT)
Dept: ADMINISTRATIVE | Facility: HOSPITAL | Age: 85
End: 2025-06-09
Payer: MEDICARE

## 2025-06-13 ENCOUNTER — LAB VISIT (OUTPATIENT)
Dept: LAB | Facility: HOSPITAL | Age: 85
End: 2025-06-13
Attending: INTERNAL MEDICINE
Payer: MEDICARE

## 2025-06-13 ENCOUNTER — OFFICE VISIT (OUTPATIENT)
Dept: NEUROLOGY | Facility: CLINIC | Age: 85
End: 2025-06-13
Payer: MEDICARE

## 2025-06-13 DIAGNOSIS — I25.10 CORONARY ARTERY DISEASE, UNSPECIFIED VESSEL OR LESION TYPE, UNSPECIFIED WHETHER ANGINA PRESENT, UNSPECIFIED WHETHER NATIVE OR TRANSPLANTED HEART: ICD-10-CM

## 2025-06-13 DIAGNOSIS — G47.33 OSA ON CPAP: ICD-10-CM

## 2025-06-13 DIAGNOSIS — K21.9 GASTROESOPHAGEAL REFLUX DISEASE WITHOUT ESOPHAGITIS: ICD-10-CM

## 2025-06-13 DIAGNOSIS — I50.30 DIASTOLIC CONGESTIVE HEART FAILURE, UNSPECIFIED HF CHRONICITY: ICD-10-CM

## 2025-06-13 DIAGNOSIS — J44.9 COPD, SEVERE: ICD-10-CM

## 2025-06-13 DIAGNOSIS — C61 MALIGNANT NEOPLASM OF PROSTATE: ICD-10-CM

## 2025-06-13 DIAGNOSIS — G89.29 CHRONIC LOW BACK PAIN WITHOUT SCIATICA, UNSPECIFIED BACK PAIN LATERALITY: ICD-10-CM

## 2025-06-13 DIAGNOSIS — D64.9 ANEMIA, UNSPECIFIED TYPE: ICD-10-CM

## 2025-06-13 DIAGNOSIS — J30.9 ALLERGIC RHINITIS, UNSPECIFIED SEASONALITY, UNSPECIFIED TRIGGER: ICD-10-CM

## 2025-06-13 DIAGNOSIS — Z82.0 FAMILY HISTORY OF ALZHEIMER'S DISEASE: ICD-10-CM

## 2025-06-13 DIAGNOSIS — R41.3 MEMORY DEFICIT: ICD-10-CM

## 2025-06-13 DIAGNOSIS — F02.818 MAJOR NEUROCOGNITIVE DISORDER DUE TO ALZHEIMER DISEASE, WITH BEHAVIORAL DISTURBANCE: Primary | ICD-10-CM

## 2025-06-13 DIAGNOSIS — R44.3 HALLUCINATIONS: ICD-10-CM

## 2025-06-13 DIAGNOSIS — F03.90 DEMENTIA WITHOUT BEHAVIORAL DISTURBANCE: ICD-10-CM

## 2025-06-13 DIAGNOSIS — Z79.4 TYPE 2 DIABETES MELLITUS WITH HYPERGLYCEMIA, WITH LONG-TERM CURRENT USE OF INSULIN: ICD-10-CM

## 2025-06-13 DIAGNOSIS — E11.69 TYPE 2 DIABETES MELLITUS WITH OTHER SPECIFIED COMPLICATION, UNSPECIFIED WHETHER LONG TERM INSULIN USE: ICD-10-CM

## 2025-06-13 DIAGNOSIS — G47.33 OSA AND COPD OVERLAP SYNDROME: ICD-10-CM

## 2025-06-13 DIAGNOSIS — M17.0 OSTEOARTHRITIS OF BOTH KNEES, UNSPECIFIED OSTEOARTHRITIS TYPE: ICD-10-CM

## 2025-06-13 DIAGNOSIS — R41.3 OTHER AMNESIA: ICD-10-CM

## 2025-06-13 DIAGNOSIS — E11.65 TYPE 2 DIABETES MELLITUS WITH HYPERGLYCEMIA, WITH LONG-TERM CURRENT USE OF INSULIN: ICD-10-CM

## 2025-06-13 DIAGNOSIS — D50.9 IRON DEFICIENCY ANEMIA, UNSPECIFIED IRON DEFICIENCY ANEMIA TYPE: ICD-10-CM

## 2025-06-13 DIAGNOSIS — J44.9 OSA AND COPD OVERLAP SYNDROME: ICD-10-CM

## 2025-06-13 DIAGNOSIS — E66.812 CLASS 2 SEVERE OBESITY DUE TO EXCESS CALORIES WITH SERIOUS COMORBIDITY AND BODY MASS INDEX (BMI) OF 39.0 TO 39.9 IN ADULT: ICD-10-CM

## 2025-06-13 DIAGNOSIS — N18.31 CHRONIC KIDNEY DISEASE, STAGE 3A: ICD-10-CM

## 2025-06-13 DIAGNOSIS — N40.0 BENIGN PROSTATIC HYPERPLASIA, UNSPECIFIED WHETHER LOWER URINARY TRACT SYMPTOMS PRESENT: ICD-10-CM

## 2025-06-13 DIAGNOSIS — D47.2 MGUS (MONOCLONAL GAMMOPATHY OF UNKNOWN SIGNIFICANCE): ICD-10-CM

## 2025-06-13 DIAGNOSIS — M54.50 CHRONIC LOW BACK PAIN WITHOUT SCIATICA, UNSPECIFIED BACK PAIN LATERALITY: ICD-10-CM

## 2025-06-13 DIAGNOSIS — R41.89 COGNITIVE CHANGES: ICD-10-CM

## 2025-06-13 DIAGNOSIS — G30.9 MAJOR NEUROCOGNITIVE DISORDER DUE TO ALZHEIMER DISEASE, WITH BEHAVIORAL DISTURBANCE: ICD-10-CM

## 2025-06-13 DIAGNOSIS — Z74.09 IMPAIRED MOBILITY: ICD-10-CM

## 2025-06-13 DIAGNOSIS — F02.818 MAJOR NEUROCOGNITIVE DISORDER DUE TO ALZHEIMER DISEASE, WITH BEHAVIORAL DISTURBANCE: ICD-10-CM

## 2025-06-13 DIAGNOSIS — G30.9 MAJOR NEUROCOGNITIVE DISORDER DUE TO ALZHEIMER DISEASE, WITH BEHAVIORAL DISTURBANCE: Primary | ICD-10-CM

## 2025-06-13 DIAGNOSIS — I10 ESSENTIAL HYPERTENSION: ICD-10-CM

## 2025-06-13 DIAGNOSIS — E78.00 HYPERCHOLESTEROLEMIA: ICD-10-CM

## 2025-06-13 DIAGNOSIS — E66.01 CLASS 2 SEVERE OBESITY DUE TO EXCESS CALORIES WITH SERIOUS COMORBIDITY AND BODY MASS INDEX (BMI) OF 39.0 TO 39.9 IN ADULT: ICD-10-CM

## 2025-06-13 DIAGNOSIS — I73.9 PAD (PERIPHERAL ARTERY DISEASE): ICD-10-CM

## 2025-06-13 DIAGNOSIS — F11.20 UNCOMPLICATED OPIOID DEPENDENCE: ICD-10-CM

## 2025-06-13 LAB
(HCYS)2 SERPL-MCNC: 17.4 UMOL/L (ref 4–16.5)
ABSOLUTE EOSINOPHIL (OHS): 0.03 K/UL
ABSOLUTE MONOCYTE (OHS): 0.63 K/UL (ref 0.3–1)
ABSOLUTE NEUTROPHIL COUNT (OHS): 7.79 K/UL (ref 1.8–7.7)
BASOPHILS # BLD AUTO: 0.04 K/UL
BASOPHILS NFR BLD AUTO: 0.4 %
CREAT SERPL-MCNC: 1.1 MG/DL (ref 0.5–1.4)
ERYTHROCYTE [DISTWIDTH] IN BLOOD BY AUTOMATED COUNT: 15.5 % (ref 11.5–14.5)
FOLATE SERPL-MCNC: 9.6 NG/ML (ref 4–24)
GFR SERPLBLD CREATININE-BSD FMLA CKD-EPI: >60 ML/MIN/1.73/M2
HCT VFR BLD AUTO: 43.6 % (ref 40–54)
HGB BLD-MCNC: 13.1 GM/DL (ref 14–18)
HIV 1+2 AB+HIV1 P24 AG SERPL QL IA: NORMAL
IMM GRANULOCYTES # BLD AUTO: 0.04 K/UL (ref 0–0.04)
IMM GRANULOCYTES NFR BLD AUTO: 0.4 % (ref 0–0.5)
LYMPHOCYTES # BLD AUTO: 2.33 K/UL (ref 1–4.8)
MCH RBC QN AUTO: 29.8 PG (ref 27–31)
MCHC RBC AUTO-ENTMCNC: 30 G/DL (ref 32–36)
MCV RBC AUTO: 99 FL (ref 82–98)
NUCLEATED RBC (/100WBC) (OHS): 0 /100 WBC
PLATELET # BLD AUTO: 322 K/UL (ref 150–450)
PMV BLD AUTO: 11.3 FL (ref 9.2–12.9)
RBC # BLD AUTO: 4.4 M/UL (ref 4.6–6.2)
RELATIVE EOSINOPHIL (OHS): 0.3 %
RELATIVE LYMPHOCYTE (OHS): 21.5 % (ref 18–48)
RELATIVE MONOCYTE (OHS): 5.8 % (ref 4–15)
RELATIVE NEUTROPHIL (OHS): 71.6 % (ref 38–73)
T4 FREE SERPL-MCNC: 1.32 NG/DL (ref 0.71–1.51)
TSH SERPL-ACNC: 0.06 UIU/ML (ref 0.4–4)
VIT B12 SERPL-MCNC: 455 PG/ML (ref 210–950)
WBC # BLD AUTO: 10.86 K/UL (ref 3.9–12.7)

## 2025-06-13 PROCEDURE — 99999 PR PBB SHADOW E&M-EST. PATIENT-LVL V: CPT | Mod: PBBFAC,,, | Performed by: NURSE PRACTITIONER

## 2025-06-13 PROCEDURE — 87389 HIV-1 AG W/HIV-1&-2 AB AG IA: CPT

## 2025-06-13 PROCEDURE — 82746 ASSAY OF FOLIC ACID SERUM: CPT

## 2025-06-13 PROCEDURE — 82565 ASSAY OF CREATININE: CPT

## 2025-06-13 PROCEDURE — 82607 VITAMIN B-12: CPT

## 2025-06-13 PROCEDURE — 83520 IMMUNOASSAY QUANT NOS NONAB: CPT

## 2025-06-13 PROCEDURE — 36415 COLL VENOUS BLD VENIPUNCTURE: CPT

## 2025-06-13 PROCEDURE — 86038 ANTINUCLEAR ANTIBODIES: CPT

## 2025-06-13 PROCEDURE — 84439 ASSAY OF FREE THYROXINE: CPT

## 2025-06-13 PROCEDURE — 83090 ASSAY OF HOMOCYSTEINE: CPT

## 2025-06-13 PROCEDURE — 85025 COMPLETE CBC W/AUTO DIFF WBC: CPT

## 2025-06-13 PROCEDURE — 84443 ASSAY THYROID STIM HORMONE: CPT

## 2025-06-13 PROCEDURE — 84393 TAU PHOSPHORYLATED EA: CPT

## 2025-06-13 RX ORDER — MEMANTINE HYDROCHLORIDE 10 MG/1
10 TABLET ORAL 2 TIMES DAILY
Qty: 60 TABLET | Refills: 11 | Status: SHIPPED | OUTPATIENT
Start: 2025-06-13 | End: 2026-06-13

## 2025-06-13 NOTE — PROGRESS NOTES
Subjective:       Patient ID: Jose Luis Martinez is a 84 y.o. male.    Chief Complaint: Memory Loss          HPIThe patient is here for memory loss.     The patient is presenting with memory and debility complaints since 2024-year. The patient is accompanied by spouse and stepdaughter. He is now a Resident of [facility])  The main problems the patient has are related to progressive short term memory loss with behavioral decline and physical decline. For example, the patient would repeat the same question repeatedly. The patient also started forgetting names. He at times forget who his wife and stepdaughter. The patient is no longer driving stopped in early 2024. Patient has  confusion around and inside the facilty. Wife was unable to provide total care and patient has been placed in nursing home.  Patient has trouble remembering the date and time, Nursing home manage medications and appointments. Patient unaware of major holidays and political changes. The patient family is handling finances. The patient totally dependent with ADLs.  Patient has to be fed meals, Patient has agitation. Patient has frequent hallucinations or delusions. No seizures. Rambles and goes off on tangential speech.  Unable to  handle tools. Requires total  ADL care, bed bound, patient has wound on buttocks. No history of strokes. No history of headaches. No history of hypothyroidism. No history of alcoholism. Former smoker quit 2023.  No history of depression. Patient has anxiety. No history of bipolar disorder. No history of Untreated Syphilis.  No history of HIV infection. No toxic exposures.  No history of traumatic brain injury. Bed bound since March has coccyx wound. No tremors or abnormal movements. No falls or instability. Patient is bladder and bowel urinary incontinence. No issues with sleep and appetite. Father and sister and bother had late onset of dementia.              Review of Systems   HENT:  Positive for hearing loss.     Cardiovascular: Negative.    Gastrointestinal: Negative.         Bowel incontinence    Genitourinary:  Positive for frequency and urgency.        Urinary incontinence    Musculoskeletal:  Positive for arthralgias, back pain, gait problem, joint swelling, myalgias, neck pain and neck stiffness.        Bed bound    Skin:  Positive for wound.   Psychiatric/Behavioral:  Positive for agitation, behavioral problems, confusion, decreased concentration, dysphoric mood and hallucinations.                Current Medications[1]  Past Medical History:   Diagnosis Date    Hypertension     Ulcers of both lower legs 6/13/2018     Past Surgical History:   Procedure Laterality Date    CATARACT EXTRACTION, BILATERAL      LIPOMA RESECTION Bilateral     neck     PROSTATE SURGERY      REPAIR OF EYELID      TOTAL KNEE ARTHROPLASTY Left      Social History[2]          Past/Current Medical/Surgical History, Past/Current Social History, Past/Current Family History and Past/Current Medications were reviewed in detail.        Objective:           VITAL SIGNS WERE REVIEWED      GENERAL APPEARANCE:     The patient looks uncomfortable, sitting in wheel chair, poor posture, complains of pain due to buttocks wound .    Body habitus is overweight    No signs of respiratory distress.    Normal breathing pattern.    No dysmorphic features    Normal eye contact.     GENERAL MEDICAL EXAM:    HEENT:  Head is atraumatic normocephalic.     No tender temporal arteries.     Neck and Axillae: No JVD. No visible lesions.    No carotid bruits. No thyromegaly. No lymphadenopathy.    Cardiopulmonary: No cyanosis. No tachypnea. Normal respiratory effort.     Gastrointestinal/Urogenital:  No jaundice. No stomas or lesions. No visible hernias. No catheters.     Abdomen is soft non-tender. No masses or organomegaly.    Skin, Hair and Nails: Patient has pathognomonic skin rash. No neurofibromatosis. Decubitus on buttocks, visible lesions.No stigmata of autoimmune  disease. No clubbing.    Skin is warm and moist. No palpable masses.    Limbs: poor skin dry rash, sores  varicose veins. No visible swelling.    No palpable edema. Pulses are symmetric. Pedal pulses are palpable.      Muskoskeletal: + visible deformities.No visible lesions. Not abmulotory weak.     No spine tenderness. No signs of longstanding neuropathy. No dislocations or fractures.            Neurological Exam  Mental Status  Alert. Oriented only to person. Recalls 0 of 3 objects immediately. At 3 minutes recalls 0 of 3 objects. Unable to copy figure. Clock drawing is abnormal. Speech is normal.    Cranial Nerves  CN II: Right visual acuity: Normal. Left visual acuity: Normal.  CN III, IV, VI: Extraocular movements intact bilaterally.  CN V: Facial sensation is normal.  CN VIII:  Right: Hearing is decreased.  Left: Hearing is decreased.    Motor  Decreased muscle bulk throughout. Decreased muscle tone. The following abnormal movements were seen:    Sensory  Light touch abnormality: Pinprick abnormality: Vibration abnormality: Proprioception abnormality:     Reflexes  Deep tendon reflexes are 2+ and symmetric in all four extremities.    Gait  Casual gait: Reduced right arm swing. Reduced left arm swing.  Bed bound .        GORDON COGNITIVE ASSESSMENT (MOCA) TOTAL SCORE 30         NORMAL-MILD NCD 26-30    MILD DEMENTIA 20-25    MODERATE DEMENTIA 10-19    SEVERE DEMENTIA <10        DATE 06-       TOTAL SCORE 5       VISUOSPATIAL EXECUTIVE (5) 0       NAMING (3) 2       ATTENTION (6) 3       LANGUAGE (3) 0       ABSTRACTION(2) 0       RECALL (5) 0       ORIENTATION (6) 0           Lab Results   Component Value Date    WBC 13.83 (H) 06/10/2024    HGB 11.1 (L) 06/10/2024    HCT 34.8 (L) 06/10/2024    MCV 95 06/10/2024     06/10/2024     Sodium   Date Value Ref Range Status   06/10/2024 142 136 - 145 mmol/L Final     Potassium   Date Value Ref Range Status   06/10/2024 3.8 3.5 - 5.1 mmol/L Final      Chloride   Date Value Ref Range Status   06/10/2024 107 95 - 110 mmol/L Final     CO2   Date Value Ref Range Status   06/10/2024 23 23 - 29 mmol/L Final     Glucose   Date Value Ref Range Status   06/10/2024 132 (H) 70 - 110 mg/dL Final     BUN   Date Value Ref Range Status   06/10/2024 43 (H) 8 - 23 mg/dL Final     Creatinine   Date Value Ref Range Status   06/10/2024 1.4 0.5 - 1.4 mg/dL Final     Calcium   Date Value Ref Range Status   06/10/2024 9.1 8.7 - 10.5 mg/dL Final     Total Protein   Date Value Ref Range Status   06/10/2024 7.0 6.0 - 8.4 g/dL Final     Albumin   Date Value Ref Range Status   06/10/2024 2.9 (L) 3.5 - 5.2 g/dL Final     Total Bilirubin   Date Value Ref Range Status   06/10/2024 0.5 0.1 - 1.0 mg/dL Final     Comment:     For infants and newborns, interpretation of results should be based  on gestational age, weight and in agreement with clinical  observations.    Premature Infant recommended reference ranges:  Up to 24 hours.............<8.0 mg/dL  Up to 48 hours............<12.0 mg/dL  3-5 days..................<15.0 mg/dL  6-29 days.................<15.0 mg/dL       Alkaline Phosphatase   Date Value Ref Range Status   06/10/2024 95 55 - 135 U/L Final     AST   Date Value Ref Range Status   06/10/2024 18 10 - 40 U/L Final     ALT   Date Value Ref Range Status   06/10/2024 14 10 - 44 U/L Final     Anion Gap   Date Value Ref Range Status   06/10/2024 12 8 - 16 mmol/L Final     eGFR if    Date Value Ref Range Status   05/03/2022 54.1 (A) >60 mL/min/1.73 m^2 Final     eGFR if non    Date Value Ref Range Status   05/03/2022 46.8 (A) >60 mL/min/1.73 m^2 Final     Comment:     Calculation used to obtain the estimated glomerular filtration  rate (eGFR) is the CKD-EPI equation.        Lab Results   Component Value Date    [id number] 425 09/22/2023     Lab Results   Component Value Date    TSH 3.799 08/25/2021     RADIOLOGY EVALUATION    03-    CT HEAD  WO    CT HEAD NL     07-    CT HEAD WO    CTH  NL  No results found in the last 24 hours.      Reviewed the neuroimaging independently       Assessment:       1. 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 Memory deficit    3. Cognitive changes    4. Family history of Alzheimer's disease    5. Other amnesia    6. Dementia without behavioral disturbance    7. Hallucinations    8. CHRISTOPHER and COPD overlap syndrome    9. Essential hypertension    10. Benign prostatic hyperplasia, unspecified whether lower urinary tract symptoms present    11. PAD (peripheral artery disease)    12. Class 2 severe obesity due to excess calories with serious comorbidity and body mass index (BMI) of 39.0 to 39.9 in adult    13. Hypercholesterolemia    14. Type 2 diabetes mellitus with hyperglycemia, with long-term current use of insulin    15. Coronary artery disease, unspecified vessel or lesion type, unspecified whether angina present, unspecified whether native or transplanted heart    16. Chronic low back pain without sciatica, unspecified back pain laterality    17. Osteoarthritis of both knees, unspecified osteoarthritis type    18. Uncomplicated opioid dependence    19. MGUS (monoclonal gammopathy of unknown significance)    20. CHRISTOPHER on CPAP    21. Diastolic congestive heart failure, unspecified HF chronicity    22. Malignant neoplasm of prostate    23. COPD, severe    24. Gastroesophageal reflux disease without esophagitis    25. Iron deficiency anemia, unspecified iron deficiency anemia type    26. Type 2 diabetes mellitus with other specified complication, unspecified whether long term insulin use    27. Impaired mobility    28. Chronic kidney disease, stage 3a    29. Allergic rhinitis, unspecified seasonality, unspecified trigger        Plan:         MAJOR NEUROCOGNITIVE DISORDER DUE TO ALZHEIMER DISEASE WITH BEHAVIORAL DISTURBANCE/FORGETFULNESS/FAMILY HISTORY OF ALZHEIMER  DISEASE/FORGETFULNESS/  HALLUCINATIONS/ CHRISTOPHER AND COPD/HTN BPH/COPD          EVALUATION     CTA H and N wo     TSH-T4, FA, B12, HIV, RPR.    Comprehensive Neuropsychological Testing     DriveAble to assess the cognitive aspects of driving.     Blood markers for AD (amyloid levels and tau levels like [id number] and p-[id number]) will likely to be useful.  (FDA approved Lumipulse G ?-amyloid Ratio). Functional imaging and CSF analyses have proven expensive, invasive and inconvenient.        DISEASE-MODIFYING AGENTS     Explained to the patient and family that medications are intended to slow down the progression (in the early stages of the disease) and not stop it or reverse the disease. The disease is relentless, progressive and so far, cannot be controlled. Progression includes Cognitive decline, Behavioral disturbances, and Motor decline as well.     I counseled the patient and family that the rate of progression is extremely variable, and the average (10 years) is an inaccurate measure.     CLASSES:    NMDA RECEPTOR ANTAGONISTS    Will start memantine/Namenda and titrate slowly to 10 mg BID. The side effects include dizziness, seizures and nightmares and discussed with patient and family. (Other formulations include Namenda XR)    CHOLINESTERASE INHIBITORS (CEI)    Avoid Donepezil/Aricept due to hypotension and bradycardia       SYMPTOMATIC MANAGEMENT-BEHAVIORAL SYMPTOMS AND NON-COGNITIVE SYMPTOMS       ANTIDEPRESSANTS CLASS MEDICATIONS      Bupropion Buspar 7.5 mg dailu               HOME CARE AND IN FACILITY CARE         Falling Down Precautions. Gait is affected by the disease as well.     Avoid driving and access to firearms     Incremental 24/Care     Help with finances and decision making.    Join support group.    Proofing the house and use labeling.    Avoid antihistamines and anticholinergics.    Avoid changing routine.    Use written reminders.    Avoid multitasking.    Healthy diet, exercise (physical and  cognitive).    Good sleep hygiene.        HERE ARE 10 WAYS TO REDUCE RISK OF DEMENTIA AND SLOW DOWN THE PROGRESSION OF DEMENTIA        1.Be physically active.    2. Avoid smoking and alcohol consumption.    3. Track your numbers. Keep your blood pressure, cholesterol, blood sugar and weight within recommended ranges.    4. Stay socially connected.    5. Make healthy food choices. Eat a well-balance and healthy diet that rich in cereals, fish, legumes, and vegetables.    6. Reduce stress.     7. Challenge your brain by trying something new, playing games, or learning a new language.    8. Take care of your hearing. Avoid being continuously exposed to loud sounds and wear a hearing aid if hearing does become a problem.    9. Lower risk of falls. Consider installing handrails on all stairs and grab bars in bathrooms.    10. Reduce your exposure to air pollution, such as exposure to exhaust in heavy traffic.         CAREGIVERS COUNSELING     Recommend reading the following books:     The 36-Hour Day and there are many online and printed resources to help caregivers as well.     Alzheimer's Through the Stages.     Dementia with G.R.A.C.E.            PREVENTION OF DELIRIUM       1. Good hydration and avoid electrolyte imbalance  2. Recognize and treat infections immediately especially UTI.  3. Bladder emptying and prevent constipation.   4. Provide stimulating activities and familiar objects  5. Use eyeglasses and hearing aids if needed.   6. Use simple and regular communication about people, current place, and time  7. Mobility and range-of-motion exercises  8. Reduce noise, lighting and avoid sleep interruptions  9. Non-narcotic pain management.  10.Nondrug treatment for sleep problems or anxiety  11. Avoid antihistamines.  12. Avoid narcotics.  13. Avoid benzodiazepines.            HELPFUL STRATEGIES FOR THE FAMILY AND CAREGIVERS        BEHAVIORAL MANAGEMENT STRATEGIES     Remember that you cannot reason with acute  "psychosis or confusion. Unless there is an immediate safety issue, there is no need to challenge or correct the person.  For example, if a pt is hallucinating, do not argue with the person that what they are seeing is not real. If they are expressing paranoia, empathize gently with their fear, and attempt to redirect them to a different activity.   If the person is particularly stuck on a topic or activity, as long as the activity is safe and is not worsening their agitation, you don't need to intervene.     Communicate calmly, simply, and concisely    Reassure the person that they are safe and you are here to help  Try not to express irritation or anger  Speak quietly and calmly, do not shout or threaten the person. Avoid provocation.   Establish verbal contact and provide orientation and reassurance.  Attempt to identify the patient's wants and feelings, listen to what they are saying, and reflect those wants and feelings back to the patient.  Dont use sarcasm as a weapon    Set clear limits on behavior in a calm voice ("You cannot hit the nurse.")  Offer choices and optimism ("Which toothpaste would you like to use today?")    Redirect the person to an alternative behavior ("Can you come help me with this?)    Avoid saying things like, "We already went over this!" "You can't keep doing this." "I already explained this to you"  Instead, simply nod calmly and acknowledge what the person is saying ("Yes, that makes sense."), and then request their help or company in a different behavior.   Having a mental list of activities the patient enjoys can be helpful with redirection. For example, do they enjoy going for walks? Eating a piece of candy?   If redirection becomes challenging, you can place objects that your family member enjoys strategically in the home in order to use for distraction. This is particularly useful if there are items that they often talk about or frequently ask you questions about; or if they are " "visually striking. Once you focus the person on this object, talk about it briefly until they are calm and then attempt to redirect to a new behavior.   Sometimes activities which are repetitive can be calming, particularly if there is a comforting sensory element. For example, pt may enjoy folding soft towels or laundry.    Limit overstimulation    Decrease distractions by turning off the TV, computer, any fluorescent lights that hum, etc.  Ask any casual visitors to leave--the fewer people the better  If the person is very agitated, avoid touching them, and respect their personal space  Sit down and ask the person to sit down also     PREVENTATIVE STRATEGIES     Try to help the patient develop a routine and stick to it  Address all immediate safety issues  Does the person still have access to the car and keys? Take the keys away, or disconnect the car battery.  Are they wandering at night? Install locks on the outside doors. A keypad lock works well. Alarms on bedroom doors can also be helpful.   Are they turning on the stove and risking a fire? If you cannot limit their access to the kitchen, you may need to unplug dangerous devices any time you are not in the room.   If the person is exhibiting poor judgment throughout the day, they likely need someone supervising them at all times.   Do they still have access to significant financial assets? Limit their access to accounts, and consult with a  if necessary.   Identify situations that seem to trigger agitation or a problematic behavior, and modify the person's environment to minimize or eliminate that trigger.   For example, is their behavior worse if they don't get a good night's sleep? Or if they don't eat or drink enough? If so, prioritize those activities and build behavior plans to support them.  Do they get upset about not being allowed to answer the phone when it rings? Put all of the phones in the house on "silent."   Do they become paranoid that " certain family members are trying to take advantage of them? Limit contact with the targeted family member as much as possible, and re-introduce them slowly after some time has passed.   Encourage independence in daily living whenever safely possible  Encourage collaborative decision-making regarding his care as well as daily activities  Encourage regular physical exercise  Address issues that may be impacting sleep   Ex: Urology consult for frequent nighttime urination, address chronic pain that may be interfering with sleep, moving to a different room if his roommate snores loudly, make sure the room is a comfortable temperature and he has adequate pillows and blankets  Ensure he gets out into the sunlight at least once per day to encourage regular circadian cycle  No caffeine, sugar, or large meals within two hours of bedtime   Make sure room at night is dark and quiet and minimize nighttime interruptions from staff unless medically necessary   Try to help pt go to sleep and wake up at the same time every day  Monitor closely for worsening psychiatric symptoms (insomnia, social withdrawal, deterioration of personal hygiene, hostility, confusing or nonsensical speech, paranoia, hallucinations) and intervene promptly. Assess for possible antecedents, modify environment appropriately.            SLEEP HYGIENE     Poor sleep has a negative effect on cognition. Several strategies have been shown to improve sleep:     Caffeine intake in the afternoon and evening, as well as stuffing oneself at supper, can decrease the quality of restful sleep throughout the night.   Bedtime and wake-up times should be consistent every night and morning so the body becomes used to a single routine, even on the weekends.  Engage in daily physical activity, but not 2-3 hours before bedtime.   No technology use (television, computer, iPad) 1-2 hours before bed.   Have a wind down routine (e.g., soft lights in the house, bath before bed,  reduced fluid intake, songs, reading, less noise) to promote sleep readiness.   Visit the www.sleepfoundation.org for more strategies.      COGNITIVE HYGIENE     Engage in regular exercise, which increases alertness and arousal and can improve attention and focus.  Consider lower impact exercises, such as yoga or light walking.  Get a good nights sleep, as this can enhance alertness and cognition.  Eat healthy foods and balanced meals. It is notable that research indicates certain nutrients may aid in brain function, such as B vitamins (especially B6, B12, and folic acid), antioxidants (such as vitamins C and E, and beta carotene), and Omega-3 fatty acids. Talk with your physician or nutritionist about whats right for you.   Keep your brain active. Find activities to stay mentally active, such as reading, games (cards, checkers), puzzles (crosswords, Sudoku, jig saw), crafts (models, woodworking), gardening, or participating in activities in the community.  Stay socially engaged. Continue staying active with your family and friends.      FUTURE PLANNING     The patient and caregivers should consider formal arrangements to allow a designated person to make medical and financial decisions for the pt, should he/she become unable to do so.  Options to consider include designating a healthcare proxy, medical and/or financial power of , and completing advanced directives for healthcare decisions and estate planning (e.g., finalizing a will).  If cost is prohibitive, [facility] Services (https://[employer].org/) provides free  for individuals with low income.       ADDITIONAL RESOURCES     Alzheimer's Services of the [facility] (www.http://alzbr.org) have good local caregiver and patient resources.   Consider resources for support through the Governors Office of Elderly Affairs (http://goea.[place].gov/), [place] Chapter of the Alzheimers Association (www.alz.org/[place]/), the Family  Caregiver Nancy (www.caregiver.org), and the American Psychological Association (http://www.apa.org/pi/about/publications/caregivers/consumers/index.aspxconsumers/index.aspx).  For More Information About Hallucinations, Delusions, and Paranoia in Alzheimer's [facility] Alzheimer's and related Dementias Education and Referral (ADEAR) [facility] 1-535.983.5813 (toll-free) adear@angelica.nih.gov www.angelica.nih.gov/alzheimers The National [place] on Aging's [facility]AR Center offers information and free print publications about Alzheimer's disease and related dementias for families, caregivers, and health professionals. ADEAR Center staff answer telephone, email, and written requests and make referrals to local and national resources  MEDICAL/SURGICAL COMORBIDITIES     All relevant medical comorbidities noted and managed by primary care physician and medical care team.          MISCELLANEOUS MEDICAL PROBLEMS       HEALTHY LIFESTYLE AND PREVENTATIVE CARE    Encouraged the patient to adhere to the age-appropriate health maintenance guidelines including screening tests and vaccinations.     Discussed the overall importance of healthy lifestyle, optimal weight, exercise, healthy diet, good sleep hygiene and avoiding drugs including smoking, alcohol and recreational drugs. The patient verbalized full understanding.       Advised the patient to follow COVID-19 prevention measures.       I spent 135 minutes more than 50 % face to face with the patient    time spent in counseling and coordination of care including discussions etiology of diagnosis, pathogenesis of diagnosis, prognosis of diagnosis,, diagnostic results, impression and recommendations, diagnostic studies, management, risks and benefits of treatment, instructions of disease self-management, treatment instructions, follow up requirements, patient and family counseling/involvement in care compliance with treatment regimen. All of the patient's questions were answered during this  "discussion.       Lorin Martins, MSN NP      Collaborating Provider: Mc Faye MD, FAAN Neurologist/Epileptologist         [1]   Current Outpatient Medications:     ACCU-CHEK DAVID PLUS TEST STRP Strp, TEST BLOOD SUGAR THREE TIMES A DAY AS NEEDED, Disp: 200 strip, Rfl: 12    ACCU-CHEK SOFTCLIX LANCETS [facility] [place] USE TO TEST TWICE DAILY, Disp: 100 each, Rfl: 12    albuterol (PROVENTIL) 2.5 mg /3 mL (0.083 %) nebulizer solution, USE 1 VIAL IN NEBULIZER EVERY 4 HOURS - and as need for rescue (max 30 doses/month), Disp: 360 mL, Rfl: 11    albuterol (PROVENTIL/VENTOLIN HFA) 90 mcg/actuation inhaler, INHALE 2 PUFFS INTO THE LUNGS EVERY 4 HOURS AS NEEDED FOR WHEEZING OR SHORTNESS OF BREATH. RESCUE, Disp: 8.5 g, Rfl: 11    alfuzosin (UROXATRAL) 10 mg Tb24, , Disp: , Rfl:     arformoteroL (BROVANA) 15 mcg/2 mL Nebu, USE 1 VIAL  IN  NEBULIZER TWICE  DAILY - Morning and Evening, Disp: 60 each, Rfl: 11    atorvastatin (LIPITOR) 40 MG tablet, Take 1 tablet (40 mg total) by mouth once daily., Disp: 90 tablet, Rfl: 3    BD ULTRA-FINE VINNIE PEN NEEDLE 32 gauge x 5/32" Ndle, Inject insulin into skin once daily, Disp: 200 each, Rfl: 3    blood pressure monitor Kit, 1 each by Misc.(Non-Drug; Combo Route) route daily as needed., Disp: 1 each, Rfl: 0    budesonide (PULMICORT) 0.5 mg/2 mL nebulizer solution, Take 2 mLs (0.5 mg total) by nebulization 2 (two) times a day., Disp: 60 mL, Rfl: 11    bumetanide (BUMEX) 2 MG tablet, TAKE 1 TABLET BY MOUTH TWO TIMES DAILY., Disp: 180 tablet, Rfl: 2    busPIRone (BUSPAR) 7.5 MG tablet, Take 1 tablet (7.5 mg total) by mouth daily as needed (anxiety)., Disp: 30 tablet, Rfl: 2    docusate sodium (COLACE) 100 MG capsule, Take 100 mg by mouth., Disp: , Rfl:     dutasteride (AVODART) 0.5 mg capsule, Take 0.5 mg by mouth once daily., Disp: , Rfl:     ferrous sulfate 325 (65 FE) MG EC tablet, Take 1 tablet (325 mg total) by mouth once daily., Disp: 90 tablet, Rfl: 3    fluticasone propionate (FLONASE) 50 " "mcg/actuation nasal spray, USE TWO SPRAYS IN EACH NOSTRIL EVERY DAY., Disp: 48 g, Rfl: 1    GEMTESA 75 mg Tab, Take 1 tablet by mouth., Disp: , Rfl:     hydrocodone-acetaminophen 10-325mg (NORCO)  mg Tab, , Disp: , Rfl: 0    insulin glargine U-100, Lantus, (LANTUS SOLOSTAR U-100 INSULIN) 100 unit/mL (3 mL) InPn pen, Inject 15 Units into the skin every evening., Disp: 15 mL, Rfl: 0    isosorbide dinitrate (ISORDIL) 30 MG Tab, Take 30 mg by mouth., Disp: , Rfl:     isosorbide mononitrate (IMDUR) 30 MG 24 hr tablet, , Disp: , Rfl:     lancets 32 gauge Misc, 1 lancet by Misc.(Non-Drug; Combo Route) route 2 (two) times daily., Disp: 200 each, Rfl: 12    ofloxacin (FLOXIN) 0.3 % otic solution, Place 5 drops into the left ear 2 (two) times daily., Disp: 5 mL, Rfl: 0    pantoprazole (PROTONIX) 40 MG tablet, TAKE 1 TABLET BY MOUTH ONCE DAILY., Disp: 90 tablet, Rfl: 2    pen needle, diabetic (BD ULTRA-FINE VINNIE PEN NEEDLE) 32 gauge x 5/32" Ndle, 1 Units by Misc.(Non-Drug; Combo Route) route once daily., Disp: 100 each, Rfl: 6    potassium chloride (KLOR-CON) 10 MEQ TbSR, TAKE 1 TABLET BY MOUTH ONCE DAILY., Disp: 90 tablet, Rfl: 2    tamsulosin (FLOMAX) 0.4 mg Cp24, , Disp: , Rfl: 11    TRUEPLUS INSULIN 0.5 mL 31 gauge x 5/16" Syrg, INJECT TWO TIMES A DAY AS DIRECTED, Disp: 100 each, Rfl: 6    valsartan (DIOVAN) 160 MG tablet, TAKE ONE TABLET BY MOUTH ONCE A DAY, Disp: 90 tablet, Rfl: 1    blood-glucose meter kit, Use as instructed, Disp: 1 each, Rfl: 0    gabapentin (NEURONTIN) 100 MG capsule, Take 1 capsule (100 mg total) by mouth every evening., Disp: 30 capsule, Rfl: 4    memantine (NAMENDA) 10 MG Tab, Take 1 tablet (10 mg total) by mouth 2 (two) times daily. Give 1/2 tablet BID the first week, then increase to a whole tablet thereafter., Disp: 60 tablet, Rfl: 11  [2]   Social History  Socioeconomic History    Marital status:    Tobacco Use    Smoking status: Former     Current packs/day: 0.00     Average " packs/day: 0.5 packs/day for 43.0 years (21.5 ttl pk-yrs)     Types: Cigarettes     Start date:      Quit date: 2018     Years since quittin.4    Smokeless tobacco: Former   Substance and Sexual Activity    Alcohol use: No    Drug use: Never     Social Drivers of Health     Financial Resource Strain: Low Risk  (10/2/2023)    Overall Financial Resource Strain (CARDIA)     Difficulty of Paying Living Expenses: Not hard at all   Food Insecurity: No Food Insecurity (10/2/2023)    Hunger Vital Sign     Worried About Running Out of Food in the Last Year: Never true     Ran Out of Food in the Last Year: Never true   Transportation Needs: No Transportation Needs (10/2/2023)    PRAPARE - Transportation     Lack of Transportation (Medical): No     Lack of Transportation (Non-Medical): No   Physical Activity: Insufficiently Active (10/2/2023)    Exercise Vital Sign     Days of Exercise per Week: 7 days     Minutes of Exercise per Session: 20 min   Stress: No Stress Concern Present (10/2/2023)    [employer] [place] [employer] - Occupational Stress Questionnaire     Feeling of Stress : Not at all   Housing Stability: Low Risk  (10/2/2023)    Housing Stability Vital Sign     Unable to Pay for Housing in the Last Year: No     Number of Places Lived in the Last Year: 1     Unstable Housing in the Last Year: No

## 2025-06-13 NOTE — PATIENT INSTRUCTIONS
HOME CARE AND IN FACILITY CARE         Falling Down Precautions. Gait is affected by the disease as well.     Avoid driving and access to firearms     Incremental 24/Care     Help with finances and decision making.    Join support group.    Proofing the house and use labeling.    Avoid antihistamines and anticholinergics.    Avoid changing routine.    Use written reminders.    Avoid multitasking.    Healthy diet, exercise (physical and cognitive).    Good sleep hygiene.        HERE ARE 10 WAYS TO REDUCE RISK OF DEMENTIA AND SLOW DOWN THE PROGRESSION OF DEMENTIA        1.Be physically active.    2. Avoid smoking and alcohol consumption.    3. Track your numbers. Keep your blood pressure, cholesterol, blood sugar and weight within recommended ranges.    4. Stay socially connected.    5. Make healthy food choices. Eat a well-balance and healthy diet that rich in cereals, fish, legumes, and vegetables.    6. Reduce stress.     7. Challenge your brain by trying something new, playing games, or learning a new language.    8. Take care of your hearing. Avoid being continuously exposed to loud sounds and wear a hearing aid if hearing does become a problem.    9. Lower risk of falls. Consider installing handrails on all stairs and grab bars in bathrooms.    10. Reduce your exposure to air pollution, such as exposure to exhaust in heavy traffic.         CAREGIVERS COUNSELING     Recommend reading the following books:     The 36-Hour Day and there are many online and printed resources to help caregivers as well.     Alzheimer's Through the Stages.     Dementia with G.R.A.C.E.            PREVENTION OF DELIRIUM       1. Good hydration and avoid electrolyte imbalance  2. Recognize and treat infections immediately especially UTI.  3. Bladder emptying and prevent constipation.   4. Provide stimulating activities and familiar objects  5. Use eyeglasses and hearing aids if needed.   6. Use simple and regular communication about  "people, current place, and time  7. Mobility and range-of-motion exercises  8. Reduce noise, lighting and avoid sleep interruptions  9. Non-narcotic pain management.  10.Nondrug treatment for sleep problems or anxiety  11. Avoid antihistamines.  12. Avoid narcotics.  13. Avoid benzodiazepines.            HELPFUL STRATEGIES FOR THE FAMILY AND CAREGIVERS        BEHAVIORAL MANAGEMENT STRATEGIES     Remember that you cannot reason with acute psychosis or confusion. Unless there is an immediate safety issue, there is no need to challenge or correct the person.  For example, if a pt is hallucinating, do not argue with the person that what they are seeing is not real. If they are expressing paranoia, empathize gently with their fear, and attempt to redirect them to a different activity.   If the person is particularly stuck on a topic or activity, as long as the activity is safe and is not worsening their agitation, you don't need to intervene.     Communicate calmly, simply, and concisely    Reassure the person that they are safe and you are here to help  Try not to express irritation or anger  Speak quietly and calmly, do not shout or threaten the person. Avoid provocation.   Establish verbal contact and provide orientation and reassurance.  Attempt to identify the patient's wants and feelings, listen to what they are saying, and reflect those wants and feelings back to the patient.  Dont use sarcasm as a weapon    Set clear limits on behavior in a calm voice ("You cannot hit the nurse.")  Offer choices and optimism ("Which toothpaste would you like to use today?")    Redirect the person to an alternative behavior ("Can you come help me with this?)    Avoid saying things like, "We already went over this!" "You can't keep doing this." "I already explained this to you"  Instead, simply nod calmly and acknowledge what the person is saying ("Yes, that makes sense."), and then request their help or company in a different " behavior.   Having a mental list of activities the patient enjoys can be helpful with redirection. For example, do they enjoy going for walks? Eating a piece of candy?   If redirection becomes challenging, you can place objects that your family member enjoys strategically in the home in order to use for distraction. This is particularly useful if there are items that they often talk about or frequently ask you questions about; or if they are visually striking. Once you focus the person on this object, talk about it briefly until they are calm and then attempt to redirect to a new behavior.   Sometimes activities which are repetitive can be calming, particularly if there is a comforting sensory element. For example, pt may enjoy folding soft towels or laundry.    Limit overstimulation    Decrease distractions by turning off the TV, computer, any fluorescent lights that hum, etc.  Ask any casual visitors to leave--the fewer people the better  If the person is very agitated, avoid touching them, and respect their personal space  Sit down and ask the person to sit down also     PREVENTATIVE STRATEGIES     Try to help the patient develop a routine and stick to it  Address all immediate safety issues  Does the person still have access to the car and keys? Take the keys away, or disconnect the car battery.  Are they wandering at night? Install locks on the outside doors. A keypad lock works well. Alarms on bedroom doors can also be helpful.   Are they turning on the stove and risking a fire? If you cannot limit their access to the kitchen, you may need to unplug dangerous devices any time you are not in the room.   If the person is exhibiting poor judgment throughout the day, they likely need someone supervising them at all times.   Do they still have access to significant financial assets? Limit their access to accounts, and consult with a  if necessary.   Identify situations that seem to trigger agitation or a  "problematic behavior, and modify the person's environment to minimize or eliminate that trigger.   For example, is their behavior worse if they don't get a good night's sleep? Or if they don't eat or drink enough? If so, prioritize those activities and build behavior plans to support them.  Do they get upset about not being allowed to answer the phone when it rings? Put all of the phones in the house on "silent."   Do they become paranoid that certain family members are trying to take advantage of them? Limit contact with the targeted family member as much as possible, and re-introduce them slowly after some time has passed.   Encourage independence in daily living whenever safely possible  Encourage collaborative decision-making regarding his care as well as daily activities  Encourage regular physical exercise  Address issues that may be impacting sleep   Ex: Urology consult for frequent nighttime urination, address chronic pain that may be interfering with sleep, moving to a different room if his roommate snores loudly, make sure the room is a comfortable temperature and he has adequate pillows and blankets  Ensure he gets out into the sunlight at least once per day to encourage regular circadian cycle  No caffeine, sugar, or large meals within two hours of bedtime   Make sure room at night is dark and quiet and minimize nighttime interruptions from staff unless medically necessary   Try to help pt go to sleep and wake up at the same time every day  Monitor closely for worsening psychiatric symptoms (insomnia, social withdrawal, deterioration of personal hygiene, hostility, confusing or nonsensical speech, paranoia, hallucinations) and intervene promptly. Assess for possible antecedents, modify environment appropriately.            SLEEP HYGIENE     Poor sleep has a negative effect on cognition. Several strategies have been shown to improve sleep:     Caffeine intake in the afternoon and evening, as well as " stuffing oneself at supper, can decrease the quality of restful sleep throughout the night.   Bedtime and wake-up times should be consistent every night and morning so the body becomes used to a single routine, even on the weekends.  Engage in daily physical activity, but not 2-3 hours before bedtime.   No technology use (television, computer, iPad) 1-2 hours before bed.   Have a wind down routine (e.g., soft lights in the house, bath before bed, reduced fluid intake, songs, reading, less noise) to promote sleep readiness.   Visit the www.sleepfoundation.org for more strategies.      COGNITIVE HYGIENE     Engage in regular exercise, which increases alertness and arousal and can improve attention and focus.  Consider lower impact exercises, such as yoga or light walking.  Get a good nights sleep, as this can enhance alertness and cognition.  Eat healthy foods and balanced meals. It is notable that research indicates certain nutrients may aid in brain function, such as B vitamins (especially B6, B12, and folic acid), antioxidants (such as vitamins C and E, and beta carotene), and Omega-3 fatty acids. Talk with your physician or nutritionist about whats right for you.   Keep your brain active. Find activities to stay mentally active, such as reading, games (cards, checkers), puzzles (crosswords, Sudoku, jig saw), crafts (models, woodworking), gardening, or participating in activities in the community.  Stay socially engaged. Continue staying active with your family and friends.      FUTURE PLANNING     The patient and caregivers should consider formal arrangements to allow a designated person to make medical and financial decisions for the pt, should he/she become unable to do so.  Options to consider include designating a healthcare proxy, medical and/or financial power of , and completing advanced directives for healthcare decisions and estate planning (e.g., finalizing a will).  If cost is prohibitive,  [place] [employer] (https://ls.org/) provides free  for individuals with low income.       ADDITIONAL RESOURCES     Alzheimer's Services of the [facility] (www.http://alzbr.org) have good local caregiver and patient resources.   Consider resources for support through the [facility] Elder[facility] Affairs (http://goea.[place].[facility]/), [place] Chapter of the Alzheimers Association (www.alz.org/[facility]/), the Family Caregiver [place] (www.caregiver.org), and the American Psychological Association (http://www.apa.org/pi/about/publications/caregivers/consumers/index.aspxconsumers/index.aspx).  For More Information About Hallucinations, Delusions, and Paranoia in Alzheimer's [facility]'s and related Dementias Education and Referral ([facility]) [facility] 1-820.872.3487 (toll-free) caseyar@[facility].nih.gov www.[facility].nih.gov/alzheimers The [facility] [place] on Aging's [facility] offers information and free print publications about Alzheimer's disease and related dementias for families, caregivers, and health professionals. [facility] staff answer telephone, email, and written requests and make referrals to local and national resources

## 2025-06-16 LAB
ANA (OHS): NORMAL
IMMUNOLOGIST REVIEW: ABNORMAL
M PHOSPHO-TAU 217: 1.5 PG/ML

## 2025-06-17 LAB — LC PHOSPHO-TAU (181P): 5.46 PG/ML (ref 0–0.97)

## 2025-07-01 ENCOUNTER — HOSPITAL ENCOUNTER (OUTPATIENT)
Dept: RADIOLOGY | Facility: HOSPITAL | Age: 85
Discharge: HOME | End: 2025-07-01
Attending: NURSE PRACTITIONER
Payer: MEDICARE

## 2025-07-01 DIAGNOSIS — F03.90 DEMENTIA WITHOUT BEHAVIORAL DISTURBANCE: ICD-10-CM

## 2025-07-01 PROCEDURE — 70496 CT ANGIOGRAPHY HEAD: CPT | Mod: TC

## 2025-07-01 PROCEDURE — 70496 CT ANGIOGRAPHY HEAD: CPT | Mod: 26,,, | Performed by: STUDENT IN AN ORGANIZED HEALTH CARE EDUCATION/TRAINING PROGRAM

## 2025-07-01 PROCEDURE — 25500020 PHARM REV CODE 255: Performed by: NURSE PRACTITIONER

## 2025-07-01 PROCEDURE — 70498 CT ANGIOGRAPHY NECK: CPT | Mod: 26,,, | Performed by: STUDENT IN AN ORGANIZED HEALTH CARE EDUCATION/TRAINING PROGRAM

## 2025-07-01 RX ADMIN — IOHEXOL 100 ML: 350 INJECTION, SOLUTION INTRAVENOUS at 02:07

## 2025-08-06 ENCOUNTER — TELEPHONE (OUTPATIENT)
Dept: NEUROLOGY | Facility: CLINIC | Age: 85
End: 2025-08-06
Payer: MEDICARE

## 2025-08-11 ENCOUNTER — TELEPHONE (OUTPATIENT)
Dept: NEUROLOGY | Facility: CLINIC | Age: 85
End: 2025-08-11
Payer: MEDICARE